# Patient Record
Sex: MALE | Race: WHITE | NOT HISPANIC OR LATINO | Employment: FULL TIME | URBAN - NONMETROPOLITAN AREA
[De-identification: names, ages, dates, MRNs, and addresses within clinical notes are randomized per-mention and may not be internally consistent; named-entity substitution may affect disease eponyms.]

---

## 2018-01-01 ENCOUNTER — APPOINTMENT (EMERGENCY)
Dept: RADIOLOGY | Facility: HOSPITAL | Age: 63
End: 2018-01-01
Payer: COMMERCIAL

## 2018-01-01 ENCOUNTER — APPOINTMENT (EMERGENCY)
Dept: CT IMAGING | Facility: HOSPITAL | Age: 63
End: 2018-01-01
Payer: COMMERCIAL

## 2018-01-01 ENCOUNTER — HOSPITAL ENCOUNTER (EMERGENCY)
Facility: HOSPITAL | Age: 63
End: 2018-01-01
Attending: EMERGENCY MEDICINE | Admitting: EMERGENCY MEDICINE
Payer: COMMERCIAL

## 2018-01-01 ENCOUNTER — HOSPITAL ENCOUNTER (INPATIENT)
Facility: HOSPITAL | Age: 63
LOS: 2 days | Discharge: HOME | DRG: 183 | End: 2018-01-03
Attending: SURGERY | Admitting: SURGERY
Payer: COMMERCIAL

## 2018-01-01 VITALS
SYSTOLIC BLOOD PRESSURE: 148 MMHG | DIASTOLIC BLOOD PRESSURE: 88 MMHG | RESPIRATION RATE: 20 BRPM | TEMPERATURE: 99.2 F | OXYGEN SATURATION: 100 % | WEIGHT: 187.61 LBS | HEART RATE: 110 BPM

## 2018-01-01 DIAGNOSIS — K92.1 MELENA: ICD-10-CM
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
## 2018-01-01 DIAGNOSIS — E11.9 NEW ONSET TYPE 2 DIABETES MELLITUS (HCC): ICD-10-CM

## 2018-01-01 DIAGNOSIS — D64.9 ANEMIA: ICD-10-CM

## 2018-01-01 DIAGNOSIS — I21.A1 NON-ST ELEVATION MYOCARDIAL INFARCTION (NSTEMI), TYPE 2: ICD-10-CM

## 2018-01-01 DIAGNOSIS — S22.49XA MULTIPLE RIB FRACTURES: ICD-10-CM

## 2018-01-01 DIAGNOSIS — I21.4 NSTEMI (NON-ST ELEVATED MYOCARDIAL INFARCTION) (HCC): ICD-10-CM

## 2018-01-01 DIAGNOSIS — K92.2 GASTROINTESTINAL HEMORRHAGE, UNSPECIFIED GASTROINTESTINAL HEMORRHAGE TYPE: Primary | ICD-10-CM

## 2018-01-01 LAB
ABO GROUP BLD BPU: NORMAL
ABO GROUP BLD BPU: NORMAL
ABO GROUP BLD: NORMAL
ACETONE SERPL-MCNC: NEGATIVE MG/DL
ALBUMIN SERPL BCP-MCNC: 3.2 G/DL (ref 3.5–5)
ALP SERPL-CCNC: 50 U/L (ref 46–116)
ALT SERPL W P-5'-P-CCNC: 33 U/L (ref 12–78)
ANION GAP SERPL CALCULATED.3IONS-SCNC: 18 MMOL/L (ref 4–13)
ANION GAP SERPL CALCULATED.3IONS-SCNC: 9 MMOL/L (ref 4–13)
APTT PPP: 24 SECONDS (ref 23–35)
AST SERPL W P-5'-P-CCNC: 27 U/L (ref 5–45)
ATRIAL RATE: 110 BPM
BACTERIA UR QL AUTO: ABNORMAL /HPF
BASOPHILS # BLD AUTO: 0.01 THOUSANDS/ΜL (ref 0–0.1)
BASOPHILS NFR BLD AUTO: 0 % (ref 0–1)
BILIRUB SERPL-MCNC: 1.3 MG/DL (ref 0.2–1)
BILIRUB UR QL STRIP: NEGATIVE
BLD GP AB SCN SERPL QL: NEGATIVE
BPU ID: NORMAL
BPU ID: NORMAL
BUN SERPL-MCNC: 35 MG/DL (ref 5–25)
BUN SERPL-MCNC: 65 MG/DL (ref 5–25)
CALCIUM SERPL-MCNC: 7.8 MG/DL (ref 8.3–10.1)
CALCIUM SERPL-MCNC: 8.6 MG/DL (ref 8.3–10.1)
CHLORIDE SERPL-SCNC: 110 MMOL/L (ref 100–108)
CHLORIDE SERPL-SCNC: 96 MMOL/L (ref 100–108)
CK MB SERPL-MCNC: 1.4 % (ref 0–2.5)
CK MB SERPL-MCNC: 2.3 NG/ML (ref 0–5)
CK SERPL-CCNC: 163 U/L (ref 39–308)
CLARITY UR: CLEAR
CO2 SERPL-SCNC: 20 MMOL/L (ref 21–32)
CO2 SERPL-SCNC: 25 MMOL/L (ref 21–32)
COLOR UR: ABNORMAL
CREAT SERPL-MCNC: 0.99 MG/DL (ref 0.6–1.3)
CREAT SERPL-MCNC: 1.61 MG/DL (ref 0.6–1.3)
CROSSMATCH: NORMAL
CROSSMATCH: NORMAL
EOSINOPHIL # BLD AUTO: 0 THOUSAND/ΜL (ref 0–0.61)
EOSINOPHIL NFR BLD AUTO: 0 % (ref 0–6)
ERYTHROCYTE [DISTWIDTH] IN BLOOD BY AUTOMATED COUNT: 12.4 % (ref 11.6–15.1)
ETHANOL SERPL-MCNC: <3 MG/DL (ref 0–3)
GFR SERPL CREATININE-BSD FRML MDRD: 45 ML/MIN/1.73SQ M
GFR SERPL CREATININE-BSD FRML MDRD: 82 ML/MIN/1.73SQ M
GLUCOSE SERPL-MCNC: 119 MG/DL (ref 65–140)
GLUCOSE SERPL-MCNC: 353 MG/DL (ref 65–140)
GLUCOSE SERPL-MCNC: 96 MG/DL (ref 65–140)
GLUCOSE UR STRIP-MCNC: NEGATIVE MG/DL
HCT VFR BLD AUTO: 28.5 % (ref 36.5–49.3)
HGB BLD-MCNC: 9.8 G/DL (ref 12–17)
HGB UR QL STRIP.AUTO: ABNORMAL
INR PPP: 1.11 (ref 0.86–1.16)
KETONES UR STRIP-MCNC: NEGATIVE MG/DL
LACTATE SERPL-SCNC: 1.5 MMOL/L (ref 0.5–2)
LACTATE SERPL-SCNC: 9.4 MMOL/L (ref 0.5–2)
LEUKOCYTE ESTERASE UR QL STRIP: NEGATIVE
LIPASE SERPL-CCNC: 69 U/L (ref 73–393)
LYMPHOCYTES # BLD AUTO: 1.11 THOUSANDS/ΜL (ref 0.6–4.47)
LYMPHOCYTES NFR BLD AUTO: 6 % (ref 14–44)
MAGNESIUM SERPL-MCNC: 2.3 MG/DL (ref 1.6–2.6)
MCH RBC QN AUTO: 32.9 PG (ref 26.8–34.3)
MCHC RBC AUTO-ENTMCNC: 34.4 G/DL (ref 31.4–37.4)
MCV RBC AUTO: 96 FL (ref 82–98)
MONOCYTES # BLD AUTO: 1.23 THOUSAND/ΜL (ref 0.17–1.22)
MONOCYTES NFR BLD AUTO: 6 % (ref 4–12)
NEUTROPHILS # BLD AUTO: 17.18 THOUSANDS/ΜL (ref 1.85–7.62)
NEUTS SEG NFR BLD AUTO: 88 % (ref 43–75)
NITRITE UR QL STRIP: NEGATIVE
NON-SQ EPI CELLS URNS QL MICRO: ABNORMAL /HPF
P AXIS: 32 DEGREES
PH UR STRIP.AUTO: 5 [PH] (ref 4.5–8)
PLATELET # BLD AUTO: 216 THOUSANDS/UL (ref 149–390)
PMV BLD AUTO: 10.7 FL (ref 8.9–12.7)
POTASSIUM SERPL-SCNC: 3.2 MMOL/L (ref 3.5–5.3)
POTASSIUM SERPL-SCNC: 3.5 MMOL/L (ref 3.5–5.3)
PR INTERVAL: 136 MS
PROT SERPL-MCNC: 6.6 G/DL (ref 6.4–8.2)
PROT UR STRIP-MCNC: NEGATIVE MG/DL
PROTHROMBIN TIME: 14.2 SECONDS (ref 12.1–14.4)
QRS AXIS: 6 DEGREES
QRSD INTERVAL: 94 MS
QT INTERVAL: 344 MS
QTC INTERVAL: 465 MS
RBC # BLD AUTO: 2.98 MILLION/UL (ref 3.88–5.62)
RBC #/AREA URNS AUTO: ABNORMAL /HPF
RH BLD: POSITIVE
SODIUM SERPL-SCNC: 134 MMOL/L (ref 136–145)
SODIUM SERPL-SCNC: 144 MMOL/L (ref 136–145)
SP GR UR STRIP.AUTO: <=1.005 (ref 1–1.03)
SPECIMEN EXPIRATION DATE: NORMAL
T WAVE AXIS: 3 DEGREES
TROPONIN I SERPL-MCNC: 0.07 NG/ML
TROPONIN I SERPL-MCNC: 0.07 NG/ML
TROPONIN I SERPL-MCNC: 0.09 NG/ML
TROPONIN I SERPL-MCNC: 0.11 NG/ML
TROPONIN I SERPL-MCNC: 0.11 NG/ML
UNIT DISPENSE STATUS: NORMAL
UNIT DISPENSE STATUS: NORMAL
UNIT PRODUCT CODE: NORMAL
UNIT PRODUCT CODE: NORMAL
UNIT RH: NORMAL
UNIT RH: NORMAL
UROBILINOGEN UR QL STRIP.AUTO: 0.2 E.U./DL
VENTRICULAR RATE: 110 BPM
WBC # BLD AUTO: 19.53 THOUSAND/UL (ref 4.31–10.16)
WBC #/AREA URNS AUTO: ABNORMAL /HPF

## 2018-01-01 PROCEDURE — 83605 ASSAY OF LACTIC ACID: CPT | Performed by: SURGERY

## 2018-01-01 PROCEDURE — 84484 ASSAY OF TROPONIN QUANT: CPT | Performed by: EMERGENCY MEDICINE

## 2018-01-01 PROCEDURE — C9113 INJ PANTOPRAZOLE SODIUM, VIA: HCPCS | Performed by: SURGERY

## 2018-01-01 PROCEDURE — 71045 X-RAY EXAM CHEST 1 VIEW: CPT

## 2018-01-01 PROCEDURE — 85730 THROMBOPLASTIN TIME PARTIAL: CPT | Performed by: EMERGENCY MEDICINE

## 2018-01-01 PROCEDURE — P9021 RED BLOOD CELLS UNIT: HCPCS

## 2018-01-01 PROCEDURE — 83690 ASSAY OF LIPASE: CPT | Performed by: EMERGENCY MEDICINE

## 2018-01-01 PROCEDURE — 93005 ELECTROCARDIOGRAM TRACING: CPT | Performed by: EMERGENCY MEDICINE

## 2018-01-01 PROCEDURE — 96365 THER/PROPH/DIAG IV INF INIT: CPT

## 2018-01-01 PROCEDURE — 70450 CT HEAD/BRAIN W/O DYE: CPT

## 2018-01-01 PROCEDURE — 90471 IMMUNIZATION ADMIN: CPT

## 2018-01-01 PROCEDURE — 80320 DRUG SCREEN QUANTALCOHOLS: CPT | Performed by: EMERGENCY MEDICINE

## 2018-01-01 PROCEDURE — 99285 EMERGENCY DEPT VISIT HI MDM: CPT

## 2018-01-01 PROCEDURE — 87505 NFCT AGENT DETECTION GI: CPT | Performed by: SURGERY

## 2018-01-01 PROCEDURE — 90715 TDAP VACCINE 7 YRS/> IM: CPT | Performed by: EMERGENCY MEDICINE

## 2018-01-01 PROCEDURE — 93005 ELECTROCARDIOGRAM TRACING: CPT

## 2018-01-01 PROCEDURE — 93005 ELECTROCARDIOGRAM TRACING: CPT | Performed by: SURGERY

## 2018-01-01 PROCEDURE — 86850 RBC ANTIBODY SCREEN: CPT | Performed by: EMERGENCY MEDICINE

## 2018-01-01 PROCEDURE — 71250 CT THORAX DX C-: CPT

## 2018-01-01 PROCEDURE — 83605 ASSAY OF LACTIC ACID: CPT | Performed by: EMERGENCY MEDICINE

## 2018-01-01 PROCEDURE — C9113 INJ PANTOPRAZOLE SODIUM, VIA: HCPCS | Performed by: EMERGENCY MEDICINE

## 2018-01-01 PROCEDURE — 82948 REAGENT STRIP/BLOOD GLUCOSE: CPT

## 2018-01-01 PROCEDURE — 85025 COMPLETE CBC W/AUTO DIFF WBC: CPT | Performed by: EMERGENCY MEDICINE

## 2018-01-01 PROCEDURE — 87040 BLOOD CULTURE FOR BACTERIA: CPT | Performed by: EMERGENCY MEDICINE

## 2018-01-01 PROCEDURE — 96361 HYDRATE IV INFUSION ADD-ON: CPT

## 2018-01-01 PROCEDURE — 73564 X-RAY EXAM KNEE 4 OR MORE: CPT

## 2018-01-01 PROCEDURE — 80048 BASIC METABOLIC PNL TOTAL CA: CPT | Performed by: SURGERY

## 2018-01-01 PROCEDURE — 86900 BLOOD TYPING SEROLOGIC ABO: CPT | Performed by: EMERGENCY MEDICINE

## 2018-01-01 PROCEDURE — 86901 BLOOD TYPING SEROLOGIC RH(D): CPT | Performed by: EMERGENCY MEDICINE

## 2018-01-01 PROCEDURE — 85610 PROTHROMBIN TIME: CPT | Performed by: EMERGENCY MEDICINE

## 2018-01-01 PROCEDURE — 82009 KETONE BODYS QUAL: CPT | Performed by: EMERGENCY MEDICINE

## 2018-01-01 PROCEDURE — 86920 COMPATIBILITY TEST SPIN: CPT

## 2018-01-01 PROCEDURE — 74177 CT ABD & PELVIS W/CONTRAST: CPT

## 2018-01-01 PROCEDURE — 87493 C DIFF AMPLIFIED PROBE: CPT | Performed by: SURGERY

## 2018-01-01 PROCEDURE — 81001 URINALYSIS AUTO W/SCOPE: CPT | Performed by: EMERGENCY MEDICINE

## 2018-01-01 PROCEDURE — 72125 CT NECK SPINE W/O DYE: CPT

## 2018-01-01 PROCEDURE — 82272 OCCULT BLD FECES 1-3 TESTS: CPT

## 2018-01-01 PROCEDURE — 36430 TRANSFUSION BLD/BLD COMPNT: CPT

## 2018-01-01 PROCEDURE — 83735 ASSAY OF MAGNESIUM: CPT | Performed by: EMERGENCY MEDICINE

## 2018-01-01 PROCEDURE — 82550 ASSAY OF CK (CPK): CPT | Performed by: EMERGENCY MEDICINE

## 2018-01-01 PROCEDURE — 84484 ASSAY OF TROPONIN QUANT: CPT | Performed by: SURGERY

## 2018-01-01 PROCEDURE — 80053 COMPREHEN METABOLIC PANEL: CPT | Performed by: EMERGENCY MEDICINE

## 2018-01-01 PROCEDURE — 36415 COLL VENOUS BLD VENIPUNCTURE: CPT | Performed by: EMERGENCY MEDICINE

## 2018-01-01 PROCEDURE — 82553 CREATINE MB FRACTION: CPT | Performed by: EMERGENCY MEDICINE

## 2018-01-01 PROCEDURE — 96374 THER/PROPH/DIAG INJ IV PUSH: CPT

## 2018-01-01 PROCEDURE — 36415 COLL VENOUS BLD VENIPUNCTURE: CPT | Performed by: SURGERY

## 2018-01-01 RX ORDER — SODIUM CHLORIDE, SODIUM LACTATE, POTASSIUM CHLORIDE, CALCIUM CHLORIDE 600; 310; 30; 20 MG/100ML; MG/100ML; MG/100ML; MG/100ML
125 INJECTION, SOLUTION INTRAVENOUS CONTINUOUS
Status: DISCONTINUED | OUTPATIENT
Start: 2018-01-01 | End: 2018-01-03

## 2018-01-01 RX ORDER — LIDOCAINE 50 MG/G
1 PATCH TOPICAL DAILY
Status: DISCONTINUED | OUTPATIENT
Start: 2018-01-01 | End: 2018-01-03 | Stop reason: HOSPADM

## 2018-01-01 RX ORDER — PANTOPRAZOLE SODIUM 40 MG/1
40 INJECTION, POWDER, FOR SOLUTION INTRAVENOUS EVERY 12 HOURS SCHEDULED
Status: DISCONTINUED | OUTPATIENT
Start: 2018-01-01 | End: 2018-01-03 | Stop reason: HOSPADM

## 2018-01-01 RX ORDER — OXYCODONE HYDROCHLORIDE 5 MG/1
5 TABLET ORAL EVERY 4 HOURS PRN
Status: DISCONTINUED | OUTPATIENT
Start: 2018-01-01 | End: 2018-01-03 | Stop reason: HOSPADM

## 2018-01-01 RX ORDER — ACETAMINOPHEN 325 MG/1
650 TABLET ORAL EVERY 6 HOURS SCHEDULED
Status: DISCONTINUED | OUTPATIENT
Start: 2018-01-01 | End: 2018-01-03 | Stop reason: HOSPADM

## 2018-01-01 RX ORDER — VALSARTAN AND HYDROCHLOROTHIAZIDE 320; 25 MG/1; MG/1
1 TABLET, FILM COATED ORAL DAILY
COMMUNITY
End: 2021-01-12 | Stop reason: SDUPTHER

## 2018-01-01 RX ORDER — ONDANSETRON 2 MG/ML
4 INJECTION INTRAMUSCULAR; INTRAVENOUS EVERY 6 HOURS PRN
Status: DISCONTINUED | OUTPATIENT
Start: 2018-01-01 | End: 2018-01-03 | Stop reason: HOSPADM

## 2018-01-01 RX ORDER — ONDANSETRON 2 MG/ML
4 INJECTION INTRAMUSCULAR; INTRAVENOUS ONCE
Status: COMPLETED | OUTPATIENT
Start: 2018-01-01 | End: 2018-01-01

## 2018-01-01 RX ORDER — OXYCODONE HYDROCHLORIDE 10 MG/1
10 TABLET ORAL EVERY 4 HOURS PRN
Status: DISCONTINUED | OUTPATIENT
Start: 2018-01-01 | End: 2018-01-03 | Stop reason: HOSPADM

## 2018-01-01 RX ADMIN — LIDOCAINE 1 PATCH: 50 PATCH TOPICAL at 14:28

## 2018-01-01 RX ADMIN — TETANUS TOXOID, REDUCED DIPHTHERIA TOXOID AND ACELLULAR PERTUSSIS VACCINE, ADSORBED 0.5 ML: 5; 2.5; 8; 8; 2.5 SUSPENSION INTRAMUSCULAR at 09:04

## 2018-01-01 RX ADMIN — SODIUM CHLORIDE, SODIUM LACTATE, POTASSIUM CHLORIDE, AND CALCIUM CHLORIDE 1000 ML: .6; .31; .03; .02 INJECTION, SOLUTION INTRAVENOUS at 13:28

## 2018-01-01 RX ADMIN — ONDANSETRON 4 MG: 2 INJECTION INTRAMUSCULAR; INTRAVENOUS at 09:03

## 2018-01-01 RX ADMIN — ACETAMINOPHEN 650 MG: 325 TABLET, FILM COATED ORAL at 20:09

## 2018-01-01 RX ADMIN — SODIUM CHLORIDE 80 MG: 900 INJECTION, SOLUTION INTRAVENOUS at 09:13

## 2018-01-01 RX ADMIN — ACETAMINOPHEN 650 MG: 325 TABLET, FILM COATED ORAL at 14:25

## 2018-01-01 RX ADMIN — DEXTROSE MONOHYDRATE 3.38 G: 50 INJECTION, SOLUTION INTRAVENOUS at 20:11

## 2018-01-01 RX ADMIN — DEXTROSE MONOHYDRATE 3.38 G: 50 INJECTION, SOLUTION INTRAVENOUS at 14:26

## 2018-01-01 RX ADMIN — SODIUM CHLORIDE 1000 ML: 0.9 INJECTION, SOLUTION INTRAVENOUS at 08:53

## 2018-01-01 RX ADMIN — SODIUM CHLORIDE 2553 ML: 0.9 INJECTION, SOLUTION INTRAVENOUS at 09:41

## 2018-01-01 RX ADMIN — SODIUM CHLORIDE, SODIUM LACTATE, POTASSIUM CHLORIDE, AND CALCIUM CHLORIDE 125 ML/HR: .6; .31; .03; .02 INJECTION, SOLUTION INTRAVENOUS at 17:30

## 2018-01-01 RX ADMIN — IOHEXOL 100 ML: 350 INJECTION, SOLUTION INTRAVENOUS at 08:46

## 2018-01-01 RX ADMIN — PANTOPRAZOLE SODIUM 40 MG: 40 INJECTION, POWDER, FOR SOLUTION INTRAVENOUS at 21:49

## 2018-01-01 NOTE — H&P
H&P Exam - Trauma   Akil Mayo 65 y o  male MRN: [id number]  Unit/Bed#: ED 22 Encounter: [id number]    Assessment/Plan   Trauma Alert: Evaluation  Model of Arrival: Transfer [facility], [place]  Trauma Team: Attending Dr Walter Shelby and Residents Dr Lorraine Huff  Consultants: None    Trauma Active Problems/Plan:     Bloody diarrhea, non-bloody vomiting, dehydrartion  - Presume infectious at this time  WBC 19 5  Lac 9 4  No obvious source on CTAP  Diverticulosis no diverticulitis  - No abdominal pain  Non-tender   - S/p 2U RBC, 1L NS, and 30cc/kg NS at OSH  - Will give another 1L bolus  - Obtain stool cultures and Cdiff  - F/u blood cultures  - Starting empiric antibiotics - zosyn    Elevated troponin 0 07  - Denies chest pain  - ST depressions on lateral leads seen on EKG  - Obtain f/u troponins  - Repeat EKG    Fall with L 6-10 rib fractures  - Rib fracture protocol  - Scheduled tylenol, robaxin  PRN pain meds  - Resp protocol, IS    Chief Complaint: Left rib pain    History of Present Illness   HPI:  Akil Mayo is a 65 y o  male who presents as trauma transfer following fall down steps  The patient states that on Saturday he developed sudden onset nausea, vomiting and diarrhea  There was no associated abdominal or chest pain  He did admit to some chills but no fever around this time  No sick contacts  No known preceding illness or antibiotics use  The vomiting was never bloody  The diarrhea had bright red blood mixed in and was very voluminous  He then was dehydrated and had a fall down steps  No LOC and didn't hit his head  He was seen at [facility], [place]  He was hypotensive to SBP of 70  He received 2U RBC, 1L and an additional 30cc/kg bolus  Lac 9 4 and leukocytosis  Hgb 9 8  CTH, chest, abdomen and pelvis obtained showing rib fractures 6-10 on left side  He was transferred here for further management  He currently denies abdominal pain, chest pain, nausea, lightheadedness  He has never had a colonoscopy  Mechanism:Fall    Review of Systems   Constitutional: Positive for chills  Negative for fever  Eyes: Negative for photophobia and visual disturbance  Respiratory: Negative for chest tightness and shortness of breath  Cardiovascular: Negative for chest pain and palpitations  Gastrointestinal: Positive for blood in stool, diarrhea, nausea and vomiting  Negative for abdominal pain  Genitourinary: Negative for dysuria and flank pain  Musculoskeletal: Negative for back pain and neck pain  Skin: Negative for rash and wound  Neurological: Negative for dizziness, syncope, weakness, light-headedness, numbness and headaches  Psychiatric/Behavioral: Negative for confusion         Historical Information     Past Medical History:   Diagnosis Date    Hypertension      Past Surgical History:   Procedure Laterality Date    NO PAST SURGERIES       Social History   History   Alcohol Use    Yes     Comment: occassionally     History   Drug Use No     History   Smoking Status    Never Smoker   Smokeless Tobacco    Never Used     Immunization History   Administered Date(s) Administered    Tdap 01/01/2018     Last Tetanus: Today  Family History: Non-contributory    Meds/Allergies   all current active meds have been reviewed, current meds:   Current Facility-Administered Medications   Medication Dose Route Frequency    acetaminophen (TYLENOL) tablet 650 mg  650 mg Oral Q6H [address]    lactated ringers bolus 1,000 mL  1,000 mL Intravenous Once    lactated ringers infusion  125 mL/hr Intravenous Continuous    lidocaine (LIDODERM) 5 % patch 1 patch  1 patch Transdermal Daily    ondansetron (ZOFRAN) injection 4 mg  4 mg Intravenous Q6H PRN    oxyCODONE (ROXICODONE) immediate release tablet 10 mg  10 mg Oral Q4H PRN    oxyCODONE (ROXICODONE) IR tablet 5 mg  5 mg Oral Q4H PRN    pantoprazole (PROTONIX) injection 40 mg  40 mg Intravenous Q12H [address]    piperacillin-tazobactam (ZOSYN) 3 375 g in dextrose 5 % 50 mL IVPB  3 375 g Intravenous Q6H    and PTA meds:   None       No Known Allergies      PHYSICAL EXAM        Objective   Vitals:   First set: Temperature: 99 9 °F (37 7 °C) (01/01/18 1229)  Pulse: (!) 114 (01/01/18 1229)  Respirations: 18 (01/01/18 1229)  Blood Pressure: 148/87 (01/01/18 1229)    Primary Survey:   (A) Airway: Intact  (B) Breathing: B/l breath sounds  (C) Circulation: Pulses:   normal  (D) Disabliity:  GCS Total:  15  (E) Expose:  Completed    Secondary Survey: (Click on Physical Exam tab above)  Physical Exam   Constitutional: He is oriented to person, place, and time  No distress  HENT:   Head: Normocephalic and atraumatic  Neck: Normal range of motion  No JVD present  Cardiovascular: Regular rhythm and intact distal pulses  Tachycardic   Pulmonary/Chest: Effort normal  No respiratory distress  He has no wheezes  Abdominal: Soft  He exhibits no distension  There is no tenderness  There is no rebound and no guarding  Musculoskeletal: Normal range of motion  He exhibits no edema or deformity  Neurological: He is alert and oriented to person, place, and time  Skin: Skin is warm  He is not diaphoretic  No pallor  Psychiatric: He has a normal mood and affect   His behavior is normal  Thought content normal        Invasive Devices     Peripheral Intravenous Line            Peripheral IV 01/01/18 Right Antecubital less than 1 day    Peripheral IV 01/01/18 Right Hand less than 1 day                Lab Results:   Results: I have personally reviewed pertinent reports   , BMP/CMP:   Lab Results   Component Value Date     (L) 01/01/2018    K 3 2 (L) 01/01/2018    CL 96 (L) 01/01/2018    CO2 20 (L) 01/01/2018    ANIONGAP 18 (H) 01/01/2018    BUN 65 (H) 01/01/2018    CREATININE 1 61 (H) 01/01/2018    GLUCOSE 353 (H) 01/01/2018    CALCIUM 8 6 01/01/2018    AST 27 01/01/2018    ALT 33 01/01/2018    ALKPHOS 50 01/01/2018    PROT 6 6 01/01/2018    ALBUMIN 3 2 (L) 01/01/2018    BILITOT 1 30 (H) 01/01/2018    EGFR 45 01/01/2018   , CBC:   Lab Results   Component Value Date    WBC 19 53 (H) 01/01/2018    HGB 9 8 (L) 01/01/2018    HCT 28 5 (L) 01/01/2018    MCV 96 01/01/2018     01/01/2018    MCH 32 9 01/01/2018    MCHC 34 4 01/01/2018    RDW 12 4 01/01/2018    MPV 10 7 01/01/2018   , Coagulation:   Lab Results   Component Value Date    INR 1 11 01/01/2018   , Lactate: No results found for: LACTATE, Troponin:   Lab Results   Component Value Date    TROPONINI 0 07 ([place]) 01/01/2018    and ISTAT: No components found for: VBG  Imaging/EKG Studies: CT Scan Head: Nml, CT Scan C-Spine: Nml, CT Chest: Rib fractures, CT Scan Abdomen/Pelvis: Sclerotic lesions to vertebrae       Code Status: No Order  Advance Directive and Living Will:      Power of :    POLST:

## 2018-01-01 NOTE — ED PROVIDER NOTES
History  Chief Complaint   Patient presents with    Fall     Patient fell coming down steps last night  States he has been sick with N/V/D since the afternoon   Vomiting     81-year-old male presents by private car with complaint of fall down 4 steps at his home yesterday afternoon     This was secondary to lightheadedness  States he was in his usual state of health yesterday but by noon he was experiencing nausea vomiting and diarrhea in the afternoon  He complains of dark stools  They are still slightly formed  He is not anticoagulated  He denies loss of consciousness  He is denying any syncope  He did hit his head  He is denying any headache visual disturbance no difficulties with speech no malocclusion no neck pain denies any anterior chest pain he has some left posterior rib discomfort he denies shortness of breath no pleuritic pain no upper or lower back pain  No abdominal pain  He continues to be slightly nauseated this morning  There has been no sick contacts or travel E no recent antibiotic use  He states his stools have been slightly dark  He has no hip pain no numbness or tingling he feels dehydrated  He is unsure of his last tetanus  ; no prior hx of DM last BM during the night            None       Past Medical History:   Diagnosis Date    Hypertension        Past Surgical History:   Procedure Laterality Date    NO PAST SURGERIES         History reviewed  No pertinent family history  I have reviewed and agree with the history as documented  Social History   Substance Use Topics    Smoking status: Never Smoker    Smokeless tobacco: Never Used    Alcohol use Yes      Comment: occassionally        Review of Systems   Constitutional: Positive for appetite change  Negative for activity change, chills, diaphoresis and fever  HENT: Negative for congestion, ear pain, rhinorrhea, sneezing, sore throat and trouble swallowing  Eyes: Negative for discharge and visual disturbance  Respiratory: Negative for cough, chest tightness, shortness of breath and wheezing  Cardiovascular: Negative for chest pain and leg swelling  Gastrointestinal: Positive for diarrhea, nausea and vomiting  Negative for abdominal pain and blood in stool  Endocrine: Negative for polyuria  Genitourinary: Negative for difficulty urinating, dysuria, frequency and urgency  Musculoskeletal: Negative for back pain (left rib pain), gait problem, myalgias, neck pain and neck stiffness  Skin: Positive for wound (abraision to forehead)  Negative for rash  Neurological: Positive for light-headedness  Negative for dizziness, syncope, weakness, numbness and headaches  Hematological: Negative for adenopathy  Psychiatric/Behavioral: Negative for confusion  All other systems reviewed and are negative  Physical Exam  ED Triage Vitals [01/01/18 0805]   Temperature Pulse Respirations Blood Pressure SpO2   (!) 97 2 °F (36 2 °C) (!) 114 18 110/69 100 %      Temp Source Heart Rate Source Patient Position - Orthostatic VS BP Location FiO2 (%)   Temporal Monitor Lying -- --      Pain Score       --           Orthostatic Vital Signs  Vitals:    01/01/18 1041 01/01/18 1045 01/01/18 1050 01/01/18 1105   BP: 144/78 147/84 147/84 148/88   Pulse: 105 (!) 108 105 (!) 110   Patient Position - Orthostatic VS:  Sitting         Physical Exam   Constitutional: He is oriented to person, place, and time  He appears well-developed  No distress  HENT:   Head: Normocephalic  Right Ear: External ear normal    Left Ear: External ear normal    Nose: Nose normal    Mouth/Throat: No oropharyngeal exudate  Right forehead abrasion; TMS pale bilaterally no facial bone tenderess; dry oropharynx   Eyes: Conjunctivae and EOM are normal  Pupils are equal, round, and reactive to light  Right eye exhibits no discharge  Left eye exhibits no discharge  3mm equal; no periorbital tenderness   Neck: Normal range of motion  Neck supple     No midline or paraspinous tenderenss   Cardiovascular: Regular rhythm, normal heart sounds and intact distal pulses  Pulmonary/Chest: Effort normal and breath sounds normal  No respiratory distress  He has no wheezes  He exhibits no tenderness  Abdominal: Soft  Bowel sounds are normal  He exhibits no distension and no mass  There is no tenderness  There is no guarding  Back no midline T or Lspine tenderness; No CVA tenderness   Genitourinary: Rectal exam shows guaiac positive stool  Genitourinary Comments: Rectal - chaparoned by Abhi Clemente; nml tone heme postitive flaca melena  Prostate NT   Musculoskeletal: Normal range of motion  He exhibits no edema or tenderness  Right shoulder: Normal         Left shoulder: Normal         Right hip: Normal         Left hip: Normal         Left knee: He exhibits normal range of motion, no swelling, no effusion, no ecchymosis, no deformity, no erythema, normal alignment, no LCL laxity, normal patellar mobility, no bony tenderness and no MCL laxity  No tenderness found  No medial joint line, no lateral joint line, no MCL, no LCL and no patellar tendon tenderness noted  Left ankle: Normal         Legs:  Abrasion to left suprascapular region and bilateral flanks as well as above posterior illiac crests; Hip NT bilaterally with ROM intact; rt knee with scan superficial abrasion   Lymphadenopathy:     He has no cervical adenopathy  Neurological: He is alert and oriented to person, place, and time  He displays normal reflexes  No cranial nerve deficit or sensory deficit  He exhibits normal muscle tone  Coordination normal    Gait steady   Skin: Skin is warm and dry  Capillary refill takes less than 2 seconds  He is not diaphoretic  Psychiatric: He has a normal mood and affect  Vitals reviewed        ED Medications  Medications   ondansetron (ZOFRAN) injection 4 mg (4 mg Intravenous Given 1/1/18 0903)   sodium chloride 0 9 % bolus 1,000 mL (0 mL Intravenous Stopped 1/1/18 0940)   tetanus-diphtheria-acellular pertussis (BOOSTRIX) IM injection 0 5 mL (0 5 mL Intramuscular Given 1/1/18 0904)   pantoprazole (PROTONIX) 80 mg in sodium chloride 0 9 % 100 mL IVPB (0 mg Intravenous Stopped 1/1/18 0940)   iohexol (OMNIPAQUE) 350 MG/ML injection (SINGLE-DOSE) 100 mL (100 mL Intravenous Given 1/1/18 0846)   sodium chloride 0 9 % bolus 2,553 mL (0 mL/kg × 85 1 kg Intravenous Stopped 1/1/18 1104)       Diagnostic Studies  Results Reviewed     Procedure Component Value Units Date/Time    Urine Microscopic [[id number]]  (Abnormal) Collected:  01/01/18 1023    Lab Status:  Final result Specimen:  Urine from Urine, Clean Catch Updated:  01/01/18 1041     RBC, UA 1-2 (A) /hpf      WBC, UA 0-1 (A) /hpf      Epithelial Cells Occasional /hpf      Bacteria, UA None Seen /hpf     Fingerstick Glucose (POCT) [[id number]]  (Normal) Collected:  01/01/18 1037    Lab Status:  Final result Updated:  01/01/18 1038     POC Glucose 96 mg/dl     UA w Reflex to Microscopic w Reflex to Culture [[id number]]  (Abnormal) Collected:  01/01/18 1023    Lab Status:  Final result Specimen:  Urine from Urine, Clean Catch Updated:  01/01/18 1030     Color, UA Light Yellow     Clarity, UA Clear     Specific Gravity, UA <=1 005     pH, UA 5 0     Leukocytes, UA Negative     Nitrite, UA Negative     Protein, UA Negative mg/dl      Glucose, UA Negative mg/dl      Ketones, UA Negative mg/dl      Urobilinogen, UA 0 2 E U /dl      Bilirubin, UA Negative     Blood, UA Moderate (A)    CKMB [[id number]]  (Normal) Collected:  01/01/18 0810    Lab Status:  Final result Specimen:  Blood from Arm, Right Updated:  01/01/18 0923     CK-MB Index 1 4 %      CK-MB FRACTION 2 3 ng/mL     Troponin I [[id number]]  (Abnormal) Collected:  01/01/18 0810    Lab Status:  Final result Specimen:  Blood from Arm, Right Updated:  01/01/18 0905     Troponin I 0 07 (HH) ng/mL     Narrative:         [employer] Chemistry analyzer 99% cutoff is > 0 04 ng/mL in network labs    o cTnI 99% cutoff is useful only when applied to patients in the clinical setting of myocardial ischemia  o cTnI 99% cutoff should be interpreted in the context of clinical history, ECG findings and possibly cardiac imaging to establish correct diagnosis  o cTnI 99% cutoff may be suggestive but clearly not indicative of a coronary event without the clinical setting of myocardial ischemia  Acetone [[id number]]  (Normal) Collected:  01/01/18 0810    Lab Status:  Final result Specimen:  Blood from Arm, Right Updated:  01/01/18 0855     Acetone, Bld Negative    Lactic acid, plasma [[id number]]  (Abnormal) Collected:  01/01/18 0810    Lab Status:  Final result Specimen:  Blood from Arm, Right Updated:  01/01/18 0851     LACTIC ACID 9 4 (HH) mmol/L     Narrative:         Result may be elevated if tourniquet was used during collection  Ethanol [[id number]]  (Normal) Collected:  01/01/18 0810    Lab Status:  Final result Specimen:  Blood from Arm, Right Updated:  01/01/18 0844     Ethanol Lvl <3 mg/dL     Comprehensive metabolic panel [[id number]]  (Abnormal) Collected:  01/01/18 0810    Lab Status:  Final result Specimen:  Blood from Arm, Right Updated:  01/01/18 0843     Sodium 134 (L) mmol/L      Potassium 3 2 (L) mmol/L      Chloride 96 (L) mmol/L      CO2 20 (L) mmol/L      Anion Gap 18 (H) mmol/L      BUN 65 (H) mg/dL      Creatinine 1 61 (H) mg/dL      Glucose 353 (H) mg/dL      Calcium 8 6 mg/dL      AST 27 U/L      ALT 33 U/L      Alkaline Phosphatase 50 U/L      Total Protein 6 6 g/dL      Albumin 3 2 (L) g/dL      Total Bilirubin 1 30 (H) mg/dL      eGFR 45 ml/min/1 73sq m     Narrative:         National Kidney Disease Education Program recommendations are as follows:  GFR calculation is accurate only with a steady state creatinine  Chronic Kidney disease less than 60 ml/min/1 73 sq  meters  Kidney failure less than 15 ml/min/1 73 sq  meters      Magnesium [[id number]]  (Normal) Collected:  01/01/18 7393    Lab Status:  Final result Specimen:  Blood from Arm, Right Updated:  01/01/18 0843     Magnesium 2 3 mg/dL     Lipase [[id number]]  (Abnormal) Collected:  01/01/18 0810    Lab Status:  Final result Specimen:  Blood from Arm, Right Updated:  01/01/18 0843     Lipase 69 (L) u/L     CK Total with Reflex CKMB [[id number]]  (Normal) Collected:  01/01/18 0810    Lab Status:  Final result Specimen:  Blood from Arm, Right Updated:  01/01/18 0843     Total  U/L     Protime-INR [[id number]]  (Normal) Collected:  01/01/18 0810    Lab Status:  Final result Specimen:  Blood from Arm, Right Updated:  01/01/18 0835     Protime 14 2 seconds      INR 1 11    APTT [[id number]]  (Normal) Collected:  01/01/18 0810    Lab Status:  Final result Specimen:  Blood from Arm, Right Updated:  01/01/18 0835     PTT 24 seconds     Narrative: Therapeutic Heparin Range = 60-90 seconds    CBC and differential [[id number]]  (Abnormal) Collected:  01/01/18 0810    Lab Status:  Final result Specimen:  Blood from Arm, Right Updated:  01/01/18 0830     WBC 19 53 (H) Thousand/uL      RBC 2 98 (L) Million/uL      Hemoglobin 9 8 (L) g/dL      Hematocrit 28 5 (L) %      MCV 96 fL      MCH 32 9 pg      MCHC 34 4 g/dL      RDW 12 4 %      MPV 10 7 fL      Platelets 634 Thousands/uL      Neutrophils Relative 88 (H) %      Lymphocytes Relative 6 (L) %      Monocytes Relative 6 %      Eosinophils Relative 0 %      Basophils Relative 0 %      Neutrophils Absolute 17 18 (H) Thousands/µL      Lymphocytes Absolute 1 11 Thousands/µL      Monocytes Absolute 1 23 (H) Thousand/µL      Eosinophils Absolute 0 00 Thousand/µL      Basophils Absolute 0 01 Thousands/µL     Blood culture #1 [[id number]] Collected:  01/01/18 8421    Lab Status: In process Specimen:  Blood from Hand, Right Updated:  01/01/18 0824    Blood culture #2 [[id number]] Collected:  01/01/18 0810    Lab Status:   In process Specimen:  Blood from Arm, Right Updated:  01/01/18 1172 CT abdomen pelvis with contrast   Final Result by Jackie Tucker MD (12/70 9309)   1  Acute displaced fractures left 7th through 10th ribs  Possible trace left hemothorax  Chest CT is recommended as the left 7 fracture is on the 1st slice of the study  2   Mild bladder wall thickening  Correlate with urinalysis  3   Mixed lytic and sclerotic lesions involving the sacrum and left T11 pedicle  Recommend bone scan for initial evaluation  4   Colonic diverticulosis without diverticulitis  I personally discussed this study with Santa Miller on 1/1/2018 9:24 AM          Workstation performed: [id number]         CT chest without contrast   Final Result by Dylon Bender MD (01/01 7180)         1  Fractures of the left sixth through 10th ribs  2  Mild airspace disease, posterior left lower lobe suggestive of mild contusion  3  No evidence of pneumothorax or hemothorax  4  Fluid-filled esophagus, potentially related to reflux                  Workstation performed: [id number]         XR knee 4+ views left injury   ED Interpretation by Ian Alan MD (89/25 7161)   Read by me; Radiologist to provide formal interpretation  No acute fracture      Final Result by INES Figueredo MD (01/01 3341)      No acute osseous abnormality  ____ ____ ____ ____ ____ ____ ____ ____ ____ ____ ____ ____ ____       As per comments in the PACS workstation, findings are concordant with preliminary interpretation provided by the emergency room physician  Workstation performed: [id number]         XR chest 1 view portable   ED Interpretation by Ian Alan MD (18/19 3169)   Read by me; Radiologist to provide formal interpretation  No acute process      Final Result by INES Figueredo MD (59/52 2836)      No active disease            ____ ____ ____ ____ ____ ____ ____ ____ ____ ____ ____ ____ ____       As per comments in the PACS workstation, findings are concordant with preliminary interpretation provided by the emergency room physician            Workstation performed: [id number]         CT cervical spine without contrast   Final Result by Corrinne Kohler, MD (01/01 1099)      No cervical spine fracture or traumatic malalignment  Workstation performed: [id number]         CT head without contrast   Final Result by Corrinne Kohler, MD (84/74 7312)      No acute intracranial abnormality  Workstation performed: [id number]                    Procedures  ECG 12 Lead Documentation  Date/Time: 1/1/2018 8:27 AM  Performed by: Karis Lake  Authorized by: Karis Lake     Indications / Diagnosis:  Lightheadedness  ECG reviewed by me, the ED Provider: yes    Patient location:  ED  Previous ECG:     Previous ECG:  Unavailable  Rate:     ECG rate:  112    ECG rate assessment: tachycardic    Rhythm:     Rhythm: sinus tachycardia    QRS:     QRS axis:  Normal  Comments:      STD 1 mm v2-v6, I  No prior available           Phone Contacts  ED Phone Contact    ED Course  ED Course as of Jan 01 2046   Mon Jan 01, 2018   0850 Will bolus with 30ml/kg NS once CT head results available    0900 Patient had episode of hypotension to SBP 70 in CT will transfulse 2 PRBC secondary to ST changes on EKG and episode of hypotension  1553 Case reviewed with Dr Javan Painter accepts patient in transfer to trauma service    9726 No focus of infection on exam; lactic acidicosis secondary to acute blood loss   Emperic antibioic held          HEART Risk Score    Flowsheet Row Most Recent Value   History  0 Filed at: 01/01/2018 0905   ECG  2 Filed at: 01/01/2018 9994   Age  1 Filed at: 01/01/2018 0905   Risk Factors  1 Filed at: 01/01/2018 0905   Troponin  1 Filed at: 01/01/2018 0905   Heart Score Risk Calculator   History  0 Filed at: 01/01/2018 0905   ECG  2 Filed at: 01/01/2018 2288   Age  1 Filed at: 01/01/2018 0905   Risk Factors  1 Filed at: 01/01/2018 9985 Troponin  1 Filed at: 01/01/2018 0905   HEART Score  5 Filed at: 01/01/2018 7483   HEART Score  5 Filed at: 01/01/2018 5028                            MDM  Number of Diagnoses or Management Options  Anemia:   Fall down stairs:   Melena:   Multiple rib fractures:   New onset type 2 diabetes mellitus ([facility] ):   Non-ST elevation myocardial infarction (NSTEMI), type 2 [facility]):   Diagnosis management comments: Mdm: upper GI bleed with flaca melena on rectal; will eval for evidence of trauma with CT head, neck , a/p; plain film of chest and left knee; will t&S and proceed with medical eval of GI bleed  The patient presented with a condition in which there was a high probability of imminent or life-threatening deterioration, and critical care services (excluding separately billable procedures) totalled  minutes          Disposition  Final diagnoses:   Fall down stairs   Multiple rib fractures - left 6-10 trace left lung contusion   Melena   Anemia - Hb 9 4   New onset type 2 diabetes mellitus (HCC)   Non-ST elevation myocardial infarction (NSTEMI), type 2 ([place] [address] )     Time reflects when diagnosis was documented in both MDM as applicable and the Disposition within this note     Time User Action Codes Description Comment    1/1/2018  9:33 AM Daniel Pellet Add Brandyn Sweetching Fall down stairs     1/1/2018  9:34 AM Daniel Pellet Add [S22 49XA] Multiple rib fractures     1/1/2018  9:34 AM Daniel Pellet Modify [S22 49XA] Multiple rib fractures left 7-10 trace hemothorax    1/1/2018  9:34 AM Daniel Pellet Add [K92 1] Melena     1/1/2018  9:35 AM Eda [place] [D64 9] Anemia     1/1/2018  9:35 AM Daniel Pellet Modify [D64 9] Anemia Hb 9 4    1/1/2018  9:35 AM Sean Kaiser Aw Add [E11 9] New onset type 2 diabetes mellitus ([facility] )     1/1/2018  9:35 AM Sean Kaiser Add [I21 4] Non-ST elevation myocardial infarction (NSTEMI), type 2 ([place] [address] )     1/1/2018 11:08 AM Daniel Pellet Modify [S22 49XA] Multiple rib fractures left 6-10 trace left lung contusion      ED Disposition     ED Disposition Condition Comment    Transfer to Another [facility] Leo should be transferred out to Dr Jaime Taylor trauma services B ED      MD Documentation    Garrett Plata Most Recent Value   Patient Condition  The patient has been stabilized such that within reasonable medical probability, no material deterioration of the patient condition or the condition of the unborn child(christianne) is likely to result from the transfer   Reason for Transfer  Level of Care needed not available at this facility   Benefits of Transfer  Specialized equipment and/or services available at the receiving facility (Include comment)________________________ Jannette Small, GI]   Risks of Transfer  Potential for delay in receiving treatment   Accepting Physician  Dr Ryan Richey Name, [address] [place], [place]    (Name & Tel number)  PACs   Sending MD  [facility]   Provider Certification  General risk, such as traffic hazards, adverse weather conditions, rough terrain or turbulence, possible failure of equipment (including vehicle or aircraft), or consequences of actions of persons outside the control of the transport personnel      RN Documentation    Flowsheet Row Most [address] [facility] Name, [address] [facility], [address], [address] Assignment  ER-2    (Name & Tel number)  PACs   Report Given to  [facility]      Follow-up Information    None       There are no discharge medications for this patient  No discharge procedures on file      ED Provider  Electronically Signed by           Mariah Woodard MD  01/01/18 9334

## 2018-01-01 NOTE — EMTALA/ACUTE CARE TRANSFER
[address] [address]  [address] [address]  Dept: [address] [place] TRANSFER CONSENT    NAME Jimbo Matthews                                         1955                              MRN [id number]    I have been informed of my rights regarding examination, treatment, and transfer   by Dr Mariah Woodard MD    Benefits: Specialized equipment and/or services available at the receiving facility (Include comment)________________________ (Trauma, GI)    Risks: Potential for delay in receiving treatment      Transfer Request   I acknowledge that my medical condition has been evaluated and explained to me by the emergency department physician or other qualified medical person and/or my attending physician who has recommended and offered to me further medical examination and treatment  I understand the Hospital's obligation with respect to the treatment and stabilization of my emergency medical condition  I nevertheless request to be transferred  I release the Hospital, the doctor, and any other persons caring for me from all responsibility or liability for any injury or ill effects that may result from my transfer and agree to accept all responsibility for the consequences of my choice to transfer, rather than receive stabilizing treatment at the Hospital  I understand that because the transfer is my request, my insurance may not provide reimbursement for the services  The Hospital will assist and direct me and my family in how to make arrangements for transfer, but the hospital is not liable for any fees charged by the transport service  In spite of this understanding, I refuse to consent to further medical examination and treatment which has been offered to me, and request transfer to  [facility] [address] : [place], [place]   I authorize the performance of emergency medical procedures and treatments upon me in both transit and upon arrival at the receiving facility  Additionally, I authorize the release of any and all medical records to the receiving facility and request they be transported with me, if possible  I authorize the performance of emergency medical procedures and treatments upon me in both transit and upon arrival at the receiving facility  Additionally, I authorize the release of any and all medical records to the receiving facility and request they be transported with me, if possible  I understand that the safest mode of transportation during a medical emergency is an ambulance and that the Hospital advocates the use of this mode of transport  Risks of traveling to the receiving facility by car, including absence of medical control, life sustaining equipment, such as oxygen, and medical personnel has been explained to me and I fully understand them  (RUSSEL CORRECT BOX BELOW)  [  ]  I consent to the stated transfer and to be transported by ambulance/helicopter  [  ]  I consent to the stated transfer, but refuse transportation by ambulance and accept full responsibility for my transportation by car  I understand the risks of non-ambulance transfers and I exonerate the Hospital and its staff from any deterioration in my condition that results from this refusal     X___________________________________________    DATE  18  TIME________  Signature of patient or legally responsible individual signing on patient behalf           RELATIONSHIP TO PATIENT_________________________          Provider Certification    NAME Uriah Romero                                         1955                              MRN [id number]    A medical screening exam was performed on the above named patient  Based on the examination:    Condition Necessitating Transfer The primary encounter diagnosis was Fall down stairs   Diagnoses of Multiple rib fractures, Melena, Anemia, New onset type 2 diabetes mellitus ([facility] [address] ), and Non-ST elevation myocardial infarction (NSTEMI), type 2 ([facility] [address] ) were also pertinent to this visit  Patient Condition: The patient has been stabilized such that within reasonable medical probability, no material deterioration of the patient condition or the condition of the unborn child(christianne) is likely to result from the transfer    Reason for Transfer: Level of Care needed not available at this facility    Transfer Requirements: [address] [place], [place]   · Space available and qualified personnel available for treatment as acknowledged by PACs  · Agreed to accept transfer and to provide appropriate medical treatment as acknowledged by       Dr Loc Wilder  · Appropriate medical records of the examination and treatment of the patient are provided at the time of transfer   [facility], [address] _______  · Transfer will be performed by qualified personnel from    and appropriate transfer equipment as required, including the use of necessary and appropriate life support measures  Provider Certification: I have examined the patient and explained the following risks and benefits of being transferred/refusing transfer to the patient/family:  General risk, such as traffic hazards, adverse weather conditions, rough terrain or turbulence, possible failure of equipment (including vehicle or aircraft), or consequences of actions of persons outside the control of the transport personnel      Based on these reasonable risks and benefits to the patient and/or the unborn child(christianne), and based upon the information available at the time of the patients examination, I certify that the medical benefits reasonably to be expected from the provision of appropriate medical treatments at another medical facility outweigh the increasing risks, if any, to the individuals medical condition, and in the case of labor to the unborn child, from effecting the transfer      X____________________________________________ DATE 01/01/18 TIME_______      ORIGINAL - SEND TO MEDICAL RECORDS   COPY - SEND WITH PATIENT DURING TRANSFER

## 2018-01-01 NOTE — ED NOTES
Rectal exam done  Stool -melanoma   hemmoccult positive     Yeni Deal [facility]  01/01/18 8455

## 2018-01-02 PROBLEM — S22.49XA MULTIPLE RIB FRACTURES: Status: ACTIVE | Noted: 2018-01-02

## 2018-01-02 PROBLEM — I21.4 NSTEMI (NON-ST ELEVATED MYOCARDIAL INFARCTION) (HCC): Status: ACTIVE | Noted: 2018-01-02

## 2018-01-02 PROBLEM — K92.2 GI BLEED: Status: ACTIVE | Noted: 2018-01-02

## 2018-01-02 LAB
ANION GAP SERPL CALCULATED.3IONS-SCNC: 6 MMOL/L (ref 4–13)
ATRIAL RATE: 112 BPM
BASOPHILS # BLD AUTO: 0.04 THOUSANDS/ΜL (ref 0–0.1)
BASOPHILS NFR BLD AUTO: 1 % (ref 0–1)
BUN SERPL-MCNC: 20 MG/DL (ref 5–25)
C DIFF TOX GENS STL QL NAA+PROBE: NORMAL
CALCIUM SERPL-MCNC: 7.8 MG/DL (ref 8.3–10.1)
CAMPYLOBACTER DNA SPEC NAA+PROBE: NORMAL
CHLORIDE SERPL-SCNC: 107 MMOL/L (ref 100–108)
CO2 SERPL-SCNC: 26 MMOL/L (ref 21–32)
CREAT SERPL-MCNC: 0.73 MG/DL (ref 0.6–1.3)
EOSINOPHIL # BLD AUTO: 0.02 THOUSAND/ΜL (ref 0–0.61)
EOSINOPHIL NFR BLD AUTO: 0 % (ref 0–6)
ERYTHROCYTE [DISTWIDTH] IN BLOOD BY AUTOMATED COUNT: 14.8 % (ref 11.6–15.1)
GFR SERPL CREATININE-BSD FRML MDRD: 100 ML/MIN/1.73SQ M
GLUCOSE SERPL-MCNC: 110 MG/DL (ref 65–140)
HCT VFR BLD AUTO: 22.6 % (ref 36.5–49.3)
HCT VFR BLD AUTO: 26.7 % (ref 36.5–49.3)
HGB BLD-MCNC: 7.8 G/DL (ref 12–17)
HGB BLD-MCNC: 9.2 G/DL (ref 12–17)
LYMPHOCYTES # BLD AUTO: 0.82 THOUSANDS/ΜL (ref 0.6–4.47)
LYMPHOCYTES NFR BLD AUTO: 9 % (ref 14–44)
MAGNESIUM SERPL-MCNC: 2.4 MG/DL (ref 1.6–2.6)
MCH RBC QN AUTO: 31.6 PG (ref 26.8–34.3)
MCHC RBC AUTO-ENTMCNC: 34.5 G/DL (ref 31.4–37.4)
MCV RBC AUTO: 92 FL (ref 82–98)
MONOCYTES # BLD AUTO: 0.82 THOUSAND/ΜL (ref 0.17–1.22)
MONOCYTES NFR BLD AUTO: 9 % (ref 4–12)
NEUTROPHILS # BLD AUTO: 7.01 THOUSANDS/ΜL (ref 1.85–7.62)
NEUTS SEG NFR BLD AUTO: 81 % (ref 43–75)
NRBC BLD AUTO-RTO: 0 /100 WBCS
P AXIS: 49 DEGREES
PLATELET # BLD AUTO: 127 THOUSANDS/UL (ref 149–390)
PMV BLD AUTO: 9.5 FL (ref 8.9–12.7)
POTASSIUM SERPL-SCNC: 3.2 MMOL/L (ref 3.5–5.3)
PR INTERVAL: 138 MS
QRS AXIS: -2 DEGREES
QRSD INTERVAL: 86 MS
QT INTERVAL: 336 MS
QTC INTERVAL: 458 MS
RBC # BLD AUTO: 2.47 MILLION/UL (ref 3.88–5.62)
SALMONELLA DNA SPEC QL NAA+PROBE: NORMAL
SHIGA TOXIN STX GENE SPEC NAA+PROBE: NORMAL
SHIGELLA DNA SPEC QL NAA+PROBE: NORMAL
SODIUM SERPL-SCNC: 139 MMOL/L (ref 136–145)
T WAVE AXIS: -10 DEGREES
VENTRICULAR RATE: 112 BPM
WBC # BLD AUTO: 8.75 THOUSAND/UL (ref 4.31–10.16)

## 2018-01-02 PROCEDURE — 83735 ASSAY OF MAGNESIUM: CPT | Performed by: SURGERY

## 2018-01-02 PROCEDURE — 85018 HEMOGLOBIN: CPT | Performed by: SURGERY

## 2018-01-02 PROCEDURE — C9113 INJ PANTOPRAZOLE SODIUM, VIA: HCPCS | Performed by: SURGERY

## 2018-01-02 PROCEDURE — 80048 BASIC METABOLIC PNL TOTAL CA: CPT | Performed by: SURGERY

## 2018-01-02 PROCEDURE — 85014 HEMATOCRIT: CPT | Performed by: SURGERY

## 2018-01-02 PROCEDURE — G8979 MOBILITY GOAL STATUS: HCPCS

## 2018-01-02 PROCEDURE — G8978 MOBILITY CURRENT STATUS: HCPCS

## 2018-01-02 PROCEDURE — 97163 PT EVAL HIGH COMPLEX 45 MIN: CPT

## 2018-01-02 PROCEDURE — 85025 COMPLETE CBC W/AUTO DIFF WBC: CPT | Performed by: SURGERY

## 2018-01-02 RX ORDER — POTASSIUM CHLORIDE 14.9 MG/ML
20 INJECTION INTRAVENOUS
Status: COMPLETED | OUTPATIENT
Start: 2018-01-02 | End: 2018-01-02

## 2018-01-02 RX ORDER — POTASSIUM CHLORIDE 20 MEQ/1
40 TABLET, EXTENDED RELEASE ORAL ONCE
Status: COMPLETED | OUTPATIENT
Start: 2018-01-02 | End: 2018-01-02

## 2018-01-02 RX ADMIN — ACETAMINOPHEN 650 MG: 325 TABLET, FILM COATED ORAL at 00:33

## 2018-01-02 RX ADMIN — POTASSIUM CHLORIDE 40 MEQ: 1500 TABLET, EXTENDED RELEASE ORAL at 07:39

## 2018-01-02 RX ADMIN — SODIUM CHLORIDE, SODIUM LACTATE, POTASSIUM CHLORIDE, AND CALCIUM CHLORIDE 125 ML/HR: .6; .31; .03; .02 INJECTION, SOLUTION INTRAVENOUS at 16:50

## 2018-01-02 RX ADMIN — PANTOPRAZOLE SODIUM 40 MG: 40 INJECTION, POWDER, FOR SOLUTION INTRAVENOUS at 20:48

## 2018-01-02 RX ADMIN — SODIUM CHLORIDE, SODIUM LACTATE, POTASSIUM CHLORIDE, AND CALCIUM CHLORIDE 125 ML/HR: .6; .31; .03; .02 INJECTION, SOLUTION INTRAVENOUS at 08:49

## 2018-01-02 RX ADMIN — LIDOCAINE 1 PATCH: 50 PATCH TOPICAL at 08:45

## 2018-01-02 RX ADMIN — ACETAMINOPHEN 650 MG: 325 TABLET, FILM COATED ORAL at 05:44

## 2018-01-02 RX ADMIN — DEXTROSE MONOHYDRATE 3.38 G: 50 INJECTION, SOLUTION INTRAVENOUS at 20:48

## 2018-01-02 RX ADMIN — ACETAMINOPHEN 650 MG: 325 TABLET, FILM COATED ORAL at 11:16

## 2018-01-02 RX ADMIN — POTASSIUM CHLORIDE 20 MEQ: 200 INJECTION, SOLUTION INTRAVENOUS at 10:47

## 2018-01-02 RX ADMIN — ACETAMINOPHEN 650 MG: 325 TABLET, FILM COATED ORAL at 23:50

## 2018-01-02 RX ADMIN — PANTOPRAZOLE SODIUM 40 MG: 40 INJECTION, POWDER, FOR SOLUTION INTRAVENOUS at 08:45

## 2018-01-02 RX ADMIN — SODIUM CHLORIDE, SODIUM LACTATE, POTASSIUM CHLORIDE, AND CALCIUM CHLORIDE 125 ML/HR: .6; .31; .03; .02 INJECTION, SOLUTION INTRAVENOUS at 00:34

## 2018-01-02 RX ADMIN — DEXTROSE MONOHYDRATE 3.38 G: 50 INJECTION, SOLUTION INTRAVENOUS at 01:41

## 2018-01-02 RX ADMIN — ACETAMINOPHEN 650 MG: 325 TABLET, FILM COATED ORAL at 17:10

## 2018-01-02 RX ADMIN — POTASSIUM CHLORIDE 20 MEQ: 200 INJECTION, SOLUTION INTRAVENOUS at 07:39

## 2018-01-02 RX ADMIN — DEXTROSE MONOHYDRATE 3.38 G: 50 INJECTION, SOLUTION INTRAVENOUS at 13:59

## 2018-01-02 RX ADMIN — DEXTROSE MONOHYDRATE 3.38 G: 50 INJECTION, SOLUTION INTRAVENOUS at 09:37

## 2018-01-02 NOTE — CONSULTS
Consultation - Cardiology   Cathryn Calderon 65 y o  male MRN: [id number]  Unit/Bed#: [facility] 803-01 Encounter: [id number]    Inpatient consult to Cardiology  Performed by: Nancy Armenta by: Luis Parikh         Physician Requesting Consult: Sean Chamberlain MD  Reason for Consult / Principal Problem:  Elevated troponin    Assessment:  Principal Problem:    Multiple rib fractures  Active Problems:    GI bleed    NSTEMI (non-ST elevated myocardial infarction) ([facility] [address] )      Plan  1  NSTEMI - type 2 secondary to sepsis and acute blood loss anemia  - EKG showed ST depressions and T-wave inversions in anterolateral leads which improved in the EKG done later yesterday  EKG changes likely secondary to demand ischemia in the setting of sepsis, hypotension and blood loss  - troponins 0 07, 0 11, 0 07, trended down  - can get an echo to evaluate LV function but likely EKG changes and elevated troponins secondary to sepsis and blood loss anemia  - patient denies any chest pain  - Will need workup by GI for GI bleed  Hemoglobin trended down from 9 8 to 7 8 even after patient received 2 unit packed RBC  Patient also did receive IV fluids yesterday  Management as per primary team  - on IV antibiotics  Lactic acid improved  Creatinine improved  2   History of Hypertension  - BP controlled now  - as per notes patient was hypotensive during CT scan yesterday likely secondary to sepsis or acute blood loss  - restart patient's home antihypertensives when stable after GI workup  History of Present Illness   HPI: Cathryn Calderon is a 65y o  year old male who has a history of hypertension  He initially went to [employer] yesterday after a mechanical fall  He was transferred to [facility] after he was found to have left 6th to 10th rib fractures on CT chest   He was also hypotensive yesterday in ED and was transfused 2 units packed RBC    Cardiology consulted for elevated troponins  Patient has been complaining of multiple episodes of vomiting and diarrhea since Thursday  He also complained of blood in his stools  On Sunday he felt very weak and also started to feel lightheaded and then fell on steps due to weakness  He denies any chest pain, palpitations, syncope, shortness of breath  Patient denies any cardiac history  Denies any chest pain or shortness of breath on exertion  He denies any smoking history, drinks alcohol occasionally, denies any IV drug abuse  Denies any family history of heart disease  He had a stress test done 2-3 years ago which was normal  His hemoglobin was 9 8 on admission and is 7 8 today after receiving 2 units packed RBC and IV fluids  His lactic acid was 9 4 on admission, WBC 19,000, creatinine 1 61  Review of Systems:  As per HPI      Historical Information   Past Medical History:   Diagnosis Date    Hypertension      Past Surgical History:   Procedure Laterality Date    NO PAST SURGERIES       History   Alcohol Use    Yes     Comment: occassionally     History   Drug Use No     History   Smoking Status    Never Smoker   Smokeless Tobacco    Never Used     Family History: History reviewed  No pertinent family history      Meds/Allergies   current meds:   Current Facility-Administered Medications   Medication Dose Route Frequency    acetaminophen (TYLENOL) tablet 650 mg  650 mg Oral Q6H [facility]    lactated ringers infusion  125 mL/hr Intravenous Continuous    lidocaine (LIDODERM) 5 % patch 1 patch  1 patch Transdermal Daily    ondansetron (ZOFRAN) injection 4 mg  4 mg Intravenous Q6H PRN    oxyCODONE (ROXICODONE) immediate release tablet 10 mg  10 mg Oral Q4H PRN    oxyCODONE (ROXICODONE) IR tablet 5 mg  5 mg Oral Q4H PRN    pantoprazole (PROTONIX) injection 40 mg  40 mg Intravenous Q12H [facility]    piperacillin-tazobactam (ZOSYN) 3 375 g in dextrose 5 % 50 mL IVPB  3 375 g Intravenous Q6H       lactated ringers 125 mL/hr Last Rate: 125 mL/hr (18 0849)       No Known Allergies    Objective   Vitals: Blood pressure 110/64, pulse 103, temperature 98 6 °F (37 °C), temperature source Oral, resp  rate 18, height 5' 10" (1 778 m), weight 86 2 kg (190 lb), SpO2 100 %  , Body mass index is 27 26 kg/m² , Orthostatic Blood Pressures    Flowsheet Row Most Recent Value   Blood Pressure  110/64 filed at 2018 0700   Patient Position - Orthostatic VS  Lying filed at 2018 0608        Systolic (61MZM), IJ , Min:103 , EOQ:944     Diastolic (99KOI), HCC:65, Min:63, Max:79      Intake/Output Summary (Last 24 hours) at 18 1356  Last data filed at 18 1200   Gross per 24 hour   Intake          2704 17 ml   Output                0 ml   Net          2704 17 ml       Weight (last 2 days)     Date/Time   Weight    18 1229  86 2 (190)              Invasive Devices     Peripheral Intravenous Line            Peripheral IV 18 Right Antecubital 1 day    Peripheral IV 18 Right Hand 1 day                  Physical Exam   Constitutional: He is oriented to person, place, and time  He appears well-developed  No distress  HENT:   Head: Normocephalic  Eyes: EOM are normal  Pupils are equal, round, and reactive to light  Neck: No JVD present  No thyromegaly present  Cardiovascular: Normal rate and regular rhythm  Exam reveals no gallop and no friction rub  No murmur heard  Pulmonary/Chest: He has no wheezes  He has no rales  Abdominal: Soft  Bowel sounds are normal  There is no tenderness  Musculoskeletal: He exhibits no edema or tenderness  Neurological: He is alert and oriented to person, place, and time  Skin: He is not diaphoretic             Laboratory Results:    Results from last 7 days  Lab Units 18  2009 18  1725 18  1348 18  0810   CK TOTAL U/L  --   --   --  163   TROPONIN I ng/mL 0 07* 0 09* 0 11*  0 11* 0 07*   CK MB INDEX %  --   --   --  1 4       CBC with diff:   Results from last 7 days  Lab Units 01/02/18  0554 01/01/18  0810   WBC Thousand/uL 8 75 19 53*   HEMOGLOBIN g/dL 7 8* 9 8*   HEMATOCRIT % 22 6* 28 5*   MCV fL 92 96   PLATELETS Thousands/uL 127* 216   MCH pg 31 6 32 9   MCHC g/dL 34 5 34 4   RDW % 14 8 12 4   MPV fL 9 5 10 7   NRBC AUTO /100 WBCs 0  --          CMP:  Results from last 7 days  Lab Units 01/02/18  0554 01/01/18 2009 01/01/18  0810   SODIUM mmol/L 139 144 134*   POTASSIUM mmol/L 3 2* 3 5 3 2*   CHLORIDE mmol/L 107 110* 96*   CO2 mmol/L 26 25 20*   ANION GAP mmol/L 6 9 18*   BUN mg/dL 20 35* 65*   CREATININE mg/dL 0 73 0 99 1 61*   GLUCOSE RANDOM mg/dL 110 119 353*   CALCIUM mg/dL 7 8* 7 8* 8 6   AST U/L  --   --  27   ALT U/L  --   --  33   ALK PHOS U/L  --   --  50   TOTAL PROTEIN g/dL  --   --  6 6   ALBUMIN g/dL  --   --  3 2*   BILIRUBIN TOTAL mg/dL  --   --  1 30*   EGFR ml/min/1 73sq m 100 82 45         BNP:  No results for input(s): BNP in the last 72 hours  Magnesium:   Results from last 7 days  Lab Units 01/02/18  0554 01/01/18  0810   MAGNESIUM mg/dL 2 4 2 3       Coags:   Results from last 7 days  Lab Units 01/01/18  0810   PTT seconds 24   INR  1 11       TSH: No components found for: TSH  No results found for: I4DQIRH, X2ZFJLW, THYROIDAB  Hemoglobin A1C       Lipid Profile:   No results found for: CHOL  No results found for: HDL  No results found for: LDLCALC  No results found for: TRIG  No components found for: CHOLHDL    Cardiac testing:   No results found for this or any previous visit  No results found for this or any previous visit  No results found for this or any previous visit  No results found for this or any previous visit  Imaging: I have personally reviewed pertinent reports  Xr Chest 1 View Portable    Result Date: 1/1/2018  Narrative: CHEST  PORTABLE INDICATION: Fall  Vomiting  Weakness  Chest pain  COMPARISON:  None VIEWS:   AP semierect; IMAGES:  1 FINDINGS: The cardiomediastinal silhouette is unremarkable    The lungs are clear  No pleural effusion  Bony thorax unremarkable  Electrode (EKG) monitoring devices overlie the chest      Impression: No active disease  ____ ____ ____ ____ ____ ____ ____ ____ ____ ____ ____ ____ ____ As per comments in the PACS workstation, findings are concordant with preliminary interpretation provided by the emergency room physician    Workstation performed: [id number]     CB Knee 4+ Views Left Injury    Result Date: 1/1/2018  Narrative: LEFT KNEE INDICATION:  Fall  Trauma  Weakness  COMPARISON: None  VIEWS:  AP, lateral and obliques IMAGES:  5 FINDINGS: There is no acute fracture or dislocation  There is no joint effusion  Small spur at the inferior pole of the patella  No lytic or blastic lesions are seen  Soft tissues are unremarkable  Impression: No acute osseous abnormality  ____ ____ ____ ____ ____ ____ ____ ____ ____ ____ ____ ____ ____ As per comments in the PACS workstation, findings are concordant with preliminary interpretation provided by the emergency room physician  Workstation performed: [id number]     Ct Head Without Contrast    Result Date: 1/1/2018  Narrative: CT BRAIN - WITHOUT CONTRAST INDICATION:  71-year-old man status post fall  COMPARISON:  None available at time of image interpretation  TECHNIQUE:  CT examination of the brain was performed  In addition to axial images, coronal reformatted images were created and submitted for interpretation  Radiation dose length product (DLP) for this visit:  1100 8 mGy-cm   This examination, like all CT scans performed in the [facility], was performed utilizing techniques to minimize radiation dose exposure, including the use of iterative  reconstruction and automated exposure control  IMAGE QUALITY:  Diagnostic  FINDINGS:  PARENCHYMA:  Decreased attenuation is noted in the supratentorial white matter demonstrating an appearance most consistent with mild microangiopathic change   No intracranial mass, mass effect or midline shift  No CT signs of acute infarction  There is no parenchymal hemorrhage  Catalina cavum septum pellucidum is noted VENTRICLES AND EXTRA-AXIAL SPACES:  Normal for patient's age  VISUALIZED ORBITS AND PARANASAL SINUSES:  Orbits appear normal   Mild scattered sinus mucosal thickening is noted  No fluid levels are seen  CALVARIUM AND EXTRACRANIAL SOFT TISSUES:   Normal      Impression: No acute intracranial abnormality  Workstation performed: [id number]     Ct Chest Without Contrast    Result Date: 1/1/2018  Narrative: CT CHEST WITHOUT IV CONTRAST INDICATION:  Status post fall  Rib fractures  Further assessment for possible intrathoracic trauma  COMPARISON: Abdominal CT, same day TECHNIQUE: CT examination of the chest was performed without intravenous contrast   Reformatted images were created in axial, sagittal, and coronal planes  Radiation dose length product (DLP) for this visit:  427 82 mGy-cm   This examination, like all CT scans performed in the [facility], was performed utilizing techniques to minimize radiation dose exposure, including the use of iterative  reconstruction and automated exposure control  FINDINGS: LUNGS:  There is mild groundglass opacity noted within the posterior aspect of the left lower lobe, given adjacent rib fractures, suspicious for mild contusion  PLEURA:  Unremarkable  HEART/GREAT VESSELS:  Unremarkable for patient's age  MEDIASTINUM AND SHRADDHA:  No adenopathy or hematoma  Fluid-filled mid to distal esophagus  CHEST WALL AND LOWER NECK:       Normal  VISUALIZED STRUCTURES IN THE UPPER ABDOMEN:  Mild hepatic steatosis  Residual contrast agent renal collecting systems  OSSEOUS STRUCTURES:  There is a slightly displaced posterolateral left sixth rib  As seen on prior abdominal CT, fractures of the seventh, eighth, ninth, and 10th ribs are also noted  There is an anomaly of the posterior right fifth rib, unrelated to acute fracture  Impression: 1  Fractures of the left sixth through 10th ribs  2  Mild airspace disease, posterior left lower lobe suggestive of mild contusion  3  No evidence of pneumothorax or hemothorax  4  Fluid-filled esophagus, potentially related to reflux    Workstation performed: [id number]     Ct Cervical Spine Without Contrast    Result Date: 1/1/2018  Narrative: CT CERVICAL SPINE - WITHOUT CONTRAST INDICATION: 71-year-old man status post fall COMPARISON: None available at time of image interpretation  TECHNIQUE:  CT examination of the cervical spine was performed without intravenous contrast   Contiguous axial images were obtained  Sagittal and coronal reconstructions were performed  Radiation dose length product (DLP) for this visit:  494 97 mGy-cm   This examination, like all CT scans performed in the [facility], was performed utilizing techniques to minimize radiation dose exposure, including the use of iterative  reconstruction and automated exposure control  IMAGE QUALITY:  Diagnostic  FINDINGS: ALIGNMENT:  Normal alignment of the cervical spine  No subluxation  VERTEBRAL BODIES:  No fracture  DEGENERATIVE CHANGES:  Moderate multilevel cervical degenerative changes are noted  No critical central canal stenosis  At least moderate neural foraminal narrowing at C4-5 and C5-6  PREVERTEBRAL AND PARASPINAL SOFT TISSUES: Normal         THORACIC INLET:  Normal      Impression: No cervical spine fracture or traumatic malalignment  Workstation performed: [id number]     Ct Abdomen Pelvis With Contrast    Result Date: 1/1/2018  Narrative: CT ABDOMEN AND PELVIS WITH IV CONTRAST INDICATION:  71-year-old male status post fall  COMPARISON: None available at time of image interpretation  TECHNIQUE:  CT examination of the abdomen and pelvis was performed    In addition to portal venous phase postcontrast scanning through the abdomen and pelvis, delayed phase postcontrast scanning was performed through the upper abdominal viscera  Reformatted images were created in axial, sagittal, and coronal planes  Radiation dose length product (DLP) for this visit:  2179 06 mGy-cm   This examination, like all CT scans performed in the [facility], was performed utilizing techniques to minimize radiation dose exposure, including the use of iterative reconstruction and automated exposure control  IV Contrast:  100 mL of iohexol (OMNIPAQUE)         Enteric Contrast:  Enteric contrast was not administered  FINDINGS: ABDOMEN LOWER CHEST: The distal esophagus is fluid-filled which may indicate reflux  Minimal atelectasis at the lung bases  Trace left pleural fluid, likely hemothorax but difficult to measure attenuation  LIVER/BILIARY TREE:  One or more subcentimeter sharply circumscribed low-density hepatic lesion(s) are noted, too small to accurately characterize, but statistically most likely to represent subcentimeter hepatic cysts  No suspicious solid hepatic lesion is identified  Hepatic contours are normal   No biliary dilatation  GALLBLADDER:  No calcified gallstones  No pericholecystic inflammatory change  SPLEEN:  Unremarkable  PANCREAS:  Unremarkable  ADRENAL GLANDS:  Unremarkable  KIDNEYS/URETERS:  Unremarkable  No hydronephrosis  STOMACH AND BOWEL:  There is colonic diverticulosis without evidence of acute diverticulitis  APPENDIX:  A normal appendix was visualized  ABDOMINOPELVIC CAVITY:  No ascites or free intraperitoneal air  No lymphadenopathy  VESSELS:  Unremarkable for patient's age  PELVIS REPRODUCTIVE ORGANS:  Unremarkable for patient's age  URINARY BLADDER:  Mild bladder wall thickening  ABDOMINAL WALL/INGUINAL REGIONS:  Unremarkable  OSSEOUS STRUCTURES: Acute displaced fractures of the left 7th through 10th ribs  Possible nondisplaced fracture of the left 11th rib   There is a mixed lytic and sclerotic lesion involving the sacrum measuring approximately 4 9 x 3 8 cm (series 9 image 67 and series 606 image 111)  Smaller similar-appearing sclerotic and sclerotic lesion involving the left T11 pedicle measures 2 6 x 1 8 cm (series 9 image 20)  Impression: 1  Acute displaced fractures left 7th through 10th ribs  Possible trace left hemothorax  Chest CT is recommended as the left 7 fracture is on the 1st slice of the study  2   Mild bladder wall thickening  Correlate with urinalysis  3   Mixed lytic and sclerotic lesions involving the sacrum and left T11 pedicle  Recommend bone scan for initial evaluation  4   Colonic diverticulosis without diverticulitis  I personally discussed this study with Kacie Morton on 1/1/2018 9:24 AM  Workstation performed: [id number]       EKG reviewed personally:  Normal sinus rhythm  ST depression and T-wave inversion in anterolateral leads ( improved in next EKG)  Telemetry reviewed personally: Not on telemetry    Counseling / Coordination of Care  Total floor / unit time spent today 45 minutes  Greater than 50% of total time was spent with the patient and / or family counseling and / or coordination of care

## 2018-01-02 NOTE — PROGRESS NOTES
Trauma Progress Note Marcus Ziegler 1955, 65 y o  male MRN: [id number]    Unit/Bed#: [facility] 803-01 Encounter: [id number]    Primary Care Provider: Zahira Cortez DO   Date and time admitted to hospital: 1/1/2018 12:20 PM    Events overnight: Some dark blood mixed in diarrhea yesterday  No bright red blood  Continues to deny abdominal pain and chest pain  GI bleed   Assessment & Plan    - Presume infectious at this time  Admission WBC 19 5  Lac 9 4  No obvious source on CTAP  Diverticulosis no diverticulitis  No abdominal pain  Non-tender  S/p 2U RBC, 1L NS, and 30cc/kg NS at OSH and more fluid here  Lactic acidosis resolved after IVF bolus  - Hgb this AM is 7 8 from 9 8  Asymptomatic    - Continues to deny abdominal pain  Some dark blood mixed in with diarrhea yesterday  - F/u stool cultures and Cdiff  - F/u blood cultures  - Continue clear diet  - Continue empiric antibiotics - zosyn  - Continue protonix  - Obtain 6PM Hgb  - Will need colonoscopy and likely upper endoscopy  - Will consult GI for further recs        NSTEMI (non-ST elevated myocardial infarction) ([facility] [address] )   Assessment & Plan    - ST depressions on lateral leads seen on initial EKG  - Initial trop  07, peaked at 0 11 and down to 0 07  - Repeat EKG showing resolution of ST depressions  - Continues to deny chest pain  - Will consult cardiology to determine if any further recs        * Multiple rib fractures   Assessment & Plan    - L 6-10 fractures s/p fall  - Rib fracture protocol  - Scheduled tylenol, robaxin  PRN pain meds  - Resp protocol, IS            Subjective/Objective   Chief Complaint: No complaints  No rib pain  No abdominal or chest pain      Objective:   Meds/Allergies   Prescriptions Prior to Admission   Medication Sig Dispense Refill Last Dose    valsartan-hydrochlorothiazide (DIOVAN-HCT) 320-25 MG per tablet Take 1 tablet by mouth daily   12/31/2017 at Unknown time       Vitals: Blood pressure 103/63, pulse 98, temperature 98 5 °F (36 9 °C), temperature source Oral, resp  rate 20, height 5' 10" (1 778 m), weight 86 2 kg (190 lb), SpO2 98 %  Body mass index is 27 26 kg/m²   SpO2: SpO2: 100 %    ABG: No results found for: PHART, GLF4RJD, PO2ART, LAL4HHP, A8IMOXWJ, BEART, SOURCE      Intake/Output Summary (Last 24 hours) at 01/02/18 0649  Last data filed at 01/02/18 0545   Gross per 24 hour   Intake          1649 17 ml   Output                0 ml   Net          1649 17 ml       Invasive Devices     Peripheral Intravenous Line            Peripheral IV 01/01/18 Right Antecubital less than 1 day    Peripheral IV 01/01/18 Right Hand less than 1 day                Nutrition/GI Proph/Bowel Reg: Clears    Physical Exam:   GENERAL APPEARANCE: NAD  HEENT: Nml  CV: RRR  LUNGS: No distress, room air  ABD: Soft, non-tender  EXT: KERR  NEURO: Intact    Lab Results:   Results: I have personally reviewed pertinent reports   , BMP/CMP:   Lab Results   Component Value Date     01/02/2018    K 3 2 (L) 01/02/2018     01/02/2018    CO2 26 01/02/2018    ANIONGAP 6 01/02/2018    BUN 20 01/02/2018    CREATININE 0 73 01/02/2018    GLUCOSE 110 01/02/2018    CALCIUM 7 8 (L) 01/02/2018    EGFR 100 01/02/2018    and CBC:   Lab Results   Component Value Date    WBC 8 75 01/02/2018    HGB 7 8 (L) 01/02/2018    HCT 22 6 (L) 01/02/2018    MCV 92 01/02/2018     (L) 01/02/2018    MCH 31 6 01/02/2018    MCHC 34 5 01/02/2018    RDW 14 8 01/02/2018    MPV 9 5 01/02/2018    NRBC 0 01/02/2018     VTE Prophylaxis: None

## 2018-01-02 NOTE — PHYSICAL THERAPY NOTE
Physical Therapy Evaluation    Patient's Name:Alexander Olmstead    Today's Date:01/02/18    Patient Active Problem List   Diagnosis    GI bleed    NSTEMI (non-ST elevated myocardial infarction) ([facility] [address] )    Multiple rib fractures       Past Medical History:   Diagnosis Date    Hypertension        Past Surgical History:   Procedure Laterality Date    NO PAST SURGERIES            01/02/18 1225   Pain Assessment   Pain Assessment 0-10   Pain Score No Pain   Hospital Pain Intervention(s) Ambulation/increased activity   Response to Interventions unchanged   Home Living   Type of Home Apartment   Home Layout One level  (10 [address])   Prior Function   Level of [place] Independent with ADLs and functional mobility   Lives With Alone   Receives Help From (landlord)   Cognition   Arousal/Participation Alert   Orientation Level Oriented X4   Following Commands Follows all commands and directions without difficulty   RUE Assessment   RUE Assessment (funct elev reach and grasp)   LUE Assessment   LUE Assessment (funct elev reach and grasp)   RLE Assessment   RLE Assessment X   Strength RLE   RLE Overall Strength 4/5   LLE Assessment   LLE Assessment X   Strength LLE   LLE Overall Strength 4/5   Coordination   Movements are Fluid and Coordinated 0   Coordination and Movement Description decreased trunk rotation   Bed Mobility   Additional Comments bed mob not tested - pt in chair on Pt arrival   Transfers   Sit to Stand 5  Supervision   Stand to Sit 5  Supervision   Stand pivot 5  Supervision   Ambulation/Elevation   Gait pattern Excessively slow; Inconsistent dalia   Gait Assistance 5  Supervision   Additional items Verbal cues; Tactile cues   Assistive Device None   Distance 200 ft   Stair Management Assistance 5  Supervision   Additional items Verbal cues   Stair Management Technique Alternating pattern; One rail L   Number of Stairs 9   Balance   Dynamic Sitting Fair +  (forward reach)   Static Standing Fair +   Dynamic Standing Fair   Endurance Deficit   Endurance Deficit Yes   Endurance Deficit Description fatigue   Activity Tolerance   Activity Tolerance Patient limited by fatigue   Nurse Made Aware yes   Assessment   Prognosis Good   Problem List Decreased endurance;Decreased range of motion   Assessment Pt is a 65 y o  male admitted to [facility] on 1/1/2018 w/ Multiple rib fractures; Pt exhibits significant impairments with decreased ROM, decreased endurance, impaired coordination, gait deviations, decreased activity tolerance and decreased safety awareness; these impact independence with mobility, ADLs, and IADLs; requires supervision w transf and amb 200 ft - gait w decreased trunk rotation 2* postural guarding of rib cage, inconsistent dalia, pain protected; objective measures on the Barthel Index also reveal limitations;  therapy prognosis is impacted by relevant co morbidities as noted in evaluation; clinical presentation is currently unstable/unpredictable - pt is on continuous pulse oximetry, contact precautions, presents w phys impairments as noted- a regression from baseline; PTA, pt was Independent with mobility lives alone in apt, reports landlord avail to assist prn skilled PT is indicated to to optimize functional independence and discharge planning; pending functional progress, PT recommendation at discharge is home w soc support   Goals   Patient Goals go home   STG Expiration Date 01/12/18   Short Term Goal #1 1  Independent with Bed Mobility Rolling Right and Left     2  Independent with Bed Mobility Supine-Sit     3  Independent with Transfer Bed-Chair     4  Increase Dynamic Sitting Balance at least 1 Grade for improved stability with functional reach activities     5  Increase Dynamic Standing Balance at least 1 Grade for improved ease with Activities of Daily Living     6  Increase Lower Extremity Strength at least 1 Grade for improved ease mobility tasks     7   Independent with  Ambulation 300 feet  to facilitate home and community mobility 8  Independent with Ascending/Descending 14 steps to facilitate home and community accessibility  Plan   Treatment/Interventions Functional transfer training;LE strengthening/ROM; Elevations; Therapeutic exercise; Endurance training;Cognitive reorientation;Patient/family training;Equipment eval/education; Bed mobility;Gait training; Compensatory technique education;Continued evaluation;Spoke to nursing   PT Frequency (6x/wk)   Recommendation   Recommendation (home w soc support)   PT - OK to Discharge Yes   Barthel Index   Feeding 10   Bathing 0   Grooming Score 5   Dressing Score 5   Bladder Score 10   Bowels Score 10   Toilet Use Score 5   Transfers (Bed/Chair) Score 10   Mobility (Level Surface) Score 10   Stairs Score 5   Barthel Index Score 70

## 2018-01-02 NOTE — CASE MANAGEMENT
Notification of Inpatient Admission/Inpatient Authorization Request  This is a Notification of Inpatient Admission/Request for Inpatient Authorization to our facility [facility]  Please be advised that this patient is currently in our facility under Inpatient Status  Below you will find the Attending Physician and Facilitys information including NPI# and contact information for the Utilization  assigned to the [facility] where the patient is receiving services  Please feel free to contact the Utilization Review Department with any questions  Patient Information:  PATIENT NAME: Jade Hendrix  MRN: [id number]  YOB: 1955    PRESENTATION DATE: 1/1/2018 12:20 PM  IP ADMISSION DATE: 1/1/18 1401  DISCHARGE DATE: No discharge date for patient encounter  DISPOSITION: [facility]    Attending Physician:  RADHA Chou  Specialty- General Surgery,   [facility] ID- [id number]  [address]  Phone 1: [phone]  Fax: [phone]    Facility:  [address] [place] [address], [address] [place] [address] [phone]  NPI: [id number]  TAX ID# [id number]  MEDICARE ID: [id number]    [facility] in the [facility] by [facility] for 2017  Network Utilization Review Department  Phone: [phone]; Fax [phone]  ATTENTION: The Network Utilization Review Department is now centralized for our 7 Facilities  Make a note that we have a new phone and fax numbers for our Department  Please call with any questions or concerns to [phone] and carefully follow the prompts so that you are directed to the right person  All voicemails are confidential  Fax any determinations, approvals, denials, and requests for initial or continue stay review clinical to [phone]   Due to HIGH CALL volume, it would be easier if you could please send faxed requests to expedite your requests and in part, help us provide discharge notifications faster

## 2018-01-02 NOTE — CASE MANAGEMENT
Thank you,  [address]  [facility] in the [facility] by [facility] for 2017  Network Utilization Review Department  Phone: [phone]; Fax [phone]  ATTENTION: The Network Utilization Review Department is now centralized for our 7 Facilities  Make a note that we have a new phone and fax numbers for our Department  Please call with any questions or concerns to [phone] and carefully follow the prompts so that you are directed to the right person  All voicemails are confidential  Fax any determinations, approvals, denials, and requests for initial or continue stay review clinical to [phone]  Due to HIGH CALL volume, it would be easier if you could please send faxed requests to expedite your requests and in part, help us provide discharge notifications faster  Initial Clinical Review    Admission: Date/Time/Statement: 1/1/18 @ 1401     Orders Placed This Encounter   Procedures    Inpatient Admission     Standing Status:   Standing     Number of Occurrences:   1     Order Specific Question:   Admitting Physician     Answer:   Dea Ortiz     Order Specific Question:   Level of Care     Answer:   Level 2 Stepdown / HOT [14]     Order Specific Question:   Bed Type     Answer:   Trauma [7]     Order Specific Question:   Estimated length of stay     Answer:   More than 2 Midnights     Order Specific Question:   Certification     Answer:   I certify that inpatient services are medically necessary for this patient for a duration of greater than two midnights  See H&P and MD Progress Notes for additional information about the patient's course of treatment       ED: Date/Time/Mode of Arrival:   ED Arrival Information     Expected Arrival Acuity Means of Arrival Escorted By Service Admission Type    1/1/2018 11:15 1/1/2018 12:20 [facility]) Trauma Emergency    Arrival Complaint    Rishi Harden        Chief Complaint:   Chief Complaint   Patient presents with    Fall     syncope and fall down stairs last night, reports vomitting and diarrhea since saturday     History of Illness: Lisa Capone is a 65 y o  male who presents as trauma transfer following fall down steps  The patient states that on Saturday he developed sudden onset nausea, vomiting and diarrhea  There was no associated abdominal or chest pain  He did admit to some chills but no fever around this time  No sick contacts  No known preceding illness or antibiotics use  The vomiting was never bloody  The diarrhea had bright red blood mixed in and was very voluminous  He then was dehydrated and had a fall down steps  No LOC and didn't hit his head  He was [facility] at [facility]  He was hypotensive to SBP of 70  He received 2U RBC, 1L and an additional 30cc/kg bolus  Lac 9 4 and leukocytosis  Hgb 9 8  CTH, chest, abdomen and pelvis obtained showing rib fractures 6-10 on left side  He was transferred here for further management  He currently denies abdominal pain, chest pain, nausea, lightheadedness  He has never had a colonoscopy     Mechanism:Fall    ED Vital Signs:   ED Triage Vitals [01/01/18 1229]   Temperature Pulse Respirations Blood Pressure SpO2   99 9 °F (37 7 °C) (!) 114 18 148/87 100 %      Temp Source Heart Rate Source Patient Position - Orthostatic VS BP Location FiO2 (%)   Oral Monitor Lying Left arm --      Pain Score       No Pain        Wt Readings from Last 1 Encounters:   01/01/18 86 2 kg (190 lb)     Vital Signs (abnormal):   01/01/18 1900  --   109  18  124/68  98 %  --  Sitting   01/01/18 1800  --   109  18  122/67  100 %  None (Room air)  Lying   01/01/18 17:00:28  --   108  17  112/63  97 %  --  Lying   01/01/18 1700  --   108  18  112/63  100 %  --  Sitting   01/01/18 1615  --   106  18  117/65  100 %  --  Lying   01/01/18 1515  --   111  16  132/76  99 %  --  Sitting   01/01/18 1501  --   112  18  132/73  100 %  None (Room air)  Lying 01/01/18 15:00:27  --   112  17  --  100 %  --  --   01/01/18 1415  --   113  18  150/79  100 %  --  Sitting   01/01/18 1357  --   116  18  126/78  100 %  None (Room air)  Lying   01/01/18 1345  --   116  18  128/75  100 %  --  Sitting   01/01/18 1315  --   116  --  129/75  100 %  --  Sitting   01/01/18 1300  --   116  18  129/71  100 %  --  Sitting   01/01/18 1245  --   120  19  130/74  98 %  --  Sitting   01/01/18 1229  99 9 °F (37 7 °C)   114  18  148/87  100 %  None (Room air)  Lying     Abnormal Labs:   12/31 1/1 1/2   Sodium 134    144    139      Potassium 3 2 3 5 3 2   Chloride 96 110 107   CO2 20 25 26   Anion Gap 18 9 6   BUN 65 35 20   Creatinine 1 61 0 99 0 73   Glucose 353 119 110   Calcium 8 6 7 8 7 8   Albumin 3 2     Total Bilirubin 1 30     Lipase 69       Trop 0 07, 0 11, 0 09, 0 07   12/31 1/2   WBC 19 53    8 75      RBC 2 98 2 47   Hemoglobin 9 8 7 8   Hematocrit 28 5 22 6   Platelets 818 265     Color, UA   01/01/18 1023  Light Yellow    Clarity, UA Clear    Specific Gravity, UA <=1 005    pH, UA 5 0    Leukocytes, UA  Negative    Nitrite, UA  Negative    Protein, UA  Negative    Glucose, UA  Negative    Ketones, UA  Negative    Urobilinogen, UA 0 2    Bilirubin, UA  Negative    Blood, UA  Moderate       Blood cultures pending    Diagnostic Test Results:     1/1 EKG - Sinus tachycardia  ST & T wave abnormality, consider anterolateral ischemia  Abnormal ECG    1/1 EKG - Sinus tachycardia  Incomplete right bundle branch block  Nonspecific ST and T wave abnormality  Borderline ECG  When compared with ECG of 01-JAN-2018 08:15, (unconfirmed)  T wave inversion no longer evident in Anterior leads    1/1 Enteric Stool cultures - pending    1/1 CT abd, pelvis - 1  Acute displaced fractures left 7th through 10th ribs  Possible trace left hemothorax  Chest CT is recommended as the left 7 fracture is on the 1st slice of the study  2   Mild bladder wall thickening  Correlate with urinalysis    3  Mixed lytic and sclerotic lesions involving the sacrum and left T11 pedicle  Recommend bone scan for initial evaluation  Colonic diverticulosis without diverticulitis  1/1 CT chest - 1  Fractures of the left sixth through 10th ribs  2  Mild airspace disease, posterior left lower lobe suggestive of mild contusion  3  No evidence of pneumothorax or hemothorax  4  Fluid-filled esophagus, potentially related to reflux    ED Treatment:   Medication Administration from 01/01/2018 1115 to 01/01/2018 2109    Date/Time Order Dose Route Action   01/01/2018 1328 lactated ringers bolus 1,000 mL 1,000 mL Intravenous New Bag   01/01/2018 2011 piperacillin-tazobactam (ZOSYN) 3 375 g in dextrose 5 % 50 mL IVPB 3 375 g Intravenous New Bag   01/01/2018 1426 piperacillin-tazobactam (ZOSYN) 3 375 g in dextrose 5 % 50 mL IVPB 3 375 g Intravenous New Bag   01/01/2018 1730 lactated ringers infusion 125 mL/hr Intravenous New Bag   01/01/2018 2009 acetaminophen (TYLENOL) tablet 650 mg 650 mg Oral Given   01/01/2018 1425 acetaminophen (TYLENOL) tablet 650 mg 650 mg Oral Given   01/01/2018 1428 lidocaine (LIDODERM) 5 % patch 1 patch 1 patch Transdermal Medication Applied        Past Medical/Surgical History: Active Ambulatory Problems     Diagnosis Date Noted    No Active Ambulatory Problems     Resolved Ambulatory Problems     Diagnosis Date Noted    No Resolved Ambulatory Problems     Past Medical History:   Diagnosis Date    Hypertension      Admitting Diagnosis: Unspecified multiple injuries, initial encounter [T07  XXXA]    Age/Sex: 65 y o  male    Assessment/Plan:   Assessment/Plan   Trauma Alert: Evaluation  Model of Arrival: Transfer [place] [facility]  Trauma Team: Attending Dr Ortiz Alfonso and Residents Dr Puma Bravo  Consultants: None     Trauma Active Problems/Plan:      Bloody diarrhea, non-bloody vomiting, dehydrartion  - Presume infectious at this time  WBC 19 5  Lac 9 4  No obvious source on CTAP   Diverticulosis no diverticulitis  - No abdominal pain  Non-tender   - S/p 2U RBC, 1L NS, and 30cc/kg NS at OSH  - Will give another 1L bolus  - Obtain stool cultures and Cdiff  - F/u blood cultures  - Starting empiric antibiotics - zosyn     Elevated troponin 0 07  - Denies chest pain  - ST depressions on lateral leads seen on EKG  - Obtain f/u troponins  - Repeat EKG     Fall with L 6-10 rib fractures  - Rib fracture protocol  - Scheduled tylenol, robaxin  PRN pain meds  - Resp protocol, IS    Admission Orders:  Scheduled Meds:   acetaminophen 650 mg Oral Q6H [facility]   lidocaine 1 patch Transdermal Daily   pantoprazole 40 mg Intravenous Q12H [facility]   piperacillin-tazobactam 3 375 g Intravenous Q6H   potassium chloride 20 mEq Intravenous Q2H     Continuous Infusions:   lactated ringers 125 mL/hr Last Rate: 125 mL/hr (01/02/18 0849)     PRN Meds: ondansetron    oxyCODONE    oxyCODONE     Cons Cardiology  Cons GI   Cons CM   Airway clearance protocol   PT eval/tx  Diet cl liq  Stool for c diff  Oxygen via Nasal Cannula  Rib fracture protocol   ___________________________  1/2 Trauma Progress Note  Events overnight: Some dark blood mixed in diarrhea yesterday  No bright red blood  Continues to deny abdominal pain and chest pain  GI bleed   Assessment & Plan     - Presume infectious at this time  Admission WBC 19 5  Lac 9 4  No obvious source on CTAP  Diverticulosis no diverticulitis  No abdominal pain  Non-tender  S/p 2U RBC, 1L NS, and 30cc/kg NS at OSH and more fluid here  Lactic acidosis resolved after IVF bolus  - Hgb this AM is 7 8 from 9 8  Asymptomatic    - Continues to deny abdominal pain  Some dark blood mixed in with diarrhea yesterday     - F/u stool cultures and Cdiff  - F/u blood cultures  - Continue clear diet  - Continue empiric antibiotics - zosyn  - Continue protonix  - Obtain 6PM Hgb  - Will need colonoscopy and likely upper endoscopy  - Will consult GI for further recs       NSTEMI (non-ST elevated myocardial infarction) [facility])   Assessment & Plan     - ST depressions on lateral leads seen on initial EKG  - Initial trop  07, peaked at 0 11 and down to 0 07  - Repeat EKG showing resolution of ST depressions  - Continues to deny chest pain  - Will consult cardiology to determine if any further recs       * Multiple rib fractures   Assessment & Plan     - L 6-10 fractures s/p fall  - Rib fracture protocol  - Scheduled tylenol, robaxin   PRN pain meds  - Resp protocol, IS

## 2018-01-02 NOTE — PLAN OF CARE
Problem: PHYSICAL THERAPY ADULT  Goal: Performs mobility at highest level of function for planned discharge setting  See evaluation for individualized goals  Treatment/Interventions: Functional transfer training, LE strengthening/ROM, Elevations, Therapeutic exercise, Endurance training, Cognitive reorientation, Patient/family training, Equipment eval/education, Bed mobility, Gait training, Compensatory technique education, Continued evaluation, Spoke to nursing          See flowsheet documentation for full assessment, interventions and recommendations  Prognosis: Good  Problem List: Decreased endurance, Decreased range of motion  Assessment: Pt is a 65 y o  male admitted to [facility] on 1/1/2018 w/ Multiple rib fractures; Pt exhibits significant impairments with decreased ROM, decreased endurance, impaired coordination, gait deviations, decreased activity tolerance and decreased safety awareness; these impact independence with mobility, ADLs, and IADLs; requires supervision w transf and amb 200 ft - gait w decreased trunk rotation 2* postural guarding of rib cage, inconsistent dalia, pain protected; objective measures on the Barthel Index also reveal limitations;  therapy prognosis is impacted by relevant co morbidities as noted in evaluation; clinical presentation is currently unstable/unpredictable - pt is on continuous pulse oximetry, contact precautions, presents w phys impairments as noted- a regression from baseline; PTA, pt was Independent with mobility lives alone in apt, reports landlord avail to assist prn skilled PT is indicated to to optimize functional independence and discharge planning; pending functional progress, PT recommendation at discharge is home w soc support        Recommendation:  (home w soc support)     PT - OK to Discharge: Yes    See flowsheet documentation for full assessment

## 2018-01-02 NOTE — SOCIAL WORK
Cm met with pt and pt aware cm role at discharge   Pt lives in an apartment alone   There is one step to get in and once in everything on one level  Prior to admission pt was independent all ADL"s   Pt denies any DME's   Pt has no history of VNA or snf   Pt also has no history of mental health or [address] [place] [address] rehab in the past   Pt gets medication from [facility]   Pt friend Ángela Perez or Ian Shahram will transport home when medically clear  CM reviewed d/c planning process including the following: identifying help at home, patient preference for d/c planning needs, Discharge Lounge, [facility] Meds to Bed program, availability of treatment team to discuss questions or concerns patient and/or family may have regarding understanding medications and recognizing signs and symptoms once discharged  CM also encouraged patient to follow up with all recommended appointments after discharge  Patient advised of importance for patient and family to participate in managing patients medical well being  Discharge checklist discussed with patient and family

## 2018-01-02 NOTE — ASSESSMENT & PLAN NOTE
- L 6-10 fractures s/p fall  - Rib fracture protocol  - Scheduled tylenol, robaxin   PRN pain meds  - Resp protocol, IS

## 2018-01-02 NOTE — CONSULTS
Consultation - [facility]  Dany Davis 65 y o  male MRN: [id number]  Unit/Bed#: [facility] 803-01 Encounter: [id number]        ASSESSMENT/PLAN:   Anemia  Heme +  GI bleeding  N/V/D    Pt was admitted after a fall likely from dehydration from his N/V/D  It is possible that his sxs were from a gastroenteritis  These sxs have all resolved  Could have also been from upper GI bleed but would have expected hematemesis  Possible Haydee Rivas tear from retching that then caused diarrhea  There is a repeat H&H pending for tonight  It would be difficult for him to undergo an endoscopic procedures secondary to rib fractures  If his H&H is stable I would prefer to hold off on EGD/colon & do them together as outpt as he needs screening colonoscopy anyway  If his H&H drops or he again sees melena will arrange for inpt  -Follow H&H  -Avoid NSAID's  -Continue Protonix BID      Consults    Reason for Consult / Principal Problem: Anemia    HPI: Dany Davis is a 65y o  year old male with a PMH of HTN who started with N/V 6 days ago  He states it came on suddenly  No blood  This improved after 2 days  He then developed diarrhea  States 5-6 episodes  He describes it as dark  He states he became very weak & passed out & fell down the stairs  He was initially seen at [address] [place] [address] transferred here  He was found to have multiple rib fxs & elevated troponin with EKG changes  He is also anemic  He came in at 9 8 & is currently 7 8, after 2 units  Per ER rectal exam showed melena & was heme +  Per pt stool now returning to brown  No further N/V or diarrhea  No abd pain  No previous EGD or colonoscopy  He states he has been taking Aleve BID x 1 month for shoulder pain  No sick contacts  Review of Systems: as per HPI  Review of Systems   Musculoskeletal:        Left chest wall/ rib pain   All other systems reviewed and are negative        Historical Information   Past Medical History:   Diagnosis Date    Hypertension      Past Surgical History:   Procedure Laterality Date    NO PAST SURGERIES       Social History   History   Alcohol Use    Yes     Comment: occassionally     History   Drug Use No     History   Smoking Status    Never Smoker   Smokeless Tobacco    Never Used     History reviewed  No pertinent family history  Meds/Allergies     Prescriptions Prior to Admission   Medication    valsartan-hydrochlorothiazide (DIOVAN-HCT) 320-25 MG per tablet     Current Facility-Administered Medications   Medication Dose Route Frequency    acetaminophen (TYLENOL) tablet 650 mg  650 mg Oral Q6H MALLIKA    lactated ringers infusion  125 mL/hr Intravenous Continuous    lidocaine (LIDODERM) 5 % patch 1 patch  1 patch Transdermal Daily    ondansetron (ZOFRAN) injection 4 mg  4 mg Intravenous Q6H PRN    oxyCODONE (ROXICODONE) immediate release tablet 10 mg  10 mg Oral Q4H PRN    oxyCODONE (ROXICODONE) IR tablet 5 mg  5 mg Oral Q4H PRN    pantoprazole (PROTONIX) injection 40 mg  40 mg Intravenous Q12H [facility] & halfway    piperacillin-tazobactam (ZOSYN) 3 375 g in dextrose 5 % 50 mL IVPB  3 375 g Intravenous Q6H       No Known Allergies    Objective     Blood pressure 138/71, pulse 89, temperature 97 5 °F (36 4 °C), temperature source Oral, resp  rate 18, height 5' 10" (1 778 m), weight 86 2 kg (190 lb), SpO2 100 %  Intake/Output Summary (Last 24 hours) at 01/02/18 1534  Last data filed at 01/02/18 1200   Gross per 24 hour   Intake          2654 17 ml   Output                0 ml   Net          2654 17 ml       PHYSICAL EXAM     Physical Exam   Constitutional: He is oriented to person, place, and time  He appears well-developed and well-nourished  HENT:   Head: Normocephalic  Small abrasions on forehead   Eyes: Conjunctivae are normal    Neck: Normal range of motion  Cardiovascular: Normal rate and regular rhythm  Pulmonary/Chest: Effort normal and breath sounds normal    Abdominal: Soft   Bowel sounds are normal  He exhibits no distension  There is no tenderness  Musculoskeletal: Normal range of motion  Neurological: He is alert and oriented to person, place, and time  Skin: Skin is warm and dry  Psychiatric: He has a normal mood and affect  His behavior is normal        Lab Results:   CBC: Lab Results   Component Value Date    WBC 8 75 01/02/2018    HGB 7 8 (L) 01/02/2018    HCT 22 6 (L) 01/02/2018    MCV 92 01/02/2018     (L) 01/02/2018    MCH 31 6 01/02/2018    MCHC 34 5 01/02/2018    RDW 14 8 01/02/2018    MPV 9 5 01/02/2018    NRBC 0 01/02/2018   ,   CMP: Lab Results   Component Value Date     01/02/2018    K 3 2 (L) 01/02/2018     01/02/2018    CO2 26 01/02/2018    ANIONGAP 6 01/02/2018    BUN 20 01/02/2018    CREATININE 0 73 01/02/2018    GLUCOSE 110 01/02/2018    CALCIUM 7 8 (L) 01/02/2018    EGFR 100 01/02/2018   ,   Lipase: No results found for: LIPASE,  PT/INR: No results found for: PT, INR,   Troponin:   Lab Results   Component Value Date    TROPONINI 0 07 (H) 01/01/2018   ,   Imaging Studies: I have personally reviewed pertinent reports  CT chest:  1  Fractures of the left sixth through 10th ribs  2  Mild airspace disease, posterior left lower lobe suggestive of mild contusion  3  No evidence of pneumothorax or hemothorax  4  Fluid-filled esophagus, potentially related to reflux    CT abd/pelvis:  1  Acute displaced fractures left 7th through 10th ribs  Possible trace left hemothorax  Chest CT is recommended as the left 7 fracture is on the 1st slice of the study  2   Mild bladder wall thickening  Correlate with urinalysis  3   Mixed lytic and sclerotic lesions involving the sacrum and left T11 pedicle  Recommend bone scan for initial evaluation  4   Colonic diverticulosis without diverticulitis

## 2018-01-02 NOTE — PLAN OF CARE
INFECTION - ADULT     Absence or prevention of progression during hospitalization Progressing        PAIN - ADULT     Verbalizes/displays adequate comfort level or baseline comfort level Progressing        Potential for Falls     Patient will remain free of falls Progressing        SAFETY ADULT     Maintain or return to baseline ADL function Progressing     Patient will remain free of falls Progressing

## 2018-01-02 NOTE — PLAN OF CARE
Problem: DISCHARGE PLANNING - CARE MANAGEMENT  Goal: Discharge to post-acute care or home with appropriate resources  INTERVENTIONS:  - Conduct assessment to determine patient/family and health care team treatment goals, and need for post-acute services based on payer coverage, community resources, and patient preferences, and barriers to discharge  - Address psychosocial, clinical, and financial barriers to discharge as identified in assessment in conjunction with the patient/family and health care team  - Arrange appropriate level of post-acute services according to patient's   needs and preference and payer coverage in collaboration with the physician and health care team  - Communicate with and update the patient/family, physician, and health care team regarding progress on the discharge plan  - Arrange appropriate transportation to post-acute venues  When medically clear will d/c home with family   Outcome: Progressing

## 2018-01-02 NOTE — ASSESSMENT & PLAN NOTE
- Presume infectious at this time  Admission WBC 19 5  Lac 9 4  No obvious source on CTAP  Diverticulosis no diverticulitis  No abdominal pain  Non-tender  S/p 2U RBC, 1L NS, and 30cc/kg NS at OSH and more fluid here  Lactic acidosis resolved after IVF bolus  - Hgb this AM is 7 8 from 9 8  Asymptomatic    - Continues to deny abdominal pain  Some dark blood mixed in with diarrhea yesterday     - F/u stool cultures and Cdiff  - F/u blood cultures  - Continue clear diet  - Continue empiric antibiotics - zosyn  - Continue protonix  - Obtain 6PM Hgb  - Will need colonoscopy and likely upper endoscopy  - Will consult GI for further recs

## 2018-01-02 NOTE — ASSESSMENT & PLAN NOTE
- ST depressions on lateral leads seen on initial EKG  - Initial trop  07, peaked at 0 11 and down to 0 07  - Repeat EKG showing resolution of ST depressions  - Continues to deny chest pain  - Will consult cardiology to determine if any further recs

## 2018-01-03 ENCOUNTER — APPOINTMENT (INPATIENT)
Dept: NON INVASIVE DIAGNOSTICS | Facility: HOSPITAL | Age: 63
DRG: 183 | End: 2018-01-03
Payer: COMMERCIAL

## 2018-01-03 VITALS
TEMPERATURE: 97.4 F | SYSTOLIC BLOOD PRESSURE: 155 MMHG | WEIGHT: 190 LBS | HEIGHT: 70 IN | RESPIRATION RATE: 18 BRPM | DIASTOLIC BLOOD PRESSURE: 80 MMHG | HEART RATE: 95 BPM | OXYGEN SATURATION: 100 % | BODY MASS INDEX: 27.2 KG/M2

## 2018-01-03 LAB
ANION GAP SERPL CALCULATED.3IONS-SCNC: 6 MMOL/L (ref 4–13)
BUN SERPL-MCNC: 10 MG/DL (ref 5–25)
CALCIUM SERPL-MCNC: 8 MG/DL (ref 8.3–10.1)
CHLORIDE SERPL-SCNC: 105 MMOL/L (ref 100–108)
CO2 SERPL-SCNC: 26 MMOL/L (ref 21–32)
CREAT SERPL-MCNC: 0.66 MG/DL (ref 0.6–1.3)
ERYTHROCYTE [DISTWIDTH] IN BLOOD BY AUTOMATED COUNT: 14.1 % (ref 11.6–15.1)
GFR SERPL CREATININE-BSD FRML MDRD: 104 ML/MIN/1.73SQ M
GLUCOSE SERPL-MCNC: 110 MG/DL (ref 65–140)
HCT VFR BLD AUTO: 23.2 % (ref 36.5–49.3)
HGB BLD-MCNC: 8 G/DL (ref 12–17)
MCH RBC QN AUTO: 32 PG (ref 26.8–34.3)
MCHC RBC AUTO-ENTMCNC: 34.5 G/DL (ref 31.4–37.4)
MCV RBC AUTO: 93 FL (ref 82–98)
PLATELET # BLD AUTO: 153 THOUSANDS/UL (ref 149–390)
PMV BLD AUTO: 9.8 FL (ref 8.9–12.7)
POTASSIUM SERPL-SCNC: 3.3 MMOL/L (ref 3.5–5.3)
RBC # BLD AUTO: 2.5 MILLION/UL (ref 3.88–5.62)
SODIUM SERPL-SCNC: 137 MMOL/L (ref 136–145)
WBC # BLD AUTO: 7.64 THOUSAND/UL (ref 4.31–10.16)

## 2018-01-03 PROCEDURE — 80048 BASIC METABOLIC PNL TOTAL CA: CPT | Performed by: STUDENT IN AN ORGANIZED HEALTH CARE EDUCATION/TRAINING PROGRAM

## 2018-01-03 PROCEDURE — 97165 OT EVAL LOW COMPLEX 30 MIN: CPT

## 2018-01-03 PROCEDURE — G8987 SELF CARE CURRENT STATUS: HCPCS

## 2018-01-03 PROCEDURE — 85027 COMPLETE CBC AUTOMATED: CPT | Performed by: STUDENT IN AN ORGANIZED HEALTH CARE EDUCATION/TRAINING PROGRAM

## 2018-01-03 PROCEDURE — G8989 SELF CARE D/C STATUS: HCPCS

## 2018-01-03 PROCEDURE — 93306 TTE W/DOPPLER COMPLETE: CPT

## 2018-01-03 PROCEDURE — 94762 N-INVAS EAR/PLS OXIMTRY CONT: CPT

## 2018-01-03 PROCEDURE — G8988 SELF CARE GOAL STATUS: HCPCS

## 2018-01-03 PROCEDURE — C9113 INJ PANTOPRAZOLE SODIUM, VIA: HCPCS | Performed by: SURGERY

## 2018-01-03 RX ORDER — VALSARTAN 160 MG/1
160 TABLET ORAL DAILY
COMMUNITY
End: 2021-01-12

## 2018-01-03 RX ORDER — POTASSIUM CHLORIDE 20 MEQ/1
40 TABLET, EXTENDED RELEASE ORAL ONCE
Status: COMPLETED | OUTPATIENT
Start: 2018-01-03 | End: 2018-01-03

## 2018-01-03 RX ADMIN — PANTOPRAZOLE SODIUM 40 MG: 40 INJECTION, POWDER, FOR SOLUTION INTRAVENOUS at 09:21

## 2018-01-03 RX ADMIN — ACETAMINOPHEN 650 MG: 325 TABLET, FILM COATED ORAL at 11:25

## 2018-01-03 RX ADMIN — DEXTROSE MONOHYDRATE 3.38 G: 50 INJECTION, SOLUTION INTRAVENOUS at 08:24

## 2018-01-03 RX ADMIN — SODIUM CHLORIDE, SODIUM LACTATE, POTASSIUM CHLORIDE, AND CALCIUM CHLORIDE 125 ML/HR: .6; .31; .03; .02 INJECTION, SOLUTION INTRAVENOUS at 01:18

## 2018-01-03 RX ADMIN — LIDOCAINE 1 PATCH: 50 PATCH TOPICAL at 09:21

## 2018-01-03 RX ADMIN — DEXTROSE MONOHYDRATE 3.38 G: 50 INJECTION, SOLUTION INTRAVENOUS at 02:47

## 2018-01-03 RX ADMIN — ACETAMINOPHEN 650 MG: 325 TABLET, FILM COATED ORAL at 05:36

## 2018-01-03 RX ADMIN — POTASSIUM CHLORIDE 40 MEQ: 1500 TABLET, EXTENDED RELEASE ORAL at 07:54

## 2018-01-03 NOTE — PROGRESS NOTES
Progress Note - Nico Schneider 59 y o  male MRN: [id number]    Unit/Bed#: [facility] 803-01 Encounter: [id number]         Assessment/ Plan:  Anemia  Heme +  GI bleeding  N/V/D     Pt was admitted after a fall likely from dehydration from his N/V/D  It is possible that his sxs were from a gastroenteritis  These sxs have all resolved  Could have also been from upper GI bleed, PUD from NSAID's  Possible Haydee Rivas tear from retching that then caused diarrhea  His H&H was 7 8->9 2->8 0 H&H appears stable  It would be difficult for him to undergo an endoscopic procedures secondary to rib fractures  Therefore will arrange to see him in the office & have outpt EGD & colonoscopy once he heals  I did tell him to call immediately if he were to have any further melena, weakness, dizziness, etc   -Avoid NSAID's  -Continue Protonix BID  -Outpt f/u    OK to d/c      Subjective:   Pt states he feels well  No abd pain  States did have BM which appears lighter, not dark  Objective:     Vitals: Blood pressure 155/80, pulse 95, temperature (!) 97 4 °F (36 3 °C), temperature source Oral, resp  rate 18, height 5' 10" (1 778 m), weight 86 2 kg (190 lb), SpO2 100 %  ,Body mass index is 27 26 kg/m²  Physical Exam: General appearance: alert, cooperative and no distress  Lungs: clear to auscultation bilaterally  Heart: regular rate and rhythm  Abdomen: soft, non-tender; bowel sounds normal; no masses,  no organomegaly   Rectal: no stool, small smear on pad, dark brown  Skin: Multiple abrasions to scalp & ecchymosis     Invasive Devices     Peripheral Intravenous Line            Peripheral IV 01/01/18 Right Antecubital 2 days                Lab, Imaging and other studies: I have personally reviewed pertinent reports       CBC:   Lab Results   Component Value Date    WBC 7 64 01/03/2018    HGB 8 0 (L) 01/03/2018    HCT 23 2 (L) 01/03/2018    MCV 93 01/03/2018     01/03/2018    MCH 32 0 01/03/2018    MCHC 34 5 01/03/2018 RDW 14 1 01/03/2018    MPV 9 8 01/03/2018   ,   CMP:   Lab Results   Component Value Date     01/03/2018    K 3 3 (L) 01/03/2018     01/03/2018    CO2 26 01/03/2018    ANIONGAP 6 01/03/2018    BUN 10 01/03/2018    CREATININE 0 66 01/03/2018    GLUCOSE 110 01/03/2018    CALCIUM 8 0 (L) 01/03/2018    EGFR 104 01/03/2018   ,

## 2018-01-03 NOTE — OCCUPATIONAL THERAPY NOTE
Occupational Therapy Evaluation      Debe Alpers    1/3/2018    Patient Active Problem List   Diagnosis    GI bleed    NSTEMI (non-ST elevated myocardial infarction) ([facility] [address] )    Multiple rib fractures       Past Medical History:   Diagnosis Date    Hypertension        Past Surgical History:   Procedure Laterality Date    NO PAST SURGERIES          01/03/18 1025   Note Type   Note type Eval only   Restrictions/Precautions   Other Precautions Impulsive; Chair Alarm   Pain Assessment   Pain Assessment No/denies pain   Pain Score No Pain   Home Living   Type of Home Apartment  (+ steps to enter )   Home Layout One level   Prior Function   Level of [place] Independent with ADLs and functional mobility   Lives With Alone   Receives Help From Family   ADL Assistance Independent   IADLs Independent   Falls in the last 6 months 0   Vocational Full time employment   Lifestyle   Autonomy fully I and active PTA, wors full-time as   Reports friends and family in local are if needed       Psychosocial   Psychosocial (WDL) WDL   Patient Behaviors/Mood Cooperative   Subjective   Subjective "I feel okay, sometimes my ribs hurt but it's getting better"   ADL   Eating Assistance 7  Independent   Grooming Assistance 7  Independent   UB Bathing Assistance 7  Independent    [address] 7  Independent    [facility] 7  [address]  7  Independent   Transfers   Sit to Stand 7  Independent   Additional items Impulsive   Stand to Sit 7  Independent   Functional Mobility   Functional Mobility 5  Supervision   Additional items (No device )   Balance   Static Sitting Normal   Dynamic Sitting Good   Static Standing Good   Dynamic Standing Fair +   Activity Tolerance   Activity Tolerance Patient tolerated treatment well  (activity Tolerance: Good )   RUE Assessment   RUE Assessment WFL   LUE Assessment   LUE Assessment WFL Sensation   Light Touch No apparent deficits   Vision-Basic Assessment   Current Vision No visual deficits   Cognition   Overall Cognitive Status WFL   Arousal/Participation Alert   Attention Within functional limits   Orientation Level Oriented X4   Memory Within functional limits   Following Commands Follows all commands and directions without difficulty   Comments mild impulsivity noted with behavior   Assessment   Limitation (no functional ADL deficits )   Prognosis Good   Assessment Patient is a 64 y/o male s/p fall down 4 steps, sustaned multiple rib fractures and reports hitting head  Patient is alert and fully oriented to person/place/time and situation, no deficits noted in cogntive skills however OT does note mildly impulsive behaviors at times  Patient currently able to complete all functional tasks at this time, do not feel furhter OT intervention is required  Recommend discharge home when medically stable    DC OT    Recommendation   OT Discharge Recommendation Home with family support   OT - OK to Discharge Yes   Barthel Index   Feeding 10   Bathing 5   Grooming Score 5   Dressing Score 10   Bladder Score 10   Bowels Score 10   Toilet Use Score 10   Transfers (Bed/Chair) Score 15   Mobility (Level Surface) Score 10   Stairs Score 5  (Per PT eval )   Barthel Index Score 90   Ricky Shah, OT

## 2018-01-03 NOTE — ASSESSMENT & PLAN NOTE
- No obvious source on CTAP  Diverticulosis no diverticulitis  No abdominal pain  Non-tender  S/p 2U RBC, 1L NS, and 30cc/kg NS at OSH and more fluid here  Lactic acidosis resolved after IVF bolus  - Hgb stable at 8 0, asymptomatic    - Stool cultures and C   Diff negative  - Blood cultures no growth to date  - Advance diet to non-ulcerogenic diet  - Discontinue empiric antibiotics  - Continue protonix  - Appreciate GI recommendations, planning for outpatient EGD and colonoscopy unless Hgb unstable

## 2018-01-03 NOTE — PROGRESS NOTES
Progress Note Bailey Rivera 1955, 59 y o  male MRN: [id number]    Unit/Bed#: [facility] 803-01 Encounter: [id number]    Primary Care Provider: Beryle Perry, DO   Date and time admitted to hospital: 1/1/2018 12:20 PM        NSTEMI (non-ST elevated myocardial infarction) [facility])   Assessment & Plan    - ST depressions on lateral leads seen on initial EKG  - Initial trop  07, peaked at 0 11 and down to 0 07  - Repeat EKG showing resolution of ST depressions  - Continues to deny chest pain  - Appreciate cardiology recs  - follow-up echo        GI bleed   Assessment & Plan    - No obvious source on CTAP  Diverticulosis no diverticulitis  No abdominal pain  Non-tender  S/p 2U RBC, 1L NS, and 30cc/kg NS at OSH and more fluid here  Lactic acidosis resolved after IVF bolus  - Hgb stable at 8 0, asymptomatic    - Stool cultures and C  Diff negative  - Blood cultures no growth to date  - Advance diet to non-ulcerogenic diet  - Discontinue empiric antibiotics  - Continue protonix  - Appreciate GI recommendations, planning for outpatient EGD and colonoscopy unless Hgb unstable        * Multiple rib fractures   Assessment & Plan    - L 6-10 fractures s/p fall  - Rib fracture protocol  - Scheduled tylenol, robaxin  PRN pain meds  - Resp protocol, IS            Subjective/Objective   Chief Complaint: I feel good    Subjective: Had a dark bowel movement yesterday  Tolerating clears  Denies abdominal or chest pain  Objective:     Meds/Allergies   Prescriptions Prior to Admission   Medication Sig Dispense Refill Last Dose    valsartan-hydrochlorothiazide (DIOVAN-HCT) 320-25 MG per tablet Take 1 tablet by mouth daily   12/31/2017 at Unknown time    valsartan (DIOVAN) 160 mg tablet Take 160 mg by mouth daily          Vitals: Blood pressure 152/83, pulse 76, temperature 98 2 °F (36 8 °C), temperature source Oral, resp  rate 18, height 5' 10" (1 778 m), weight 86 2 kg (190 lb), SpO2 100 %  Body mass index is 27 26 kg/m²  SpO2 Device: O2 Device: None (Room air)    ABG: No results found for: PHART, CYC7UKI, PO2ART, KSX4EXQ, S1BOVPFF, BEART, SOURCE      Intake/Output Summary (Last 24 hours) at 01/03/18 0945  Last data filed at 01/03/18 9797   Gross per 24 hour   Intake          3970 01 ml   Output              600 ml   Net          3370 01 ml       Invasive Devices     Peripheral Intravenous Line            Peripheral IV 01/01/18 Right Antecubital 2 days    Peripheral IV 01/01/18 Right Hand 2 days                Nutrition/GI Proph/Bowel Reg: Clears, Protonix    Physical Exam:   GENERAL APPEARANCE: No acute distress, awake, alert and oriented  HEENT: NCAT  CV: Regular rate/rhythm  LUNGS: no respiratory distress  ABD: soft, non-tender, non-distended, Left flank ecchymosis and tenderness  EXT: No edema  NEURO: No focal deficits  SKIN: Intact    Lab Results:   BMP/CMP:   Lab Results   Component Value Date     01/03/2018    K 3 3 (L) 01/03/2018     01/03/2018    CO2 26 01/03/2018    ANIONGAP 6 01/03/2018    BUN 10 01/03/2018    CREATININE 0 66 01/03/2018    GLUCOSE 110 01/03/2018    CALCIUM 8 0 (L) 01/03/2018    EGFR 104 01/03/2018    and CBC:   Lab Results   Component Value Date    WBC 7 64 01/03/2018    HGB 8 0 (L) 01/03/2018    HCT 23 2 (L) 01/03/2018    MCV 93 01/03/2018     01/03/2018    MCH 32 0 01/03/2018    MCHC 34 5 01/03/2018    RDW 14 1 01/03/2018    MPV 9 8 01/03/2018     Imaging/EKG Studies: Results: I have personally reviewed pertinent reports      Other Studies: n/a  VTE Prophylaxis: Held 2/2 GI bleed

## 2018-01-03 NOTE — ASSESSMENT & PLAN NOTE
- ST depressions on lateral leads seen on initial EKG  - Initial trop  07, peaked at 0 11 and down to 0 07  - Repeat EKG showing resolution of ST depressions  - Continues to deny chest pain  - Appreciate cardiology recs  - follow-up echo

## 2018-01-03 NOTE — PLAN OF CARE
Problem: OCCUPATIONAL THERAPY ADULT  Goal: Performs self-care activities at highest level of function for planned discharge setting  See evaluation for individualized goals  See flowsheet documentation for full assessment, interventions and recommendations  Outcome: Adequate for Discharge  Limitation:  (no functional ADL deficits )  Prognosis: Good  Assessment: Patient is a 64 y/o male s/p fall down 4 steps, sustaned multiple rib fractures and reports hitting head  Patient is alert and fully oriented to person/place/time and situation, no deficits noted in cogntive skills however OT does note mildly impulsive behaviors at times  Patient currently able to complete all functional tasks at this time, do not feel furhter OT intervention is required  Recommend discharge home when medically stable  DC OT      OT Discharge Recommendation: Home with family support  OT - OK to Discharge:  Yes

## 2018-01-03 NOTE — SIGNIFICANT EVENT
Patient discovered to be missing from floor  Unable to locate patient  Was planned for discharge today

## 2018-01-06 LAB
BACTERIA BLD CULT: NORMAL
BACTERIA BLD CULT: NORMAL

## 2018-01-16 NOTE — DISCHARGE SUMMARY
Discharge Summary - Trauma  Last Haney 59 y o  male MRN: [id number]  [facility] Encounter: [id number]      Date of admission: 1/1/2018  Date of discharge: 1/3/2018 (eloped)    Admitting diagnosis: Unspecified multiple injuries, initial encounter [T07  XXXA]  Discharge diagnosis: NSTEMI, GI bleeding, Multiple rib fx    HPI  Last Haney is a 59 y o  male who presented as a trauma transfer  He had suffered nausea, vomiting, and bloody diarrhea, and subsequently fell down some steps  Denied loss of consciousness  Hospital Course  Seen and evaluated by trauma team  Found to have left 6 - 10 rib fractures, NSTEMI, and Hgb of 7 8  Was started on empiric antibiotics and diet was advanced to clear liquids  Cardiology consulted with no indication for intervention  Gastroenterology consulted and deemed the patient stable for outpatient colonoscopy/endoscopy  Worked with physical and occupational therapy and deemed appropriate for home with family/social support  On hospital day 2, was found to be missing from floor with discharge planning in progress  Hospital staff/supervisor as well as police were notified  Condition at discharge: stable    Procedures/Services:  GI consultation  Cardiology consultation    Current Vitals:   Blood Pressure: 155/80 (01/03/18 1248)  Pulse: 95 (01/03/18 1248)  Temperature: (!) 97 4 °F (36 3 °C) (01/03/18 1248)  Temp Source: Oral (01/03/18 1248)  Respirations: 18 (01/03/18 0700)  Height: 5' 10" (177 8 cm) (01/01/18 1229)  Weight - Scale: 86 2 kg (190 lb) (01/01/18 1229)  SpO2: 100 % (01/03/18 0800)    No intake or output data in the 24 hours ending 01/16/18 1000    Lab Results: CBC: No results found for: WBC, HGB, HCT, MCV, PLT, ADJUSTEDWBC, MCH, MCHC, RDW, MPV, NRBC, CMP: No results found for: NA, K, CL, CO2, ANIONGAP, BUN, CREATININE, GLUCOSE, CALCIUM, AST, ALT, ALKPHOS, PROT, ALBUMIN, BILITOT, EGFR  Imaging: I have personally reviewed pertinent reports  EKG, Pathology, and Other Studies: I have personally reviewed pertinent reports  Disposition: Eloped/left against medical advice  Planned Readmission: No    Discharge Medications:  See after visit summary for reconciled discharge medications provided to patient and family  Discharge instructions/Information to patient and family:   See after visit summary for information provided to patient and family  Provisions for Follow-Up Care:  See after visit summary for information related to follow-up care and any pertinent home health orders  Discharge Statement   I spent 30 minutes discharging the patient  This time was spent on the day of discharge  I had direct contact with the patient on the day of discharge  Additional documentation is required if more than 30 minutes were spent on discharge

## 2018-01-17 ENCOUNTER — GENERIC CONVERSION - ENCOUNTER (OUTPATIENT)
Dept: OTHER | Facility: OTHER | Age: 63
End: 2018-01-17

## 2018-01-23 NOTE — MISCELLANEOUS
Message  GI Reminder Recall Kathy Long:   Date: 01/17/2018   Dear Nanette Jeter:     Review of our records shows you are due for the following: hospital follow up  Please call the following office to schedule your appointment:   [facility], [address] [place] [address], [address] [place] [address] [phone]  We look forward to hearing from you! Sincerely,     [facility]      Signatures   Electronically signed by :  Abigail Barrett, ; Jan 17 2018 12:56PM EST                       (Author)

## 2020-01-02 LAB — HBA1C MFR BLD HPLC: 5.5 %

## 2020-01-09 ENCOUNTER — TRANSCRIBE ORDERS (OUTPATIENT)
Dept: ADMINISTRATIVE | Facility: HOSPITAL | Age: 65
End: 2020-01-09

## 2020-01-09 DIAGNOSIS — R94.5 NONSPECIFIC ABNORMAL RESULTS OF LIVER FUNCTION STUDY: Primary | ICD-10-CM

## 2020-01-10 ENCOUNTER — HOSPITAL ENCOUNTER (OUTPATIENT)
Dept: ULTRASOUND IMAGING | Facility: HOSPITAL | Age: 65
Discharge: HOME | End: 2020-01-10
Payer: COMMERCIAL

## 2020-01-10 ENCOUNTER — APPOINTMENT (OUTPATIENT)
Dept: LAB | Facility: HOSPITAL | Age: 65
End: 2020-01-10
Payer: COMMERCIAL

## 2020-01-10 DIAGNOSIS — R94.5 NONSPECIFIC ABNORMAL RESULTS OF LIVER FUNCTION STUDY: ICD-10-CM

## 2020-01-10 LAB
ALBUMIN SERPL BCP-MCNC: 3.6 G/DL (ref 3.5–5)
ALP SERPL-CCNC: 97 U/L (ref 46–116)
ALT SERPL W P-5'-P-CCNC: 49 U/L (ref 12–78)
AST SERPL W P-5'-P-CCNC: 37 U/L (ref 5–45)
BILIRUB DIRECT SERPL-MCNC: 0.16 MG/DL (ref 0–0.2)
BILIRUB SERPL-MCNC: 0.5 MG/DL (ref 0.2–1)
HAV IGM SER QL: NORMAL
HBV CORE IGM SER QL: NORMAL
HBV SURFACE AG SER QL: NORMAL
HCV AB SER QL: NORMAL
PROT SERPL-MCNC: 7.8 G/DL (ref 6.4–8.2)

## 2020-01-10 PROCEDURE — 80074 ACUTE HEPATITIS PANEL: CPT

## 2020-01-10 PROCEDURE — 36415 COLL VENOUS BLD VENIPUNCTURE: CPT

## 2020-01-10 PROCEDURE — 76705 ECHO EXAM OF ABDOMEN: CPT

## 2020-01-10 PROCEDURE — 80076 HEPATIC FUNCTION PANEL: CPT

## 2020-05-27 RX ORDER — MULTIVITAMIN
1 TABLET ORAL DAILY
COMMUNITY

## 2020-05-27 RX ORDER — ICOSAPENT ETHYL 1000 MG/1
1 CAPSULE ORAL DAILY
COMMUNITY
End: 2021-01-12

## 2021-01-12 ENCOUNTER — OFFICE VISIT (OUTPATIENT)
Dept: FAMILY MEDICINE CLINIC | Facility: CLINIC | Age: 66
End: 2021-01-12
Payer: MEDICARE

## 2021-01-12 ENCOUNTER — TELEPHONE (OUTPATIENT)
Dept: ADMINISTRATIVE | Facility: OTHER | Age: 66
End: 2021-01-12

## 2021-01-12 VITALS
TEMPERATURE: 98.6 F | RESPIRATION RATE: 20 BRPM | SYSTOLIC BLOOD PRESSURE: 154 MMHG | DIASTOLIC BLOOD PRESSURE: 84 MMHG | WEIGHT: 200 LBS | HEART RATE: 84 BPM | HEIGHT: 69 IN | BODY MASS INDEX: 29.62 KG/M2

## 2021-01-12 DIAGNOSIS — I21.4 NSTEMI (NON-ST ELEVATED MYOCARDIAL INFARCTION) (HCC): Primary | ICD-10-CM

## 2021-01-12 DIAGNOSIS — E55.9 VITAMIN D DEFICIENCY: ICD-10-CM

## 2021-01-12 DIAGNOSIS — I11.9 HYPERTENSIVE HEART DISEASE WITHOUT HEART FAILURE: ICD-10-CM

## 2021-01-12 DIAGNOSIS — R73.03 PRE-DIABETES: ICD-10-CM

## 2021-01-12 PROCEDURE — 99213 OFFICE O/P EST LOW 20 MIN: CPT | Performed by: FAMILY MEDICINE

## 2021-01-12 RX ORDER — VALSARTAN AND HYDROCHLOROTHIAZIDE 320; 25 MG/1; MG/1
1 TABLET, FILM COATED ORAL DAILY
Qty: 90 TABLET | Refills: 1 | Status: SHIPPED | OUTPATIENT
Start: 2021-01-12 | End: 2021-07-06

## 2021-01-12 NOTE — PROGRESS NOTES
Assessment/Plan:  Hypertension less than ideally controlled  Patient states his blood pressure has been 523-260 systolic at home and 70 80 diastolic at home pre states he did not take his blood pressure medication until 2 hours ago  Will continue to monitor blood pressure and continue current regimen  History of pre diabetes hemoglobin A1c now is at 5 5  Problem List Items Addressed This Visit     None           There are no diagnoses linked to this encounter  No problem-specific Assessment & Plan notes found for this encounter  PHQ-9 Depression Screening    PHQ-9:   Frequency of the following problems over the past two weeks:      Little interest or pleasure in doing things: 0 - not at all  Feeling down, depressed, or hopeless: 0 - not at all  PHQ-2 Score: 0          Body mass index is 29 53 kg/m²  BMI Counseling: Body mass index is 29 53 kg/m²  The BMI     Subjective:      Patient ID: Meseret Alvarez is a 73 y o  male  Patient presents for routine      The following portions of the patient's history were reviewed and updated as appropriate:   He has a past medical history of Hyperlipidemia and Hypertension  ,  does not have any pertinent problems on file  ,   has a past surgical history that includes No past surgeries  ,  family history includes Diabetes in his father; Hypertension in his father; Stroke in his mother  ,   reports that he has never smoked  He has never used smokeless tobacco  He reports current alcohol use  He reports that he does not use drugs  ,  has No Known Allergies     Current Outpatient Medications   Medication Sig Dispense Refill    Garlic (GARLIQUE PO) Take by mouth      Multiple Vitamin (MULTIVITAMIN) tablet Take 1 tablet by mouth daily      Omega-3 Fatty Acids (FISH OIL PO) Take by mouth      Probiotic Product (ALIGN PO) Take by mouth      psyllium (METAMUCIL) 58 6 % packet Take 1 packet by mouth daily      valsartan-hydrochlorothiazide (DIOVAN-HCT) 320-25 MG per tablet Take 1 tablet by mouth daily       No current facility-administered medications for this visit  Review of Systems   Constitutional: Negative  Negative for chills and fever  HENT: Negative  Negative for ear pain and sore throat  Eyes: Negative  Negative for pain and visual disturbance  Respiratory: Negative  Negative for cough and shortness of breath  Cardiovascular: Negative  Negative for chest pain and palpitations  Gastrointestinal: Negative  Negative for abdominal pain and vomiting  Endocrine: Negative  Genitourinary: Negative  Negative for dysuria and hematuria  Musculoskeletal: Negative  Negative for arthralgias and back pain  Skin: Negative  Negative for color change and rash  Allergic/Immunologic: Negative  Neurological: Negative  Negative for seizures and syncope  Hematological: Negative  Psychiatric/Behavioral: Negative  All other systems reviewed and are negative  Objective:    /84   Pulse 84   Temp 98 6 °F (37 °C)   Resp 20   Ht 5' 9" (1 753 m)   Wt 90 7 kg (200 lb)   BMI 29 53 kg/m²   Body mass index is 29 53 kg/m²  Physical Exam  Constitutional:       Appearance: He is well-developed  HENT:      Head: Normocephalic  Eyes:      Pupils: Pupils are equal, round, and reactive to light  Neck:      Musculoskeletal: Normal range of motion  Cardiovascular:      Rate and Rhythm: Normal rate and regular rhythm  Heart sounds: Normal heart sounds  Pulmonary:      Effort: Pulmonary effort is normal       Breath sounds: Normal breath sounds  Abdominal:      General: Bowel sounds are normal       Palpations: Abdomen is soft  Tenderness: There is no abdominal tenderness  Skin:     General: Skin is warm  Neurological:      Mental Status: He is alert and oriented to person, place, and time

## 2021-01-12 NOTE — TELEPHONE ENCOUNTER
Upon review of the request/inquiry, we are reaching out to you to ask for assistance  We have reviewed all Chart Review tabs, Care Everywhere (CE), completed a chart search and were unable to locate requested item(s)  If you feel this was an oversight, please respond with with location and date of service of the document(s)  To respond with requested information, open this Encounter, navigate to the Routing section, add your response to the routing comments, add my name to the Recipient field, and select Send and Close Workspace      Thank you  Davon Marks

## 2021-01-12 NOTE — TELEPHONE ENCOUNTER
----- Message from Dominguez Barillas sent at 1/12/2021  9:55 AM EST -----  Regarding: Colon/rectal  01/12/21 9:55 AM    Hello, our patient Ronak Iverson has had CRC: Colonoscopy completed/performed  Please assist in updating the patient chart by pulling the document from Encounters Tab within Chart Review  The date of service is 01/2018-- inpatient hospitalization  Patient insisting inpatient colon/rectal exam  Report states will schedule as outpatient       Thank you,  Dominguez Barillas  [facility] [address] [facility]

## 2021-01-14 NOTE — TELEPHONE ENCOUNTER
Upon review of the In Basket request we were unable to find the reqested item within patints chart  We have outreachd for assist and have not received a response  This [employer] will now be completed  Any additional questions or concerns should be emailed to the Practice Liaisons via [email or web address] email, please do not reply via In Basket      Thank you  Davon Marks

## 2021-03-10 DIAGNOSIS — Z23 ENCOUNTER FOR IMMUNIZATION: ICD-10-CM

## 2021-04-21 ENCOUNTER — VBI (OUTPATIENT)
Dept: ADMINISTRATIVE | Facility: OTHER | Age: 66
End: 2021-04-21

## 2021-07-06 DIAGNOSIS — I11.9 HYPERTENSIVE HEART DISEASE WITHOUT HEART FAILURE: ICD-10-CM

## 2021-07-06 RX ORDER — VALSARTAN AND HYDROCHLOROTHIAZIDE 320; 25 MG/1; MG/1
TABLET, FILM COATED ORAL
Qty: 90 TABLET | Refills: 1 | Status: SHIPPED | OUTPATIENT
Start: 2021-07-06 | End: 2021-12-28

## 2021-07-13 ENCOUNTER — OFFICE VISIT (OUTPATIENT)
Dept: FAMILY MEDICINE CLINIC | Facility: CLINIC | Age: 66
End: 2021-07-13
Payer: MEDICARE

## 2021-07-13 VITALS
OXYGEN SATURATION: 98 % | BODY MASS INDEX: 29.71 KG/M2 | HEART RATE: 93 BPM | TEMPERATURE: 97.8 F | DIASTOLIC BLOOD PRESSURE: 84 MMHG | SYSTOLIC BLOOD PRESSURE: 146 MMHG | HEIGHT: 69 IN | WEIGHT: 200.6 LBS

## 2021-07-13 DIAGNOSIS — I21.4 NSTEMI (NON-ST ELEVATED MYOCARDIAL INFARCTION) (HCC): ICD-10-CM

## 2021-07-13 DIAGNOSIS — I11.9 HYPERTENSIVE HEART DISEASE WITHOUT HEART FAILURE: Primary | ICD-10-CM

## 2021-07-13 DIAGNOSIS — R73.03 PRE-DIABETES: ICD-10-CM

## 2021-07-13 DIAGNOSIS — Z11.59 NEED FOR HEPATITIS B SCREENING TEST: ICD-10-CM

## 2021-07-13 DIAGNOSIS — Z11.4 SCREENING FOR HIV (HUMAN IMMUNODEFICIENCY VIRUS): ICD-10-CM

## 2021-07-13 DIAGNOSIS — Z11.59 NEED FOR HEPATITIS C SCREENING TEST: ICD-10-CM

## 2021-07-13 DIAGNOSIS — Z00.00 MEDICARE ANNUAL WELLNESS VISIT, INITIAL: ICD-10-CM

## 2021-07-13 DIAGNOSIS — E55.9 VITAMIN D DEFICIENCY: ICD-10-CM

## 2021-07-13 DIAGNOSIS — Z13.6 SCREENING FOR CARDIOVASCULAR CONDITION: ICD-10-CM

## 2021-07-13 PROCEDURE — G0402 INITIAL PREVENTIVE EXAM: HCPCS | Performed by: FAMILY MEDICINE

## 2021-07-13 PROCEDURE — 99213 OFFICE O/P EST LOW 20 MIN: CPT | Performed by: FAMILY MEDICINE

## 2021-07-13 PROCEDURE — 1123F ACP DISCUSS/DSCN MKR DOCD: CPT | Performed by: FAMILY MEDICINE

## 2021-07-13 NOTE — PROGRESS NOTES
Assessment and Plan:     Problem List Items Addressed This Visit     None           Preventive health issues were discussed with patient, and age appropriate screening tests were ordered as noted in patient's After Visit Summary  Personalized health advice and appropriate referrals for health education or preventive services given if needed, as noted in patient's After Visit Summary  History of Present Illness:     Patient presents for Medicare Annual Wellness visit    Patient Care Team:  Megan Khan DO as PCP - General (Family Medicine)     Problem List:     Patient Active Problem List   Diagnosis    GI bleed    NSTEMI (non-ST elevated myocardial infarction) ([facility] [address] )    Multiple rib fractures      Past Medical and Surgical History:     Past Medical History:   Diagnosis Date    Hyperlipidemia     Hypertension      Past Surgical History:   Procedure Laterality Date    NO PAST SURGERIES        Family History:     Family History   Problem Relation Age of Onset    Stroke Mother     Hypertension Father     Diabetes Father       Social History:     Social History     Socioeconomic History    Marital status: Single     Spouse name: None    Number of children: None    Years of education: None    Highest education level: None   Occupational History    None   Tobacco Use    Smoking status: Never Smoker    Smokeless tobacco: Never Used   Vaping Use    Vaping Use: Never used   Substance and Sexual Activity    Alcohol use: Yes     Comment: social    Drug use: No    Sexual activity: None   Other Topics Concern    None   Social History Narrative    None     Social Determinants of Health     Financial Resource Strain:     Difficulty of Paying Living Expenses:    Food Insecurity:     Worried About Running Out of Food in the Last Year:     Ran Out of Food in the Last Year:    Transportation Needs:     Lack of Transportation (Medical):      Lack of Transportation (Non-Medical):    Physical Activity:  Days of Exercise per Week:     Minutes of Exercise per Session:    Stress:     Feeling of Stress :    Social Connections:     Frequency of Communication with Friends and Family:     Frequency of Social Gatherings with Friends and Family:     Attends Manda[employer] Services:     Active Member of Clubs or Organizations:     Attends Club or Organization Meetings:     Marital Status:    Intimate Partner Violence:     Fear of Current or Ex-Partner:     Emotionally Abused:     Physically Abused:     Sexually Abused:       Medications and Allergies:     Current Outpatient Medications   Medication Sig Dispense Refill    Garlic (GARLIQUE PO) Take by mouth      Multiple Vitamin (MULTIVITAMIN) tablet Take 1 tablet by mouth daily      Omega-3 Fatty Acids (FISH OIL PO) Take by mouth      Probiotic Product (ALIGN PO) Take by mouth      psyllium (METAMUCIL) 58 6 % packet Take 1 packet by mouth daily      valsartan-hydrochlorothiazide (DIOVAN-HCT) 320-25 MG per tablet TAKE 1 TABLET BY MOUTH EVERY DAY 90 tablet 1     No current facility-administered medications for this visit  No Known Allergies   Immunizations:     Immunization History   Administered Date(s) Administered    INFLUENZA 11/05/2017, 10/19/2018    Influenza Injectable, MDCK, Preservative Free, Quadrivalent, 0 5 mL 10/18/2019    SARS-CoV-2 / COVID-19 mRNA IM (Lyle Batter) 03/24/2021    Tdap 01/01/2018      Health Maintenance:         Topic Date Due    HIV Screening  Never done    Colorectal Cancer Screening  Never done    Hepatitis C Screening  Completed         Topic Date Due    Pneumococcal Vaccine: 65+ Years (1 of 2 - PPSV23) Never done    COVID-19 Vaccine (2 - Moderna 2-dose series) 04/21/2021    Influenza Vaccine (1) 09/01/2021      Medicare Health Risk Assessment:     There were no vitals taken for this visit  Jacoby Leon is here for his Initial Wellness visit  Health Risk Assessment:   Patient rates overall health as good   Patient feels that their physical health rating is same  Patient is satisfied with their life  Eyesight was rated as same  Hearing was rated as same  Patient feels that their emotional and mental health rating is same  Patients states they are never, rarely angry  Patient states they are never, rarely unusually tired/fatigued  Pain experienced in the last 7 days has been none  Patient states that he has experienced no weight loss or gain in last 6 months  Fall Risk Screening: In the past year, patient has experienced: no history of falling in past year      Home Safety:  Patient does not have trouble with stairs inside or outside of their home  Patient has working smoke alarms and has working carbon monoxide detector  Home safety hazards include: none  Nutrition:   Current diet is Regular  Medications:   Patient is not currently taking any over-the-counter supplements  Patient is able to manage medications  Activities of Daily Living (ADLs)/Instrumental Activities of Daily Living (IADLs):   Walk and transfer into and out of bed and chair?: Yes  Dress and groom yourself?: Yes    Bathe or shower yourself?: Yes    Feed yourself?  Yes  Do your laundry/housekeeping?: Yes  Manage your money, pay your bills and track your expenses?: Yes  Make your own meals?: Yes    Do your own shopping?: Yes    Previous Hospitalizations:   Any hospitalizations or ED visits within the last 12 months?: No      Advance Care Planning:   Living will: No    Durable POA for healthcare: No    Advanced directive: No      PREVENTIVE SCREENINGS        Abdominal Aortic Aneurysm (AAA) Screening:    Risk factors include: age between 73-69 yo        Lung Cancer Screening:     General: Screening Not Indicated      Hepatitis C Screening:    General: Screening Current    Screening, Brief Intervention, and Referral to Treatment (SBIRT)    Screening  Typical number of drinks in a day: 1  Typical number of drinks in a week: 6  Interpretation: Low risk drinking behavior      Single Item Drug Screening:  How often have you used an illegal drug (including marijuana) or a prescription medication for non-medical reasons in the past year? never    Single Item Drug Screen Score: 0  Interpretation: Negative screen for possible drug use disorder      Gilford Purpura, DO

## 2021-07-13 NOTE — PATIENT INSTRUCTIONS
Medicare Preventive Visit Patient Instructions  Thank you for completing your Welcome to Medicare Visit or Medicare Annual Wellness Visit today  Your next wellness visit will be due in one year (7/14/2022)  The screening/preventive services that you may require over the next 5-10 years are detailed below  Some tests may not apply to you based off risk factors and/or age  Screening tests ordered at today's visit but not completed yet may show as past due  Also, please note that scanned in results may not display below  Preventive Screenings:  Service Recommendations Previous Testing/Comments   Colorectal Cancer Screening  · Colonoscopy    · Fecal Occult Blood Test (FOBT)/Fecal Immunochemical Test (FIT)  · Fecal DNA/Cologuard Test  · Flexible Sigmoidoscopy Age: 54-65 years old   Colonoscopy: every 10 years (May be performed more frequently if at higher risk)  OR  FOBT/FIT: every 1 year  OR  Cologuard: every 3 years  OR  Sigmoidoscopy: every 5 years  Screening may be recommended earlier than age 48 if at higher risk for colorectal cancer  Also, an individualized decision between you and your healthcare provider will decide whether screening between the ages of 74-80 would be appropriate   Colonoscopy: Not on file  FOBT/FIT: Not on file  Cologuard: Not on file  Sigmoidoscopy: Not on file          Prostate Cancer Screening Individualized decision between patient and health care provider in men between ages of 53-78   Medicare will cover every 12 months beginning on the day after your 50th birthday PSA: No results in last 5 years           Hepatitis C Screening Once for adults born between 1945 and 1965  More frequently in patients at high risk for Hepatitis C Hep C Antibody: 01/10/2020    Screening Current   Diabetes Screening 1-2 times per year if you're at risk for diabetes or have pre-diabetes Fasting glucose: No results in last 5 years   A1C: 5 5 %        Cholesterol Screening Once every 5 years if you don't have a lipid disorder  May order more often based on risk factors  Lipid panel: Not on file           Other Preventive Screenings Covered by Medicare:  1  Abdominal Aortic Aneurysm (AAA) Screening: covered once if your at risk  You're considered to be at risk if you have a family history of AAA or a male between the age of 73-68 who smoking at least 100 cigarettes in your lifetime  2  Lung Cancer Screening: covers low dose CT scan once per year if you meet all of the following conditions: (1) Age 50-69; (2) No signs or symptoms of lung cancer; (3) Current smoker or have quit smoking within the last 15 years; (4) You have a tobacco smoking history of at least 30 pack years (packs per day x number of years you smoked); (5) You get a written order from a healthcare provider  3  Glaucoma Screening: covered annually if you're considered high risk: (1) You have diabetes OR (2) Family history of glaucoma OR (3)  aged 48 and older OR (3)  American aged 72 and older  3  Osteoporosis Screening: covered every 2 years if you meet one of the following conditions: (1) Have a vertebral abnormality; (2) On glucocorticoid therapy for more than 3 months; (3) Have primary hyperparathyroidism; (4) On osteoporosis medications and need to assess response to drug therapy  5  HIV Screening: covered annually if you're between the age of 12-76  Also covered annually if you are younger than 13 and older than 72 with risk factors for HIV infection  For pregnant patients, it is covered up to 3 times per pregnancy      Immunizations:  Immunization Recommendations   Influenza Vaccine Annual influenza vaccination during flu season is recommended for all persons aged >= 6 months who do not have contraindications   Pneumococcal Vaccine (Prevnar and Pneumovax)  * Prevnar = PCV13  * Pneumovax = PPSV23 Adults 25-60 years old: 1-3 doses may be recommended based on certain risk factors  Adults 72 years old: Prevnar (PCV13) vaccine recommended followed by Pneumovax (PPSV23) vaccine  If already received PPSV23 since turning 65, then PCV13 recommended at least one year after PPSV23 dose  Hepatitis B Vaccine 3 dose series if at intermediate or high risk (ex: diabetes, end stage renal disease, liver disease)   Tetanus (Td) Vaccine - COST NOT COVERED BY MEDICARE PART B Following completion of primary series, a booster dose should be given every 10 years to maintain immunity against tetanus  Td may also be given as tetanus wound prophylaxis  Tdap Vaccine - COST NOT COVERED BY MEDICARE PART B Recommended at least once for all adults  For pregnant patients, recommended with each pregnancy  Shingles Vaccine (Shingrix) - COST NOT COVERED BY MEDICARE PART B  2 shot series recommended in those aged 48 and above     Health Maintenance Due:      Topic Date Due    HIV Screening  Never done    Colorectal Cancer Screening  Never done    Hepatitis C Screening  Completed     Immunizations Due:      Topic Date Due    Pneumococcal Vaccine: 65+ Years (1 of 2 - PPSV23) Never done    COVID-19 Vaccine (2 - Moderna 2-dose series) 04/21/2021    Influenza Vaccine (1) 09/01/2021     Advance Directives   What are advance directives? Advance directives are legal documents that state your wishes and plans for medical care  These plans are made ahead of time in case you lose your ability to make decisions for yourself  Advance directives can apply to any medical decision, such as the treatments you want, and if you want to donate organs  What are the types of advance directives? There are many types of advance directives, and each state has rules about how to use them  You may choose a combination of any of the following:  · Living will: This is a written record of the treatment you want  You can also choose which treatments you do not want, which to limit, and which to stop at a certain time  This includes surgery, medicine, IV fluid, and tube feedings  · Durable power of  for healthcare [place] [facility]): This is a written record that states who you want to make healthcare choices for you when you are unable to make them for yourself  This person, called a proxy, is usually a family member or a friend  You may choose more than 1 proxy  · Do not resuscitate (DNR) order:  A DNR order is used in case your heart stops beating or you stop breathing  It is a request not to have certain forms of treatment, such as CPR  A DNR order may be included in other types of advance directives  · Medical directive: This covers the care that you want if you are in a coma, near death, or unable to make decisions for yourself  You can list the treatments you want for each condition  Treatment may include pain medicine, surgery, blood transfusions, dialysis, IV or tube feedings, and a ventilator (breathing machine)  · Values history: This document has questions about your views, beliefs, and how you feel and think about life  This information can help others choose the care that you would choose  Why are advance directives important? An advance directive helps you control your care  Although spoken wishes may be used, it is better to have your wishes written down  Spoken wishes can be misunderstood, or not followed  Treatments may be given even if you do not want them  An advance directive may make it easier for your family to make difficult choices about your care  Weight Management   Why it is important to manage your weight:  Being overweight increases your risk of health conditions such as heart disease, high blood pressure, type 2 diabetes, and certain types of cancer  It can also increase your risk for osteoarthritis, sleep apnea, and other respiratory problems  Aim for a slow, steady weight loss  Even a small amount of weight loss can lower your risk of health problems    How to lose weight safely:  A safe and healthy way to lose weight is to eat fewer calories and get regular exercise  You can lose up about 1 pound a week by decreasing the number of calories you eat by 500 calories each day  Healthy meal plan for weight management:  A healthy meal plan includes a variety of foods, contains fewer calories, and helps you stay healthy  A healthy meal plan includes the following:  · Eat whole-grain foods more often  A healthy meal plan should contain fiber  Fiber is the part of grains, fruits, and vegetables that is not broken down by your body  Whole-grain foods are healthy and provide extra fiber in your diet  Some examples of whole-grain foods are whole-wheat breads and pastas, oatmeal, brown rice, and bulgur  · Eat a variety of vegetables every day  Include dark, leafy greens such as spinach, kale, lenora greens, and mustard greens  Eat yellow and orange vegetables such as carrots, sweet potatoes, and winter squash  · Eat a variety of fruits every day  Choose fresh or canned fruit (canned in its own juice or light syrup) instead of juice  Fruit juice has very little or no fiber  · Eat low-fat dairy foods  Drink fat-free (skim) milk or 1% milk  Eat fat-free yogurt and low-fat cottage cheese  Try low-fat cheeses such as mozzarella and other reduced-fat cheeses  · Choose meat and other protein foods that are low in fat  Choose beans or other legumes such as split peas or lentils  Choose fish, skinless poultry (chicken or turkey), or lean cuts of red meat (beef or pork)  Before you cook meat or poultry, cut off any visible fat  · Use less fat and oil  Try baking foods instead of frying them  Add less fat, such as margarine, sour cream, regular salad dressing and mayonnaise to foods  Eat fewer high-fat foods  Some examples of high-fat foods include french fries, doughnuts, ice cream, and cakes  · Eat fewer sweets  Limit foods and drinks that are high in sugar  This includes candy, cookies, regular soda, and sweetened drinks    Exercise:  Exercise at least 30 minutes per day on most days of the week  Some examples of exercise include walking, biking, dancing, and swimming  You can also fit in more physical activity by taking the stairs instead of the elevator or parking farther away from stores  Ask your healthcare provider about the best exercise plan for you  © Copyright [employer] 2018 Information is for End User's use only and may not be sold, redistributed or otherwise used for commercial purposes  All illustrations and images included in CareNotes® are the copyrighted property of [employer]  or [facility] Preventive Visit Patient Instructions  Thank you for completing your Welcome to Medicare Visit or Medicare Annual Wellness Visit today  Your next wellness visit will be due in one year (7/14/2022)  The screening/preventive services that you may require over the next 5-10 years are detailed below  Some tests may not apply to you based off risk factors and/or age  Screening tests ordered at today's visit but not completed yet may show as past due  Also, please note that scanned in results may not display below  Preventive Screenings:  Service Recommendations Previous Testing/Comments   Colorectal Cancer Screening  · Colonoscopy    · Fecal Occult Blood Test (FOBT)/Fecal Immunochemical Test (FIT)  · Fecal DNA/Cologuard Test  · Flexible Sigmoidoscopy Age: 54-65 years old   Colonoscopy: every 10 years (May be performed more frequently if at higher risk)  OR  FOBT/FIT: every 1 year  OR  Cologuard: every 3 years  OR  Sigmoidoscopy: every 5 years  Screening may be recommended earlier than age 48 if at higher risk for colorectal cancer  Also, an individualized decision between you and your healthcare provider will decide whether screening between the ages of 74-80 would be appropriate   Colonoscopy: Not on file  FOBT/FIT: Not on file  Cologuard: Not on file  Sigmoidoscopy: Not on file          Prostate Cancer Screening Individualized decision between patient and health care provider in men between ages of 53-78   Medicare will cover every 12 months beginning on the day after your 50th birthday PSA: No results in last 5 years           Hepatitis C Screening Once for adults born between 1945 and 1965  More frequently in patients at high risk for Hepatitis C Hep C Antibody: 01/10/2020    Screening Current   Diabetes Screening 1-2 times per year if you're at risk for diabetes or have pre-diabetes Fasting glucose: No results in last 5 years   A1C: 5 5 %        Cholesterol Screening Once every 5 years if you don't have a lipid disorder  May order more often based on risk factors  Lipid panel: Not on file           Other Preventive Screenings Covered by Medicare:  6  Abdominal Aortic Aneurysm (AAA) Screening: covered once if your at risk  You're considered to be at risk if you have a family history of AAA or a male between the age of 73-68 who smoking at least 100 cigarettes in your lifetime  7  Lung Cancer Screening: covers low dose CT scan once per year if you meet all of the following conditions: (1) Age 50-69; (2) No signs or symptoms of lung cancer; (3) Current smoker or have quit smoking within the last 15 years; (4) You have a tobacco smoking history of at least 30 pack years (packs per day x number of years you smoked); (5) You get a written order from a healthcare provider  8  Glaucoma Screening: covered annually if you're considered high risk: (1) You have diabetes OR (2) Family history of glaucoma OR (3)  aged 48 and older OR (3)  American aged 72 and older  5  Osteoporosis Screening: covered every 2 years if you meet one of the following conditions: (1) Have a vertebral abnormality; (2) On glucocorticoid therapy for more than 3 months; (3) Have primary hyperparathyroidism; (4) On osteoporosis medications and need to assess response to drug therapy  10  HIV Screening: covered annually if you're between the age of 12-76   Also covered annually if you are younger than 13 and older than 72 with risk factors for HIV infection  For pregnant patients, it is covered up to 3 times per pregnancy  Immunizations:  Immunization Recommendations   Influenza Vaccine Annual influenza vaccination during flu season is recommended for all persons aged >= 6 months who do not have contraindications   Pneumococcal Vaccine (Prevnar and Pneumovax)  * Prevnar = PCV13  * Pneumovax = PPSV23 Adults 25-60 years old: 1-3 doses may be recommended based on certain risk factors  Adults 72 years old: Prevnar (PCV13) vaccine recommended followed by Pneumovax (PPSV23) vaccine  If already received PPSV23 since turning 65, then PCV13 recommended at least one year after PPSV23 dose  Hepatitis B Vaccine 3 dose series if at intermediate or high risk (ex: diabetes, end stage renal disease, liver disease)   Tetanus (Td) Vaccine - COST NOT COVERED BY MEDICARE PART B Following completion of primary series, a booster dose should be given every 10 years to maintain immunity against tetanus  Td may also be given as tetanus wound prophylaxis  Tdap Vaccine - COST NOT COVERED BY MEDICARE PART B Recommended at least once for all adults  For pregnant patients, recommended with each pregnancy  Shingles Vaccine (Shingrix) - COST NOT COVERED BY MEDICARE PART B  2 shot series recommended in those aged 48 and above     Health Maintenance Due:      Topic Date Due    HIV Screening  Never done    Colorectal Cancer Screening  Never done    Hepatitis C Screening  Completed     Immunizations Due:      Topic Date Due    Pneumococcal Vaccine: 65+ Years (1 of 2 - PPSV23) Never done    COVID-19 Vaccine (2 - Moderna 2-dose series) 04/21/2021    Influenza Vaccine (1) 09/01/2021     Advance Directives   What are advance directives? Advance directives are legal documents that state your wishes and plans for medical care   These plans are made ahead of time in case you lose your ability to make decisions for yourself  Advance directives can apply to any medical decision, such as the treatments you want, and if you want to donate organs  What are the types of advance directives? There are many types of advance directives, and each state has rules about how to use them  You may choose a combination of any of the following:  · Living will: This is a written record of the treatment you want  You can also choose which treatments you do not want, which to limit, and which to stop at a certain time  This includes surgery, medicine, IV fluid, and tube feedings  · Durable power of  for healthcare [place] [facility]): This is a written record that states who you want to make healthcare choices for you when you are unable to make them for yourself  This person, called a proxy, is usually a family member or a friend  You may choose more than 1 proxy  · Do not resuscitate (DNR) order:  A DNR order is used in case your heart stops beating or you stop breathing  It is a request not to have certain forms of treatment, such as CPR  A DNR order may be included in other types of advance directives  · Medical directive: This covers the care that you want if you are in a coma, near death, or unable to make decisions for yourself  You can list the treatments you want for each condition  Treatment may include pain medicine, surgery, blood transfusions, dialysis, IV or tube feedings, and a ventilator (breathing machine)  · Values history: This document has questions about your views, beliefs, and how you feel and think about life  This information can help others choose the care that you would choose  Why are advance directives important? An advance directive helps you control your care  Although spoken wishes may be used, it is better to have your wishes written down  Spoken wishes can be misunderstood, or not followed  Treatments may be given even if you do not want them   An advance directive may make it easier for your family to make difficult choices about your care  Weight Management   Why it is important to manage your weight:  Being overweight increases your risk of health conditions such as heart disease, high blood pressure, type 2 diabetes, and certain types of cancer  It can also increase your risk for osteoarthritis, sleep apnea, and other respiratory problems  Aim for a slow, steady weight loss  Even a small amount of weight loss can lower your risk of health problems  How to lose weight safely:  A safe and healthy way to lose weight is to eat fewer calories and get regular exercise  You can lose up about 1 pound a week by decreasing the number of calories you eat by 500 calories each day  Healthy meal plan for weight management:  A healthy meal plan includes a variety of foods, contains fewer calories, and helps you stay healthy  A healthy meal plan includes the following:  · Eat whole-grain foods more often  A healthy meal plan should contain fiber  Fiber is the part of grains, fruits, and vegetables that is not broken down by your body  Whole-grain foods are healthy and provide extra fiber in your diet  Some examples of whole-grain foods are whole-wheat breads and pastas, oatmeal, brown rice, and bulgur  · Eat a variety of vegetables every day  Include dark, leafy greens such as spinach, kale, lenora greens, and mustard greens  Eat yellow and orange vegetables such as carrots, sweet potatoes, and winter squash  · Eat a variety of fruits every day  Choose fresh or canned fruit (canned in its own juice or light syrup) instead of juice  Fruit juice has very little or no fiber  · Eat low-fat dairy foods  Drink fat-free (skim) milk or 1% milk  Eat fat-free yogurt and low-fat cottage cheese  Try low-fat cheeses such as mozzarella and other reduced-fat cheeses  · Choose meat and other protein foods that are low in fat  Choose beans or other legumes such as split peas or lentils   Choose fish, skinless poultry (chicken or turkey), or lean cuts of red meat (beef or pork)  Before you cook meat or poultry, cut off any visible fat  · Use less fat and oil  Try baking foods instead of frying them  Add less fat, such as margarine, sour cream, regular salad dressing and mayonnaise to foods  Eat fewer high-fat foods  Some examples of high-fat foods include french fries, doughnuts, ice cream, and cakes  · Eat fewer sweets  Limit foods and drinks that are high in sugar  This includes candy, cookies, regular soda, and sweetened drinks  Exercise:  Exercise at least 30 minutes per day on most days of the week  Some examples of exercise include walking, biking, dancing, and swimming  You can also fit in more physical activity by taking the stairs instead of the elevator or parking farther away from stores  Ask your healthcare provider about the best exercise plan for you  © Copyright [employer] 2018 Information is for End User's use only and may not be sold, redistributed or otherwise used for commercial purposes   All illustrations and images included in CareNotes® are the copyrighted property of A D A M [employer] Inc  or [address] [place] [address]

## 2021-07-13 NOTE — PROGRESS NOTES
Assessment and Plan:Hypertensive cardiovascular disease with blood pressure slightly elevated on today's visit  Will continue to observe the blood pressure  History of non ST elevated myocardial infarction and January of 2018 complains of no chest pain or shortness of breath    Pre diabetes will be checking hemoglobin A1c    History of vitamin-D deficiency will be checking a vitamin-D     Problem List Items Addressed This Visit     None        BMI Counseling: Body mass index is 29 62 kg/m²  The BMI is above normal  Nutrition recommendations include decreasing portion sizes, encouraging healthy choices of fruits and vegetables, decreasing fast food intake, consuming healthier snacks, limiting drinks that contain sugar, moderation in carbohydrate intake, increasing intake of lean protein, reducing intake of saturated and trans fat and reducing intake of cholesterol  Exercise recommendations include moderate physical activity 150 minutes/week and exercising 3-5 times per week  Preventive health issues were discussed with patient, and age appropriate screening tests were ordered as noted in patient's After Visit Summary  Personalized health advice and appropriate referrals for health education or preventive services given if needed, as noted in patient's After Visit Summary  History of Present Illness:     Patient [facility] for [facility] Medicare visit  Patient Care Team:  Megan Khan DO as PCP - General (Family Medicine)     Review of Systems:     Review of Systems   Constitutional: Negative for chills and fever  HENT: Negative for ear pain and sore throat  Eyes: Negative for pain and visual disturbance  Respiratory: Negative for cough and shortness of breath  Cardiovascular: Negative for chest pain and palpitations  Gastrointestinal: Negative for abdominal pain and vomiting  Genitourinary: Negative for dysuria and hematuria     Musculoskeletal: Negative for arthralgias and back pain    Skin: Negative for color change and rash  Neurological: Negative for seizures and syncope  All other systems reviewed and are negative  Problem List:     Patient Active Problem List   Diagnosis    GI bleed    NSTEMI (non-ST elevated myocardial infarction) ([facility] [address] )    Multiple rib fractures      Past Medical and Surgical History:     Past Medical History:   Diagnosis Date    Hyperlipidemia     Hypertension      Past Surgical History:   Procedure Laterality Date    NO PAST SURGERIES        Family History:     Family History   Problem Relation Age of Onset    Stroke Mother     Hypertension Father     Diabetes Father       Social History:     Social History     Socioeconomic History    Marital status: Single     Spouse name: None    Number of children: None    Years of education: None    Highest education level: None   Occupational History    None   Tobacco Use    Smoking status: Never Smoker    Smokeless tobacco: Never Used   Vaping Use    Vaping Use: Never used   Substance and Sexual Activity    Alcohol use: Yes     Comment: social    Drug use: No    Sexual activity: None   Other Topics Concern    None   Social History Narrative    None     Social Determinants of Health     Financial Resource Strain:     Difficulty of Paying Living Expenses:    Food Insecurity:     Worried About Running Out of Food in the Last Year:     Ran Out of Food in the Last Year:    Transportation Needs:     Lack of Transportation (Medical):      Lack of Transportation (Non-Medical):    Physical Activity:     Days of Exercise per Week:     Minutes of Exercise per Session:    Stress:     Feeling of Stress :    Social Connections:     Frequency of Communication with Friends and Family:     Frequency of Social Gatherings with Friends and Family:     Attends Denominational Services:     Active Member of Clubs or Organizations:     Attends Club or Organization Meetings:     Marital Status:    Intimate Partner Violence:     Fear of Current or Ex-Partner:     Emotionally Abused:     Physically Abused:     Sexually Abused:       Medications and Allergies:     Current Outpatient Medications   Medication Sig Dispense Refill    Garlic (GARLIQUE PO) Take by mouth      Multiple Vitamin (MULTIVITAMIN) tablet Take 1 tablet by mouth daily      Omega-3 Fatty Acids (FISH OIL PO) Take by mouth      Probiotic Product (ALIGN PO) Take by mouth      psyllium (METAMUCIL) 58 6 % packet Take 1 packet by mouth daily      valsartan-hydrochlorothiazide (DIOVAN-HCT) 320-25 MG per tablet TAKE 1 TABLET BY MOUTH EVERY DAY 90 tablet 1     No current facility-administered medications for this visit  No Known Allergies   Immunizations:     Immunization History   Administered Date(s) Administered    INFLUENZA 11/05/2017, 10/19/2018    Influenza Injectable, MDCK, Preservative Free, Quadrivalent, 0 5 mL 10/18/2019    SARS-CoV-2 / COVID-19 mRNA IM (Moderna) 03/24/2021    Tdap 01/01/2018      Health Maintenance:         Topic Date Due    HIV Screening  Never done    Colorectal Cancer Screening  Never done    Hepatitis C Screening  Completed         Topic Date Due    Pneumococcal Vaccine: 65+ Years (1 of 2 - PPSV23) Never done    COVID-19 Vaccine (2 - Moderna 2-dose series) 04/21/2021    Influenza Vaccine (1) 09/01/2021      Medicare Screening Tests and Risk Assessments:     Annual Wellness Visit  No exam data present     Physical Exam:     /84 (BP Location: Left arm, Patient Position: Sitting, Cuff Size: Adult)   Pulse 93   Temp 97 8 °F (36 6 °C) (Tympanic)   Ht 5' 9" (1 753 m)   Wt 91 kg (200 lb 9 6 oz)   SpO2 98%   BMI 29 62 kg/m²     Physical Exam  Vitals and nursing note reviewed  Constitutional:       Appearance: He is well-developed  HENT:      Head: Normocephalic and atraumatic  Eyes:      Conjunctiva/sclera: Conjunctivae normal    Cardiovascular:      Rate and Rhythm: Normal rate and regular rhythm  Heart sounds: No murmur heard  Pulmonary:      Effort: Pulmonary effort is normal  No respiratory distress  Breath sounds: Normal breath sounds  Abdominal:      Palpations: Abdomen is soft  Tenderness: There is no abdominal tenderness  Musculoskeletal:      Cervical back: Neck supple  Skin:     General: Skin is warm and dry  Neurological:      Mental Status: He is alert            Carvel Fieldton, DO

## 2021-07-14 ENCOUNTER — TELEPHONE (OUTPATIENT)
Dept: ADMINISTRATIVE | Facility: OTHER | Age: 66
End: 2021-07-14

## 2021-07-14 NOTE — LETTER
Vaccination Request Form: [id number] aka SARS-CoV-2 (Ruthellen Frantz or Sadiq Thakur or [employer])      Date Requested: 21  Patient: Anastasiya Rivera  Patient : 1955   Referring Provider: Ana Maria Person DO       The above patient has informed us that they have had their   most recent COVID-19 aka SARS-CoV-2 (Leonlen Frantz or Sadiq Thakur or [employer]) administered at your facility  Please   complete this form and attach all corresponding documentation  Date of Vaccine(s) Given  ______________________________    Lot Number(s) _______________________________________    Manufacture(s) ______________________________________    Dose Amount (s) _____________________________________    Expiration Date(s) ____________________________________    Comments __________________________________________________________  ____________________________________________________________________  ____________________________________________________________________  ____________________________________________________________________    Administering Facility  ________________________________________________    Vaccine Administered By (print name) ___________________________________      Form Completed By (print name) _______________________________________      Signature ___________________________________________________________      These reports are needed for  compliance    Please fax this completed form and a copy of the Vaccine Documents to our office located at [place] [address] as soon as possible to [phone] hammad Allan Patches: Phone [phone]    We thank you for your assistance in treating our mutual patient

## 2021-07-14 NOTE — TELEPHONE ENCOUNTER
----- Message from Erin Martin, [address] [place] sent at 7/13/2021  9:57 AM EDT -----  Regarding: covid  [place]  07/13/21 9:58 AM    Hello, our patient Jenifer Brush has had Immunization(s) completed/performed  COVID VACCINE Please assist in updating the patient chart by making an External outreach to [address] [place] [address] facility located in [place]  The date of service is 3/21 & 4/21      Thank you,  Erin Martin MA  [facility]

## 2021-07-30 NOTE — TELEPHONE ENCOUNTER
Upon review of the In Basket request and the patient's chart, initial outreach has been made via fax, please see Contacts section for details       Thank you  Angel Prieto MA

## 2021-08-02 NOTE — TELEPHONE ENCOUNTER
Upon review of the In Basket request we were able to locate, review, and update the patient chart as requested for Immunization(s) [id number] Covid Vaccination  Any additional questions or concerns should be emailed to the Practice Liaisons via [email or web address] email, please do not reply via In Basket      Thank you  Romy Rice MA

## 2021-11-16 ENCOUNTER — APPOINTMENT (OUTPATIENT)
Dept: LAB | Facility: HOSPITAL | Age: 66
End: 2021-11-16
Attending: FAMILY MEDICINE
Payer: MEDICARE

## 2021-11-16 DIAGNOSIS — Z11.59 NEED FOR HEPATITIS B SCREENING TEST: ICD-10-CM

## 2021-11-16 DIAGNOSIS — Z13.6 SCREENING FOR CARDIOVASCULAR CONDITION: ICD-10-CM

## 2021-11-16 DIAGNOSIS — Z11.4 SCREENING FOR HIV (HUMAN IMMUNODEFICIENCY VIRUS): ICD-10-CM

## 2021-11-16 DIAGNOSIS — Z11.59 NEED FOR HEPATITIS C SCREENING TEST: ICD-10-CM

## 2021-11-16 DIAGNOSIS — Z00.00 MEDICARE ANNUAL WELLNESS VISIT, INITIAL: ICD-10-CM

## 2021-11-16 LAB
CHOLEST SERPL-MCNC: 163 MG/DL (ref 50–200)
HBV CORE AB SER QL: NORMAL
HBV SURFACE AB SER-ACNC: <3.1 MIU/ML
HBV SURFACE AG SER QL: NORMAL
HCV AB SER QL: NORMAL
HDLC SERPL-MCNC: 82 MG/DL
LDLC SERPL CALC-MCNC: 65 MG/DL (ref 0–100)
NONHDLC SERPL-MCNC: 81 MG/DL
TRIGL SERPL-MCNC: 78 MG/DL

## 2021-11-16 PROCEDURE — 87340 HEPATITIS B SURFACE AG IA: CPT

## 2021-11-16 PROCEDURE — 86803 HEPATITIS C AB TEST: CPT

## 2021-11-16 PROCEDURE — 36415 COLL VENOUS BLD VENIPUNCTURE: CPT

## 2021-11-16 PROCEDURE — 86706 HEP B SURFACE ANTIBODY: CPT

## 2021-11-16 PROCEDURE — 87389 HIV-1 AG W/HIV-1&-2 AB AG IA: CPT

## 2021-11-16 PROCEDURE — 80061 LIPID PANEL: CPT

## 2021-11-16 PROCEDURE — 86704 HEP B CORE ANTIBODY TOTAL: CPT

## 2021-11-17 LAB — HIV 1+2 AB+HIV1 P24 AG SERPL QL IA: NORMAL

## 2021-12-28 DIAGNOSIS — I11.9 HYPERTENSIVE HEART DISEASE WITHOUT HEART FAILURE: ICD-10-CM

## 2021-12-28 RX ORDER — VALSARTAN AND HYDROCHLOROTHIAZIDE 320; 25 MG/1; MG/1
TABLET, FILM COATED ORAL
Qty: 90 TABLET | Refills: 1 | Status: SHIPPED | OUTPATIENT
Start: 2021-12-28 | End: 2022-06-22

## 2022-01-18 ENCOUNTER — OFFICE VISIT (OUTPATIENT)
Dept: FAMILY MEDICINE CLINIC | Facility: CLINIC | Age: 67
End: 2022-01-18
Payer: MEDICARE

## 2022-01-18 VITALS
RESPIRATION RATE: 16 BRPM | BODY MASS INDEX: 28.71 KG/M2 | HEART RATE: 99 BPM | WEIGHT: 194.4 LBS | SYSTOLIC BLOOD PRESSURE: 150 MMHG | DIASTOLIC BLOOD PRESSURE: 70 MMHG | OXYGEN SATURATION: 100 %

## 2022-01-18 DIAGNOSIS — E55.9 VITAMIN D DEFICIENCY: ICD-10-CM

## 2022-01-18 DIAGNOSIS — I11.9 HYPERTENSIVE HEART DISEASE WITHOUT HEART FAILURE: Primary | ICD-10-CM

## 2022-01-18 PROCEDURE — 99213 OFFICE O/P EST LOW 20 MIN: CPT | Performed by: FAMILY MEDICINE

## 2022-01-18 NOTE — PROGRESS NOTES
Assessment/Plan:  Hypertensive cardiovascular disease with blood pressure slightly elevated today at 150/70 the patient currently is on Diovan hydrochlorothiazide 320-25  The patient states that he will continue to check his blood pressure daily and notify us if it remains elevated  Otherwise will see him back in 6 months    Cholesterol panel was reviewed with the patient and found to be an excellent panel    Problem List Items Addressed This Visit     None           There are no diagnoses linked to this encounter  No problem-specific Assessment & Plan notes found for this encounter  PHQ-2/9 Depression Screening    Little interest or pleasure in doing things: 0 - not at all  Feeling down, depressed, or hopeless: 0 - not at all  PHQ-2 Score: 0  PHQ-2 Interpretation: Negative depression screen          Body mass index is 28 71 kg/m²  BMI Counseling: Body mass index is 28 71 kg/m²  The BMI   Subjective:      Patient ID: Uriah Romero is a 78 y o  male  Patient presents for six-month checkup on hypertension      The following portions of the patient's history were reviewed and updated as appropriate:   He has a past medical history of Hyperlipidemia and Hypertension  ,  does not have any pertinent problems on file  ,   has a past surgical history that includes No past surgeries  ,  family history includes Diabetes in his father; Hypertension in his father; Stroke in his mother  ,   reports that he has never smoked  He has never used smokeless tobacco  He reports current alcohol use  He reports that he does not use drugs  ,  has No Known Allergies     Current Outpatient Medications   Medication Sig Dispense Refill    Garlic (GARLIQUE PO) Take by mouth      Multiple Vitamin (MULTIVITAMIN) tablet Take 1 tablet by mouth daily      Omega-3 Fatty Acids (FISH OIL PO) Take by mouth      Probiotic Product (ALIGN PO) Take by mouth      psyllium (METAMUCIL) 58 6 % packet Take 1 packet by mouth daily      valsartan-hydrochlorothiazide (DIOVAN-HCT) 320-25 MG per tablet TAKE 1 TABLET BY MOUTH EVERY DAY 90 tablet 1     No current facility-administered medications for this visit  Review of Systems   Constitutional: Negative for chills and fever  HENT: Negative for ear pain and sore throat  Eyes: Negative for pain and visual disturbance  Respiratory: Negative for cough and shortness of breath  Cardiovascular: Negative for chest pain and palpitations  Gastrointestinal: Negative for abdominal pain and vomiting  Genitourinary: Negative for dysuria and hematuria  Musculoskeletal: Negative for arthralgias and back pain  Skin: Negative for color change and rash  Neurological: Negative for seizures and syncope  All other systems reviewed and are negative  Objective:    /70 (BP Location: Left arm, Patient Position: Sitting, Cuff Size: Standard)   Pulse 99   Resp 16   Wt 88 2 kg (194 lb 6 4 oz)   SpO2 100%   BMI 28 71 kg/m²   Body mass index is 28 71 kg/m²  Physical Exam  Constitutional:       Appearance: He is well-developed  HENT:      Head: Normocephalic  Eyes:      Pupils: Pupils are equal, round, and reactive to light  Cardiovascular:      Rate and Rhythm: Normal rate and regular rhythm  Heart sounds: Normal heart sounds  Pulmonary:      Effort: Pulmonary effort is normal       Breath sounds: Normal breath sounds  Abdominal:      General: Bowel sounds are normal       Palpations: Abdomen is soft  Tenderness: There is no abdominal tenderness  Musculoskeletal:      Cervical back: Normal range of motion  Skin:     General: Skin is warm  Neurological:      Mental Status: He is alert and oriented to person, place, and time

## 2022-01-18 NOTE — PROGRESS NOTES
Assessment/Plan:    Problem List Items Addressed This Visit     None           There are no diagnoses linked to this encounter  No problem-specific Assessment & Plan notes found for this encounter  PHQ-2/9 Depression Screening    Little interest or pleasure in doing things: 0 - not at all  Feeling down, depressed, or hopeless: 0 - not at all  PHQ-2 Score: 0  PHQ-2 Interpretation: Negative depression screen          Body mass index is 28 71 kg/m²  BMI Counseling: Body mass index is 28 71 kg/m²  The BMI   Subjective:      Patient ID: Debe Alpers is a 78 y o  male  HPI    The following portions of the patient's history were reviewed and updated as appropriate:   He has a past medical history of Hyperlipidemia and Hypertension  ,  does not have any pertinent problems on file  ,   has a past surgical history that includes No past surgeries  ,  family history includes Diabetes in his father; Hypertension in his father; Stroke in his mother  ,   reports that he has never smoked  He has never used smokeless tobacco  He reports current alcohol use  He reports that he does not use drugs  ,  has No Known Allergies     Current Outpatient Medications   Medication Sig Dispense Refill    Garlic (GARLIQUE PO) Take by mouth      Multiple Vitamin (MULTIVITAMIN) tablet Take 1 tablet by mouth daily      Omega-3 Fatty Acids (FISH OIL PO) Take by mouth      Probiotic Product (ALIGN PO) Take by mouth      psyllium (METAMUCIL) 58 6 % packet Take 1 packet by mouth daily      valsartan-hydrochlorothiazide (DIOVAN-HCT) 320-25 MG per tablet TAKE 1 TABLET BY MOUTH EVERY DAY 90 tablet 1     No current facility-administered medications for this visit  Review of Systems   Constitutional: Negative for chills and fever  HENT: Negative for ear pain and sore throat  Eyes: Negative for pain and visual disturbance  Respiratory: Negative for cough and shortness of breath      Cardiovascular: Negative for chest pain and palpitations  Gastrointestinal: Negative for abdominal pain and vomiting  Genitourinary: Negative for dysuria and hematuria  Musculoskeletal: Negative for arthralgias and back pain  Skin: Negative for color change and rash  Neurological: Negative for seizures and syncope  All other systems reviewed and are negative  Objective:    /70 (BP Location: Left arm, Patient Position: Sitting, Cuff Size: Standard)   Pulse 99   Resp 16   Wt 88 2 kg (194 lb 6 4 oz)   SpO2 100%   BMI 28 71 kg/m²   Body mass index is 28 71 kg/m²  Physical Exam  Constitutional:       Appearance: He is well-developed  HENT:      Head: Normocephalic  Eyes:      Pupils: Pupils are equal, round, and reactive to light  Cardiovascular:      Rate and Rhythm: Normal rate and regular rhythm  Heart sounds: Normal heart sounds  Pulmonary:      Effort: Pulmonary effort is normal       Breath sounds: Normal breath sounds  Abdominal:      General: Bowel sounds are normal       Palpations: Abdomen is soft  Tenderness: There is no abdominal tenderness  Musculoskeletal:      Cervical back: Normal range of motion  Skin:     General: Skin is warm  Neurological:      Mental Status: He is alert and oriented to person, place, and time

## 2022-06-22 DIAGNOSIS — I11.9 HYPERTENSIVE HEART DISEASE WITHOUT HEART FAILURE: ICD-10-CM

## 2022-06-22 RX ORDER — VALSARTAN AND HYDROCHLOROTHIAZIDE 320; 25 MG/1; MG/1
TABLET, FILM COATED ORAL
Qty: 90 TABLET | Refills: 1 | Status: SHIPPED | OUTPATIENT
Start: 2022-06-22 | End: 2022-07-19 | Stop reason: SDUPTHER

## 2022-06-28 ENCOUNTER — APPOINTMENT (OUTPATIENT)
Dept: LAB | Facility: HOSPITAL | Age: 67
End: 2022-06-28
Attending: FAMILY MEDICINE
Payer: MEDICARE

## 2022-06-28 DIAGNOSIS — I11.9 HYPERTENSIVE HEART DISEASE WITHOUT HEART FAILURE: ICD-10-CM

## 2022-06-28 DIAGNOSIS — E55.9 VITAMIN D DEFICIENCY: ICD-10-CM

## 2022-06-28 LAB
25(OH)D3 SERPL-MCNC: 52.7 NG/ML (ref 30–100)
ALBUMIN SERPL BCP-MCNC: 3.5 G/DL (ref 3.5–5)
ALP SERPL-CCNC: 112 U/L (ref 46–116)
ALT SERPL W P-5'-P-CCNC: 22 U/L (ref 12–78)
ANION GAP SERPL CALCULATED.3IONS-SCNC: 8 MMOL/L (ref 4–13)
AST SERPL W P-5'-P-CCNC: 17 U/L (ref 5–45)
BASOPHILS # BLD AUTO: 0.04 THOUSANDS/ΜL (ref 0–0.1)
BASOPHILS NFR BLD AUTO: 1 % (ref 0–1)
BILIRUB SERPL-MCNC: 1.6 MG/DL (ref 0.2–1)
BUN SERPL-MCNC: 9 MG/DL (ref 5–25)
CALCIUM SERPL-MCNC: 9.8 MG/DL (ref 8.3–10.1)
CHLORIDE SERPL-SCNC: 94 MMOL/L (ref 100–108)
CHOLEST SERPL-MCNC: 142 MG/DL
CO2 SERPL-SCNC: 28 MMOL/L (ref 21–32)
CREAT SERPL-MCNC: 0.81 MG/DL (ref 0.6–1.3)
EOSINOPHIL # BLD AUTO: 0.02 THOUSAND/ΜL (ref 0–0.61)
EOSINOPHIL NFR BLD AUTO: 1 % (ref 0–6)
ERYTHROCYTE [DISTWIDTH] IN BLOOD BY AUTOMATED COUNT: 14.4 % (ref 11.6–15.1)
GFR SERPL CREATININE-BSD FRML MDRD: 92 ML/MIN/1.73SQ M
GLUCOSE P FAST SERPL-MCNC: 132 MG/DL (ref 65–99)
HCT VFR BLD AUTO: 42.6 % (ref 36.5–49.3)
HDLC SERPL-MCNC: 62 MG/DL
HGB BLD-MCNC: 14.2 G/DL (ref 12–17)
IMM GRANULOCYTES # BLD AUTO: 0.01 THOUSAND/UL (ref 0–0.2)
IMM GRANULOCYTES NFR BLD AUTO: 0 % (ref 0–2)
LDLC SERPL CALC-MCNC: 55 MG/DL (ref 0–100)
LYMPHOCYTES # BLD AUTO: 0.75 THOUSANDS/ΜL (ref 0.6–4.47)
LYMPHOCYTES NFR BLD AUTO: 18 % (ref 14–44)
MCH RBC QN AUTO: 30 PG (ref 26.8–34.3)
MCHC RBC AUTO-ENTMCNC: 33.3 G/DL (ref 31.4–37.4)
MCV RBC AUTO: 90 FL (ref 82–98)
MONOCYTES # BLD AUTO: 0.87 THOUSAND/ΜL (ref 0.17–1.22)
MONOCYTES NFR BLD AUTO: 21 % (ref 4–12)
NEUTROPHILS # BLD AUTO: 2.47 THOUSANDS/ΜL (ref 1.85–7.62)
NEUTS SEG NFR BLD AUTO: 59 % (ref 43–75)
NRBC BLD AUTO-RTO: 0 /100 WBCS
PLATELET # BLD AUTO: 419 THOUSANDS/UL (ref 149–390)
PMV BLD AUTO: 8.8 FL (ref 8.9–12.7)
POTASSIUM SERPL-SCNC: 4.2 MMOL/L (ref 3.5–5.3)
PROT SERPL-MCNC: 8.1 G/DL (ref 6.4–8.2)
RBC # BLD AUTO: 4.73 MILLION/UL (ref 3.88–5.62)
SODIUM SERPL-SCNC: 130 MMOL/L (ref 136–145)
TRIGL SERPL-MCNC: 124 MG/DL
WBC # BLD AUTO: 4.16 THOUSAND/UL (ref 4.31–10.16)

## 2022-06-28 PROCEDURE — 80061 LIPID PANEL: CPT

## 2022-06-28 PROCEDURE — 36415 COLL VENOUS BLD VENIPUNCTURE: CPT

## 2022-06-28 PROCEDURE — 82306 VITAMIN D 25 HYDROXY: CPT

## 2022-06-28 PROCEDURE — 85025 COMPLETE CBC W/AUTO DIFF WBC: CPT

## 2022-06-28 PROCEDURE — 80053 COMPREHEN METABOLIC PANEL: CPT

## 2022-07-19 ENCOUNTER — OFFICE VISIT (OUTPATIENT)
Dept: FAMILY MEDICINE CLINIC | Facility: CLINIC | Age: 67
End: 2022-07-19
Payer: MEDICARE

## 2022-07-19 VITALS
SYSTOLIC BLOOD PRESSURE: 130 MMHG | WEIGHT: 192 LBS | TEMPERATURE: 97.7 F | DIASTOLIC BLOOD PRESSURE: 64 MMHG | HEART RATE: 80 BPM | BODY MASS INDEX: 28.44 KG/M2 | RESPIRATION RATE: 20 BRPM | HEIGHT: 69 IN

## 2022-07-19 DIAGNOSIS — R73.03 PRE-DIABETES: ICD-10-CM

## 2022-07-19 DIAGNOSIS — Z00.00 MEDICARE ANNUAL WELLNESS VISIT, SUBSEQUENT: Primary | ICD-10-CM

## 2022-07-19 DIAGNOSIS — I11.9 HYPERTENSIVE HEART DISEASE WITHOUT HEART FAILURE: ICD-10-CM

## 2022-07-19 DIAGNOSIS — E55.9 VITAMIN D DEFICIENCY: ICD-10-CM

## 2022-07-19 DIAGNOSIS — I21.4 NSTEMI (NON-ST ELEVATED MYOCARDIAL INFARCTION) (HCC): ICD-10-CM

## 2022-07-19 PROCEDURE — G0438 PPPS, INITIAL VISIT: HCPCS | Performed by: FAMILY MEDICINE

## 2022-07-19 RX ORDER — VALSARTAN AND HYDROCHLOROTHIAZIDE 320; 25 MG/1; MG/1
1 TABLET, FILM COATED ORAL DAILY
Qty: 90 TABLET | Refills: 1 | Status: SHIPPED | OUTPATIENT
Start: 2022-07-19

## 2022-07-19 NOTE — PROGRESS NOTES
Assessment and Plan:  Hypertensive cardiovascular disease with blood pressure controlled at 130/64  The patient is currently on Diovan hydrochlorothiazide 320-25  Remote history of myocardial infarction hospital record January 2, 2018 reviewed     Problem List Items Addressed This Visit        Cardiovascular and Mediastinum    NSTEMI (non-ST elevated myocardial infarction) [facility])      Other Visit Diagnoses     Medicare annual wellness visit, subsequent    -  Primary    Relevant Orders    Vitamin D 25 hydroxy    Hypertensive heart disease without heart failure        Relevant Medications    valsartan-hydrochlorothiazide (DIOVAN-HCT) 320-25 MG per tablet    Other Relevant Orders    CBC and differential    Comprehensive metabolic panel    Lipid Panel with Direct LDL reflex    Pre-diabetes        Relevant Orders    Hemoglobin A1c (w/out EAG)    Vitamin D deficiency        Relevant Orders    Vitamin D 25 hydroxy        BMI Counseling: Body mass index is 28 35 kg/m²  The BMI is above normal  Nutrition recommendations include decreasing portion sizes, encouraging healthy choices of fruits and vegetables, decreasing fast food intake, consuming healthier snacks, limiting drinks that contain sugar, moderation in carbohydrate intake, increasing intake of lean protein, reducing intake of saturated and trans fat and reducing intake of cholesterol  Exercise recommendations include moderate physical activity 150 minutes/week  Rationale for BMI follow-up plan is due to patient being overweight or obese  Depression Screening and Follow-up Plan: Patient was screened for depression during today's encounter  They screened negative with a PHQ-2 score of 0  Preventive health issues were discussed with patient, and age appropriate screening tests were ordered as noted in patient's After Visit Summary    Personalized health advice and appropriate referrals for health education or preventive services given if needed, as noted in patient's After Visit Summary  History of Present Illness:     Patient presents for a Medicare Wellness Visit    Patient presents for his annual Medicare wellness checkup     Patient Care Team:  Faith Tan DO as PCP - General (Family Medicine)     Review of Systems:     Review of Systems   Constitutional: Negative  HENT: Negative  Eyes: Negative  Respiratory: Negative  Cardiovascular: Negative  Gastrointestinal: Negative  Endocrine: Negative  Genitourinary: Negative  Musculoskeletal: Negative  Skin: Negative  Allergic/Immunologic: Negative  Neurological: Negative  Hematological: Negative  Psychiatric/Behavioral: Negative           Problem List:     Patient Active Problem List   Diagnosis    GI bleed    NSTEMI (non-ST elevated myocardial infarction) ([facility] [address] [facility]    Multiple rib fractures      Past Medical and Surgical History:     Past Medical History:   Diagnosis Date    Hyperlipidemia     Hypertension      Past Surgical History:   Procedure Laterality Date    NO PAST SURGERIES        Family History:     Family History   Problem Relation Age of Onset    Stroke Mother     Hypertension Father     Diabetes Father       Social History:     Social History     Socioeconomic History    Marital status: Single     Spouse name: None    Number of children: None    Years of education: None    Highest education level: None   Occupational History    None   Tobacco Use    Smoking status: Never Smoker    Smokeless tobacco: Never Used   Vaping Use    Vaping Use: Never used   Substance and Sexual Activity    Alcohol use: Yes     Comment: social    Drug use: No    Sexual activity: None   Other Topics Concern    None   Social History Narrative    None     Social Determinants of Health     Financial Resource Strain: Not on file   Food Insecurity: Not on file   Transportation Needs: Not on file   Physical Activity: Not on file   Stress: Not on file   Social Connections: Not on file   Intimate Partner Violence: Not on file   Housing Stability: Not on file      Medications and Allergies:     Current Outpatient Medications   Medication Sig Dispense Refill    Garlic (GARLIQUE PO) Take by mouth      Multiple Vitamin (MULTIVITAMIN) tablet Take 1 tablet by mouth daily      Omega-3 Fatty Acids (FISH OIL PO) Take by mouth      Probiotic Product (ALIGN PO) Take by mouth      valsartan-hydrochlorothiazide (DIOVAN-HCT) 320-25 MG per tablet Take 1 tablet by mouth daily 90 tablet 1    psyllium (METAMUCIL) 58 6 % packet Take 1 packet by mouth daily (Patient not taking: Reported on 7/19/2022)       No current facility-administered medications for this visit  No Known Allergies   Immunizations:     Immunization History   Administered Date(s) Administered    COVID-19 MODERNA VACC 0 5 ML IM 03/24/2021, 04/21/2021, 10/22/2021, 04/06/2022    INFLUENZA 11/05/2017, 10/19/2018, 09/14/2021    Influenza Injectable, MDCK, Preservative Free, Quadrivalent, 0 5 mL 10/18/2019    Pneumococcal Conjugate 13-Valent 10/14/2021    Tdap 01/01/2018    Zoster Vaccine Recombinant 09/27/2021      Health Maintenance:         Topic Date Due    Colorectal Cancer Screening  Never done    Hepatitis C Screening  Completed         Topic Date Due    Influenza Vaccine (1) 09/01/2022      Medicare Screening Tests and Risk Assessments:         Health Risk Assessment:   Patient rates overall health as excellent  Patient feels that their physical health rating is slightly better  Patient is very satisfied with their life  Eyesight was rated as same  Hearing was rated as same  Patient feels that their emotional and mental health rating is same  Patients states they are never, rarely angry  Patient states they are never, rarely unusually tired/fatigued  Pain experienced in the last 7 days has been none  Patient states that he has experienced no weight loss or gain in last 6 months       Depression Screening:   PHQ-2 Score: 0      Fall Risk Screening: In the past year, patient has experienced: no history of falling in past year      Home Safety:  Patient does not have trouble with stairs inside or outside of their home  Patient has working smoke alarms and has working carbon monoxide detector  Home safety hazards include: none  Nutrition:   Current diet is Limited junk food  Medications:   Patient is not currently taking any over-the-counter supplements  Patient is able to manage medications  Activities of Daily Living (ADLs)/Instrumental Activities of Daily Living (IADLs):   Walk and transfer into and out of bed and chair?: Yes  Dress and groom yourself?: Yes    Bathe or shower yourself?: Yes    Feed yourself? Yes  Do your laundry/housekeeping?: Yes  Manage your money, pay your bills and track your expenses?: Yes  Make your own meals?: Yes    Do your own shopping?: Yes    Previous Hospitalizations:   Any hospitalizations or ED visits within the last 12 months?: No      Advance Care Planning:   Living will: No    Durable POA for healthcare: No    Advanced directive: No      PREVENTIVE SCREENINGS      Cardiovascular Screening:    General: Screening Current      Diabetes Screening:     General: Screening Current      Abdominal Aortic Aneurysm (AAA) Screening:    Risk factors include: age between 73-69 yo        Lung Cancer Screening:     General: Screening Not Indicated      Hepatitis C Screening:    General: Screening Current    Screening, Brief Intervention, and Referral to Treatment (SBIRT)    Screening  Typical number of drinks in a day: 3  Typical number of drinks in a week: 12  Interpretation: Low risk drinking behavior  AUDIT-C Screenin) How often did you have a drink containing alcohol in the past year? 2 to 4 times a month  2) How many drinks did you have on a typical day when you were drinking in the past year?  3 to 4  3) How often did you have 6 or more drinks on one occasion in the past year? less than monthly    AUDIT-C Score: 3  Interpretation: Score 0-3 (male): Negative screen for alcohol misuse    Single Item Drug Screening:  How often have you used an illegal drug (including marijuana) or a prescription medication for non-medical reasons in the past year? never    Single Item Drug Screen Score: 0  Interpretation: Negative screen for possible drug use disorder    No exam data present     Physical Exam:     /64   Pulse 80   Temp 97 7 °F (36 5 °C)   Resp 20   Ht 5' 9" (1 753 m)   Wt 87 1 kg (192 lb)   BMI 28 35 kg/m²     Physical Exam  Vitals and nursing note reviewed  Constitutional:       Appearance: He is well-developed  HENT:      Head: Normocephalic and atraumatic  Eyes:      Conjunctiva/sclera: Conjunctivae normal    Cardiovascular:      Rate and Rhythm: Normal rate and regular rhythm  Heart sounds: No murmur heard  Pulmonary:      Effort: Pulmonary effort is normal  No respiratory distress  Breath sounds: Normal breath sounds  Abdominal:      Palpations: Abdomen is soft  Tenderness: There is no abdominal tenderness  Musculoskeletal:      Cervical back: Neck supple  Skin:     General: Skin is warm and dry  Neurological:      Mental Status: He is alert            Km Nails, DO

## 2022-07-19 NOTE — PATIENT INSTRUCTIONS

## 2022-07-19 NOTE — PROGRESS NOTES
Assessment and Plan:     Problem List Items Addressed This Visit    None     Visit Diagnoses     Hypertensive heart disease without heart failure               Preventive health issues were discussed with patient, and age appropriate screening tests were ordered as noted in patient's After Visit Summary  Personalized health advice and appropriate referrals for health education or preventive services given if needed, as noted in patient's After Visit Summary       History of Present Illness:     Patient presents for a Medicare Wellness Visit    HPI   Patient Care Team:  Zahira Buenrostro DO as PCP - General (Family Medicine)     Review of Systems:     Review of Systems     Problem List:     Patient Active Problem List   Diagnosis    GI bleed    [facility]STEMI [facility]non-ST elevated myocardial infarction) ([facility] [address] )    Multiple rib fractures      Past Medical and Surgical History:     Past Medical History:   Diagnosis Date    Hyperlipidemia     Hypertension      Past Surgical History:   Procedure Laterality Date    NO PAST SURGERIES        Family History:     Family History   Problem Relation Age of Onset    Stroke Mother     Hypertension Father     Diabetes Father       Social History:     Social History     Socioeconomic History    Marital status: Single     Spouse name: None    Number of children: None    Years of education: None    Highest education level: None   Occupational History    None   Tobacco Use    Smoking status: Never Smoker    Smokeless tobacco: Never Used   Vaping Use    Vaping Use: Never used   Substance and Sexual Activity    Alcohol use: Yes     Comment: social    Drug use: No    Sexual activity: None   Other Topics Concern    None   Social History Narrative    None     Social Determinants of Health     Financial Resource Strain: Not on file   Food Insecurity: Not on file   Transportation Needs: Not on file   Physical Activity: Not on file   Stress: Not on file   Social Connections: Not on file   Intimate Partner Violence: Not on file   Housing Stability: Not on file      Medications and Allergies:     Current Outpatient Medications   Medication Sig Dispense Refill    Garlic (GARLIQUE PO) Take by mouth      Multiple Vitamin (MULTIVITAMIN) tablet Take 1 tablet by mouth daily      Omega-3 Fatty Acids (FISH OIL PO) Take by mouth      Probiotic Product (ALIGN PO) Take by mouth      valsartan-hydrochlorothiazide (DIOVAN-HCT) 320-25 MG per tablet TAKE 1 TABLET BY MOUTH EVERY DAY 90 tablet 1    psyllium (METAMUCIL) 58 6 % packet Take 1 packet by mouth daily (Patient not taking: Reported on 7/19/2022)       No current facility-administered medications for this visit  No Known Allergies   Immunizations:     Immunization History   Administered Date(s) Administered    COVID-19 MODERNA VACC 0 5 ML IM 03/24/2021, 04/21/2021, 10/22/2021, 04/06/2022    INFLUENZA 11/05/2017, 10/19/2018, 09/14/2021    Influenza Injectable, MDCK, Preservative Free, Quadrivalent, 0 5 mL 10/18/2019    Pneumococcal Conjugate 13-Valent 10/14/2021    Tdap 01/01/2018    Zoster Vaccine Recombinant 09/27/2021      Health Maintenance:         Topic Date Due    Colorectal Cancer Screening  Never done    Hepatitis C Screening  Completed         Topic Date Due    Influenza Vaccine (1) 09/01/2022      Medicare Screening Tests and Risk Assessments:         Health Risk Assessment:   Patient rates overall health as excellent  Patient feels that their physical health rating is slightly better  Patient is very satisfied with their life  Eyesight was rated as same  Hearing was rated as same  Patient feels that their emotional and mental health rating is same  Patients states they are never, rarely angry  Patient states they are never, rarely unusually tired/fatigued  Pain experienced in the last 7 days has been none  Patient states that he has experienced no weight loss or gain in last 6 months       Depression Screening:   PHQ-2 Score: 0      Fall Risk Screening: In the past year, patient has experienced: no history of falling in past year      Home Safety:  Patient does not have trouble with stairs inside or outside of their home  Patient has working smoke alarms and has working carbon monoxide detector  Home safety hazards include: none  Nutrition:   Current diet is Limited junk food  Medications:   Patient is not currently taking any over-the-counter supplements  Patient is able to manage medications  Activities of Daily Living (ADLs)/Instrumental Activities of Daily Living (IADLs):   Walk and transfer into and out of bed and chair?: Yes  Dress and groom yourself?: Yes    Bathe or shower yourself?: Yes    Feed yourself? Yes  Do your laundry/housekeeping?: Yes  Manage your money, pay your bills and track your expenses?: Yes  Make your own meals?: Yes    Do your own shopping?: Yes    Previous Hospitalizations:   Any hospitalizations or ED visits within the last 12 months?: No      Advance Care Planning:   Living will: No    Durable POA for healthcare: No    Advanced directive: No      PREVENTIVE SCREENINGS      Cardiovascular Screening:    General: Screening Current      Diabetes Screening:     General: Screening Current      Abdominal Aortic Aneurysm (AAA) Screening:    Risk factors include: age between 73-69 yo        Lung Cancer Screening:     General: Screening Not Indicated      Hepatitis C Screening:    General: Screening Current    Screening, Brief Intervention, and Referral to Treatment (SBIRT)    Screening  Typical number of drinks in a day: 3  Typical number of drinks in a week: 12  Interpretation: Low risk drinking behavior  AUDIT-C Screenin) How often did you have a drink containing alcohol in the past year? 2 to 4 times a month  2) How many drinks did you have on a typical day when you were drinking in the past year?  3 to 4  3) How often did you have 6 or more drinks on one occasion in the past year? less than monthly    AUDIT-C Score: 3  Interpretation: Score 0-3 (male): Negative screen for alcohol misuse    Single Item Drug Screening:  How often have you used an illegal drug (including marijuana) or a prescription medication for non-medical reasons in the past year? never    Single Item Drug Screen Score: 0  Interpretation: Negative screen for possible drug use disorder    No exam data present     Physical Exam:     /64   Pulse 80   Temp 97 7 °F (36 5 °C)   Resp 20   Ht 5' 9" (1 753 m)   Wt 87 1 kg (192 lb)   BMI 28 35 kg/m²     Physical Exam     Blade Walton,

## 2022-11-23 ENCOUNTER — HOSPITAL ENCOUNTER (INPATIENT)
Facility: HOSPITAL | Age: 67
LOS: 8 days | Discharge: NURSING, REHAB OR LONG-TERM CARE | End: 2022-12-01
Attending: EMERGENCY MEDICINE | Admitting: INTERNAL MEDICINE

## 2022-11-23 ENCOUNTER — APPOINTMENT (EMERGENCY)
Dept: CT IMAGING | Facility: HOSPITAL | Age: 67
End: 2022-11-23

## 2022-11-23 ENCOUNTER — APPOINTMENT (INPATIENT)
Dept: RADIOLOGY | Facility: HOSPITAL | Age: 67
End: 2022-11-23

## 2022-11-23 ENCOUNTER — APPOINTMENT (EMERGENCY)
Dept: RADIOLOGY | Facility: HOSPITAL | Age: 67
End: 2022-11-23

## 2022-11-23 DIAGNOSIS — R41.82 ALTERED MENTAL STATUS: Primary | ICD-10-CM

## 2022-11-23 DIAGNOSIS — R65.21 SEPTIC SHOCK (HCC): ICD-10-CM

## 2022-11-23 DIAGNOSIS — E87.1 ACUTE HYPONATREMIA: ICD-10-CM

## 2022-11-23 DIAGNOSIS — A41.9 SEPTIC SHOCK (HCC): ICD-10-CM

## 2022-11-23 DIAGNOSIS — R13.19 OTHER DYSPHAGIA: ICD-10-CM

## 2022-11-23 DIAGNOSIS — J02.9 ACUTE PHARYNGITIS: ICD-10-CM

## 2022-11-23 DIAGNOSIS — E61.1 IRON DEFICIENCY: ICD-10-CM

## 2022-11-23 DIAGNOSIS — K22.89 ESOPHAGEAL DILATATION: ICD-10-CM

## 2022-11-23 DIAGNOSIS — R93.3 ABNORMAL CT SCAN, ESOPHAGUS: ICD-10-CM

## 2022-11-23 DIAGNOSIS — R12 HEARTBURN: ICD-10-CM

## 2022-11-23 DIAGNOSIS — T68.XXXA HYPOTHERMIA, INITIAL ENCOUNTER: ICD-10-CM

## 2022-11-23 DIAGNOSIS — R41.82 ALTERED MENTAL STATUS, UNSPECIFIED ALTERED MENTAL STATUS TYPE: ICD-10-CM

## 2022-11-23 DIAGNOSIS — J18.9 PNEUMONIA DUE TO INFECTIOUS ORGANISM, UNSPECIFIED LATERALITY, UNSPECIFIED PART OF LUNG: ICD-10-CM

## 2022-11-23 DIAGNOSIS — Z87.19 HISTORY OF GI BLEED: ICD-10-CM

## 2022-11-23 PROBLEM — R09.02 HYPOXIA: Status: ACTIVE | Noted: 2022-11-23

## 2022-11-23 PROBLEM — J39.2 PHARYNGEAL SWELLING: Status: ACTIVE | Noted: 2022-11-23

## 2022-11-23 PROBLEM — E87.20 METABOLIC ACIDOSIS WITH INCREASED ANION GAP AND ACCUMULATION OF ORGANIC ACIDS: Status: ACTIVE | Noted: 2022-11-23

## 2022-11-23 PROBLEM — G93.41 METABOLIC ENCEPHALOPATHY: Status: ACTIVE | Noted: 2022-11-23

## 2022-11-23 LAB
2HR DELTA HS TROPONIN: 1 NG/L
4HR DELTA HS TROPONIN: 4 NG/L
AMPHETAMINES SERPL QL SCN: NEGATIVE
ANION GAP SERPL CALCULATED.3IONS-SCNC: 15 MMOL/L (ref 4–13)
ANION GAP SERPL CALCULATED.3IONS-SCNC: 15 MMOL/L (ref 4–13)
ANION GAP SERPL CALCULATED.3IONS-SCNC: 17 MMOL/L (ref 4–13)
ANION GAP SERPL CALCULATED.3IONS-SCNC: 17 MMOL/L (ref 4–13)
ANION GAP SERPL CALCULATED.3IONS-SCNC: 19 MMOL/L (ref 4–13)
APAP SERPL-MCNC: 2.5 UG/ML (ref 10–20)
APTT PPP: 36 SECONDS (ref 23–37)
ARTERIAL PATENCY WRIST A: YES
ATRIAL RATE: 62 BPM
BACTERIA UR QL AUTO: ABNORMAL /HPF
BARBITURATES UR QL: NEGATIVE
BASE EX.OXY STD BLDV CALC-SCNC: 62.2 % (ref 60–80)
BASE EXCESS BLDA CALC-SCNC: -7.8 MMOL/L
BASE EXCESS BLDV CALC-SCNC: -8 MMOL/L
BENZODIAZ UR QL: NEGATIVE
BILIRUB UR QL STRIP: NEGATIVE
BILIRUB UR QL STRIP: NEGATIVE
BUN SERPL-MCNC: 20 MG/DL (ref 5–25)
BUN SERPL-MCNC: 25 MG/DL (ref 5–25)
BUN SERPL-MCNC: 26 MG/DL (ref 5–25)
BUN SERPL-MCNC: 26 MG/DL (ref 5–25)
BUN SERPL-MCNC: 28 MG/DL (ref 5–25)
CA-I BLD-SCNC: 1.19 MMOL/L (ref 1.12–1.32)
CALCIUM SERPL-MCNC: 5.9 MG/DL (ref 8.3–10.1)
CALCIUM SERPL-MCNC: 8.2 MG/DL (ref 8.3–10.1)
CALCIUM SERPL-MCNC: 8.3 MG/DL (ref 8.3–10.1)
CALCIUM SERPL-MCNC: 8.6 MG/DL (ref 8.3–10.1)
CALCIUM SERPL-MCNC: 9.2 MG/DL (ref 8.3–10.1)
CARDIAC TROPONIN I PNL SERPL HS: 17 NG/L
CARDIAC TROPONIN I PNL SERPL HS: 18 NG/L
CARDIAC TROPONIN I PNL SERPL HS: 21 NG/L
CHLORIDE SERPL-SCNC: 102 MMOL/L (ref 96–108)
CHLORIDE SERPL-SCNC: 86 MMOL/L (ref 96–108)
CHLORIDE SERPL-SCNC: 90 MMOL/L (ref 96–108)
CHLORIDE SERPL-SCNC: 91 MMOL/L (ref 96–108)
CHLORIDE SERPL-SCNC: 92 MMOL/L (ref 96–108)
CK MB SERPL-MCNC: 25 NG/ML (ref 0–5)
CK MB SERPL-MCNC: 9.1 % (ref 0–2.5)
CK SERPL-CCNC: 274 U/L (ref 39–308)
CLARITY UR: CLEAR
CLARITY UR: CLEAR
CO2 SERPL-SCNC: 14 MMOL/L (ref 21–32)
CO2 SERPL-SCNC: 17 MMOL/L (ref 21–32)
CO2 SERPL-SCNC: 17 MMOL/L (ref 21–32)
CO2 SERPL-SCNC: 18 MMOL/L (ref 21–32)
CO2 SERPL-SCNC: 21 MMOL/L (ref 21–32)
COCAINE UR QL: NEGATIVE
COLOR UR: YELLOW
COLOR UR: YELLOW
CREAT SERPL-MCNC: 0.4 MG/DL (ref 0.6–1.3)
CREAT SERPL-MCNC: 0.68 MG/DL (ref 0.6–1.3)
CREAT SERPL-MCNC: 0.78 MG/DL (ref 0.6–1.3)
CREAT SERPL-MCNC: 0.82 MG/DL (ref 0.6–1.3)
CREAT SERPL-MCNC: 0.85 MG/DL (ref 0.6–1.3)
ERYTHROCYTE [DISTWIDTH] IN BLOOD BY AUTOMATED COUNT: 12.2 % (ref 11.6–15.1)
ETHANOL SERPL-MCNC: <3 MG/DL (ref 0–3)
ETHANOL SERPL-MCNC: <3 MG/DL (ref 0–3)
FLUAV RNA RESP QL NAA+PROBE: NEGATIVE
FLUBV RNA RESP QL NAA+PROBE: NEGATIVE
GAS + CO PNL BLDA: 1.3 % (ref 0–1.5)
GFR SERPL CREATININE-BSD FRML MDRD: 123 ML/MIN/1.73SQ M
GFR SERPL CREATININE-BSD FRML MDRD: 90 ML/MIN/1.73SQ M
GFR SERPL CREATININE-BSD FRML MDRD: 92 ML/MIN/1.73SQ M
GFR SERPL CREATININE-BSD FRML MDRD: 94 ML/MIN/1.73SQ M
GFR SERPL CREATININE-BSD FRML MDRD: 99 ML/MIN/1.73SQ M
GLUCOSE SERPL-MCNC: 116 MG/DL (ref 65–140)
GLUCOSE SERPL-MCNC: 116 MG/DL (ref 65–140)
GLUCOSE SERPL-MCNC: 48 MG/DL (ref 65–140)
GLUCOSE SERPL-MCNC: 69 MG/DL (ref 65–140)
GLUCOSE SERPL-MCNC: 71 MG/DL (ref 65–140)
GLUCOSE SERPL-MCNC: 79 MG/DL (ref 65–140)
GLUCOSE SERPL-MCNC: 96 MG/DL (ref 65–140)
GLUCOSE UR STRIP-MCNC: NEGATIVE MG/DL
GLUCOSE UR STRIP-MCNC: NEGATIVE MG/DL
HCO3 BLDA-SCNC: 17.1 MMOL/L (ref 22–28)
HCO3 BLDV-SCNC: 18.5 MMOL/L (ref 24–30)
HCT VFR BLD AUTO: 32.4 % (ref 36.5–49.3)
HCT VFR BLD AUTO: 37.4 % (ref 36.5–49.3)
HFNC FLOW LPM: 40
HGB BLD-MCNC: 11.7 G/DL (ref 12–17)
HGB BLD-MCNC: 13.4 G/DL (ref 12–17)
HGB UR QL STRIP.AUTO: ABNORMAL
HGB UR QL STRIP.AUTO: NEGATIVE
HYALINE CASTS #/AREA URNS LPF: ABNORMAL /LPF
INR PPP: 1.13 (ref 0.84–1.19)
KETONES UR STRIP-MCNC: ABNORMAL MG/DL
KETONES UR STRIP-MCNC: ABNORMAL MG/DL
LACTATE SERPL-SCNC: 0.8 MMOL/L (ref 0.5–2)
LACTATE SERPL-SCNC: 0.9 MMOL/L (ref 0.5–2)
LEUKOCYTE ESTERASE UR QL STRIP: NEGATIVE
LEUKOCYTE ESTERASE UR QL STRIP: NEGATIVE
MAGNESIUM SERPL-MCNC: 1.8 MG/DL (ref 1.6–2.6)
MCH RBC QN AUTO: 31 PG (ref 26.8–34.3)
MCHC RBC AUTO-ENTMCNC: 35.8 G/DL (ref 31.4–37.4)
MCV RBC AUTO: 87 FL (ref 82–98)
METHADONE UR QL: NEGATIVE
NITRITE UR QL STRIP: NEGATIVE
NITRITE UR QL STRIP: NEGATIVE
NON VENT HFNC FIO2: 100
NON VENT TYPE HFNC: ABNORMAL
NON-SQ EPI CELLS URNS QL MICRO: ABNORMAL /HPF
O2 CT BLDA-SCNC: 16.6 ML/DL (ref 16–23)
O2 CT BLDV-SCNC: 12.4 ML/DL
OPIATES UR QL SCN: NEGATIVE
OXYCODONE+OXYMORPHONE UR QL SCN: NEGATIVE
OXYHGB MFR BLDA: 89.9 % (ref 94–97)
P AXIS: 89 DEGREES
PCO2 BLDA: 33.2 MM HG (ref 36–44)
PCO2 BLDV: 41.8 MM HG (ref 42–50)
PCP UR QL: NEGATIVE
PH BLDA: 7.33 [PH] (ref 7.35–7.45)
PH BLDV: 7.26 [PH] (ref 7.3–7.4)
PH UR STRIP.AUTO: 5.5 [PH]
PH UR STRIP.AUTO: 6 [PH]
PHOSPHATE SERPL-MCNC: 3.6 MG/DL (ref 2.3–4.1)
PLATELET # BLD AUTO: 322 THOUSANDS/UL (ref 149–390)
PMV BLD AUTO: 8.9 FL (ref 8.9–12.7)
PO2 BLDA: 65.7 MM HG (ref 75–129)
PO2 BLDV: 38.2 MM HG (ref 35–45)
POTASSIUM SERPL-SCNC: 2.7 MMOL/L (ref 3.5–5.3)
POTASSIUM SERPL-SCNC: 3.1 MMOL/L (ref 3.5–5.3)
POTASSIUM SERPL-SCNC: 3.4 MMOL/L (ref 3.5–5.3)
POTASSIUM SERPL-SCNC: 3.6 MMOL/L (ref 3.5–5.3)
POTASSIUM SERPL-SCNC: 3.7 MMOL/L (ref 3.5–5.3)
PR INTERVAL: 174 MS
PROCALCITONIN SERPL-MCNC: 0.26 NG/ML
PROT UR STRIP-MCNC: NEGATIVE MG/DL
PROT UR STRIP-MCNC: NEGATIVE MG/DL
PROTHROMBIN TIME: 14.8 SECONDS (ref 11.6–14.5)
QRS AXIS: 2 DEGREES
QRSD INTERVAL: 100 MS
QT INTERVAL: 504 MS
QTC INTERVAL: 511 MS
RBC # BLD AUTO: 4.32 MILLION/UL (ref 3.88–5.62)
RBC #/AREA URNS AUTO: ABNORMAL /HPF
RSV RNA RESP QL NAA+PROBE: NEGATIVE
SALICYLATES SERPL-MCNC: 3 MG/DL (ref 3–20)
SARS-COV-2 RNA RESP QL NAA+PROBE: NEGATIVE
SODIUM SERPL-SCNC: 123 MMOL/L (ref 135–147)
SODIUM SERPL-SCNC: 125 MMOL/L (ref 135–147)
SODIUM SERPL-SCNC: 126 MMOL/L (ref 135–147)
SODIUM SERPL-SCNC: 126 MMOL/L (ref 135–147)
SODIUM SERPL-SCNC: 131 MMOL/L (ref 135–147)
SP GR UR STRIP.AUTO: 1.01 (ref 1–1.03)
SP GR UR STRIP.AUTO: >=1.03 (ref 1–1.03)
SPECIMEN SOURCE: ABNORMAL
T WAVE AXIS: 53 DEGREES
THC UR QL: NEGATIVE
TSH SERPL DL<=0.05 MIU/L-ACNC: 2.32 UIU/ML (ref 0.45–4.5)
UROBILINOGEN UR QL STRIP.AUTO: 0.2 E.U./DL
UROBILINOGEN UR QL STRIP.AUTO: 0.2 E.U./DL
VENTRICULAR RATE: 62 BPM
WBC # BLD AUTO: 7.31 THOUSAND/UL (ref 4.31–10.16)
WBC #/AREA URNS AUTO: ABNORMAL /HPF

## 2022-11-23 PROCEDURE — 05H533Z INSERTION OF INFUSION DEVICE INTO RIGHT SUBCLAVIAN VEIN, PERCUTANEOUS APPROACH: ICD-10-PCS | Performed by: EMERGENCY MEDICINE

## 2022-11-23 RX ORDER — DEXAMETHASONE SODIUM PHOSPHATE 4 MG/ML
10 INJECTION, SOLUTION INTRA-ARTICULAR; INTRALESIONAL; INTRAMUSCULAR; INTRAVENOUS; SOFT TISSUE ONCE
Status: COMPLETED | OUTPATIENT
Start: 2022-11-23 | End: 2022-11-23

## 2022-11-23 RX ORDER — ENOXAPARIN SODIUM 100 MG/ML
40 INJECTION SUBCUTANEOUS DAILY
Status: DISCONTINUED | OUTPATIENT
Start: 2022-11-24 | End: 2022-12-01 | Stop reason: HOSPADM

## 2022-11-23 RX ORDER — CEFEPIME HYDROCHLORIDE 2 G/50ML
2000 INJECTION, SOLUTION INTRAVENOUS ONCE
Status: COMPLETED | OUTPATIENT
Start: 2022-11-23 | End: 2022-11-23

## 2022-11-23 RX ORDER — DEXTROSE AND SODIUM CHLORIDE 5; .9 G/100ML; G/100ML
75 INJECTION, SOLUTION INTRAVENOUS CONTINUOUS
Status: DISCONTINUED | OUTPATIENT
Start: 2022-11-23 | End: 2022-11-26

## 2022-11-23 RX ORDER — CEFEPIME HYDROCHLORIDE 2 G/50ML
2000 INJECTION, SOLUTION INTRAVENOUS EVERY 12 HOURS
Status: DISCONTINUED | OUTPATIENT
Start: 2022-11-24 | End: 2022-11-28

## 2022-11-23 RX ADMIN — DEXAMETHASONE SODIUM PHOSPHATE 10 MG: 4 INJECTION, SOLUTION INTRAMUSCULAR; INTRAVENOUS at 16:39

## 2022-11-23 RX ADMIN — SODIUM CHLORIDE 1000 ML: 0.9 INJECTION, SOLUTION INTRAVENOUS at 22:13

## 2022-11-23 RX ADMIN — SODIUM CHLORIDE 1000 ML: 0.9 INJECTION, SOLUTION INTRAVENOUS at 20:54

## 2022-11-23 RX ADMIN — VANCOMYCIN HYDROCHLORIDE 1250 MG: 5 INJECTION, POWDER, LYOPHILIZED, FOR SOLUTION INTRAVENOUS at 18:28

## 2022-11-23 RX ADMIN — IOHEXOL 100 ML: 350 INJECTION, SOLUTION INTRAVENOUS at 13:36

## 2022-11-23 RX ADMIN — SODIUM CHLORIDE 1000 ML: 0.9 INJECTION, SOLUTION INTRAVENOUS at 23:31

## 2022-11-23 RX ADMIN — DEXTROSE AND SODIUM CHLORIDE 200 ML/HR: 5; .9 INJECTION, SOLUTION INTRAVENOUS at 13:44

## 2022-11-23 RX ADMIN — CEFEPIME HYDROCHLORIDE 2000 MG: 2 INJECTION, SOLUTION INTRAVENOUS at 16:42

## 2022-11-23 RX ADMIN — NOREPINEPHRINE BITARTRATE 4 MCG/MIN: 1 SOLUTION INTRAVENOUS at 18:47

## 2022-11-23 RX ADMIN — SODIUM CHLORIDE 1000 ML: 0.9 INJECTION, SOLUTION INTRAVENOUS at 18:28

## 2022-11-23 NOTE — ED NOTES
Bed ready  Delay in transporting patient to unit due to provider placing a central line        Shannan Freire RN  11/23/22 [id number]

## 2022-11-23 NOTE — ED NOTES
Inpatient bed assigned  Awaiting current inpatient to be moved out of room and the room to be cleaned        Oralcarlos Freire RN  11/23/22 [id number]

## 2022-11-23 NOTE — ED PROVIDER NOTES
History  Chief Complaint   Patient presents with   • Altered Mental Status     Patient presents for altered mental status, found on the floor greater than 24 hours     Patient presents via EMS for evaluation of altered mental status  His neighbor had not seen him for 2 days, corroborated by the presence of male piled up in his mailbox, per EMS  His dog was found dead in the home  There were human and canine feces throughout the apartment  The patient was found on the floor, soiled in urine and stool, confused but able to state his name  He was cold to the touch but had no external signs of trauma other than healing abrasions to his knees  He has no documented significant medical history and was last in [address] records for a well visit to his PCP, Dr Sean Stone, in July  His pre-hospital blood sugar was 97 mg/dL  An IV was established and saline started to be infused pre-hospital   Upon arrival to the ED, he is cool to the touch, but able to follow commands and answer most questions  He does appear to be having difficulty articulating but is not dysarthric  He does appear to be alert and oriented to me  No recent travel or sick contacts  Denies f/c, HA, CP, SOB, abdominal pain, n/v/d  12 system ROS o/w negative  History provided by:  Patient, medical records and EMS personnel  Altered Mental Status  Presenting symptoms: behavior changes and partial responsiveness    Severity:  Severe  Most recent episode: Possibly 2 days ago  Episode history:  Unable to specify  Timing:  Unable to specify  Progression:  Unable to specify  Chronicity:  New  Context: not dementia, not head injury, not homeless and not nursing home resident  Alcohol use: Unknown  Drug use: Unknown  Associated symptoms: no vomiting        Prior to Admission Medications   Prescriptions Last Dose Informant Patient Reported? Taking?    Garlic (GARLIQUE PO)   Yes No   Sig: Take by mouth   Multiple Vitamin (MULTIVITAMIN) tablet Yes No   Sig: Take 1 tablet by mouth daily   Omega-3 Fatty Acids (FISH OIL PO)   Yes No   Sig: Take by mouth   Probiotic Product (ALIGN PO)   Yes No   Sig: Take by mouth   psyllium (METAMUCIL) 58 6 % packet   Yes No   Sig: Take 1 packet by mouth daily   Patient not taking: Reported on 7/19/2022   valsartan-hydrochlorothiazide (DIOVAN-HCT) 320-25 MG per tablet   No No   Sig: Take 1 tablet by mouth daily      Facility-Administered Medications: None       Past Medical History:   Diagnosis Date   • Hyperlipidemia    • Hypertension        Past Surgical History:   Procedure Laterality Date   • NO PAST SURGERIES         Family History   Problem Relation Age of Onset   • Stroke Mother    • Hypertension Father    • Diabetes Father      I have reviewed and agree with the history as documented  E-Cigarette/Vaping   • E-Cigarette Use Never User      E-Cigarette/Vaping Substances   • Nicotine No    • THC No    • CBD No    • Flavoring No    • Other No    • Unknown No      Social History     Tobacco Use   • Smoking status: Never   • Smokeless tobacco: Never   Vaping Use   • Vaping Use: Never used   Substance Use Topics   • Alcohol use: Yes     Comment: social   • Drug use: No       Review of Systems   Unable to perform ROS: Mental status change   Gastrointestinal: Negative for vomiting  Physical Exam  Physical Exam  Vitals reviewed  Constitutional:       General: He is not in acute distress  Appearance: He is well-developed and well-nourished  He is ill-appearing  He is not toxic-appearing or diaphoretic  HENT:      Head: Normocephalic and atraumatic  Right Ear: External ear normal       Left Ear: External ear normal       Nose: Nose normal  No rhinorrhea  Mouth/Throat:      Mouth: Oropharynx is clear and moist  Mucous membranes are dry  Pharynx: No oropharyngeal exudate  Comments: Dry mucosa with thick secretions  Eyes:      General: No scleral icterus       Extraocular Movements: Extraocular movements intact and EOM normal       Conjunctiva/sclera: Conjunctivae normal       Pupils: Pupils are equal, round, and reactive to light  Pupils are equal    Cardiovascular:      Rate and Rhythm: Normal rate and regular rhythm  Pulses: Normal pulses  Heart sounds: Normal heart sounds  No murmur heard  Pulmonary:      Effort: No respiratory distress  Breath sounds: Rales present  Comments: Snoring respirations even when awake  HOB elevated 30 degrees  Abdominal:      General: Bowel sounds are normal  There is no distension  Palpations: Abdomen is soft  Tenderness: There is no abdominal tenderness  There is no right CVA tenderness or left CVA tenderness  Musculoskeletal:         General: Signs of injury (Minor abrasions that are healing to both anterior knees) present  No tenderness or edema  Normal range of motion  Cervical back: Normal range of motion and neck supple  No rigidity or tenderness  Right lower leg: No edema  Left lower leg: No edema  Lymphadenopathy:      Cervical: No cervical adenopathy  Skin:     General: Skin is warm and dry  Coloration: Skin is not pale  Findings: No erythema or rash  Neurological:      General: No focal deficit present  Mental Status: He is alert and oriented to person, place, and time  Cranial Nerves: No cranial nerve deficit  Motor: No abnormal muscle tone  Deep Tendon Reflexes: Reflexes are normal and symmetric     Psychiatric:         Mood and Affect: Mood and affect normal          Behavior: Behavior normal          Vital Signs  ED Triage Vitals   Temperature Pulse Respirations Blood Pressure SpO2   11/23/22 1315 11/23/22 1315 11/23/22 1315 11/23/22 1315 11/23/22 1315   (!) 84 9 °F (29 4 °C) 62 (!) 26 123/90 98 %      Temp Source Heart Rate Source Patient Position - Orthostatic VS BP Location FiO2 (%)   11/23/22 1315 11/23/22 1315 11/23/22 1315 11/23/22 1315 11/23/22 1631   Bladder Monitor Lying Right arm 100      Pain Score       --                  Vitals:    11/23/22 1700 11/23/22 1715 11/23/22 1730 11/23/22 1745   BP:  98/55 (!) 81/33 (!) 84/51   Pulse: 76 66 71 71   Patient Position - Orthostatic VS: Lying Lying Lying Lying         Visual Acuity      ED Medications  Medications   dextrose 5 % and sodium chloride 0 9 % infusion (200 mL/hr Intravenous New Bag 11/23/22 1344)   vancomycin (VANCOCIN) 1250 mg in sodium chloride 0 9% 250 mL IVPB (1,250 mg Intravenous New Bag 11/23/22 1828)   sodium chloride 0 9 % bolus 1,000 mL (1,000 mL Intravenous New Bag 11/23/22 1828)   iohexol (OMNIPAQUE) 350 MG/ML injection (SINGLE-DOSE) 100 mL (100 mL Intravenous Given 11/23/22 1336)   cefepime (MAXIPIME) IVPB (premix in dextrose) 2,000 mg 50 mL (0 mg Intravenous Stopped 11/23/22 1828)   dexamethasone (DECADRON) injection 10 mg (10 mg Intravenous Given 11/23/22 1639)       Diagnostic Studies  Results Reviewed     Procedure Component Value Units Date/Time    Blood culture #1 [[id number]] Collected: 11/23/22 1339    Lab Status: Preliminary result Specimen: Blood from Arm, Left Updated: 11/23/22 1801     Blood Culture Received in Microbiology Lab  Culture in Progress  Blood culture #2 [[id number]] Collected: 11/23/22 1328    Lab Status: Preliminary result Specimen: Blood from Arm, Right Updated: 11/23/22 1801     Blood Culture Received in Microbiology Lab  Culture in Progress  TSH [[id number]]  (Normal) Collected: 11/23/22 1610    Lab Status: Final result Specimen: Blood from Arm, Left Updated: 11/23/22 1757     TSH 3RD GENERATON 2 318 uIU/mL     Narrative:      Patients undergoing fluorescein dye angiography may retain small amounts of fluorescein in the body for 48-72 hours post procedure  Samples containing fluorescein can produce falsely depressed TSH values  If the patient had this procedure,a specimen should be resubmitted post fluorescein clearance        HS Troponin I 2hr [[id number]]  (Normal) Collected: 11/23/22 1711    Lab Status: Final result Specimen: Blood from Arm, Left Updated: 11/23/22 1753     hs TnI 2hr 18 ng/L      Delta 2hr hsTnI 1 ng/L     Fingerstick Glucose (POCT) [[id number]]  (Normal) Collected: 11/23/22 1657    Lab Status: Final result Updated: 11/23/22 1711     POC Glucose 79 mg/dl     Basic metabolic panel [[id number]]  (Abnormal) Collected: 11/23/22 1610    Lab Status: Final result Specimen: Blood from Arm, Left Updated: 11/23/22 1647     Sodium 131 mmol/L      Potassium 2 7 mmol/L      Chloride 102 mmol/L      CO2 14 mmol/L      ANION GAP 15 mmol/L      BUN 20 mg/dL      Creatinine 0 40 mg/dL      Glucose 48 mg/dL      Calcium 5 9 mg/dL      eGFR 123 ml/min/1 73sq m     Narrative:      [place] guidelines for Chronic Kidney Disease (CKD):   •  Stage 1 with normal or high GFR (GFR > 90 mL/min/1 73 square meters)  •  Stage 2 Mild CKD (GFR = 60-89 mL/min/1 73 square meters)  •  Stage 3A Moderate CKD (GFR = 45-59 mL/min/1 73 square meters)  •  Stage 3B Moderate CKD (GFR = 30-44 mL/min/1 73 square meters)  •  Stage 4 Severe CKD (GFR = 15-29 mL/min/1 73 square meters)  •  Stage 5 End Stage CKD (GFR <15 mL/min/1 73 square meters)  Note: GFR calculation is accurate only with a steady state creatinine    Blood gas, arterial [[id number]]  (Abnormal) Collected: 11/23/22 1629    Lab Status: Final result Specimen: Blood, Arterial from Radial, Right Updated: 11/23/22 1646     pH, Arterial 7 329     pCO2, Arterial 33 2 mm Hg      pO2, Arterial 65 7 mm Hg      HCO3, Arterial 17 1 mmol/L      Base Excess, Arterial -7 8 mmol/L      O2 Content, Arterial 16 6 mL/dL      O2 HGB,Arterial  89 9 %      SOURCE Radial, Right     DANA TEST Yes     Non Vent Type- HFNC HFNC Flow     NV HFNC FIO2 100     HFNC FLOW LPM 40    HS Troponin I 4hr [[id number]]     Lab Status: No result Specimen: Blood     Ethanol [[id number]]  (Normal) Collected: 11/23/22 1431    Lab Status: Final result Specimen: Blood from Arm, Left Updated: 11/23/22 1512     Ethanol Lvl <3 mg/dL     Carboxyhemoglobin [[id number]]  (Normal) Collected: 11/23/22 1458    Lab Status: Final result Specimen: Blood from Arm, Left Updated: 11/23/22 1505     Carbon Monoxide, Blood 1 3 %     Narrative: Therapeutic levels (1 mg/mL and 2 mg/mL) of hydroxocobalamin may interfere with the fCOHb and fMetHb where it may cause lower than expected values  Normal Carboxyhemoglobin range for nonsmokers is <1 5%   Normal Carboxyhemoglobin range for smokers is 1 5% to 5 1%     CKMB [[id number]]  (Abnormal) Collected: 11/23/22 1320    Lab Status: Final result Specimen: Blood from Arm, Right Updated: 11/23/22 1428     CK-MB Index 9 1 %      CK-MB 25 0 ng/mL     FLU/RSV/COVID - if FLU/RSV clinically relevant [[id number]]  (Normal) Collected: 11/23/22 1339    Lab Status: Final result Specimen: Nares from Nose Updated: 11/23/22 1421     SARS-CoV-2 Negative     INFLUENZA A PCR Negative     INFLUENZA B PCR Negative     RSV PCR Negative    Narrative:      FOR PEDIATRIC PATIENTS - copy/paste COVID Guidelines URL to browser: [email or web address]  [email or web address]    SARS-CoV-2 assay is a Nucleic Acid Amplification assay intended for the  qualitative detection of nucleic acid from SARS-CoV-2 in nasopharyngeal  swabs  Results are for the presumptive identification of SARS-CoV-2 RNA  Positive results are indicative of infection with SARS-CoV-2, the virus  causing COVID-19, but do not rule out bacterial infection or co-infection  with other viruses  Laboratories within the [place] and its  territories are required to report all positive results to the appropriate  public health authorities  Negative results do not preclude SARS-CoV-2  infection and should not be used as the sole basis for treatment or other  patient management decisions   Negative results must be combined with  clinical observations, patient history, and epidemiological information  This test has not been FDA cleared or approved  This test has been authorized by FDA under an Emergency Use Authorization  (EUA)  This test is only authorized for the duration of time the  declaration that circumstances exist justifying the authorization of the  emergency use of an in vitro diagnostic tests for detection of SARS-CoV-2  virus and/or diagnosis of COVID-19 infection under section 564(b)(1) of  the Act, 21 U  S C  753YJB-7(Y)(9), unless the authorization is terminated  or revoked sooner  The test has been validated but independent review by FDA  and CLIA is pending  Test performed using [employer]: This RT-PCR assay targets N2,  a region unique to SARS-CoV-2  A conserved region in the E-gene was chosen  for pan-Sarbecovirus detection which includes SARS-CoV-2  According to CMS-2020-01-R, this platform meets the definition of high-throughput technology  CK Total with Reflex CKMB [[id number]]  (Normal) Collected: 11/23/22 1320    Lab Status: Final result Specimen: Blood from Arm, Right Updated: 11/23/22 1409     Total  U/L     Rapid drug screen, urine [[id number]]  (Normal) Collected: 11/23/22 1345    Lab Status: Final result Specimen: Urine, Clean Catch Updated: 11/23/22 1403     Amph/Meth UR Negative     Barbiturate Ur Negative     Benzodiazepine Urine Negative     Cocaine Urine Negative     Methadone Urine Negative     Opiate Urine Negative     PCP Ur Negative     THC Urine Negative     Oxycodone Urine Negative    Narrative:      FOR MEDICAL PURPOSES ONLY  IF CONFIRMATION NEEDED PLEASE CONTACT THE LAB WITHIN 5 DAYS      Drug Screen Cutoff Levels:  AMPHETAMINE/METHAMPHETAMINES  1000 ng/mL  BARBITURATES     200 ng/mL  BENZODIAZEPINES     200 ng/mL  COCAINE      300 ng/mL  METHADONE      300 ng/mL  OPIATES      300 ng/mL  PHENCYCLIDINE     25 ng/mL  THC       50 ng/mL  OXYCODONE      100 ng/mL    Procalcitonin [[id number]]  (Abnormal) Collected: 11/23/22 1328    Lab Status: Final result Specimen: Blood from Arm, Right Updated: 11/23/22 1402     Procalcitonin 0 26 ng/ml     UA w Reflex to Microscopic w Reflex to Culture [[id number]]  (Abnormal) Collected: 11/23/22 1346    Lab Status: Final result Specimen: Urine, Clean Catch Updated: 11/23/22 1355     Color, UA Yellow     Clarity, UA Clear     Specific Gravity, UA >=1 030     pH, UA 5 5     Leukocytes, UA Negative     Nitrite, UA Negative     Protein, UA Negative mg/dl      Glucose, UA Negative mg/dl      Ketones, UA 40 (2+) mg/dl      Urobilinogen, UA 0 2 E U /dl      Bilirubin, UA Negative     Occult Blood, UA Negative    Lactic acid [[id number]]  (Normal) Collected: 11/23/22 1328    Lab Status: Final result Specimen: Blood from Arm, Right Updated: 11/23/22 1354     LACTIC ACID 0 9 mmol/L     Narrative:      Result may be elevated if tourniquet was used during collection      HS Troponin 0hr (reflex protocol) [[id number]]  (Normal) Collected: 11/23/22 1320    Lab Status: Final result Specimen: Blood from Arm, Right Updated: 11/23/22 1351     hs TnI 0hr 17 ng/L     APTT [[id number]]  (Normal) Collected: 11/23/22 1320    Lab Status: Final result Specimen: Blood from Arm, Right Updated: 11/23/22 1339     PTT 36 seconds     Protime-INR [[id number]]  (Abnormal) Collected: 11/23/22 1320    Lab Status: Final result Specimen: Blood from Arm, Right Updated: 11/23/22 1339     Protime 14 8 seconds      INR 8 38    Basic metabolic panel [[id number]]  (Abnormal) Collected: 11/23/22 1320    Lab Status: Final result Specimen: Blood from Arm, Right Updated: 11/23/22 1339     Sodium 126 mmol/L      Potassium 3 7 mmol/L      Chloride 86 mmol/L      CO2 21 mmol/L      ANION GAP 19 mmol/L      BUN 26 mg/dL      Creatinine 0 68 mg/dL      Glucose 69 mg/dL      Calcium 9 2 mg/dL      eGFR 99 ml/min/1 73sq m     Narrative:      [place] guidelines for Chronic Kidney Disease (CKD):   •  Stage 1 with normal or high GFR (GFR > 90 mL/min/1 73 square meters)  •  Stage 2 Mild CKD (GFR = 60-89 mL/min/1 73 square meters)  •  Stage 3A Moderate CKD (GFR = 45-59 mL/min/1 73 square meters)  •  Stage 3B Moderate CKD (GFR = 30-44 mL/min/1 73 square meters)  •  Stage 4 Severe CKD (GFR = 15-29 mL/min/1 73 square meters)  •  Stage 5 End Stage CKD (GFR <15 mL/min/1 73 square meters)  Note: GFR calculation is accurate only with a steady state creatinine    Blood gas, Venous [[id number]]  (Abnormal) Collected: 11/23/22 1328    Lab Status: Final result Specimen: Blood from Arm, Right Updated: 11/23/22 1338     pH, Blue 7 265     pCO2, Blue 41 8 mm Hg      pO2, Blue 38 2 mm Hg      HCO3, Blue 18 5 mmol/L      Base Excess, Blue -8 0 mmol/L      O2 Content, Blue 12 4 ml/dL      O2 HGB, VENOUS 62 2 %     CBC and Platelet [[id number]]  (Normal) Collected: 11/23/22 1320    Lab Status: Final result Specimen: Blood from Arm, Right Updated: 11/23/22 1326     WBC 7 31 Thousand/uL      RBC 4 32 Million/uL      Hemoglobin 13 4 g/dL      Hematocrit 37 4 %      MCV 87 fL      MCH 31 0 pg      MCHC 35 8 g/dL      RDW 12 2 %      Platelets 129 Thousands/uL      MPV 8 9 fL     Fingerstick Glucose (POCT) [[id number]]  (Normal) Collected: 11/23/22 1313    Lab Status: Final result Updated: 11/23/22 1314     POC Glucose 71 mg/dl                  X-ray chest 1 view portable   Final Result by Sher Mistry MD (11/23 0574)      No acute cardiopulmonary disease  In the setting of clinically suspected/proven COVID-19, this plain film appearance does not contain findings that raise concern for viral pneumonia such as COVID-19, but does not rule out this diagnosis  Workstation performed: [id number]         CTA head and neck w wo contrast   Final Result by Jozef Cash MD (11/23 2116)      1  No acute intracranial CT abnormality  2   Patent major vasculature of [place] without high-grade stenosis        3   No hemodynamically significant stenosis of cervical carotid arteries  4   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  5   Significant retropharyngeal effusion extending from C1 through inferior C5 effacing nasopharyngeal and pharyngeal airways  No apparent rim enhancement to suggest abscess  Uvula appears edematous  Recommend ENT consultation  6   Acute sinusitis  7   Fluid-filled esophagus extending to the level of the neck base  Correlate for GERD  I personally discussed this study with CAMERON MEJIA on 11/23/2022 at 2:26 PM                Workstation performed: [id number]         XR chest 1 view portable    (Results Pending)              Procedures  ECG 12 Lead Documentation Only    Date/Time: 11/23/2022 1:43 PM  Performed by: Jarrod Whitney DO  Authorized by: Jarrod Whitney DO     Indications / Diagnosis:  Altered mental status  ECG reviewed by me, the ED Provider: yes    Patient location:  ED  Interpretation:     Interpretation: normal    Quality:     Tracing quality:  Limited by artifact  Rate:     ECG rate:  62    ECG rate assessment: normal    Rhythm:     Rhythm: sinus rhythm    Ectopy:     Ectopy: none    Conduction:     Conduction: normal    ST segments:     ST segments:  Normal  T waves:     T waves: normal    CriticalCare Time  Performed by: Jarrod Whitney DO  Authorized by:  Jarrod Whitney DO     Critical care provider statement:     Critical care time (minutes):  30    Critical care time was exclusive of:  Separately billable procedures and treating other patients and teaching time    Critical care was necessary to treat or prevent imminent or life-threatening deterioration of the following conditions:  CNS failure or compromise and metabolic crisis    Critical care was time spent personally by me on the following activities:  Obtaining history from patient or surrogate, development of treatment plan with patient or surrogate, discussions with consultants, evaluation of patient's response to treatment, examination of patient, ordering and performing treatments and interventions, ordering and review of laboratory studies, ordering and review of radiographic studies, re-evaluation of patient's condition and review of old charts    I assumed direction of critical care for this patient from another provider in my specialty: no    Central Line    Date/Time: 11/23/2022 6:28 PM  Performed by: Eliseo Gracia DO  Authorized by: Eliseo Gracia DO     Patient location:  ED  Consent:     Consent obtained:  Emergent situation  Universal protocol:     Patient identity confirmed:  Verbally with patient and arm band  Pre-procedure details:     Hand hygiene: Hand hygiene performed prior to insertion      Sterile barrier technique: All elements of maximal sterile technique followed      Skin preparation:  2% chlorhexidine    Skin preparation agent: Skin preparation agent completely dried prior to procedure    Indications:     Central line indications: medications requiring central line      Site selection rationale:  Longevity  Anesthesia (see MAR for exact dosages): Anesthesia method:  Local infiltration    Local anesthetic:  Lidocaine 1% w/o epi  Procedure details:     Location:  Right subclavian    Vessel type: vein      Laterality:  Right    Approach: percutaneous technique used      Patient position:  Flat    Catheter type:  Triple lumen 20cm    Catheter size:  7 Fr    Landmarks identified: yes      Ultrasound guidance: no      Manometry confirmation: no      Number of attempts:  1    Successful placement: yes      Vessel of catheter tip end:  R atrium  Post-procedure details:     Post-procedure:  Dressing applied and line sutured    Assessment:  Blood return through all ports, free fluid flow, no pneumothorax on x-ray and placement verified by x-ray    Patient tolerance of procedure:   Tolerated well, no immediate complications             ED Course  ED Course as of 11/23/22 1830 Wed Nov 23, 2022 1349 Anion Gap(!): 19  Previously normal    1349 Sodium(!): 126  Previously in the 130s  1411 Core temp up to 86  Patient still altered but rousable and oriented  Airway appears protected  1436 Mild desaturation while resting - patient noted to be mouth-breathing, likely associated with his CT findings  NRB applied with improved results  Core temp 86 4  Patient denies daily alcohol consumption  1540 Case d/w Dr Radha Mckeon  Will see patient in ED and determine disposition  Initial Sepsis Screening     Row Name 11/23/22 1448                Is the patient's history suggestive of a new or worsening infection? No  -AC        Suspected source of infection --        Are two or more of the following signs & symptoms of infection both present and new to the patient? Yes (Proceed)  though I do not think they are due to sepsis  -AC        Indicate SIRS criteria Leukocytosis (WBC > 42804 IJL); Hypothermia < 36C (96 8F)  -AC        If the answer is yes to both questions, suspicion of sepsis is present --        If severe sepsis is present AND tissue hypoperfusion perists in the hour after fluid resuscitation or lactate > 4, the patient meets criteria for SEPTIC SHOCK --        Are any of the following organ dysfunction criteria present within 6 hours of suspected infection and SIRS criteria that are NOT considered to be chronic conditions?  --        Organ dysfunction --        Date of presentation of severe sepsis --        Time of presentation of severe sepsis --        Tissue hypoperfusion persists in the hour after crystalloid fluid administration, evidenced, by either: --        Was hypotension present within one hour of the conclusion of crystalloid fluid administration? --        Date of presentation of septic shock --        Time of presentation of septic shock --              User Key  (r) = Recorded By, (t) = Taken By, (c) = Cosigned By    [id number] Name Provider Type    [facility] Paola Caba DO Physician                              MDM  Number of Diagnoses or Management Options  Diagnosis management comments: DDx:  Altered mental status - metabolic derangement (such as abnormal electrolytes, ARF, rhabdomyolysis, alcohol withdrawal), subacute stroke, drug use/abuse, no clinical evidence of hypoglycemia, trauma  A/P: Will check CT/CTA head and neck, septic workup, CK/troponin, UA/UDS, treat symptoms, reevaluate for further work up and admit  Amount and/or Complexity of Data Reviewed  Clinical lab tests: reviewed and ordered  Tests in the radiology section of CPT®: ordered and reviewed  Tests in the medicine section of CPT®: reviewed  Obtain history from someone other than the patient: yes (EMS)  Review and summarize past medical records: yes        Disposition  Final diagnoses: Altered mental status   Hypothermia, initial encounter   Acute hyponatremia   Acute pharyngitis     Time reflects when diagnosis was documented in both MDM as applicable and the Disposition within this note     Time User Action Codes Description Comment    11/23/2022  1:52 PM [address],[place]  [address], [address] [R41 82] Altered mental status     11/23/2022  1:52 PM [address],[place]  [address], [address]  XXXA] Hypothermia, initial encounter     11/23/2022  1:52 PM [address],[place]  [address], [address] [E87 1] Acute hyponatremia     11/23/2022  1:52 PM [address],[place]  [address], [address] [E87 29] High anion gap metabolic acidosis     74/28/7175  1:53 PM Karlie Masters Remove [D15 48] High anion gap metabolic acidosis     67/42/7611  4:09 PM Karlie Masters Add [J02 9] Acute pharyngitis       ED Disposition     ED Disposition   Admit    Condition   Stable    Date/Time   Wed Nov 23, 2022  4:09 PM    Comment   Case was discussed with Dr Maik Gates and the patient's admission status was agreed to be Admission Status: inpatient status to the service of Dr Maik Gates              Follow-up Information    None         Patient's Medications   Discharge Prescriptions    No medications on file       No discharge procedures on file      PDMP Review     None          ED Provider  Electronically Signed by           Natalie Trotter DO  11/23/22 3500

## 2022-11-23 NOTE — ED NOTES
Delay in transport due to ICU nurse being busy with another patient        Ania Sneed, RN  11/23/22 1250

## 2022-11-23 NOTE — ED NOTES
Upon entering patients room, bear hugger blanket was half off, no blanket or gown on patient  Patient removed his cardiac wires  Room cleaned, clutter removed  Cardiac monitor, gown, blankets, bear hugger all reapplied  Litter in low, locked position with side rail up x2  Patient awaiting inpatient bed        Yoly Freire RN  11/23/22 8728

## 2022-11-23 NOTE — LETTER
December 1, 2022     Kin Solis, DO  [address] [facility]  [address]  [address] [address]    Patient: Remy Martinez   YOB: 1955   Date of Visit: 11/23/2022       Dear Dr Timur Liu Recipients: Thank you for referring Remy Martinez to me for evaluation  Below are my notes for this consultation  If you have questions, please do not hesitate to call me  I look forward to following your patient along with you  Sincerely,        No name on file        CC: No Recipients  Fausto Benjamin MD  12/1/2022  8:29 AM  Incomplete  Progress Note - Infectious Disease   Remy Martinez 78 y o  male MRN: [id number]  Unit/Bed#: 404-01 Encounter: [id number]      Impression/Plan:  1  Septic shock   Patient earlier in admission required pressors and was felt to meet sepsis criteria  [facility] source is problem 2  Would question if patient may have had episode of vomiting/aspiration in the setting of 6 which then also contributed to 4  Clinical parameters improved   Suspect blood cultures represent contaminant   Repeat blood cultures NGTD   Repeat CT of the neck with significant improvement   Oral intake slowly progressing due to risk of aspiration   MRI of the brain negative  Continue Unasyn 3 g every 6 hours for now  Plan for total of 10 days of therapy given 4, through 12/2  Can transition to oral Augmentin once p o  intake is reliable  Continue to trend fever curve/vitals  Repeat CBC/CMP tomorrow  Additional supportive care/discharge as per primary      2  Multifocal pneumonia   Imaging over admission notable for multifocal disease   Esophagus noted to be distended and fluid-filled on imaging   Suspect a component of aspiration pneumonia in the setting of 6  Procalcitonin has since normalized  Continue antibiotic as above, plan to complete 10 day course through 12/02/2022  Continue to trend fever curve/vitals  Repeat labs tomorrow if admitted  Monitor respiratory status     3   Acute hypoxic respiratory failure   Noted early on presentation  Mejia Marisol due to problem 2 as well as 6  Currently stable on room air   Supportive care as per primary      4  Abnormal CT neck   Patient noted to have retropharyngeal effusion on initial imaging on presentation   Again would question if this may be inflammatory due to an episode of vomiting, consider ingestion   Consider also developing upper respiratory infection   Clinical parameters improved   Repeat CT imaging with near resolution of effusion noted  No focal complaints on exam   Continue antibiotics as above  Continue to trend fever curve/WBC  Monitor exam and for any recurrent symptoms     5  Positive blood culture   Blood cultures on admission with delayed growth of 1 set with lactobacillus and the other with micrococcus   I suspect that these organisms were isolated from the patient's skin and are likely contaminant given the manner in which the patient was found   Repeat cultures are NGTD   2D echo without gross vegetation   No other devices appreciated on  imaging  No antibiotics needed for this issue  Continue to trend fever curve/WBC     6  Multiple electrolyte abnormalities and alcohol abuse/withdrawal   Suspect that this is the primary reason for patient's presentation and likely contributed to the above complications  Toxicology evaluation appreciated  Fluid hydration as per primary  Antibiotics as above     7  Metabolic encephalopathy   This is primarily related to 6  CT head imaging unremarkable   Formal MRI of the head unremarkable   Mental status continues to improve  Monitor mental status  Ongoing follow-up by speech therapy  Antibiotics as above     8  Abnormal CT abdomen   Patient's CT imaging of the abdomen notable for a sclerotic bony lesion at the sacrum   Uncertain etiology    Patient will need further imaging/biopsy as outpatient  Monitor for progressing pain  Additional care/workup as per primary     Above plan discussed in detail with the patient and with primary service  ID Consult service will continue to follow  Antibiotics:  Amp-Sulbactam: 4  Total antibiotic days: 9    Subjective:  Patient has no fever, chills, sweats; no nausea, vomiting, diarrhea; no cough, shortness of breath; no pain  No new symptoms  Objective:  Vitals:  Temp:  [97 9 °F (36 6 °C)-98 4 °F (36 9 °C)] 97 9 °F (36 6 °C)  HR:  [66-69] 66  Resp:  [16-18] 16  BP: (140-179)/(74-90) 140/74  SpO2:  [98 %-100 %] 100 %  Temp (24hrs), Av 2 °F (36 8 °C), Min:97 9 °F (36 6 °C), Max:98 4 °F (36 9 °C)  Current: Temperature: 97 9 °F (36 6 °C)    Physical Exam:   General Appearance:  Alert, interactive, nontoxic, no acute distress  Throat: Oropharynx moist without lesions  Lungs:   Clear to auscultation bilaterally; no wheezes, rhonchi or rales; respirations unlabored   Heart:  RRR; no murmur, rub or gallop   Abdomen:   Soft, non-tender, non-distended, positive bowel sounds  Extremities: No clubbing, cyanosis or edema   Skin: No new rashes or lesions  No draining wounds noted  Labs:    All pertinent labs and imaging studies were personally reviewed  Results from last 7 days   Lab Units 22  0522  1238 22  0531   WBC Thousand/uL 6 81 7 70 6 64   HEMOGLOBIN g/dL 10 4* 9 5* 8 8*   PLATELETS Thousands/uL 164 150 126*     Results from last 7 days   Lab Units 22  0517 22  0531 22  0453   SODIUM mmol/L 141 142 140   POTASSIUM mmol/L 3 4* 3 2* 3 6   CHLORIDE mmol/L 110* 111* 111*   CO2 mmol/L 24 22 23   BUN mg/dL 16 16 20   CREATININE mg/dL 0 67 0 74 0 89   EGFR ml/min/1 73sq m 100 96 89   CALCIUM mg/dL 8 3 8 3 8 8   AST U/L  --  13  --    ALT U/L  --  16  --    ALK PHOS U/L  --  112  --      Results from last 7 days   Lab Units 22  0453   PROCALCITONIN ng/ml 0 07         Results from last 7 days   Lab Units 22  0431   FERRITIN ng/mL 425*           Micro:  Results from last 7 days   Lab Units 22  1226   BLOOD CULTURE  No Growth at 72 hrs  No Growth at 72 hrs  Imaging:          Moe Dick MD  [facility]                     [address], [place]  11/30/2022  4:52 PM  Signed  [address] [facility] [address]  Progress Note Meenakshi Hinton 1955, 78 y o  male MRN: [id number]  Unit/Bed#: 404-01 Encounter: [id number]  Primary Care Provider: Margarita Mclean DO   Date and time admitted to hospital: 11/23/2022  1:09 PM    Other dysphagia  Assessment & Plan  Continue speech therapy with diet modifications    * Metabolic encephalopathy  Assessment & Plan  Background:  Patient presented with altered mental status, lethargic but responds to verbal stimuli, follows commands  Acute metabolic encephalopathy present on admission, likely due to underlying infection, plus alcohol withdrawal      Mental status continues to improve, discussion with patient's friend/emergency contact and the patient at bedside, patient is agreeable to short-term rehab placement    -Bairum swallow study ; report and recommendations per speech therapist is following:  Recommending dysphagia diet for aspiration precautions given the results of the swallow study  -MRI brain; No acute intracranial abnormality  -PT OT recommending acute rehab services when stable  Plan: Today patient is alert and oriented x3  Remarkably improved in terms of alertness and orientation since admission  Will continue to recheck and follow-up  -PT OT eval and treat  -cardiopulmonary monitoring  -assessment and plan for sepsis  Pharyngeal swelling  Assessment & Plan  Continue IV antibiotics per Infectious Disease, can transition to p o  Augmentin at discharge    Positive blood culture  Assessment & Plan  Assessment and plan per Infectious Disease team     Positive blood culture  Blood cultures on admission with delayed growth of 1 set with lactobacillus and the other with micrococcus    I suspect that these organisms were isolated from the patient's skin and are likely contaminant given the manner in which the patient was found  Repeat cultures are pending  2D echo without gross vegetation  No other devices appreciated on  imaging  No antibiotics for this issue  Follow-up repeat blood cultures  Unasyn as above  Continue to trend fever curve/WBC    History of GI bleed  Assessment & Plan  Patient with history of GI bleed and previous hospitalization  No evidence of active bleeding    Esophageal dilatation  Assessment & Plan  In the setting of coma and possible alcohol intoxication when was at home  -CT scan showing esophageal dilation  Tolerating diet    Other specified anemias  Assessment & Plan  -H and H trend stable  Plan:  -GI team on board; gastroenterologist notified no further steps needed at this time  Sacral lesion  Assessment & Plan  Incidental finding noted on CT scan and x-rays as following:    3  Sacral bony lesion has enlarged compared to the previous exam and is indeterminate  Plan:  Patient should follow-up with [address] [facility] team and/or PCP    Alcohol withdrawal [facility])  Assessment & Plan  Background:  Social history as given by his close friends; daily alcohol drinker for the past 1 year at least   Smokes cigarettes I pack per day for same duration  Patient was Likely past alcohol withdrawal on admission, unknown down time at home  -IV thiamine supplement per toxicology, please see their plan of care  -IV folic acid supplement  -follow-up with rehab when stable, I recommended outpatient or inpatient alcohol rehab after short-term nursing care stay      Pneumonia  Assessment & Plan  CT Ca P:  1  Bilateral pneumonia and pleural effusions      Blood cultures consistent with contaminant son admission    -completed 5 days of cefepime       -infectious disease team on board; agreed on the following plan:  Continue Unasyn 3 g every 6 hours  Plan for total of 10 days of therapy given 4, through   Can transition to oral Augmentin once p o  intake is reliable      Septic shock (HCC)  Assessment & Plan  Likely secondary to complicated pneumonia, present on admission, resolved              Code Status: Level 1 - Full Code    Subjective:   No pain    Objective:     Vitals:   Temp (24hrs), Av 7 °F (36 5 °C), Min:97 1 °F (36 2 °C), Max:98 4 °F (36 9 °C)    Temp:  [97 1 °F (36 2 °C)-98 4 °F (36 9 °C)] 98 4 °F (36 9 °C)  HR:  [59-69] 69  Resp:  [16-19] 18  BP: (142-154)/(78-90) 154/90  SpO2:  [98 %-100 %] 98 %  Body mass index is 28 84 kg/m²  Input and Output Summary (last 24 hours): Intake/Output Summary (Last 24 hours) at 2022 1651  Last data filed at 2022 1300  Gross per 24 hour   Intake 720 ml   Output 100 ml   Net 620 ml       Physical Exam:   Physical Exam  Vitals and nursing note reviewed  Constitutional:       General: He is not in acute distress  Appearance: He is not ill-appearing, toxic-appearing or diaphoretic  Comments: Chronically ill-appearing male, no acute distress   HENT:      Head: Normocephalic and atraumatic  Right Ear: External ear normal       Left Ear: External ear normal    Cardiovascular:      Pulses: Normal pulses  Pulmonary:      Effort: Pulmonary effort is normal       Breath sounds: Normal breath sounds  Abdominal:      General: Abdomen is flat  There is no distension  Palpations: Abdomen is soft  Tenderness: There is no abdominal tenderness  Musculoskeletal:      Cervical back: Normal range of motion  Right lower leg: Edema present  Left lower leg: Edema present  Neurological:      General: No focal deficit present  Mental Status: He is alert  Psychiatric:         Mood and Affect: Mood normal          Behavior: Behavior normal          Thought Content:  Thought content normal          Judgment: Judgment normal           Additional Data:     Labs:  Results from last 7 days   Lab Units 11/30/22  0517 11/25/22  0431 11/24/22  0504   WBC Thousand/uL 6 81   < > 13 01*   HEMOGLOBIN g/dL 10 4*   < > 10 3*   HEMATOCRIT % 32 3*   < > 28 8*   PLATELETS Thousands/uL 164   < > 353   BANDS PCT %  --   --  6   NEUTROS PCT % 67   < >  --    LYMPHS PCT % 18   < >  --    LYMPHO PCT %  --   --  0*   MONOS PCT % 9   < >  --    MONO PCT %  --   --  2*   EOS PCT % 5   < > 0    < > = values in this interval not displayed  Results from last 7 days   Lab Units 11/30/22  0517 11/29/22  0531   SODIUM mmol/L 141 142   POTASSIUM mmol/L 3 4* 3 2*   CHLORIDE mmol/L 110* 111*   CO2 mmol/L 24 22   BUN mg/dL 16 16   CREATININE mg/dL 0 67 0 74   ANION GAP mmol/L 7 9   CALCIUM mg/dL 8 3 8 3   ALBUMIN g/dL  --  2 2*   TOTAL BILIRUBIN mg/dL  --  0 42   ALK PHOS U/L  --  112   ALT U/L  --  16   AST U/L  --  13   GLUCOSE RANDOM mg/dL 96 98     Results from last 7 days   Lab Units 11/27/22  0453   INR  1 14     Results from last 7 days   Lab Units 11/24/22  0800 11/23/22  1657   POC GLUCOSE mg/dl 169* 79     Results from last 7 days   Lab Units 11/24/22  0504   HEMOGLOBIN A1C % 4 9     Results from last 7 days   Lab Units 11/27/22  0453 11/24/22  0504 11/23/22  2024   LACTIC ACID mmol/L  --   --  0 8   PROCALCITONIN ng/ml 0 07 0 70*  --        Lines/Drains:  Invasive Devices     Peripheral Intravenous Line  Duration           Peripheral IV 11/29/22 Right Antecubital 1 day                      Imaging: No pertinent imaging reviewed  Recent Cultures (last 7 days):   Results from last 7 days   Lab Units 11/27/22  1226   BLOOD CULTURE  No Growth at 48 hrs  No Growth at 48 hrs         Last 24 Hours Medication List:   Current Facility-Administered Medications   Medication Dose Route Frequency Provider Last Rate   • ampicillin-sulbactam  3 g Intravenous Q6H Lindsay Teixeira MD 3 g (11/30/22 1646)   • enoxaparin  40 mg Subcutaneous Daily Joshua Torres DO     • ferrous gluconate  324 mg Oral BID AC Hernandez Coronel MD     • Denver Beal ON 12/1/2022] pantoprazole  40 mg Oral Early Morning [place] Brash Canelo cason, DO     • thiamine  100 mg Intravenous Wolkrystina Hernández Canelo cason,  mg (11/30/22 8120)        Today, Patient Was Seen By: Naman Nava DO    **Please Note: This note may have been constructed using a voice recognition system  Kar Florentino MD  11/30/2022 11:18 AM  Signed    Progress Note - Infectious Disease   Nuria Hightower 78 y o  male MRN: [id number]  Unit/Bed#: 404-01 Encounter: [id number]        REQUIRED DOCUMENTATION:   1  This service was provided via Telemedicine  2  Provider located at [facility]  3  TeleMed provider: Lourdes Varela MD   4  Identify all parties in room with patient during tele consult: [facility]  5  After connecting through [employer], patient was identified by name and date of birth and assistant checked wristband  Patient was then informed that this was a Telemedicine visit and that the exam was being conducted confidentially over secure lines  My office door was closed  No one else was in the room  Patient acknowledged consent and understanding of privacy and security of the Telemedicine visit, and gave us permission to have the assistant stay in the room in order to assist with the history and to conduct the exam   I informed the patient that I have reviewed their record in Epic and presented the opportunity for them to ask any questions regarding the visit today  The patient agreed to participate  Assessment/Recommendations   1  Septic shock   Patient earlier in admission required pressors and was felt to meet sepsis criteria  Char Epstein source is problem 2  Would question if patient may have had episode of vomiting/aspiration in the setting of 6 which then also contributed to 4  Clinical parameters improved   Suspect blood cultures represent contaminant   Repeat blood cultures NGTD  Repeat CT of the neck with significant improvement  Oral intake slowly progressing due to risk of aspiration    MRI of the brain negative  Continue Unasyn 3 g every 6 hours for now  Plan for total of 10 days of therapy given 4, through 12/2  Can transition to oral Augmentin once p o  intake is reliable  Continue to trend fever curve/vitals  Repeat CBC/CMP tomorrow  Follow-up repeat blood cultures while admitted  Additional supportive care/discharge as per primary      2  Multifocal pneumonia   Imaging over admission notable for multifocal disease   Esophagus noted to be distended and fluid-filled on imaging   Suspect a component of aspiration pneumonia in the setting of 6  Procalcitonin has since normalized  Continue antibiotic as above  Continue to trend fever curve/vitals  Repeat labs tomorrow if admitted  Monitor respiratory status  Follow-up pending blood cultures while admitted     3  Acute hypoxic respiratory failure   Noted early on presentation   Likely due to problem 2 as well as 6  Currently stable on room air   Supportive care as per primary      4  Abnormal CT neck   Patient noted to have retropharyngeal effusion on initial imaging on presentation   Again would question if this may be inflammatory due to an episode of vomiting, consider ingestion  Consider also developing upper respiratory infection   Clinical parameters improved  Repeat CT imaging with near resolution of effusion noted  No focal complaints on exam   Continue antibiotics as above  Continue to trend fever curve/WBC  Monitor exam and for any recurrent symptoms     5  Positive blood culture   Blood cultures on admission with delayed growth of 1 set with lactobacillus and the other with micrococcus   I suspect that these organisms were isolated from the patient's skin and are likely contaminant given the manner in which the patient was found   Repeat cultures are NGTD   2D echo without gross vegetation   No other devices appreciated on  imaging    No antibiotics for this issue  Follow-up repeat blood cultures while admitted  Unasyn as above  Continue to trend fever curve/WBC     6  Multiple electrolyte abnormalities and alcohol abuse/withdrawal   Suspect that this is the primary reason for patient's presentation and likely contributed to the above complications  Toxicology evaluation appreciated  Fluid hydration as per primary  Antibiotics as above     7  Metabolic encephalopathy   This is primarily related to 6  CT head imaging unremarkable   Formal MRI of the head unremarkable  Mental status continues to improve  Monitor mental status  Ongoing follow-up by speech therapy  Antibiotics as above     8  Abnormal CT abdomen   Patient's CT imaging of the abdomen notable for a sclerotic bony lesion at the sacrum   Uncertain etiology  Patient will need further imaging/biopsy as outpatient  Monitor for progressing pain  Additional care/workup as per primary    Above plan discussed in detail with the patient and with primary service  ID Consult service will continue to follow  History       Subjective:  Patient is much more interactive today  Again he has very poor recall of the events leading up to his admission  Denies having any nausea, vomiting, chest pain or shortness of breath  Denies having any neck discomfort  Denies having any throat discomfort  Denies having any joint complaints  Overall tolerating antibiotic without itching or rash      Antibiotics:  Unasyn 3  Total antibody 8      Physical Exam     Temp:  [97 1 °F (36 2 °C)-97 5 °F (36 4 °C)] 97 5 °F (36 4 °C)  HR:  [59-69] 59  Resp:  [16-19] 19  BP: (142-154)/(78-90) 142/78  SpO2:  [98 %-100 %] 98 %  Temp (24hrs), Av 4 °F (36 3 °C), Min:97 1 °F (36 2 °C), Max:97 5 °F (36 4 °C)  Current: Temperature: 97 5 °F (36 4 °C)    Intake/Output Summary (Last 24 hours) at 2022 0816  Last data filed at 2022 1900  Gross per 24 hour   Intake 840 ml   Output 800 ml   Net 40 ml       Physical exam findings reported by bedside and primary medical team staff      General Appearance:  Appearing well, nontoxic, and in no distress, appears stated age   Throat: Oropharynx moist without lesions; lips, mucosa, and tongue normal   Lungs:   Clear to auscultation bilaterally, no audible wheezes, rhonchi and rales, respirations unlabored on room air   Heart:  Regular rate and rhythm, S1, S2 normal, no murmur, rub or gallop reported   Abdomen:   Soft, non-tender, non-distended, positive bowel sounds, no masses, no organomegaly   Extremities: Extremities normal, atraumatic, no cyanosis, clubbing or edema   Skin: Skin color, texture, turgor normal, no rashes or lesions  No draining wounds noted  Neurologic: Alert and oriented times 3, extremity strength 5/5 and symmetric; again has very poor recall of recent events but is much more interactive and participate with exam compared to prior  Invasive Devices:   Peripheral IV 11/29/22 Right Antecubital (Active)   Site Assessment WDL 11/29/22 1200   Dressing Type Transparent 11/29/22 1200   Line Status Flushed; Infusing 11/29/22 1200   Dressing Status Clean;Dry; Intact 11/29/22 1200       Labs, Imaging, & Other Studies     Lab Results:    I have personally reviewed pertinent labs  Results from last 7 days   Lab Units 11/30/22  0517 11/29/22  1238 11/29/22  0531   WBC Thousand/uL 6 81 7 70 6 64   HEMOGLOBIN g/dL 10 4* 9 5* 8 8*   PLATELETS Thousands/uL 164 150 126*     Results from last 7 days   Lab Units 11/30/22  0517 11/29/22  0531 11/24/22  1245 11/24/22  0504   POTASSIUM mmol/L 3 4* 3 2*   < > 4 0   CHLORIDE mmol/L 110* 111*   < > 97   CO2 mmol/L 24 22   < > 16*   BUN mg/dL 16 16   < > 23   CREATININE mg/dL 0 67 0 74   < > 0 96   EGFR ml/min/1 73sq m 100 96   < > 82   CALCIUM mg/dL 8 3 8 3   < > 7 8*   AST U/L  --  13  --  34   ALT U/L  --  16  --  21   ALK PHOS U/L  --  112  --  124*    < > = values in this interval not displayed       Results from last 7 days   Lab Units 11/27/22  1226 11/23/22  1339 11/23/22  1328   BLOOD CULTURE  No Growth at 48 hrs  No Growth at 48 hrs  Micrococcus luteus* Lactobacillus species*   GRAM STAIN RESULT   --  Gram positive cocci in clusters* Gram variable rods*       Imaging Studies:   I have personally reviewed pertinent imaging study reports and images in PACS  EKG, Pathology, and Other Studies:   I have personally reviewed pertinent reports  Counseling/Coordination of care: Total 35 minutes communication with the patient via telehealth  Labs, medical tests and imaging studies were independently reviewed by me as noted above  NASIM Stovall  11/29/2022  2:51 PM  Attested  Progress Note- Mei Matta 78 y o  male MRN: [id number]    Unit/Bed#: 404-01 Encounter: [id number]      Assessment and Plan:    Mei Matta is a 78year old male with a PMH of alcohol use and hypertension that presented to the emergency department with altered mental status  According to EMR, patient was found in his house on the floor, confused, soiled in urine and stool  Patient was found to have sepsis and hypoxia  1  Abnormal CT scan, esophagus     CT scan on admission revealed possible pneumonia as well as distended esophagus containing fluid  Patient underwent speech evaluation with video swallow that revealed remarkable signs of aspiration  He was recommended fear pureed/level 1 diet and honey thick liquids with medications crushed with puree  Non urgent EGD recommended  2  Anemia    Patient's hemoglobin on admission was normal at 13 4 with a downtrend to 8 8 this morning however this has increased to 9 5 this afternoon  Patient denies any overt bleeding  Stool for occult with 1 positive and 1 negative  Patient does have a history of GI bleeding in 2018 and he was recommended for outpatient EGD and colonoscopy however this was not completed    Non urgent EGD and colonoscopy recommended     ______________________________________________________________________    Subjective: Patient awake and alert  He is oriented x3  He currently denies any GI symptoms at this time  He reports that he tolerated breakfast with no choking or trouble swallowing      Medication Administration - last 24 hours from 11/28/2022 1441 to 11/29/2022 1441       Date/Time Order Dose Route Action Action by     11/28/2022 2116 EST cefepime (MAXIPIME) IVPB (premix in dextrose) 2,000 mg 50 mL 0 mg Intravenous Stopped [facility]     11/29/2022 0855 EST enoxaparin (LOVENOX) subcutaneous injection 40 mg 40 mg Subcutaneous Given Vicky Bishop RN     11/28/2022 2116 EST dextrose 5 % and sodium chloride 0 9 % infusion 0 mL/hr Intravenous Stopped [facility]     11/29/2022 0855 EST pantoprazole (PROTONIX) injection 40 mg 40 mg Intravenous Given Vicky Bishop RN     11/28/2022 2117 EST pantoprazole (PROTONIX) injection 40 mg 40 mg Intravenous Given Ulus Waleska     11/29/2022 0602 EST thiamine (VITAMIN B1) 100 mg in sodium chloride 0 9 % 50 mL IVPB 100 mg Intravenous New Bag [facility]     11/28/2022 2202 EST thiamine (VITAMIN B1) 100 mg in sodium chloride 0 9 % 50 mL IVPB 0 mg Intravenous Stopped [facility]     11/28/2022 2117 EST thiamine (VITAMIN B1) 100 mg in sodium chloride 0 9 % 50 mL IVPB 100 mg Intravenous New Bag [facility]     11/28/2022 1443 EST thiamine (VITAMIN B1) 100 mg in sodium chloride 0 9 % 50 mL IVPB 100 mg Intravenous [address] Vicky Bishop RN     11/29/2022 0525 EST ampicillin-sulbactam (UNASYN) 3 g in sodium chloride 0 9 % 100 mL IVPB 3 g Intravenous New Bag [facility]     11/28/2022 2202 EST ampicillin-sulbactam (UNASYN) 3 g in sodium chloride 0 9 % 100 mL IVPB 3 g Intravenous New Bag [facility]     11/28/2022 1724 EST ampicillin-sulbactam (UNASYN) 3 g in sodium chloride 0 9 % 100 mL IVPB 3 g Intravenous [address] Vicky Bishop RN     11/28/2022 1442 EST ampicillin-sulbactam (UNASYN) 3 g in sodium chloride 0 9 % 100 mL IVPB 3 g Intravenous Not Given Pennie Guevara RN     11/29/2022 9723 EST ferrous gluconate (FERGON) tablet 324 mg 324 mg Oral Given Pennie Guevara RN     11/28/2022 1714 EST ferrous gluconate (FERGON) tablet 324 mg 324 mg Oral Given Pennie Guevara RN     11/29/2022 1220 EST potassium chloride 20 mEq IVPB (premix) 20 mEq Intravenous [address] Pennie Guevara RN     11/29/2022 1507 EST potassium chloride 20 mEq IVPB (premix) 20 mEq Intravenous [address] Pennie Guevara RN     11/29/2022 1220 EST ampicillin-sulbactam (UNASYN) 3 g in sodium chloride 0 9 % 100 mL IVPB 3 g Intravenous New Bag Pennie Guevara RN          Objective:     Vitals: Blood pressure 137/74, pulse 56, temperature 97 7 °F (36 5 °C), resp  rate 17, height 5' 9" (1 753 m), weight 88 6 kg (195 lb 5 2 oz), SpO2 99 %  ,Body mass index is 28 84 kg/m²  No intake or output data in the 24 hours ending 11/29/22 1441    Physical Exam:   PHYSICAL EXAMINATION:    General Appearance:   Alert, cooperative, no distress   HEENT:  Normocephalic, atraumatic, anicteric  Neck supple, symmetrical, trachea midline  Lungs:   Equal chest rise and unlabored breathing, normal effort, no coughing  Cardiovascular:   No visualized JVD  Abdomen:   No abdominal distension  Skin:   No jaundice, rashes, or lesions  Musculoskeletal:   Normal range of motion visualized  Psych:  Normal affect and normal insight  Neuro:  Alert and appropriate  Invasive Devices     Central Venous Catheter Line  Duration           CVC Central Lines 11/23/22 Triple Right Subclavian 5 days          Peripheral Intravenous Line  Duration           Peripheral IV 11/29/22 Right Antecubital <1 day                Lab Results:  No results displayed because visit has over 200 results  Imaging Studies: I have personally reviewed pertinent imaging studies  Attestation signed by Susana Ng MD at 11/30/2022 10:22 AM:     I was the supervising/collaborating physician on 11/29/2022    I acknowledge the AP's documentation and services provided  I was available by phone, if needed, for consultation  Dat Lamb MD 11/30/22    Vania Green MD  11/29/2022 11:58 AM  Attested  [address] [address]  Progress Note Na Welch 1955, 78 y o  male MRN: [id number]  Unit/Bed#: 404-01 Encounter: [id number]  Primary Care Provider: Bel Estrada DO   Date and time admitted to hospital: 11/23/2022  1:09 PM    Pneumonia  Assessment & Plan  CT Ca P:  1  Bilateral pneumonia and pleural effusions  Blood cultures:     1/4:  Micrococcus luteus, Gram-positive cocci in clusters  1/4: Lactobacillus species Abnormal  Gram Stain ResultGram variable rods Abnormal    2/4: NGTD     -completed 5 days of cefepime   -day 1 Unasyn    -new set of blood cultures obtained on 11/27  No growth to date     -infectious disease team on board; agreed on the following plan:  Continue Unasyn 3 g every 6 hours  Plan for total of 10 days of therapy given 4, through 12/2  Can transition to oral Augmentin once p o  intake is reliable  Continue to trend fever curve/vitals  Repeat CBC/CMP tomorrow  Follow-up repeat blood cultures while admitted  Additional supportive care/discharge as per primary  Septic shock [facility])  Assessment & Plan  Likely secondary to complicated pneumonia      Refer to assessment and plan for pneumonia  * Metabolic encephalopathy  Assessment & Plan  Background:  Patient presented with altered mental status, lethargic but responds to verbal stimuli, follows commands  Acute metabolic encephalopathy present on admission, likely due to underlying infection, plus alcohol withdrawal      Mental status moderately improved, however he is still confused with minimal change from yesterday     -Bairum swallow study ; report and recommendations per speech therapist is following:  Recommending dysphagia diet for aspiration precautions given the results of the swallow study  -MRI brain;    No acute intracranial abnormality  Minor white matter change suggestive of chronic microangiopathy  -PT OT recommending acute rehab services when stable  Plan: Today patient is alert and oriented x3  Remarkably improved in terms of alertness and orientation since admission  Will continue to recheck and follow-up  -PT OT eval and treat  -cardiopulmonary monitoring  -assessment and plan for sepsis  Alcohol withdrawal ([facility] [address] )  Assessment & Plan  Background:  Social history as given by his close friends; daily alcohol drinker for the past 1 year at least   Smokes cigarettes I pack per day for same duration  Given the patient's estimated timing of the fall at home his altered mental state could be most likely caused by a combination between septic shock and alcohol withdrawal     -medical toxicology consultant on board; agreed on the plan as following:    -Ativan p r n  for agitation and seizures  -IV thiamine supplement  -IV folic acid supplement  -follow-up with rehab when stable  Positive blood culture  Assessment & Plan  Assessment and plan per Infectious Disease team     Positive blood culture  Blood cultures on admission with delayed growth of 1 set with lactobacillus and the other with micrococcus  I suspect that these organisms were isolated from the patient's skin and are likely contaminant given the manner in which the patient was found  Repeat cultures are pending  2D echo without gross vegetation  No other devices appreciated on  imaging  No antibiotics for this issue  Follow-up repeat blood cultures  Unasyn as above  Continue to trend fever curve/WBC    History of GI bleed  Assessment & Plan  Patient with history of GI bleed and previous hospitalization     -since admission hemoglobin levels have been trending down from 11 6 on presentation to 8 7    -2 consecutive readings from past 24 hours with no change  Most likely no active bleeding at this time    Will continue to re-evaluate  -FOBT 1/3:  positive  Pending complete set  -hemoglobin trend stable  GI team consulted and did not want to further proceed with any interventions at this time for the next 24 hours  Plan:   -GI team consulted; input highly appreciated  -follow-up with morning labs  -continue PPI therapy for stress ulcer prophylaxis  -consider holding anticoagulants if continues to trend down  Esophageal dilatation  Assessment & Plan  In the setting of coma and possible alcohol intoxication when was at home  -CT scan showing esophageal dilation  Plan as following:  -GI team consulted; input highly appreciated  -will consider nasogastric tube insertion if no improvement within next 24 hours  Other dysphagia  Assessment & Plan  Patient seen by speech therapy twice and failed oral intake trial at bedside  Barium swallow study obtained on 11/28  With remarkable signs of aspiration on exam      -MRI brain; no acute changes  Speech therapist with recommendations as following;    Recommended Diet:  puree/level 1 diet and honey thick liquids   Recommended Form of Medications: crushed with puree   Aspiration precautions and compensatory swallowing strategies: upright posture, only feed when fully alert, small bites/sips, no straws, liquids by teaspoon only, chin tuck and alternating bites and sips  Consider referral to:  ENT regarding CP bar, GI regarding suspected GERD  SLP Dysphagia therapy recommended: yes  Recommend pharyngeal strengthening exercises      Other specified anemias  Assessment & Plan  Continue monitoring hemoglobin and hematocrit     -given his history of GI bleed and previous hospitalization for that reason will plan for the following:    -folic acid and vitamin B12 levels within normal range  -fecal occult blood test; pending 1/3   -H and H trend stable        Plan:  -GI team on board; gastroenterologist notified no further steps needed at this time   -follow-up with fecal occult blood testing complete set of 3  -transfuse packed RBC units for hemoglobin less than 7, and per protocol   -morning labs  Sacral lesion  Assessment & Plan  Incidental finding noted on CT scan and x-rays as following:    3  Sacral bony lesion has enlarged compared to the previous exam and is indeterminate  Plan:  -follow-up with primary care provider accordingly  -May follow up with an MRI when stable  Pharyngeal swelling  Assessment & Plan    -vitals stable  -SpO2 within normal limits on room air   -not in acute respiratory distress  -CMV and EBV panel negative  11/28  -follow-up CT neck/soft tissue: There is marked improvement in the previously seen retropharyngeal effusion compared to the study performed 5 days ago  Plan:  -will consider contacting ENT specialist for further recommendations  Acute respiratory failure (HCC)-resolved as of 11/27/2022  Assessment & Plan  Present on admission, resolved    Hyponatremia-resolved as of 11/25/2022  Assessment & Plan  Background:  Likely hyponatremia due to hypovolemia on top of thiazide diuretic use     -received total 4 L IV normal saline boluses within the past 24 hour  -also is maintained on maintenance IV fluids of half dextrose with normal saline at 200 mils per hour     -serum sodium levels normalizing; trending up to 130 from 123 on presentation  Plan:  Given the patient's borderline low blood pressures and his need for pressor will continue IV fluids at maintenance  -currently Levophed is at 1 ml/hr   -wean and titrate Levophed drip per protocol   -re-evaluate Q 4 neuro checks, cardiopulmonary check, telemetry   -monitor I/OS with Moreno's catheter in place   -daily weight  -morning labs          Metabolic acidosis with increased anion gap and accumulation of organic acids-resolved as of 11/24/2022  Assessment & Plan  Metabolic acidosis with anion gap    -resolved per updated ABG on 11/24        Hypothermia-resolved as of 2022  Assessment & Plan  Hypothermia due to exposure in cold house, unknown downtime    Closely monitor metabolic status with rewarming, monitor calcium, patient did have transient hypoglycemia  Continue D5 normal saline infusion and external rewarming as needed     -resolved  Hypoxia-resolved as of 2022  Assessment & Plan  Resolved, likely secondary to aspiration, with suspected aspiration pneumonia, continue current broad-spectrum antibiotics, cultures        VTE Pharmacologic Prophylaxis:   Pharmacologic: Enoxaparin (Lovenox)  Mechanical VTE Prophylaxis in Place: Yes    Patient Centered Rounds: I have performed bedside rounds with nursing staff today  Discussions with Specialists or Other Care Team Provider: infectious disease, GI, case management    Education and Discussions with Family / Patient: patient     Time Spent for Care: 30 minutes  More than 50% of total time spent on counseling and coordination of care as described above  Current Length of Stay: 6 day(s)    Current Patient Status: Inpatient   Certification Statement: The patient will continue to require additional inpatient hospital stay due to ongoing medical management for metabolic encephalopathy        Code Status: Level 1 - Full Code      Subjective:   Patient was seen at bedside  AOX3, continues to be improving in terms of alertness and awareness  Tolerating oral diet for the past 24 hrs with restrictions per speech PT  He doesn't have any complaints   No chest pain or tightness, oxygen saturation stable on RA  stooling and voiding accordingly  Objective:     Vitals:   Temp (24hrs), Av 9 °F (36 6 °C), Min:97 7 °F (36 5 °C), Max:98 2 °F (36 8 °C)    Temp:  [97 7 °F (36 5 °C)-98 2 °F (36 8 °C)] 97 7 °F (36 5 °C)  HR:  [56-65] 56  Resp:  [17-18] 17  BP: (136-155)/(74-85) 137/74  SpO2:  [98 %-100 %] 99 %  Body mass index is 28 84 kg/m²  Input and Output Summary (last 24 hours):        Intake/Output Summary (Last 24 hours) at 11/29/2022 1153  Last data filed at 11/28/2022 1438  Gross per 24 hour   Intake 0 ml   Output 0 ml   Net 0 ml       Physical Exam:   Vitals and nursing note reviewed  HENT:      Head: Normocephalic and atraumatic       Right Ear: External ear normal       Left Ear: External ear normal    Cardiovascular:      Rate and Rhythm: Normal rate   No peripheral edema   No heart murmurs or added sounds  Pulmonary:      Effort: Pulmonary effort is normal, normal bilateral air entry   Coarse crackles audible throughout  Musculoskeletal:      Cervical back: Normal range of motion   Deep tendon reflexes equal symmetrical 2+, power:  5/5 throughout  Neurological:      Mental Status:AOX3     Motor: No weakness  DTR 2+ equal ( patellar and biceps)       Additional Data:     Labs:    Results from last 7 days   Lab Units 11/29/22  0531 11/25/22  0431 11/24/22  0504   WBC Thousand/uL 6 64   < > 13 01*   HEMOGLOBIN g/dL 8 8*   < > 10 3*   HEMATOCRIT % 27 3*   < > 28 8*   PLATELETS Thousands/uL 126*   < > 353   BANDS PCT %  --   --  6   NEUTROS PCT % 64   < >  --    LYMPHS PCT % 16   < >  --    LYMPHO PCT %  --   --  0*   MONOS PCT % 12   < >  --    MONO PCT %  --   --  2*   EOS PCT % 5   < > 0    < > = values in this interval not displayed       Results from last 7 days   Lab Units 11/29/22  0531   SODIUM mmol/L 142   POTASSIUM mmol/L 3 2*   CHLORIDE mmol/L 111*   CO2 mmol/L 22   BUN mg/dL 16   CREATININE mg/dL 0 74   ANION GAP mmol/L 9   CALCIUM mg/dL 8 3   ALBUMIN g/dL 2 2*   TOTAL BILIRUBIN mg/dL 0 42   ALK PHOS U/L 112   ALT U/L 16   AST U/L 13   GLUCOSE RANDOM mg/dL 98     Results from last 7 days   Lab Units 11/27/22  0453   INR  1 14     Results from last 7 days   Lab Units 11/24/22  0800 11/23/22  1657 11/23/22  1313   POC GLUCOSE mg/dl 169* 79 71     Results from last 7 days   Lab Units 11/24/22  0504   HEMOGLOBIN A1C % 4 9     Results from last 7 days   Lab Units 11/27/22  0453 11/24/22  0504 11/23/22 2024 11/23/22  1328   LACTIC ACID mmol/L  --   --  0 8 0 9   PROCALCITONIN ng/ml 0 07 0 70*  --  0 26*           * I Have Reviewed All Lab Data Listed Above  * Additional Pertinent Lab Tests Reviewed: [address] Admission Reviewed    Imaging:    Imaging Reports Reviewed Today Include:   CT soft tissue neck w contrast   Final Result      There is marked improvement in the previously seen retropharyngeal effusion compared to the study performed 5 days ago  There is coating of the mucosa of the oral cavity, oropharynx, hypopharynx and larynx from a recently performed fluoroscopy barium swallow earlier today  Small amount of barium is seen within the tracheal bifurcation and proximal right and left mainstem    bronchi posteriorly indicating some aspiration occurred during that barium swallow  Small posterior layering pleural effusions are seen within the lung apices, new since prior CT angiogram             Workstation performed: [id number]         FL barium swallow video w speech   Final Result      MRI brain wo contrast   Final Result      No acute intracranial abnormality  Minor white matter change suggestive of chronic microangiopathy  Workstation performed: [id number]         XR hips bilateral 3-4 vw w pelvis if performed   Final Result      No acute osseous abnormality  Again seen is a large sclerotic bone lesion in the upper sacrum  This has enlarged comparing the 11/24/2022 CT and a 1/1/2018 CT  This is suspicious for malignancy, and bone scan is recommended for further evaluation  Workstation performed: [id number]         CT chest abdomen pelvis w contrast   Final Result         1  Bilateral pneumonia and pleural effusions  2   Distended esophagus containing fluid  Nonurgent evaluation with swallowing study is recommended  3   Sacral bony lesion has enlarged compared to the previous exam and is indeterminate    Recommend correlation with bone scan or MRI pelvis  Sclerotic lesion at the left pedicle at T11 is unchanged  The study was marked in EPIC for significant notification  Workstation performed: [id number]         CT head wo contrast   Final Result      No acute intracranial abnormality  Chronic microangiopathic changes  Air-fluid level right sphenoid sinus  Acute sphenoid sinusitis should be considered in the right clinical setting  Workstation performed: [id number]         XR chest 1 view portable   Final Result      Right-sided central venous catheter placement without pneumothorax  Workstation performed: [id number]         X-ray chest 1 view portable   Final Result      No acute cardiopulmonary disease  In the setting of clinically suspected/proven COVID-19, this plain film appearance does not contain findings that raise concern for viral pneumonia such as COVID-19, but does not rule out this diagnosis  Workstation performed: [id number]         CTA head and neck w wo contrast   Final Result      1  No acute intracranial CT abnormality  2   Patent major vasculature of [place] without high-grade stenosis  3   No hemodynamically significant stenosis of cervical carotid arteries  4   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  5   Significant retropharyngeal effusion extending from C1 through inferior C5 effacing nasopharyngeal and pharyngeal airways  No apparent rim enhancement to suggest abscess  Uvula appears edematous  Recommend ENT consultation  6   Acute sinusitis  7   Fluid-filled esophagus extending to the level of the neck base  Correlate for GERD                           I personally discussed this study with CAMERON MEJIA on 11/23/2022 at 2:26 PM                Workstation performed: [id number]                 Recent Cultures (last 7 days):     Results from last 7 days   Lab Units 11/27/22  1226 11/23/22  5198 11/23/22  1328   BLOOD CULTURE  No Growth at 24 hrs  No Growth at 24 hrs  Micrococcus luteus* Lactobacillus species*   GRAM STAIN RESULT   --  Gram positive cocci in clusters* Gram variable rods*       Last 24 Hours Medication List:   Current Facility-Administered Medications   Medication Dose Route Frequency Provider Last Rate   • ampicillin-sulbactam  3 g Intravenous Q6H Marleen Alcantar MD     • enoxaparin  40 mg Subcutaneous Daily [facility],      • ferrous gluconate  324 mg Oral BID AC Tino Painter MD     • pantoprazole  40 mg Intravenous Q12H [facility] [address] [facility], DO     • potassium chloride  20 mEq Intravenous Q2H Tino Painter MD 20 mEq (11/29/22 0855)   • thiamine  100 mg Intravenous Q8H [facility],  100 mg (11/29/22 0602)        Today, Patient Was Seen By: Tino Painter MD    ** Please Note: Dictation voice to text software may have been used in the creation of this document  **        Attestation signed by Kalani Fay DO at 11/29/2022  4:32 PM:    Standard Teaching Supervising Statement    I have reviewed the note performed and documented by the Resident  I personally performed the required components/examined the patient  I agree with the Resident's findings and plan of care with the following additions/exceptions:     Mental status continues to improve, follow up with PT OT recommendations, anticipate discharge in 24 to 48 hours    DO Marleen Shipman MD  11/29/2022 11:19 AM  Signed    Progress Note - Infectious Disease   Remy Martinez 78 y o  male MRN: [id number]  Unit/Bed#: 404-01 Encounter: [id number]        REQUIRED DOCUMENTATION:   1  This service was provided via Telemedicine  2  Provider located at [facility]  3  TeleMed provider: Marleen Alcantar MD   4  Identify all parties in room with patient during tele consult: RN  5   After connecting through [email or web address], patient was identified by name and date of birth and assistant checked wristband  Patient was then informed that this was a Telemedicine visit and that the exam was being conducted confidentially over secure lines  My office door was closed  No one else was in the room  Patient acknowledged consent and understanding of privacy and security of the Telemedicine visit, and gave us permission to have the assistant stay in the room in order to assist with the history and to conduct the exam   I informed the patient that I have reviewed their record in Epic and presented the opportunity for them to ask any questions regarding the visit today  The patient agreed to participate  Assessment/Recommendations   1  Septic shock  Patient earlier in admission required pressors and was felt to meet sepsis criteria  Likely source is problem 2  Would question if patient may have had episode of vomiting/aspiration in the setting of 6 which then also contributed to 4  Clinical parameters improved  Suspect blood cultures represent contaminant  Repeat blood cultures NGTD  Repeat CT of the neck with significant improvement  Oral intake remains limited due to risk of aspiration  MRI of the brain negative  Continue Unasyn 3 g every 6 hours  Plan for total of 10 days of therapy given 4, through 12/2  Can transition to oral Augmentin once p o  intake is reliable  Continue to trend fever curve/vitals  Repeat CBC/CMP tomorrow  Follow-up repeat blood cultures while admitted  Additional supportive care/discharge as per primary      2  Multifocal pneumonia  Imaging over admission notable for multifocal disease  Esophagus noted to be distended and fluid-filled on imaging  Suspect a component of aspiration pneumonia in the setting of 6  Procalcitonin has since normalized  Continue antibiotic as above  Continue to trend fever curve/vitals  Repeat labs tomorrow  Monitor respiratory status  Follow-up pending blood cultures while admitted     3  Acute hypoxic respiratory failure    Noted early on presentation  Likely due to problem 2 as well as 6  Currently stable on room air  Supportive care as per primary      4  Abnormal CT neck  Patient noted to have retropharyngeal effusion on initial imaging on presentation  Again would question if this may be inflammatory due to an episode of vomiting, consider ingestion  Consider also developing upper respiratory infection  Clinical parameters improved  Repeat CT imaging with near resolution of effusion noted  Continue antibiotics as above  Continue to trend fever curve/WBC  Monitor exam and for any recurrent symptoms     5  Positive blood culture  Blood cultures on admission with delayed growth of 1 set with lactobacillus and the other with micrococcus  I suspect that these organisms were isolated from the patient's skin and are likely contaminant given the manner in which the patient was found  Repeat cultures are NGTD  2D echo without gross vegetation  No other devices appreciated on  imaging  No antibiotics for this issue  Follow-up repeat blood cultures while admitted  Unasyn as above  Continue to trend fever curve/WBC     6  Multiple electrolyte abnormalities and alcohol abuse/withdrawal   Suspect that this is the primary reason for patient's presentation and likely contributed to the above complications  Toxicology evaluation appreciated  Fluid hydration as per primary  Antibiotics as above     7  Metabolic encephalopathy  This is primarily related to 6  CT head imaging unremarkable  Formal MRI of the head unremarkable  Mental status continues to improve  Monitor mental status  Ongoing follow-up by speech therapy  Antibiotics as above     8  Abnormal CT abdomen  Patient's CT imaging of the abdomen notable for a sclerotic bony lesion at the sacrum  Uncertain etiology    Patient will need further imaging/biopsy as outpatient  Monitor for progressing pain  Additional care as per primary     Above plan discussed in detail with the patient and with primary service  ID Consult service will continue to follow  History       Subjective:  Patient is much more awake and responsive today  He denies having any nausea, vomiting, chest pain or shortness of breath  He is able to perform full range of motion of his neck  Unfortunately noted to have some bits of aspiration happening on speech evaluations on subsequent imaging  Repeat imaging of the neck reviewed which does note improvement of the previously seen retropharyngeal phlegmon  Almost nearly resolved  The patient again has limited recall of events leading up to admission  He denies any previous history of DTs or shakes with stopping alcohol use  Reports actively still working  Antibiotics:  Unasyn 2  Total antibody 7      Physical Exam     Temp:  [97 7 °F (36 5 °C)-98 2 °F (36 8 °C)] 97 7 °F (36 5 °C)  HR:  [56-65] 56  Resp:  [17-18] 17  BP: (136-155)/(74-85) 137/74  SpO2:  [98 %-100 %] 99 %  Temp (24hrs), Av 9 °F (36 6 °C), Min:97 7 °F (36 5 °C), Max:98 2 °F (36 8 °C)  Current: Temperature: 97 7 °F (36 5 °C)    Intake/Output Summary (Last 24 hours) at 2022 0936  Last data filed at 2022 1438  Gross per 24 hour   Intake 0 ml   Output 0 ml   Net 0 ml       Physical exam findings reported by bedside and primary medical team staff      General Appearance:  Appearing well, nontoxic, and in no distress, appears stated age; slow to respond to some questions but is answering more appropriately today     Throat: Oropharynx moist without lesions; lips, mucosa, and tongue normal   Lungs:   Decreased breath sounds throughout, no wheezes, rales or rhonchi   Heart:  Regular rate and rhythm, S1, S2 normal, no murmur, rub or gallop report   Abdomen:   Soft, non-tender, non-distended, positive bowel sounds, no masses, no organomegaly    No CVA tenderness   Extremities: Extremities normal, atraumatic, no cyanosis, clubbing or edema; full range of motion appreciated of the neck   Skin: Skin color, texture, turgor normal, no rashes or lesions  No draining wounds noted  Neurologic: Alert and oriented times 3, sitting up in chair unassisted and is able to participate with exam maneuvers  Is oriented but has very limited recall  Invasive Devices:   CVC Central Lines 11/23/22 Triple Right Subclavian (Active)   Reasons to continue CVC line  Other (comment) 11/27/22 1200   Goal for Removal No longer needed- Will place order to discontinue 11/27/22 1200   Line Necessity Reviewed Yes, reviewed with provider 11/27/22 1200   Site Assessment WDL 11/28/22 1000   Proximal Lumen Status Flushed 11/28/22 1000   Medial Lumen Status Flushed 11/28/22 1000   Distal Lumen Status Flushed 11/28/22 1000   Dressing Type Chlorhexidine dressing 11/27/22 2156   Dressing Status Clean;Dry; Intact 11/28/22 1000   Dressing Change Due 11/30/22 11/27/22 2156       Labs, Imaging, & Other Studies     Lab Results:    I have personally reviewed pertinent labs  Results from last 7 days   Lab Units 11/29/22  0531 11/27/22  1228 11/27/22  0453 11/26/22  0929   WBC Thousand/uL 6 64  --  8 14 7 64   HEMOGLOBIN g/dL 8 8* 9 4* 8 7* 9 1*   PLATELETS Thousands/uL 126*  --  146* 147*     Results from last 7 days   Lab Units 11/29/22  0531 11/24/22  1245 11/24/22  0504   POTASSIUM mmol/L 3 2*   < > 4 0   CHLORIDE mmol/L 111*   < > 97   CO2 mmol/L 22   < > 16*   BUN mg/dL 16   < > 23   CREATININE mg/dL 0 74   < > 0 96   EGFR ml/min/1 73sq m 96   < > 82   CALCIUM mg/dL 8 3   < > 7 8*   AST U/L 13  --  34   ALT U/L 16  --  21   ALK PHOS U/L 112  --  124*    < > = values in this interval not displayed  Results from last 7 days   Lab Units 11/27/22  1226 11/23/22  1339 11/23/22  1328   BLOOD CULTURE  No Growth at 24 hrs  No Growth at 24 hrs   Micrococcus luteus* Lactobacillus species*   GRAM STAIN RESULT   --  Gram positive cocci in clusters* Gram variable rods*       Imaging Studies:   I have personally reviewed pertinent imaging study reports and images in PACS  EKG, Pathology, and Other Studies:   I have personally reviewed pertinent reports  Counseling/Coordination of care: Total 35 minutes communication with the patient via telehealth  Labs, medical tests and imaging studies were independently reviewed by me as noted above  Sarah Garcia MD  11/28/2022  4:25 PM  Attested  [address]  Progress Note Estelita Lozano 1955, 78 y o  male MRN: [id number]  Unit/Bed#: 404-01 Encounter: [id number]  Primary Care Provider: Cruz Castellanos DO   Date and time admitted to hospital: 11/23/2022  1:09 PM    Pneumonia  Assessment & Plan  CT Ca P:  1  Bilateral pneumonia and pleural effusions  Blood cultures:     1/4:  Micrococcus luteus, Gram-positive cocci in clusters  1/4: Lactobacillus species Abnormal  Gram Stain ResultGram variable rods Abnormal    2/4: NGTD     -completed 5 days of cefepime   -day 1 Unasyn    -new set of blood cultures obtained on 11/27  Pending  Plan:  Given the atypical growth of blood cultures infectious disease team were consulted and agreed on the following plan;  -CT neck/soft tissue obtained; with improving retropharyngeal effusions   -antibiotics switched from cefepime to Unasyn   -consider reviewing the CT scan with ENT  -vital signs per unit  -I/OS, daily weight  -morning labs  -follow-up with culture    Septic shock [facility])  Assessment & Plan  Likely secondary to complicated pneumonia      Refer to assessment and plan for pneumonia      * Metabolic encephalopathy  Assessment & Plan  Background:  Patient presented with altered mental status, lethargic but responds to verbal stimuli, follows commands  Acute metabolic encephalopathy present on admission, likely due to underlying infection, plus alcohol withdrawal      Mental status moderately improved, however he is still confused with minimal change from yesterday     -Bairum swallow study ; report and recommendations per speech therapist is following:  Recommending dysphagia diet for aspiration precautions given the results of the swallow study  -MRI brain; No acute intracranial abnormality  Minor white matter change suggestive of chronic microangiopathy  -PT OT recommending acute rehab services when stable  Plan:  -PT OT eval and treat  -cardiopulmonary monitoring  -assessment and plan for sepsis  Alcohol withdrawal ([facility] [address] )  Assessment & Plan  Background:  Social history as given by his close friends; daily alcohol drinker for the past 1 year at least   Smokes cigarettes I pack per day for same duration  Given the patient's estimated timing of the fall at home his altered mental state could be most likely caused by a combination between septic shock and alcohol withdrawal     -medical toxicology consultant on board; agreed on the plan as following:    -Ativan p r n  for agitation and seizures  -IV thiamine supplement  -IV folic acid supplement  -follow-up with rehab when stable  Positive blood culture  Assessment & Plan  Assessment and plan per Infectious Disease team     Positive blood culture  Blood cultures on admission with delayed growth of 1 set with lactobacillus and the other with micrococcus  I suspect that these organisms were isolated from the patient's skin and are likely contaminant given the manner in which the patient was found  Repeat cultures are pending  2D echo without gross vegetation  No other devices appreciated on  imaging  No antibiotics for this issue  Follow-up repeat blood cultures  Unasyn as above  Continue to trend fever curve/WBC    History of GI bleed  Assessment & Plan  Patient with history of GI bleed and previous hospitalization     -since admission hemoglobin levels have been trending down from 11 6 on presentation to 8 7    -2 consecutive readings from past 24 hours with no change  Most likely no active bleeding at this time    Will continue to re-evaluate  -FOBT 1/3:  positive  Pending complete set  -hemoglobin trend stable  GI team consulted and did not want to further proceed with any interventions at this time for the next 24 hours  Plan:   -GI team consulted; input highly appreciated  -follow-up with morning labs  -continue PPI therapy for stress ulcer prophylaxis  -consider holding anticoagulants if continues to trend down  Esophageal dilatation  Assessment & Plan  In the setting of coma and possible alcohol intoxication when was at home  -CT scan showing esophageal dilation  Plan as following:  -GI team consulted; input highly appreciated  -will consider nasogastric tube insertion if no improvement within next 24 hours  Other dysphagia  Assessment & Plan  Patient seen by speech therapy twice and failed oral intake trial at bedside  Barium swallow study obtained on 11/28  With remarkable signs of aspiration on exam      -MRI brain; no acute changes  Speech therapist with recommendations as following;    Recommended Diet:  puree/level 1 diet and honey thick liquids   Recommended Form of Medications: crushed with puree   Aspiration precautions and compensatory swallowing strategies: upright posture, only feed when fully alert, small bites/sips, no straws, liquids by teaspoon only, chin tuck and alternating bites and sips  Consider referral to:  ENT regarding CP bar, GI regarding suspected GERD  SLP Dysphagia therapy recommended: yes  Recommend pharyngeal strengthening exercises      Other specified anemias  Assessment & Plan  Continue monitoring hemoglobin and hematocrit     -given his history of GI bleed and previous hospitalization for that reason will plan for the following:    -folic acid and vitamin B12 levels within normal range  -fecal occult blood test; pending 1/3   -H and H trend stable        Plan:  -GI team on board; gastroenterologist notified no further steps needed at this time   -follow-up with fecal occult blood testing complete set of 3  -transfuse packed RBC units for hemoglobin less than 7, and per protocol   -morning labs  Sacral lesion  Assessment & Plan  Incidental finding noted on CT scan and x-rays as following:    3  Sacral bony lesion has enlarged compared to the previous exam and is indeterminate  Plan:  -follow-up with primary care provider accordingly  -May follow up with an MRI when stable  Pharyngeal swelling  Assessment & Plan    -vitals stable  -SpO2 within normal limits on room air   -not in acute respiratory distress  -CMV and EBV panel negative  11/28  -follow-up CT neck/soft tissue: There is marked improvement in the previously seen retropharyngeal effusion compared to the study performed 5 days ago  Plan:  -infectious disease team consulted; input highly appreciated  -will consider contacting ENT specialist for further recommendations  Acute respiratory failure (HCC)-resolved as of 11/27/2022  Assessment & Plan  Present on admission, resolved    Hyponatremia-resolved as of 11/25/2022  Assessment & Plan  Background:  Likely hyponatremia due to hypovolemia on top of thiazide diuretic use     -received total 4 L IV normal saline boluses within the past 24 hour  -also is maintained on maintenance IV fluids of half dextrose with normal saline at 200 mils per hour     -serum sodium levels normalizing; trending up to 130 from 123 on presentation  Plan:  Given the patient's borderline low blood pressures and his need for pressor will continue IV fluids at maintenance  -currently Levophed is at 1 ml/hr   -wean and titrate Levophed drip per protocol   -re-evaluate Q 4 neuro checks, cardiopulmonary check, telemetry   -monitor I/OS with Moreno's catheter in place   -daily weight  -morning labs          Metabolic acidosis with increased anion gap and accumulation of organic acids-resolved as of 11/24/2022  Assessment & Plan  Metabolic acidosis with anion gap    -resolved per updated ABG on         Hypothermia-resolved as of 2022  Assessment & Plan  Hypothermia due to exposure in cold house, unknown downtime    Closely monitor metabolic status with rewarming, monitor calcium, patient did have transient hypoglycemia  Continue D5 normal saline infusion and external rewarming as needed     -resolved  Hypoxia-resolved as of 2022  Assessment & Plan  Resolved, likely secondary to aspiration, with suspected aspiration pneumonia, continue current broad-spectrum antibiotics, cultures      VTE Pharmacologic Prophylaxis:   Pharmacologic: Enoxaparin (Lovenox)  Mechanical VTE Prophylaxis in Place: Yes    Patient Centered Rounds: I have performed bedside rounds with nursing staff today  Discussions with Specialists or Other Care Team Provider:  Infectious Disease, Gastroenterology    Education and Discussions with Family / Patient:  Patient    Time Spent for Care: 30 minutes  More than 50% of total time spent on counseling and coordination of care as described above  Current Length of Stay: 5 day(s)    Current Patient Status: Inpatient   Certification Statement: The patient will continue to require additional inpatient hospital stay due to Ongoing medical management for acute metabolic encephalopathy and sepsis      Code Status: Level 1 - Full Code      Subjective:   Patient was seen today at bedside  He does not have any active complaints  Noted to be in confused state however remarkably improved compared to prior days since admission  He is alert and oriented to person, place and time only  During my assessment the patient was able to answer questions appropriately however with short statements constituting of 1-2 words maximum  -patient was started on dysphagia level 2 diet today for therapist recommendations  Will continue to evaluate   -stooling and voiding accordingly        Objective:     Vitals:   Temp (24hrs), Av 6 °F (36 4 °C), Min:97 3 °F (36 3 °C), Max:98 °F (36 7 °C)    Temp:  [97 3 °F (36 3 °C)-98 °F (36 7 °C)] 97 5 °F (36 4 °C)  HR:  [60-78] 60  Resp:  [17-21] 21  BP: (137-155)/(73-85) 155/85  SpO2:  [95 %-100 %] 100 %  Body mass index is 29 56 kg/m²  Input and Output Summary (last 24 hours): Intake/Output Summary (Last 24 hours) at 11/28/2022 1622  Last data filed at 11/28/2022 1438  Gross per 24 hour   Intake 0 ml   Output 0 ml   Net 0 ml       Physical Exam:   Vitals and nursing note reviewed  HENT:      Head: Normocephalic and atraumatic       Right Ear: External ear normal       Left Ear: External ear normal    Cardiovascular:      Rate and Rhythm: Normal rate  No peripheral edema  No heart murmurs or added sounds  Pulmonary:      Effort: Pulmonary effort is normal, normal bilateral air entry  Coarse crackles audible throughout  Musculoskeletal:      Cervical back: Normal range of motion  Deep tendon reflexes equal symmetrical 2+, power:  5/5 throughout  Neurological:      Mental Status: He is confused and oriented to person and time only       Motor: No weakness       Comments: Patient is bracing himself in his hospital bed, reports an abnormal sense of movement, seems like he feels as though he is going to fall      Additional Data:     Labs:    Results from last 7 days   Lab Units 11/27/22  1228 11/27/22  0453 11/25/22  0431 11/24/22  0504   WBC Thousand/uL  --  8 14   < > 13 01*   HEMOGLOBIN g/dL 9 4* 8 7*   < > 10 3*   HEMATOCRIT % 27 9* 26 4*   < > 28 8*   PLATELETS Thousands/uL  --  146*   < > 353   BANDS PCT %  --   --   --  6   NEUTROS PCT %  --  69   < >  --    LYMPHS PCT %  --  15   < >  --    LYMPHO PCT %  --   --   --  0*   MONOS PCT %  --  13*   < >  --    MONO PCT %  --   --   --  2*   EOS PCT %  --  1   < > 0    < > = values in this interval not displayed       Results from last 7 days   Lab Units 11/27/22  0453 11/24/22  1245 11/24/22  0504   SODIUM mmol/L 140   < > 130*   POTASSIUM mmol/L 3 6 < > 4 0   CHLORIDE mmol/L 111*   < > 97   CO2 mmol/L 23   < > 16*   BUN mg/dL 20   < > 23   CREATININE mg/dL 0 89   < > 0 96   ANION GAP mmol/L 6   < > 17*   CALCIUM mg/dL 8 8   < > 7 8*   ALBUMIN g/dL  --   --  2 2*   TOTAL BILIRUBIN mg/dL  --   --  0 45   ALK PHOS U/L  --   --  124*   ALT U/L  --   --  21   AST U/L  --   --  34   GLUCOSE RANDOM mg/dL 101   < > 153*    < > = values in this interval not displayed  Results from last 7 days   Lab Units 11/27/22  0453   INR  1 14     Results from last 7 days   Lab Units 11/24/22  0800 11/23/22  1657 11/23/22  1313   POC GLUCOSE mg/dl 169* 79 71     Results from last 7 days   Lab Units 11/24/22  0504   HEMOGLOBIN A1C % 4 9     Results from last 7 days   Lab Units 11/27/22  0453 11/24/22  0504 11/23/22  2024 11/23/22  1328   LACTIC ACID mmol/L  --   --  0 8 0 9   PROCALCITONIN ng/ml 0 07 0 70*  --  0 26*           * I Have Reviewed All Lab Data Listed Above  * Additional Pertinent Lab Tests Reviewed: [address] Admission Reviewed    Imaging:    Imaging Reports Reviewed Today Include:   CT soft tissue neck w contrast   Final Result      There is marked improvement in the previously seen retropharyngeal effusion compared to the study performed 5 days ago  There is coating of the mucosa of the oral cavity, oropharynx, hypopharynx and larynx from a recently performed fluoroscopy barium swallow earlier today  Small amount of barium is seen within the tracheal bifurcation and proximal right and left mainstem    bronchi posteriorly indicating some aspiration occurred during that barium swallow  Small posterior layering pleural effusions are seen within the lung apices, new since prior CT angiogram             Workstation performed: [id number]         FL barium swallow video w speech   Final Result      MRI brain wo contrast   Final Result      No acute intracranial abnormality  Minor white matter change suggestive of chronic microangiopathy  Workstation performed: [id number]         XR hips bilateral 3-4 vw w pelvis if performed   Final Result      No acute osseous abnormality  Again seen is a large sclerotic bone lesion in the upper sacrum  This has enlarged comparing the 11/24/2022 CT and a 1/1/2018 CT  This is suspicious for malignancy, and bone scan is recommended for further evaluation  Workstation performed: [id number]         CT chest abdomen pelvis w contrast   Final Result         1  Bilateral pneumonia and pleural effusions  2   Distended esophagus containing fluid  Nonurgent evaluation with swallowing study is recommended  3   Sacral bony lesion has enlarged compared to the previous exam and is indeterminate  Recommend correlation with bone scan or MRI pelvis  Sclerotic lesion at the left pedicle at T11 is unchanged  The study was marked in EPIC for significant notification  Workstation performed: [id number]         CT head wo contrast   Final Result      No acute intracranial abnormality  Chronic microangiopathic changes  Air-fluid level right sphenoid sinus  Acute sphenoid sinusitis should be considered in the right clinical setting  Workstation performed: [id number]         XR chest 1 view portable   Final Result      Right-sided central venous catheter placement without pneumothorax  Workstation performed: [id number]         X-ray chest 1 view portable   Final Result      No acute cardiopulmonary disease  In the setting of clinically suspected/proven COVID-19, this plain film appearance does not contain findings that raise concern for viral pneumonia such as COVID-19, but does not rule out this diagnosis  Workstation performed: [id number]         CTA head and neck w wo contrast   Final Result      1  No acute intracranial CT abnormality  2   Patent major vasculature of [place] without high-grade stenosis        3   No hemodynamically significant stenosis of cervical carotid arteries  4   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  5   Significant retropharyngeal effusion extending from C1 through inferior C5 effacing nasopharyngeal and pharyngeal airways  No apparent rim enhancement to suggest abscess  Uvula appears edematous  Recommend ENT consultation  6   Acute sinusitis  7   Fluid-filled esophagus extending to the level of the neck base  Correlate for GERD  I personally discussed this study with CAMERON MEJIA on 11/23/2022 at 2:26 PM                Workstation performed: [id number]                 Recent Cultures (last 7 days):     Results from last 7 days   Lab Units 11/27/22  1226 11/23/22  1339 11/23/22  1328   BLOOD CULTURE  Received in Microbiology Lab  Culture in Progress  Received in Microbiology Lab  Culture in Progress  Micrococcus luteus* Lactobacillus species*   GRAM STAIN RESULT   --  Gram positive cocci in clusters* Gram variable rods*       Last 24 Hours Medication List:   Current Facility-Administered Medications   Medication Dose Route Frequency Provider Last Rate   • ampicillin-sulbactam  3 g Intravenous Q6H Jennifer Sparrow MD     • enoxaparin  40 mg Subcutaneous Daily Caroline Torres DO     • ferrous gluconate  324 mg Oral BID AC Anh Rocha MD     • pantoprazole  40 mg Intravenous Q12H Raf 6027 Canelo dunes, DO     • thiamine  100 mg Intravenous Ellouise Roses Canelo dunes,  mg (11/28/22 1443)        Today, Patient Was Seen By: Anh Rocha MD    ** Please Note: Dictation voice to text software may have been used in the creation of this document  **        Attestation signed by Shelley Borges DO at 11/29/2022  4:23 PM:    Standard Teaching Supervising Statement    I have reviewed the note performed and documented by the Resident  I personally performed the required components/examined the patient   I agree with the Resident's findings and plan of care with the following additions/exceptions:     Patient's mental status is gradually improving, follow up final blood cultures, continue IV Unasyn, MRI of the brain negative for acute pathology, CT of the neck pending    Elvi Ignacio MD  11/28/2022 11:16 AM  Addendum  Progress Note- Tammy Benedict 78 y o  male MRN: [id number]    Unit/Bed#: 404-01 Encounter: [id number]      Assessment and Plan: This is a patient with alcohol abuse and altered mental status who is doing better  On cefepime day six  GI will follow with you     ______________________________________________________________________    Subjective:     Sarah Cueva is alert orient x3 today  He states that he feels better      Medication Administration - last 24 hours from 11/27/2022 1108 to 11/28/2022 1108       Date/Time Order Dose Route Action Action by     11/28/2022 0427 EST cefepime (MAXIPIME) IVPB (premix in dextrose) 2,000 mg 50 mL 2,000 mg Intravenous [address] Sharon Luciano RN     11/27/2022 1808 EST cefepime (MAXIPIME) IVPB (premix in dextrose) 2,000 mg 50 mL 0 mg Intravenous Stopped Callie Wilkerson RN     11/27/2022 1734 EST cefepime (MAXIPIME) IVPB (premix in dextrose) 2,000 mg 50 mL 2,000 mg Intravenous [address] Callie Wilkerson RN     11/28/2022 7371 EST enoxaparin (LOVENOX) subcutaneous injection 40 mg 40 mg Subcutaneous Given Rodney Hawkins RN     11/27/2022 1536 EST dextrose 5 % and sodium chloride 0 9 % infusion 0 mL/hr Intravenous Paused Callie Wilkerson RN     11/27/2022 1408 EST dextrose 5 % and sodium chloride 0 9 % infusion 50 mL/hr Intravenous [address] Callie Wilkerson RN     11/28/2022 7193 EST pantoprazole (PROTONIX) injection 40 mg 40 mg Intravenous Given Rodney Hawkins, AP     11/27/2022 2158 EST pantoprazole (PROTONIX) injection 40 mg 40 mg Intravenous Given Sharon Luciano RN     11/28/2022 0586 EST thiamine (VITAMIN B1) 100 mg in sodium chloride 0 9 % 50 mL IVPB 100 mg Intravenous [address] [place], [facility]     11/27/2022 2156 EST thiamine (VITAMIN B1) 100 mg in sodium chloride 0 9 % 50 mL IVPB 100 mg Intravenous [address] Gen Healy RN     11/27/2022 1527 EST thiamine (VITAMIN B1) 100 mg in sodium chloride 0 9 % 50 mL IVPB 0 mg Intravenous Stopped More Carmichael RN     11/27/2022 1408 EST thiamine (VITAMIN B1) 100 mg in sodium chloride 0 9 % 50 mL IVPB 100 mg Intravenous [address] More Carmichael RN     11/27/2022 1519 EST alteplase (CATHFLO) injection 2 mg 2 mg Intracatheter Given More Carmichael RN          Objective:     Vitals: Blood pressure 137/78, pulse 63, temperature 97 5 °F (36 4 °C), temperature source Oral, resp  rate 21, height 5' 9" (1 753 m), weight 90 8 kg (200 lb 2 8 oz), SpO2 97 %  ,Body mass index is 29 56 kg/m²  Intake/Output Summary (Last 24 hours) at 11/28/2022 1108  Last data filed at 11/28/2022 0843  Gross per 24 hour   Intake 1308 33 ml   Output --   Net 1308 33 ml       Physical Exam:   General Appearance: Awake and alert, in no acute distress  Abdomen: Soft, non-tender, non-distended; bowel sounds normal; no masses or no organomegaly    Invasive Devices     Central Venous Catheter Line  Duration           CVC Central Lines 11/23/22 Triple Right Subclavian 4 days                Lab Results:  No results displayed because visit has over 200 results  Imaging Studies: I have personally reviewed pertinent imaging studies  Ana Puckett MD  11/27/2022  1:59 PM  Attested  [address]  Progress Note Monty Mckeon 1955, 78 y o  male MRN: [id number]  Unit/Bed#: 404-01 Encounter: [id number]  Primary Care Provider: Philipp Alves, DO   Date and time admitted to hospital: 11/23/2022  1:09 PM    Pneumonia  Assessment & Plan  CT Ca P:  1  Bilateral pneumonia and pleural effusions  Blood cultures:     1/4:  Micrococcus luteus, Gram-positive cocci in clusters    1/4: Lactobacillus species Abnormal Gram Stain ResultGram variable rods Abnormal    2/4: NGTD     Plan:  Given the atypical growth of blood cultures a plan for the following;  -continue cefepime day 5  -follow-up with cultures  -infectious disease team consulted; input highly appreciated  -vital signs per unit  -I/OS, daily weight  -morning labs    Septic shock ([facility] [address] )  Assessment & Plan  Likely secondary to pneumonia, continue current broad-spectrum antibiotics and follow-up blood cultures      Refer to assessment and plan for pneumonia  * Metabolic encephalopathy  Assessment & Plan  Background:  Patient presented with altered mental status, lethargic but responds to verbal stimuli, follows commands  Acute metabolic encephalopathy present on admission, likely due to underlying infection, plus alcohol withdrawal      Mental status moderately improved, however he is still confused with minimal change from yesterday     -continues to be in altered mental state, failed 3 trials with speech and swallow eval   Continues to be NPO  Will consider NG tube if did not pass swallow eval within next 24 hours  Check MRI patient seemed to have some vertigo, trial meclizine, give 1 dose of Ativan  [facility]  Assessment & Plan  Background:  Social history as given by his close friends; daily alcohol drinker for the past 1 year at least   Smokes cigarettes I pack per day for same duration  Given the patient's estimated timing of the fall at home his altered mental state could be most likely caused by a combination between septic shock and alcohol withdrawal     -medical toxicology consultant on board; agreed on the plan as following:    -Ativan p r n  for agitation and seizures  -IV thiamine supplement  -IV folic acid supplement  -follow-up with rehab when stable        History of GI bleed  Assessment & Plan  Patient with history of GI bleed and previous hospitalization     -since admission hemoglobin levels have been trending down from 11 6 on presentation to  7    -2 consecutive readings from past 24 hours with no change  Most likely no active bleeding at this time  Will continue to re-evaluate  -FOBT 1/3:  Negative  Pending completion    Plan:  -trend H and H  -follow-up with morning labs  -continue PPI therapy for stress ulcer prophylaxis  -consider holding anticoagulants if continues to trend down  -GI team consulted; input highly appreciated    Esophageal dilatation  Assessment & Plan  In the setting of coma and possible alcohol intoxication when was at home  -CT scan showing esophageal dilation     -unable to tolerate oral diet since admission on 11/23  Daily speech and swallow eval however patient continues to fail  Plan as following:  -GI team consulted; input highly appreciated  -will consider nasogastric tube insertion if no improvement within next 24 hours  Other dysphagia  Assessment & Plan  Patient seen by speech therapy twice, still not safe for p o  Intake, video swallow Monday, check MRI to rule out CVA    Other specified anemias  Assessment & Plan  Continue monitoring hemoglobin and hematocrit     -given his history of GI bleed and previous hospitalization for that reason will plan for the following:    -check folic acid and vitamin B12 levels   -trend H and H and transfuse if needed per protocol  Sacral lesion  Assessment & Plan  Incidental finding noted on CT scan and x-rays as following:    3  Sacral bony lesion has enlarged compared to the previous exam and is indeterminate  Plan:  -follow-up with primary care provider accordingly  -May follow up with an MRI when stable  Pharyngeal swelling  Assessment & Plan    -vitals stable  -SpO2 within normal limits on room air   -not in acute respiratory distress  -CMV and EBV panel negative      Plan:  Follow-up video swallow Monday    Acute respiratory failure (HCC)-resolved as of 11/27/2022  Assessment & Plan  Present on admission, resolved    Hyponatremia-resolved as of 11/25/2022  Assessment & Plan  Background:  Likely hyponatremia due to hypovolemia on top of thiazide diuretic use     -received total 4 L IV normal saline boluses within the past 24 hour  -also is maintained on maintenance IV fluids of half dextrose with normal saline at 200 mils per hour     -serum sodium levels normalizing; trending up to 130 from 123 on presentation  Plan:  Given the patient's borderline low blood pressures and his need for pressor will continue IV fluids at maintenance  -currently Levophed is at 1 ml/hr   -wean and titrate Levophed drip per protocol   -re-evaluate Q 4 neuro checks, cardiopulmonary check, telemetry   -monitor I/OS with Moreno's catheter in place   -daily weight  -morning labs  Metabolic acidosis with increased anion gap and accumulation of organic acids-resolved as of 11/24/2022  Assessment & Plan  Metabolic acidosis with anion gap    -resolved per updated ABG on 11/24        Hypothermia-resolved as of 11/25/2022  Assessment & Plan  Hypothermia due to exposure in cold house, unknown downtime    Closely monitor metabolic status with rewarming, monitor calcium, patient did have transient hypoglycemia  Continue D5 normal saline infusion and external rewarming as needed     -resolved  Hypoxia-resolved as of 11/27/2022  Assessment & Plan  Resolved, likely secondary to aspiration, with suspected aspiration pneumonia, continue current broad-spectrum antibiotics, cultures      VTE Pharmacologic Prophylaxis:   Pharmacologic: Enoxaparin (Lovenox)  Mechanical VTE Prophylaxis in Place: Yes    Patient Centered Rounds: I have performed bedside rounds with nursing staff today  Discussions with Specialists or Other Care Team Provider:  Gastroenterology, case management    Education and Discussions with Family / Patient:  Patient    Time Spent for Care: 30 minutes    More than 50% of total time spent on counseling and coordination of care as described above  Current Length of Stay: 4 day(s)    Current Patient Status: Inpatient   Certification Statement: The patient will continue to require additional inpatient hospital stay due to Ongoing medical management off acute metabolic encephalopathy        Code Status: Level 1 - Full Code      Subjective:   Patient seen at bedside  He does not have any active complaints  Noted to be still confused and oriented to person and time only  Communicating, comprehending and verbalizing accordingly  Following commands  Unchanged from yesterday      -continues to be NPO due to failing speech and swallow eval for the 3rd day   -urine catheter discontinued  PVR/urinary retention protocol in place  Objective:     Vitals:   Temp (24hrs), Av 9 °F (36 6 °C), Min:97 8 °F (36 6 °C), Max:98 °F (36 7 °C)    Temp:  [97 8 °F (36 6 °C)-98 °F (36 7 °C)] 97 8 °F (36 6 °C)  HR:  [62-70] 62  Resp:  [16] 16  BP: (111-146)/(76-89) 146/81  SpO2:  [96 %-98 %] 96 %  Body mass index is 29 01 kg/m²  Input and Output Summary (last 24 hours): Intake/Output Summary (Last 24 hours) at 2022 1359  Last data filed at 2022 1203  Gross per 24 hour   Intake 0 ml   Output 125 ml   Net -125 ml       Physical Exam:   Vitals and nursing note reviewed  HENT:      Head: Normocephalic and atraumatic  Right Ear: External ear normal       Left Ear: External ear normal    Cardiovascular:      Rate and Rhythm: Normal rate  No peripheral edema  No heart murmurs or added sounds  Pulmonary:      Effort: Pulmonary effort is normal, normal bilateral air entry  Coarse crackles audible throughout  Musculoskeletal:      Cervical back: Normal range of motion  Deep tendon reflexes equal symmetrical 2+, power:  5/5 throughout  Neurological:      Mental Status: He is confused and oriented to person and time only  Motor: No weakness        Comments: Patient is bracing himself in his hospital bed, reports an abnormal sense of movement, seems like he feels as though he is going to fall      Additional Data:     Labs:    Results from last 7 days   Lab Units 11/27/22  1228 11/27/22  0453 11/25/22  0431 11/24/22  0504   WBC Thousand/uL  --  8 14   < > 13 01*   HEMOGLOBIN g/dL 9 4* 8 7*   < > 10 3*   HEMATOCRIT % 27 9* 26 4*   < > 28 8*   PLATELETS Thousands/uL  --  146*   < > 353   BANDS PCT %  --   --   --  6   NEUTROS PCT %  --  69   < >  --    LYMPHS PCT %  --  15   < >  --    LYMPHO PCT %  --   --   --  0*   MONOS PCT %  --  13*   < >  --    MONO PCT %  --   --   --  2*   EOS PCT %  --  1   < > 0    < > = values in this interval not displayed  Results from last 7 days   Lab Units 11/27/22  0453 11/24/22  1245 11/24/22  0504   SODIUM mmol/L 140   < > 130*   POTASSIUM mmol/L 3 6   < > 4 0   CHLORIDE mmol/L 111*   < > 97   CO2 mmol/L 23   < > 16*   BUN mg/dL 20   < > 23   CREATININE mg/dL 0 89   < > 0 96   ANION GAP mmol/L 6   < > 17*   CALCIUM mg/dL 8 8   < > 7 8*   ALBUMIN g/dL  --   --  2 2*   TOTAL BILIRUBIN mg/dL  --   --  0 45   ALK PHOS U/L  --   --  124*   ALT U/L  --   --  21   AST U/L  --   --  34   GLUCOSE RANDOM mg/dL 101   < > 153*    < > = values in this interval not displayed  Results from last 7 days   Lab Units 11/27/22  0453   INR  1 14     Results from last 7 days   Lab Units 11/24/22  0800 11/23/22  1657 11/23/22  1313   POC GLUCOSE mg/dl 169* 79 71     Results from last 7 days   Lab Units 11/24/22  0504   HEMOGLOBIN A1C % 4 9     Results from last 7 days   Lab Units 11/27/22  0453 11/24/22  0504 11/23/22  2024 11/23/22  1328   LACTIC ACID mmol/L  --   --  0 8 0 9   PROCALCITONIN ng/ml 0 07 0 70*  --  0 26*           * I Have Reviewed All Lab Data Listed Above  * Additional Pertinent Lab Tests Reviewed: [address] [facility] Admission Reviewed    Imaging:    Imaging Reports Reviewed Today Include:   XR hips bilateral 3-4 vw w pelvis if performed   Final Result      No acute osseous abnormality  Again seen is a large sclerotic bone lesion in the upper sacrum  This has enlarged comparing the 11/24/2022 CT and a 1/1/2018 CT  This is suspicious for malignancy, and bone scan is recommended for further evaluation  Workstation performed: [id number]         CT chest abdomen pelvis w contrast   Final Result         1  Bilateral pneumonia and pleural effusions  2   Distended esophagus containing fluid  Nonurgent evaluation with swallowing study is recommended  3   Sacral bony lesion has enlarged compared to the previous exam and is indeterminate  Recommend correlation with bone scan or MRI pelvis  Sclerotic lesion at the left pedicle at T11 is unchanged  The study was marked in EPIC for significant notification  Workstation performed: [id number]         CT head wo contrast   Final Result      No acute intracranial abnormality  Chronic microangiopathic changes  Air-fluid level right sphenoid sinus  Acute sphenoid sinusitis should be considered in the right clinical setting  Workstation performed: [id number]         XR chest 1 view portable   Final Result      Right-sided central venous catheter placement without pneumothorax  Workstation performed: [id number]         X-ray chest 1 view portable   Final Result      No acute cardiopulmonary disease  In the setting of clinically suspected/proven COVID-19, this plain film appearance does not contain findings that raise concern for viral pneumonia such as COVID-19, but does not rule out this diagnosis  Workstation performed: [id number]         CTA head and neck w wo contrast   Final Result      1  No acute intracranial CT abnormality  2   Patent major vasculature of [place] without high-grade stenosis  3   No hemodynamically significant stenosis of cervical carotid arteries  4   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        5  Significant retropharyngeal effusion extending from C1 through inferior C5 effacing nasopharyngeal and pharyngeal airways  No apparent rim enhancement to suggest abscess  Uvula appears edematous  Recommend ENT consultation  6   Acute sinusitis  7   Fluid-filled esophagus extending to the level of the neck base  Correlate for GERD  I personally discussed this study with CAMERON MEJIA on 11/23/2022 at 2:26 PM                Workstation performed: [id number]         FL barium swallow video w speech    (Results Pending)   MRI inpatient order    (Results Pending)           Recent Cultures (last 7 days):     Results from last 7 days   Lab Units 11/23/22  1339 11/23/22  1328   BLOOD CULTURE  Micrococcus luteus* Lactobacillus species*   GRAM STAIN RESULT  Gram positive cocci in clusters* Gram variable rods*       Last 24 Hours Medication List:   Current Facility-Administered Medications   Medication Dose Route Frequency Provider Last Rate   • cefepime  2,000 mg Intravenous Q12H Riesa Burn Brian, DO 2,000 mg (11/27/22 0535)   • dextrose 5 % and sodium chloride 0 9 %  50 mL/hr Intravenous Continuous Riesa Burn Brian, DO 50 mL/hr (11/26/22 1158)   • enoxaparin  40 mg Subcutaneous Daily Riesa Burn Canelo dunes, DO     • pantoprazole  40 mg Intravenous Q12H Raf [address] [facility], DO     • thiamine  100 mg Intravenous Q8H Tracy Rocha DO          Today, Patient Was Seen By: Vania Green MD    ** Please Note: Dictation voice to text software may have been used in the creation of this document  **        Attestation signed by Tracy Rocha DO at 11/27/2022  4:14 PM:    Standard Teaching Supervising Statement    I have reviewed the note performed and documented by the Resident  I personally performed the required components/examined the patient   I agree with the Resident's findings and plan of care with the following additions/exceptions:     Plan of care discussed with nursing staff, follow-up with PT OT, patient will need MRI of the brain, consult to Infectious Disease to review positive blood cultures given presentation, repeat blood cultures drawn today  Follow-up with video swallow tomorrow, patient may need further GI intervention pending testing  DO Miquel Choudhary DO  11/26/2022 12:05 PM  Signed  [address]  Progress Note Phu Sweeney 1955, 78 y o  male MRN: [id number]  Unit/Bed#: 404-01 Encounter: [id number]  Primary Care Provider: Romie Patel DO   Date and time admitted to hospital: 11/23/2022  1:09 PM    Other dysphagia  Assessment & Plan  Patient seen by speech therapy twice, still not safe for p o  Intake, video swallow Monday, check MRI to rule out CVA    * Metabolic encephalopathy  Assessment & Plan  Background:  Patient presented with altered mental status, lethargic but responds to verbal stimuli, follows commands  Acute metabolic encephalopathy present on admission, likely due to underlying infection, plus alcohol withdrawal      Mental status has improved, patient is still confused    Check MRI patient seemed to have some vertigo, trial meclizine, give 1 dose of Ativan  Hypoxia  Assessment & Plan  Resolved, likely secondary to aspiration, with suspected aspiration pneumonia, continue current broad-spectrum antibiotics, cultures    Pharyngeal swelling  Assessment & Plan  Follow-up video swallow Monday    Other specified anemias  Assessment & Plan  Continue monitoring hemoglobin    Sacral lesion  Assessment & Plan  Incidental finding noted on CT scan and x-rays as following:    3  Sacral bony lesion has enlarged compared to the previous exam and is indeterminate  Plan:  -follow-up with primary care provider accordingly  -May and spit for an MRI when stable      Alcohol withdrawal ([facility] [address] )  Assessment & Plan  Background:  Social history as given by his close friends; daily alcohol drinker for the past 1 year at least   Smokes cigarettes I pack per day for same duration  Given the patient's estimated timing of the fall at home his altered mental state could be most likely caused by a combination between septic shock and alcohol withdrawal     -medical toxicology consultant on board; agreed on the plan as following:    -Ativan p r n  for agitation and seizures  -IV thiamine supplement  -IV folic acid supplement  -follow-up with rehab when stable  Pneumonia  Assessment & Plan  CT Ca P:  1  Bilateral pneumonia and pleural effusions  2   Distended esophagus containing fluid  Nonurgent evaluation with swallowing study is recommended  3   Sacral bony lesion has enlarged compared to the previous exam and is indeterminate  Recommend correlation with bone scan or MRI pelvis  Sclerotic lesion at the left pedicle at T11 is unchanged  Acute respiratory failure (HCC)  Assessment & Plan  Present on admission, resolved    Septic shock ([address] )  Assessment & Plan  Likely secondary to pneumonia, continue current broad-spectrum antibiotics and follow-up blood cultures      Code Status: Level 1 - Full Code    Subjective:   Denies pain, still seems confused, is not a reliable historian    Objective:     Vitals:   Temp (24hrs), Av 3 °F (36 3 °C), Min:97 2 °F (36 2 °C), Max:97 4 °F (36 3 °C)    Temp:  [97 2 °F (36 2 °C)-97 4 °F (36 3 °C)] 97 4 °F (36 3 °C)  HR:  [76-79] 77  Resp:  [16-18] 16  BP: (141-153)/(72-89) 141/72  SpO2:  [95 %-100 %] 95 %  Body mass index is 29 37 kg/m²  Input and Output Summary (last 24 hours): Intake/Output Summary (Last 24 hours) at 2022 1204  Last data filed at 2022 1128  Gross per 24 hour   Intake 1388 29 ml   Output 600 ml   Net 788 29 ml       Physical Exam:   Physical Exam  Vitals and nursing note reviewed  HENT:      Head: Normocephalic and atraumatic        Right Ear: External ear normal       Left Ear: External ear normal    Cardiovascular: Rate and Rhythm: Normal rate  Pulmonary:      Effort: Pulmonary effort is normal    Musculoskeletal:      Cervical back: Normal range of motion  Neurological:      Mental Status: He is alert  He is disoriented  Motor: No weakness  Comments: Patient is bracing himself in his hospital bed, reports an abnormal sense of movement, seems like he feels as though he is going to fall    Generalized weakness          Additional Data:     Labs:  Results from last 7 days   Lab Units 11/26/22  0929 11/25/22  0431 11/24/22  0504   WBC Thousand/uL 7 64   < > 13 01*   HEMOGLOBIN g/dL 9 1*   < > 10 3*   HEMATOCRIT % 26 9*   < > 28 8*   PLATELETS Thousands/uL 147*   < > 353   BANDS PCT %  --   --  6   NEUTROS PCT % 75  --   --    LYMPHS PCT % 11*  --   --    LYMPHO PCT %  --   --  0*   MONOS PCT % 13*  --   --    MONO PCT %  --   --  2*   EOS PCT % 0  --  0    < > = values in this interval not displayed  Results from last 7 days   Lab Units 11/26/22  0444 11/24/22  1245 11/24/22  0504   SODIUM mmol/L 138   < > 130*   POTASSIUM mmol/L 3 5   < > 4 0   CHLORIDE mmol/L 109*   < > 97   CO2 mmol/L 20*   < > 16*   BUN mg/dL 15   < > 23   CREATININE mg/dL 0 80   < > 0 96   ANION GAP mmol/L 9   < > 17*   CALCIUM mg/dL 8 7   < > 7 8*   ALBUMIN g/dL  --   --  2 2*   TOTAL BILIRUBIN mg/dL  --   --  0 45   ALK PHOS U/L  --   --  124*   ALT U/L  --   --  21   AST U/L  --   --  34   GLUCOSE RANDOM mg/dL 128   < > 153*    < > = values in this interval not displayed       Results from last 7 days   Lab Units 11/24/22  0504   INR  1 21*     Results from last 7 days   Lab Units 11/24/22  0800 11/23/22  1657 11/23/22  1313   POC GLUCOSE mg/dl 169* 79 71     Results from last 7 days   Lab Units 11/24/22  0504   HEMOGLOBIN A1C % 4 9     Results from last 7 days   Lab Units 11/24/22  0504 11/23/22  2024 11/23/22  1328   LACTIC ACID mmol/L  --  0 8 0 9   PROCALCITONIN ng/ml 0 70*  --  0 26*       Lines/Drains:  Invasive Devices     Central Venous Catheter Line  Duration           CVC Central Lines 11/23/22 Triple Right Subclavian 2 days          Peripheral Intravenous Line  Duration           Peripheral IV 11/23/22 Dorsal (posterior); Left Hand 2 days    Peripheral IV 11/23/22 Proximal;Right;Ventral (anterior) Forearm 2 days          Drain  Duration           Urethral Catheter 16 Fr  2 days              Urinary Catheter:  Goal for removal: Remove after 48 hrs of I/O monitoring         Central Line:  Goal for removal: Maintain times 24 hours             Imaging: No pertinent imaging reviewed  Recent Cultures (last 7 days):   Results from last 7 days   Lab Units 11/23/22  1339 11/23/22  1328   GRAM STAIN RESULT  Gram positive cocci in clusters* Gram variable rods*       Last 24 Hours Medication List:   Current Facility-Administered Medications   Medication Dose Route Frequency Provider Last Rate   • cefepime  2,000 mg Intravenous Q12H Eliverto Drown Brian, DO 2,000 mg (11/26/22 0432)   • dextrose 5 % and sodium chloride 0 9 %  50 mL/hr Intravenous Continuous Eliverto Drown Brian, DO 50 mL/hr (11/26/22 1158)   • enoxaparin  40 mg Subcutaneous Daily Eliverto Drown Brian, DO     • ferrous gluconate  324 mg Oral BID AC Ilda Newell MD     • LORazepam  0 5 mg Intravenous Once Eliverto Drown Brian, DO     • meclizine  12 5 mg Oral [facility] Eliverto Drown [place], [place]     • pantoprazole  40 mg Intravenous Q24H Raf [address] [facility]     • potassium phosphate  12 mmol Intravenous Once Eliverto Drown Brian, DO     • thiamine  500 mg Intravenous Q8H [address] [place], [place] Stopped (11/26/22 5580)        Today, Patient Was Seen By: [facility],     **Please Note: This note may have been constructed using a voice recognition system  Antoni Vital MD  11/25/2022 12:05 PM  Attested  [address]  Progress Note [facility] 1955, 78 y o  male MRN: [id number]  Unit/Bed#:  Encounter: [id number]  Primary Care Provider: Shanda Spencer DO   Date and time admitted to hospital: 11/23/2022  1:09 PM    Pneumonia  Assessment & Plan  CT Ca P:  1  Bilateral pneumonia and pleural effusions  2   Distended esophagus containing fluid  Nonurgent evaluation with swallowing study is recommended  3   Sacral bony lesion has enlarged compared to the previous exam and is indeterminate  Recommend correlation with bone scan or MRI pelvis  Sclerotic lesion at the left pedicle at T11 is unchanged     -weaned off high-flow oxygen on 11/24 successful  Currently oxygen saturation within normal limits on 4 liters/minute of oxygen via nasal cannula  -Bcx: prelim growth gram positive cocci in clusters 1/4, gram variable rods prelim  1/4  2/4 NGTD   -UCx: negative to date     Plan:  In the picture of multifocal pneumonia on top of sepsis with associated delirium tremens   -continue IV antibiotics  -respiratory protocol  -oxygen therapy per protocol   -wean and titrate for target SpO2 93%>  -pulmonary toilet  -refer to assessment and plan for septic shock        Septic shock ([facility] [address] )  Assessment & Plan  Background:  Patient presented with altered mental status, lethargic but responds to verbal stimuli, follows commands  Acute metabolic encephalopathy present on admission, likely due to underlying infection    Update:(11/24)   Patient noted at bedside to be withdrawing with painful stimuli, not following commands, nonverbal, asymmetrical pupils  Right pupil nonreactive to light  GCS:  5  Continues to be on Levophed drip, high-flow oxygen  Case was discussed with toxicology due to concerns regarding delirium tremens  Per the family patient drinks alcohol on a daily basis  Given him being in a coma state for the past 72 hours at least high chance that his altered mental state is due to sepsis on top of delirium tremens  Pan CT scan 11/24:  1  Bilateral pneumonia and pleural effusions  2   Distended esophagus containing fluid    Nonurgent evaluation with swallowing study is recommended  3   Sacral bony lesion has enlarged compared to the previous exam and is indeterminate  4  Sclerotic lesion at the left pedicle at T11 is unchanged  5  No acute intracranial abnormality  Chronic microangiopathic changes  Air-fluid level right sphenoid sinus  Acute sphenoid sinusitis should be considered in the right clinical setting  11/25:   Patient is arousable, awake, oriented to person only, follows commands, GCS 13    -weaned off norepinephrine drip since 18 hours  Maintaining blood pressure within normal limits without pressure support  -currently on 4 liters/minute of oxygen via nasal cannula  Down titrated from high-flow oxygen   -speech and swallow eval done; recommended at continue NPO status for the next 24 hours and to re-evaluate accordingly  Plan as following:  -per CT scan findings suspected multifocal pneumonia on top of alcohol withdrawal with delirium tremens   -day 3 cefepime and vancomycin     -day 2 Decadron 4 mg IV Q 8  -Moreno's catheter in place; day 3   -follow-up on blood and urine cultures for final read  Plan on obtaining new set of cultures  -document I/OS, daily weight   -cardiopulmonary monitoring; telemetry  -will consider Neurology consult and possible MRI head and neck if patient continues to be in altered mental state   -septic shock resolved  * Metabolic encephalopathy  Assessment & Plan  Background:  Patient presented with altered mental status, lethargic but responds to verbal stimuli, follows commands  Acute metabolic encephalopathy present on admission, likely due to underlying infection      11/25:  Remarkably improved today  GCS score 13  Refer to assessment and plan for septic shock  Alcohol withdrawal ([facility] [address] )  Assessment & Plan  Background:  Social history as given by his close friends; daily alcohol drinker for the past 1 year at least   Smokes cigarettes I pack per day for same duration  Given the patient's estimated timing of the fall at home his altered mental state could be most likely caused by a combination between septic shock and alcohol withdrawal     -medical toxicology consultant on board; agreed on the plan as following:    -Ativan p r n  for agitation and seizures  -IV thiamine supplement  -IV folic acid supplement  -follow-up with rehab when stable  Other specified anemias  Assessment & Plan  Hemoglobin with a drop within the past 4 days from 13 to 9 1 today     -intravascular volume expansion versus acute blood loss anemia  Plan:  Given the patient's history of GI bleed and hospitalizatio GI team were consulted; pending recommendations  Type and screen  Repeat H and H  Consider blood transfusion for HGB 7 or less  Fecal occult blood test 1/3 sent  Processing  Sacral lesion  Assessment & Plan  Incidental finding noted on CT scan and x-rays as following:    3  Sacral bony lesion has enlarged compared to the previous exam and is indeterminate  Plan:  -follow-up with primary care provider accordingly  -May and spit for an MRI when stable  Acute respiratory failure (HCC)  Assessment & Plan    CT Ca P:  1  Bilateral pneumonia and pleural effusions  2   Distended esophagus containing fluid  Nonurgent evaluation with swallowing study is recommended  3   Sacral bony lesion has enlarged compared to the previous exam and is indeterminate  Recommend correlation with bone scan or MRI pelvis  Sclerotic lesion at the left pedicle at T11 is unchanged     -weaned off high-flow oxygen on 11/24 successful  Currently oxygen saturation within normal limits on 4 liters/minute of oxygen via nasal cannula  Plan:  In the picture of multifocal pneumonia on top of sepsis with associated delirium tremens   -continue IV antibiotics  -respiratory protocol  -oxygen therapy per protocol   -wean and titrate for target SpO2 93%>  -pulmonary toilet    -refer to assessment and plan for septic shock        Hypoxia  Assessment & Plan    CT Ca P:  1  Bilateral pneumonia and pleural effusions  2   Distended esophagus containing fluid  Nonurgent evaluation with swallowing study is recommended  3   Sacral bony lesion has enlarged compared to the previous exam and is indeterminate  Recommend correlation with bone scan or MRI pelvis  Sclerotic lesion at the left pedicle at T11 is unchanged     -weaned off high-flow oxygen on 11/24 successful  Currently oxygen saturation within normal limits on 4 liters/minute of oxygen via nasal cannula  Plan:  In the picture of multifocal pneumonia on top of sepsis with associated delirium tremens   -continue IV antibiotics  -respiratory protocol  -oxygen therapy per protocol   -wean and titrate for target SpO2 93%>  -pulmonary toilet  -refer to assessment and plan for septic shock      Pharyngeal swelling  Assessment & Plan  Possible component of angioedema, possible infectious component, continue antibiotics, patient given steroids  -EBV panel ordered; pending  -CMV panel ordered; pending    -refer to assessment and plan for septic shock  Hyponatremia-resolved as of 11/25/2022  Assessment & Plan  Background:  Likely hyponatremia due to hypovolemia on top of thiazide diuretic use     -received total 4 L IV normal saline boluses within the past 24 hour  -also is maintained on maintenance IV fluids of half dextrose with normal saline at 200 mils per hour     -serum sodium levels normalizing; trending up to 130 from 123 on presentation  Plan:  Given the patient's borderline low blood pressures and his need for pressor will continue IV fluids at maintenance  -currently Levophed is at 1 ml/hr   -wean and titrate Levophed drip per protocol   -re-evaluate Q 4 neuro checks, cardiopulmonary check, telemetry   -monitor I/OS with Moreno's catheter in place   -daily weight  -morning labs          Metabolic acidosis with increased anion gap and accumulation of organic acids-resolved as of 2022  Assessment & Plan  Metabolic acidosis with anion gap    -resolved per updated ABG on         Hypothermia-resolved as of 2022  Assessment & Plan  Hypothermia due to exposure in cold house, unknown downtime    Closely monitor metabolic status with rewarming, monitor calcium, patient did have transient hypoglycemia  Continue D5 normal saline infusion and external rewarming as needed     -resolved  VTE Pharmacologic Prophylaxis:   Pharmacologic: Enoxaparin (Lovenox)  Mechanical VTE Prophylaxis in Place: Yes    Patient Centered Rounds: I have performed bedside rounds with nursing staff today  Discussions with Specialists or Other Care Team Provider:  Gastroenterology, toxicology, case management, PT OT    Education and Discussions with Family / Patient:  Patient    Time Spent for Care: 30 minutes  More than 50% of total time spent on counseling and coordination of care as described above  Current Length of Stay: 2 day(s)    Current Patient Status: Inpatient   Certification Statement: The patient will continue to require additional inpatient hospital stay due to Ongoing management of sepsis, metabolic encephalopathy      Code Status: Level 1 - Full Code      Subjective:   Patient was seen today at bedside  Noted to be not in acute distress  Not ill appearing  Oriented to person only  Patient is remarkably improved compared to prior day he is able to follow commands and verbalize and communicate with limitations      -speech and swallow eval; failed  Continue NPO re-evaluate in 24 hours   -no documented bowel movements during hospitalization however on ANAHI stool was noted in the rectal area  -Moreno's catheter in place; actively draining light yellow urine with no clots or blood or pus noted        Objective:     Vitals:   Temp (24hrs), Av 9 °F (36 6 °C), Min:97 2 °F (36 2 °C), Max:98 5 °F (36 9 °C)    Temp:  [97 2 °F (36 2 °C)-98 5 °F (36 9 °C)] 97 3 °F (36 3 °C)  HR:  [74-86] 81  Resp:  [12-70] 70  BP: (115-166)/(58-81) 153/81  SpO2:  [95 %-100 %] 100 %  Body mass index is 28 35 kg/m²  Input and Output Summary (last 24 hours): Intake/Output Summary (Last 24 hours) at 11/25/2022 1205  Last data filed at 11/25/2022 1100  Gross per 24 hour   Intake 4201 15 ml   Output 1500 ml   Net 2701 15 ml       Physical Exam:     Physical Exam  Vitals and nursing note reviewed  Exam conducted with a chaperone present  Constitutional:       General: He is not in acute distress  Appearance: He is obese  He is ill-appearing  He is not toxic-appearing or diaphoretic  HENT:      Head: Normocephalic and atraumatic  Eyes:      Conjunctiva/sclera: Conjunctivae normal       Pupils: Pupils are equal, round, and reactive to light  Cardiovascular:      Rate and Rhythm: Normal rate and regular rhythm  Pulses:           Radial pulses are 2+ on the right side and 2+ on the left side  Dorsalis pedis pulses are 2+ on the right side and 2+ on the left side  Heart sounds: Normal heart sounds, S1 normal and S2 normal  No murmur heard  Pulmonary:      Effort: Pulmonary effort is normal       Breath sounds: Examination of the right-lower field reveals decreased breath sounds  Examination of the left-lower field reveals decreased breath sounds  Decreased breath sounds and rales present  No wheezing  Abdominal:      General: Abdomen is flat  Bowel sounds are normal       Palpations: Abdomen is soft  Tenderness: There is no abdominal tenderness  There is no guarding or rebound  Genitourinary:     Testes: Normal    Musculoskeletal:         General: No swelling  Normal range of motion  Cervical back: Normal range of motion and neck supple  No rigidity or tenderness  Right lower leg: No edema  Left lower leg: No edema  Skin:     General: Skin is warm  Capillary Refill: Capillary refill takes less than 2 seconds     Neurological: Mental Status: He is alert  GCS: GCS eye subscore is 4  GCS verbal subscore is 4  GCS motor subscore is 5  Motor: Weakness present  Deep Tendon Reflexes:      Reflex Scores:       Bicep reflexes are 2+ on the right side and 2+ on the left side  Patellar reflexes are 2+ on the right side and 2+ on the left side  Comments: Weakness noted on all extremities an active and passive range of motion and against resistance however remarkably improved to yesterday when he was in a comatose state  Additional Data:     Labs:    Results from last 7 days   Lab Units 11/25/22  0431 11/24/22  0504   WBC Thousand/uL 11 22* 13 01*   HEMOGLOBIN g/dL 9 1* 10 3*   HEMATOCRIT % 25 8* 28 8*   PLATELETS Thousands/uL 173 353   BANDS PCT %  --  6   LYMPHO PCT %  --  0*   MONO PCT %  --  2*   EOS PCT %  --  0     Results from last 7 days   Lab Units 11/25/22  0431 11/24/22  1245 11/24/22  0504   SODIUM mmol/L 132*   < > 130*   POTASSIUM mmol/L 3 2*   < > 4 0   CHLORIDE mmol/L 104   < > 97   CO2 mmol/L 20*   < > 16*   BUN mg/dL 15   < > 23   CREATININE mg/dL 0 83   < > 0 96   ANION GAP mmol/L 8   < > 17*   CALCIUM mg/dL 8 2*   < > 7 8*   ALBUMIN g/dL  --   --  2 2*   TOTAL BILIRUBIN mg/dL  --   --  0 45   ALK PHOS U/L  --   --  124*   ALT U/L  --   --  21   AST U/L  --   --  34   GLUCOSE RANDOM mg/dL 196*   < > 153*    < > = values in this interval not displayed  Results from last 7 days   Lab Units 11/24/22  0504   INR  1 21*     Results from last 7 days   Lab Units 11/24/22  0800 11/23/22  1657 11/23/22  1313   POC GLUCOSE mg/dl 169* 79 71     Results from last 7 days   Lab Units 11/24/22  0504   HEMOGLOBIN A1C % 4 9     Results from last 7 days   Lab Units 11/24/22  0504 11/23/22  2024 11/23/22  1328   LACTIC ACID mmol/L  --  0 8 0 9   PROCALCITONIN ng/ml 0 70*  --  0 26*           * I Have Reviewed All Lab Data Listed Above  * Additional Pertinent Lab Tests Reviewed:  All Labs For Current Hospital Admission Reviewed    Imaging:    Imaging Reports Reviewed Today Include:   XR hips bilateral 3-4 vw w pelvis if performed   Final Result      No acute osseous abnormality  Again seen is a large sclerotic bone lesion in the upper sacrum  This has enlarged comparing the 11/24/2022 CT and a 1/1/2018 CT  This is suspicious for malignancy, and bone scan is recommended for further evaluation  Workstation performed: [id number]         CT chest abdomen pelvis w contrast   Final Result         1  Bilateral pneumonia and pleural effusions  2   Distended esophagus containing fluid  Nonurgent evaluation with swallowing study is recommended  3   Sacral bony lesion has enlarged compared to the previous exam and is indeterminate  Recommend correlation with bone scan or MRI pelvis  Sclerotic lesion at the left pedicle at T11 is unchanged  The study was marked in EPIC for significant notification  Workstation performed: [id number]         CT head wo contrast   Final Result      No acute intracranial abnormality  Chronic microangiopathic changes  Air-fluid level right sphenoid sinus  Acute sphenoid sinusitis should be considered in the right clinical setting  Workstation performed: [id number]         XR chest 1 view portable   Final Result      Right-sided central venous catheter placement without pneumothorax  Workstation performed: [id number]         X-ray chest 1 view portable   Final Result      No acute cardiopulmonary disease  In the setting of clinically suspected/proven COVID-19, this plain film appearance does not contain findings that raise concern for viral pneumonia such as COVID-19, but does not rule out this diagnosis  Workstation performed: [id number]         CTA head and neck w wo contrast   Final Result      1  No acute intracranial CT abnormality  2   Patent major vasculature of [place] without high-grade stenosis  3   No hemodynamically significant stenosis of cervical carotid arteries  4   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  5   Significant retropharyngeal effusion extending from C1 through inferior C5 effacing nasopharyngeal and pharyngeal airways  No apparent rim enhancement to suggest abscess  Uvula appears edematous  Recommend ENT consultation  6   Acute sinusitis  7   Fluid-filled esophagus extending to the level of the neck base  Correlate for GERD  I personally discussed this study with CAMERON MEJIA on 11/23/2022 at 2:26 PM                Workstation performed: [id number]                 Recent Cultures (last 7 days):     Results from last 7 days   Lab Units 11/23/22  1339 11/23/22  1328   GRAM STAIN RESULT  Gram positive cocci in clusters* Gram variable rods*       Last 24 Hours Medication List:   Current Facility-Administered Medications   Medication Dose Route Frequency Provider Last Rate   • cefepime  2,000 mg Intravenous Q12H Chun April Brian, DO 2,000 mg (11/25/22 0433)   • dexamethasone  4 mg Intravenous Q8H Ciarra Negron MD     • dextrose 5 % and sodium chloride 0 9 %  175 mL/hr Intravenous Continuous Darren Tolbert PA-C 175 mL/hr (11/25/22 0328)   • enoxaparin  40 mg Subcutaneous Daily Chun April Brian, DO     • LORazepam  0 5 mg Intravenous Q6H PRN Chun April Brian, DO     • norepinephrine  1-30 mcg/min Intravenous Titrated Chun April Brian, DO Stopped (11/24/22 1500)   • pantoprazole  40 mg Intravenous Q24H [address] Nedra Ng MD     • potassium chloride  20 mEq Intravenous Q2H Nedra Ng MD 20 mEq (11/25/22 1042)   • thiamine  500 mg Intravenous Q8H Chun April Canelo dunes,  mg (11/25/22 7610)        Today, Patient Was Seen By: Nedra Ng MD    ** Please Note: Dictation voice to text software may have been used in the creation of this document   **        Attestation signed by Gisele Britt Basil Bartlett DO at 2022  3:46 PM:    Standard Teaching Supervising Statement    I have reviewed the note performed and documented by the Resident  I personally performed the required components/examined the patient  I agree with the Resident's findings and plan of care with the following additions/exceptions:     Rapid clinical improvement overnight, transfer to med surg floor, continue IV Protonix, continue antibiotics, continue thiamine  DO Yamileth Freeman MD  2022 10:15 AM  Attested  [address]  Progress Note Yolie Fletcher 1955, 78 y o  male MRN: [id number]  Unit/Bed#: 491-04 Encounter: [id number]  Primary Care Provider: Donald Rojas DO   Date and time admitted to hospital: 2022  1:09 PM    * Septic shock [facility])  Assessment & Plan  Background:  Patient presented with altered mental status, lethargic but responds to verbal stimuli, follows commands  Acute metabolic encephalopathy present on admission, likely due to underlying infection    Update:()   Patient noted at bedside to be withdrawing with painful stimuli, not following commands, nonverbal, asymmetrical pupils  Right pupil nonreactive to light  GCS:  5  ABG:  PH 7 469, PO2:  87, pCO2:  22, HC03:  16  RB  Pan CT scan with contrast ordered; pending  ( stat)    Plan as following:  Given his deteriorating mental state and nonspecific blood work to date at this time considering septic shock given his hypotension with pressor support, lethargic, tachypnea, fever and CTA findings regarding retropharyngeal soft tissue swelling  Cannot rule out anoxic brain injury given the unknown timing and duration by which the patient was in a coma state when was found by EMS  -obtain pan CT scan with contrast today   -continue high-flow per respiratory therapy protocol   -day 2 cefepime and doxycycline    Continue antibiotics ( suspected infection of unknown source)   -started on Decadron 4 mg IV Q 8  -currently maintained on Levophed drip at 1 mL/hour  Wean and titrate per protocol   -Moreno's catheter in place; day 2   -follow-up on blood and urine cultures  -document I/OS, daily weight   -cardiopulmonary monitoring; telemetry  -will consider Neurology consult and possible MRI head and neck if patient continues to be in altered mental state  Acute respiratory failure (HCC)  Assessment & Plan  Mostly due to to be in septic shock however unclear etiology     -continue high-flow per respiratory protocol   -wean and titrate per protocol   -target SpO2> 93%    Hyponatremia  Assessment & Plan  Background:  Likely hyponatremia due to hypovolemia on top of thiazide diuretic use     -received total 4 L IV normal saline boluses within the past 24 hour  -also is maintained on maintenance IV fluids of half dextrose with normal saline at 200 mils per hour     -serum sodium levels normalizing; trending up to 130 from 123 on presentation  Plan:  Given the patient's borderline low blood pressures and his need for pressor will continue IV fluids at maintenance  -currently Levophed is at 1 ml/hr   -wean and titrate Levophed drip per protocol   -re-evaluate Q 4 neuro checks, cardiopulmonary check, telemetry   -monitor I/OS with Moreno's catheter in place   -daily weight  -morning labs  Hypothermia  Assessment & Plan  Hypothermia due to exposure in cold house, unknown downtime    Closely monitor metabolic status with rewarming, monitor calcium, patient did have transient hypoglycemia  Continue D5 normal saline infusion and external rewarming as needed     -resolved  Currently borderline hyperthermic  Hypoxia  Assessment & Plan  Acute respiratory failure with hypoxia, present on admission, initial chest x-ray negative for acute pathology, possible worsening airway obstruction, verses combination of airway obstruction from increased secretions      Refer to assessment and plan for septic shock and metabolic encephalopathy  Pharyngeal swelling  Assessment & Plan  Possible component of angioedema, possible infectious component, continue antibiotics, patient given steroids  -EBV panel ordered; pending  -CMV panel ordered; pending    -refer to assessment and plan for septic shock  Metabolic encephalopathy  Assessment & Plan  Background:  Patient presented with altered mental status, lethargic but responds to verbal stimuli, follows commands  Acute metabolic encephalopathy present on admission, likely due to underlying infection    Update:()   Patient noted at bedside to be withdrawing with painful stimuli, not following commands, nonverbal, asymmetrical pupils  Right pupil nonreactive to light  GCS:  5  ABG:  PH 7 469, PO2:  87, pCO2:  22, HC03:  16  RB  Pan CT scan with contrast ordered; pending  ( stat)    Plan as following:  Given his deteriorating mental state and nonspecific blood work to date at this time considering septic shock given his hypotension with pressor support, lethargic, tachypnea, fever and CTA findings regarding retropharyngeal soft tissue swelling  Cannot rule out anoxic brain injury given the unknown timing and duration by which the patient was in a coma state when was found by EMS  -obtain pan CT scan with contrast today   -continue high-flow per respiratory therapy protocol   -day 2 cefepime and doxycycline  Continue antibiotics ( suspected infection of unknown source)   -started on Decadron 4 mg IV Q 8  -Moreno's catheter in place; day 2   -document I/OS, daily weight   -cardiopulmonary monitoring; telemetry  -will consider Neurology consult and possible MRI head and neck if patient continues to be in altered mental state        Metabolic acidosis with increased anion gap and accumulation of organic acids-resolved as of 2022  Assessment & Plan  Metabolic acidosis with anion gap    -resolved per updated ABG on           VTE Pharmacologic Prophylaxis:   Pharmacologic: Enoxaparin (Lovenox)  Mechanical VTE Prophylaxis in Place: Yes    Patient Centered Rounds: I have performed bedside rounds with nursing staff today  Discussions with Specialists or Other Care Team Provider:  Close friend  Education and Discussions with Family / Patient:  Family over the phone    Time Spent for Care: 30 minutes  More than 50% of total time spent on counseling and coordination of care as described above  Current Length of Stay: 1 day(s)    Current Patient Status: Inpatient   Certification Statement: The patient will continue to require additional inpatient hospital stay due to Septic shock with altered mental status        Code Status: Level 1 - Full Code      Subjective:   Patient was seen today at bedside  Continues to be in altered mental state  None arousable  Nonverbal   GCS score 5     -continues to be on high-flow oxygen  -per discussion with nursing staff the patient was initially with a GCS of 10     -Moreno's catheter in place; functional     Objective:     Vitals:   Temp (24hrs), Av °F (34 4 °C), Min:84 9 °F (29 4 °C), Max:100 2 °F (37 9 °C)    Temp:  [84 9 °F (29 4 °C)-100 2 °F (37 9 °C)] 99 5 °F (37 5 °C)  HR:  [] 92  Resp:  [18-42] 34  BP: ()/(33-90) 109/48  SpO2:  [83 %-100 %] 98 %  Body mass index is 28 49 kg/m²  Input and Output Summary (last 24 hours): Intake/Output Summary (Last 24 hours) at 2022 1015  Last data filed at 2022 1000  Gross per 24 hour   Intake 63 5 ml   Output 1960 ml   Net -1896 5 ml       Physical Exam:     Physical Exam  Vitals and nursing note reviewed  Constitutional:       Appearance: He is ill-appearing  HENT:      Nose: No congestion  Mouth/Throat:      Mouth: Mucous membranes are moist    Eyes:      Conjunctiva/sclera: Conjunctivae normal       Pupils: Pupils are equal, round, and reactive to light     Cardiovascular:      Rate and Rhythm: Normal rate and regular rhythm  Pulses: Normal pulses  Radial pulses are 2+ on the right side and 2+ on the left side  Dorsalis pedis pulses are 2+ on the right side and 2+ on the left side  Heart sounds: Normal heart sounds, S1 normal and S2 normal  No murmur heard  Pulmonary:      Effort: Tachypnea present  No accessory muscle usage, prolonged expiration, respiratory distress or retractions  Breath sounds: Normal air entry  Rales present  Abdominal:      General: Abdomen is flat  There is no distension  Palpations: Abdomen is soft  Musculoskeletal:      Cervical back: Normal range of motion and neck supple  No rigidity  Right lower leg: No edema  Left lower leg: No edema  Lymphadenopathy:      Cervical: No cervical adenopathy  Skin:     General: Skin is warm and dry  Capillary Refill: Capillary refill takes less than 2 seconds  Neurological:      Mental Status: He is disoriented and unresponsive  GCS: GCS eye subscore is 1  GCS verbal subscore is 1  GCS motor subscore is 1  Deep Tendon Reflexes: Reflexes abnormal       Reflex Scores:       Patellar reflexes are 0 on the right side and 2+ on the left side            Additional Data:     Labs:    Results from last 7 days   Lab Units 11/24/22  0504   WBC Thousand/uL 13 01*   HEMOGLOBIN g/dL 10 3*   HEMATOCRIT % 28 8*   PLATELETS Thousands/uL 353   BANDS PCT % 6   LYMPHO PCT % 0*   MONO PCT % 2*   EOS PCT % 0     Results from last 7 days   Lab Units 11/24/22  0504   SODIUM mmol/L 130*   POTASSIUM mmol/L 4 0   CHLORIDE mmol/L 97   CO2 mmol/L 16*   BUN mg/dL 23   CREATININE mg/dL 0 96   ANION GAP mmol/L 17*   CALCIUM mg/dL 7 8*   ALBUMIN g/dL 2 2*   TOTAL BILIRUBIN mg/dL 0 45   ALK PHOS U/L 124*   ALT U/L 21   AST U/L 34   GLUCOSE RANDOM mg/dL 153*     Results from last 7 days   Lab Units 11/24/22  0504   INR  1 21*     Results from last 7 days   Lab Units 11/24/22  0800 11/23/22  1657 11/23/22  1313   POC GLUCOSE mg/dl 169* 79 71         Results from last 7 days   Lab Units 11/24/22  0504 11/23/22 2024 11/23/22  1328   LACTIC ACID mmol/L  --  0 8 0 9   PROCALCITONIN ng/ml 0 70*  --  0 26*           * I Have Reviewed All Lab Data Listed Above  * Additional Pertinent Lab Tests Reviewed: [facility] Admission Reviewed    Imaging:    Imaging Reports Reviewed Today Include:   XR chest 1 view portable   Final Result      Right-sided central venous catheter placement without pneumothorax  Workstation performed: [id number]         X-ray chest 1 view portable   Final Result      No acute cardiopulmonary disease  In the setting of clinically suspected/proven COVID-19, this plain film appearance does not contain findings that raise concern for viral pneumonia such as COVID-19, but does not rule out this diagnosis  Workstation performed: [id number]         CTA head and neck w wo contrast   Final Result      1  No acute intracranial CT abnormality  2   Patent major vasculature of [place] without high-grade stenosis  3   No hemodynamically significant stenosis of cervical carotid arteries  4   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  5   Significant retropharyngeal effusion extending from C1 through inferior C5 effacing nasopharyngeal and pharyngeal airways  No apparent rim enhancement to suggest abscess  Uvula appears edematous  Recommend ENT consultation  6   Acute sinusitis  7   Fluid-filled esophagus extending to the level of the neck base  Correlate for GERD                           I personally discussed this study with CAMERON MEJIA on 11/23/2022 at 2:26 PM                Workstation performed: [id number]         CT head chest abdomen pelvis w contrast    (Results Pending)           Recent Cultures (last 7 days):     Results from last 7 days   Lab Units 11/23/22  1339 11/23/22  1328   BLOOD CULTURE  Received in Microbiology Lab  Culture in Progress  Received in Microbiology Lab  Culture in Progress  Last 24 Hours Medication List:   Current Facility-Administered Medications   Medication Dose Route Frequency Provider Last Rate   • cefepime  2,000 mg Intravenous Q12H Devante Cordon Brian, DO 2,000 mg (11/24/22 0503)   • dexamethasone  4 mg Intravenous Q8H [address] Ivory Cabezas MD     • dextrose 5 % and sodium chloride 0 9 %  200 mL/hr Intravenous Continuous Devante Cordon Brian,  mL/hr (11/24/22 0105)   • enoxaparin  40 mg Subcutaneous Daily Devante Cordon Canelo dunes, DO     • norepinephrine  1-30 mcg/min Intravenous Titrated Devante Cordon Canelo dunes, DO 4 mcg/min (11/24/22 0845)        Today, Patient Was Seen By: Ivory Cabezas MD    ** Please Note: Dictation voice to text software may have been used in the creation of this document  **        Attestation signed by Barry Good DO at 11/24/2022  2:08 PM (Updated):  Patient seen examined with the resident, I independently made all medical decisions, assessment plan was discussed in detail  Plan of care discussed with toxicology, continue supportive care, plan of care discussed with ICU nurse, maintain map greater than 65, continue with respiratory support, can wean Vapotherm over the next 24 to 48 hours  Suspected pneumonia, continue current broad-spectrum antibiotics    Follow-up cultures  Case discussed in detail with the patient's primary contact via telephone today    Critical care time today 40 minutes

## 2022-11-24 ENCOUNTER — APPOINTMENT (INPATIENT)
Dept: CT IMAGING | Facility: HOSPITAL | Age: 67
End: 2022-11-24

## 2022-11-24 PROBLEM — J96.00 ACUTE RESPIRATORY FAILURE (HCC): Status: ACTIVE | Noted: 2022-11-24

## 2022-11-24 PROBLEM — E87.20 METABOLIC ACIDOSIS WITH INCREASED ANION GAP AND ACCUMULATION OF ORGANIC ACIDS: Status: RESOLVED | Noted: 2022-11-23 | Resolved: 2022-11-24

## 2022-11-24 PROBLEM — R65.21 SEPTIC SHOCK (HCC): Status: ACTIVE | Noted: 2022-11-24

## 2022-11-24 PROBLEM — A41.9 SEPTIC SHOCK (HCC): Status: ACTIVE | Noted: 2022-11-24

## 2022-11-24 LAB
ALBUMIN SERPL BCP-MCNC: 2.2 G/DL (ref 3.5–5)
ALP SERPL-CCNC: 124 U/L (ref 46–116)
ALT SERPL W P-5'-P-CCNC: 21 U/L (ref 12–78)
ANION GAP SERPL CALCULATED.3IONS-SCNC: 10 MMOL/L (ref 4–13)
ANION GAP SERPL CALCULATED.3IONS-SCNC: 11 MMOL/L (ref 4–13)
ANION GAP SERPL CALCULATED.3IONS-SCNC: 17 MMOL/L (ref 4–13)
ANION GAP SERPL CALCULATED.3IONS-SCNC: 17 MMOL/L (ref 4–13)
ANION GAP SERPL CALCULATED.3IONS-SCNC: 18 MMOL/L (ref 4–13)
ARTERIAL PATENCY WRIST A: YES
ARTERIAL PATENCY WRIST A: YES
AST SERPL W P-5'-P-CCNC: 34 U/L (ref 5–45)
BASE EXCESS BLDA CALC-SCNC: -3.2 MMOL/L
BASE EXCESS BLDA CALC-SCNC: -6.5 MMOL/L
BASOPHILS # BLD MANUAL: 0 THOUSAND/UL (ref 0–0.1)
BASOPHILS NFR MAR MANUAL: 0 % (ref 0–1)
BILIRUB SERPL-MCNC: 0.45 MG/DL (ref 0.2–1)
BUN SERPL-MCNC: 21 MG/DL (ref 5–25)
BUN SERPL-MCNC: 23 MG/DL (ref 5–25)
BUN SERPL-MCNC: 23 MG/DL (ref 5–25)
BUN SERPL-MCNC: 24 MG/DL (ref 5–25)
BUN SERPL-MCNC: 24 MG/DL (ref 5–25)
CA-I BLD-SCNC: 1.15 MMOL/L (ref 1.12–1.32)
CALCIUM ALBUM COR SERPL-MCNC: 9.2 MG/DL (ref 8.3–10.1)
CALCIUM SERPL-MCNC: 7.7 MG/DL (ref 8.3–10.1)
CALCIUM SERPL-MCNC: 7.7 MG/DL (ref 8.3–10.1)
CALCIUM SERPL-MCNC: 7.8 MG/DL (ref 8.3–10.1)
CALCIUM SERPL-MCNC: 8 MG/DL (ref 8.3–10.1)
CALCIUM SERPL-MCNC: 8 MG/DL (ref 8.3–10.1)
CHLORIDE SERPL-SCNC: 100 MMOL/L (ref 96–108)
CHLORIDE SERPL-SCNC: 102 MMOL/L (ref 96–108)
CHLORIDE SERPL-SCNC: 95 MMOL/L (ref 96–108)
CHLORIDE SERPL-SCNC: 95 MMOL/L (ref 96–108)
CHLORIDE SERPL-SCNC: 97 MMOL/L (ref 96–108)
CHOLEST SERPL-MCNC: 106 MG/DL
CO2 SERPL-SCNC: 14 MMOL/L (ref 21–32)
CO2 SERPL-SCNC: 14 MMOL/L (ref 21–32)
CO2 SERPL-SCNC: 16 MMOL/L (ref 21–32)
CO2 SERPL-SCNC: 20 MMOL/L (ref 21–32)
CO2 SERPL-SCNC: 21 MMOL/L (ref 21–32)
CREAT SERPL-MCNC: 0.72 MG/DL (ref 0.6–1.3)
CREAT SERPL-MCNC: 0.83 MG/DL (ref 0.6–1.3)
CREAT SERPL-MCNC: 0.96 MG/DL (ref 0.6–1.3)
CREAT SERPL-MCNC: 1 MG/DL (ref 0.6–1.3)
CREAT SERPL-MCNC: 1.06 MG/DL (ref 0.6–1.3)
EOSINOPHIL # BLD MANUAL: 0 THOUSAND/UL (ref 0–0.4)
EOSINOPHIL NFR BLD MANUAL: 0 % (ref 0–6)
ERYTHROCYTE [DISTWIDTH] IN BLOOD BY AUTOMATED COUNT: 12.9 % (ref 11.6–15.1)
EST. AVERAGE GLUCOSE BLD GHB EST-MCNC: 94 MG/DL
GFR SERPL CREATININE-BSD FRML MDRD: 72 ML/MIN/1.73SQ M
GFR SERPL CREATININE-BSD FRML MDRD: 78 ML/MIN/1.73SQ M
GFR SERPL CREATININE-BSD FRML MDRD: 82 ML/MIN/1.73SQ M
GFR SERPL CREATININE-BSD FRML MDRD: 91 ML/MIN/1.73SQ M
GFR SERPL CREATININE-BSD FRML MDRD: 97 ML/MIN/1.73SQ M
GLUCOSE SERPL-MCNC: 125 MG/DL (ref 65–140)
GLUCOSE SERPL-MCNC: 153 MG/DL (ref 65–140)
GLUCOSE SERPL-MCNC: 169 MG/DL (ref 65–140)
GLUCOSE SERPL-MCNC: 190 MG/DL (ref 65–140)
GLUCOSE SERPL-MCNC: 196 MG/DL (ref 65–140)
GLUCOSE SERPL-MCNC: 84 MG/DL (ref 65–140)
HBA1C MFR BLD: 4.9 %
HCO3 BLDA-SCNC: 15.6 MMOL/L (ref 22–28)
HCO3 BLDA-SCNC: 19 MMOL/L (ref 22–28)
HCT VFR BLD AUTO: 28.8 % (ref 36.5–49.3)
HDLC SERPL-MCNC: 63 MG/DL
HFNC FLOW LPM: 20
HFNC FLOW LPM: 36
HGB BLD-MCNC: 10.3 G/DL (ref 12–17)
INR PPP: 1.21 (ref 0.84–1.19)
LDLC SERPL CALC-MCNC: 37 MG/DL (ref 0–100)
LYMPHOCYTES # BLD AUTO: 0 % (ref 14–44)
LYMPHOCYTES # BLD AUTO: 0 THOUSAND/UL (ref 0.6–4.47)
MAGNESIUM SERPL-MCNC: 1.7 MG/DL (ref 1.6–2.6)
MCH RBC QN AUTO: 31 PG (ref 26.8–34.3)
MCHC RBC AUTO-ENTMCNC: 35.8 G/DL (ref 31.4–37.4)
MCV RBC AUTO: 87 FL (ref 82–98)
MONOCYTES # BLD AUTO: 0.26 THOUSAND/UL (ref 0–1.22)
MONOCYTES NFR BLD: 2 % (ref 4–12)
NEUTROPHILS # BLD MANUAL: 12.75 THOUSAND/UL (ref 1.85–7.62)
NEUTS BAND NFR BLD MANUAL: 6 % (ref 0–8)
NEUTS SEG NFR BLD AUTO: 92 % (ref 43–75)
NON VENT HFNC FIO2: 25
NON VENT HFNC FIO2: 36
NON VENT TYPE HFNC: ABNORMAL
NON VENT TYPE HFNC: ABNORMAL
NONHDLC SERPL-MCNC: 43 MG/DL
O2 CT BLDA-SCNC: 14 ML/DL (ref 16–23)
O2 CT BLDA-SCNC: 14.6 ML/DL (ref 16–23)
OXYHGB MFR BLDA: 95.5 % (ref 94–97)
OXYHGB MFR BLDA: 95.6 % (ref 94–97)
PCO2 BLDA: 22 MM HG (ref 36–44)
PCO2 BLDA: 25.6 MM HG (ref 36–44)
PH BLDA: 7.47 [PH] (ref 7.35–7.45)
PH BLDA: 7.49 [PH] (ref 7.35–7.45)
PHOSPHATE SERPL-MCNC: 4.2 MG/DL (ref 2.3–4.1)
PLATELET # BLD AUTO: 353 THOUSANDS/UL (ref 149–390)
PLATELET BLD QL SMEAR: ADEQUATE
PMV BLD AUTO: 9.5 FL (ref 8.9–12.7)
PO2 BLDA: 81.4 MM HG (ref 75–129)
PO2 BLDA: 87.6 MM HG (ref 75–129)
POTASSIUM SERPL-SCNC: 3.4 MMOL/L (ref 3.5–5.3)
POTASSIUM SERPL-SCNC: 3.7 MMOL/L (ref 3.5–5.3)
POTASSIUM SERPL-SCNC: 3.7 MMOL/L (ref 3.5–5.3)
POTASSIUM SERPL-SCNC: 3.9 MMOL/L (ref 3.5–5.3)
POTASSIUM SERPL-SCNC: 4 MMOL/L (ref 3.5–5.3)
PROCALCITONIN SERPL-MCNC: 0.7 NG/ML
PROLACTIN SERPL-MCNC: 8.2 NG/ML (ref 2.5–17.4)
PROT SERPL-MCNC: 5.6 G/DL (ref 6.4–8.4)
PROTHROMBIN TIME: 15.6 SECONDS (ref 11.6–14.5)
RBC # BLD AUTO: 3.32 MILLION/UL (ref 3.88–5.62)
SODIUM SERPL-SCNC: 126 MMOL/L (ref 135–147)
SODIUM SERPL-SCNC: 127 MMOL/L (ref 135–147)
SODIUM SERPL-SCNC: 130 MMOL/L (ref 135–147)
SODIUM SERPL-SCNC: 131 MMOL/L (ref 135–147)
SODIUM SERPL-SCNC: 133 MMOL/L (ref 135–147)
SPECIMEN SOURCE: ABNORMAL
SPECIMEN SOURCE: ABNORMAL
TRIGL SERPL-MCNC: 32 MG/DL
WBC # BLD AUTO: 13.01 THOUSAND/UL (ref 4.31–10.16)

## 2022-11-24 RX ORDER — MAGNESIUM SULFATE 1 G/100ML
1 INJECTION INTRAVENOUS ONCE
Status: COMPLETED | OUTPATIENT
Start: 2022-11-24 | End: 2022-11-24

## 2022-11-24 RX ORDER — LORAZEPAM 2 MG/ML
0.5 INJECTION INTRAMUSCULAR EVERY 6 HOURS PRN
Status: DISCONTINUED | OUTPATIENT
Start: 2022-11-24 | End: 2022-11-25

## 2022-11-24 RX ORDER — LORAZEPAM 2 MG/ML
1 INJECTION INTRAMUSCULAR ONCE
Status: COMPLETED | OUTPATIENT
Start: 2022-11-24 | End: 2022-11-24

## 2022-11-24 RX ORDER — POTASSIUM CHLORIDE 14.9 MG/ML
20 INJECTION INTRAVENOUS
Status: COMPLETED | OUTPATIENT
Start: 2022-11-24 | End: 2022-11-24

## 2022-11-24 RX ORDER — DEXAMETHASONE SODIUM PHOSPHATE 4 MG/ML
4 INJECTION, SOLUTION INTRA-ARTICULAR; INTRALESIONAL; INTRAMUSCULAR; INTRAVENOUS; SOFT TISSUE EVERY 8 HOURS SCHEDULED
Status: DISCONTINUED | OUTPATIENT
Start: 2022-11-24 | End: 2022-11-25

## 2022-11-24 RX ADMIN — LORAZEPAM 1 MG: 2 INJECTION INTRAMUSCULAR; INTRAVENOUS at 12:37

## 2022-11-24 RX ADMIN — CEFEPIME HYDROCHLORIDE 2000 MG: 2 INJECTION, SOLUTION INTRAVENOUS at 16:55

## 2022-11-24 RX ADMIN — CEFEPIME HYDROCHLORIDE 2000 MG: 2 INJECTION, SOLUTION INTRAVENOUS at 05:03

## 2022-11-24 RX ADMIN — DEXAMETHASONE SODIUM PHOSPHATE 4 MG: 4 INJECTION, SOLUTION INTRAMUSCULAR; INTRAVENOUS at 22:57

## 2022-11-24 RX ADMIN — MAGNESIUM SULFATE IN DEXTROSE 1 G: 10 INJECTION, SOLUTION INTRAVENOUS at 19:44

## 2022-11-24 RX ADMIN — DEXTROSE AND SODIUM CHLORIDE 200 ML/HR: 5; .9 INJECTION, SOLUTION INTRAVENOUS at 01:05

## 2022-11-24 RX ADMIN — DEXAMETHASONE SODIUM PHOSPHATE 4 MG: 4 INJECTION, SOLUTION INTRAMUSCULAR; INTRAVENOUS at 09:47

## 2022-11-24 RX ADMIN — THIAMINE HYDROCHLORIDE 500 MG: 100 INJECTION, SOLUTION INTRAMUSCULAR; INTRAVENOUS at 15:06

## 2022-11-24 RX ADMIN — IOHEXOL 100 ML: 350 INJECTION, SOLUTION INTRAVENOUS at 11:20

## 2022-11-24 RX ADMIN — ENOXAPARIN SODIUM 40 MG: 40 INJECTION SUBCUTANEOUS at 08:53

## 2022-11-24 RX ADMIN — THIAMINE HYDROCHLORIDE 500 MG: 100 INJECTION, SOLUTION INTRAMUSCULAR; INTRAVENOUS at 22:58

## 2022-11-24 RX ADMIN — POTASSIUM CHLORIDE 20 MEQ: 14.9 INJECTION, SOLUTION INTRAVENOUS at 19:44

## 2022-11-24 RX ADMIN — DEXTROSE AND SODIUM CHLORIDE 125 ML/HR: 5; .9 INJECTION, SOLUTION INTRAVENOUS at 20:03

## 2022-11-24 RX ADMIN — DEXAMETHASONE SODIUM PHOSPHATE 4 MG: 4 INJECTION, SOLUTION INTRAMUSCULAR; INTRAVENOUS at 15:06

## 2022-11-24 RX ADMIN — POTASSIUM CHLORIDE 20 MEQ: 14.9 INJECTION, SOLUTION INTRAVENOUS at 20:58

## 2022-11-24 NOTE — ASSESSMENT & PLAN NOTE
Possible component of angioedema, possible infectious component, continue antibiotics, patient given steroids

## 2022-11-24 NOTE — ASSESSMENT & PLAN NOTE
Mostly due to to be in septic shock however unclear etiology     -continue high-flow per respiratory protocol   -wean and titrate per protocol   -target SpO2> 93%

## 2022-11-24 NOTE — ASSESSMENT & PLAN NOTE
Background:  Likely hyponatremia due to hypovolemia on top of thiazide diuretic use     -received total 4 L IV normal saline boluses within the past 24 hour  -also is maintained on maintenance IV fluids of half dextrose with normal saline at 200 mils per hour     -serum sodium levels normalizing; trending up to 130 from 123 on presentation  Plan:  Given the patient's borderline low blood pressures and his need for pressor will continue IV fluids at maintenance  -currently Levophed is at 1 ml/hr   -wean and titrate Levophed drip per protocol   -re-evaluate Q 4 neuro checks, cardiopulmonary check, telemetry   -monitor I/OS with Moreno's catheter in place   -daily weight  -morning labs

## 2022-11-24 NOTE — ASSESSMENT & PLAN NOTE
Background:  Patient presented with altered mental status, lethargic but responds to verbal stimuli, follows commands  Acute metabolic encephalopathy present on admission, likely due to underlying infection    Update:()   Patient noted at bedside to be withdrawing with painful stimuli, not following commands, nonverbal, asymmetrical pupils  Right pupil nonreactive to light  GCS:  5  ABG:  PH 7 469, PO2:  87, pCO2:  22, HC03:  16  RB  Pan CT scan with contrast ordered; pending  ( stat)    Plan as following:  Given his deteriorating mental state and nonspecific blood work to date at this time considering septic shock given his hypotension with pressor support, lethargic, tachypnea, fever and CTA findings regarding retropharyngeal soft tissue swelling  Cannot rule out anoxic brain injury given the unknown timing and duration by which the patient was in a coma state when was found by EMS  -obtain pan CT scan with contrast today   -continue high-flow per respiratory therapy protocol   -day 2 cefepime and doxycycline  Continue antibiotics ( suspected infection of unknown source)   -started on Decadron 4 mg IV Q 8  -Moreno's catheter in place; day 2   -document I/OS, daily weight   -cardiopulmonary monitoring; telemetry  -will consider Neurology consult and possible MRI head and neck if patient continues to be in altered mental state

## 2022-11-24 NOTE — RESPIRATORY THERAPY NOTE
11/24/22 0813   Respiratory Assessment   Assessment Type Assess only   General Appearance Lethargic   Respiratory Pattern Tachypneic;Symmetrical   Chest Assessment Chest expansion symmetrical;Trachea midline   Bilateral Breath Sounds Diminished;Coarse; Expiratory wheezes   Cough None   Resp Comments ABG drawn on this, I did increase the flow from 20L to 25L, patient is tachnypneic, he does have a fever  ABG resulted to Dr Toña Harris, no new orders for me, patient will be going to CAT scan, we will place him on a non-rebreather mask for this  BS: I heard no stridor, patient does have a large neck   He does have bilateral some coarse wheeze bibasalar lower airways   O2 Device Vapotherm   Subjective Data water full   Non-Invasive Information   O2 Interface Device HFNC prongs   Non-Invasive Ventilation Mode HFNC (High flow)   SpO2 98 %   $ Pulse Oximetry Spot Check Charge Completed   Non-Invasive Settings   FiO2 (%) 36   Flow (lpm) 25   Temperature (Set) 34   Non-Invasive Readings   Heater Temperature (Obs) 34

## 2022-11-24 NOTE — ASSESSMENT & PLAN NOTE
Likely hypovolemic hyponatremia, monitor electrolytes closely, will order BMP, magnesium and phosphorus every 2 hours

## 2022-11-24 NOTE — ASSESSMENT & PLAN NOTE
Acute respiratory failure with hypoxia, present on admission, initial chest x-ray negative for acute pathology, possible worsening airway obstruction, verses combination of airway obstruction from increased secretions  Refer to assessment and plan for septic shock and metabolic encephalopathy

## 2022-11-24 NOTE — PLAN OF CARE
Problem: SAFETY ADULT  Goal: Patient will remain free of falls  Description: INTERVENTIONS:  - Educate patient/family on patient safety including physical limitations  - Instruct patient to call for assistance with activity   - Consult OT/PT to assist with strengthening/mobility   - Keep Call bell within reach  - Keep bed low and locked with side rails adjusted as appropriate  - Keep care items and personal belongings within reach  - Initiate and maintain comfort rounds  - Make Fall Risk Sign visible to staff  - Offer Toileting every 2 Hours, in advance of need  - Initiate/Maintain bed/chair alarm  - Apply yellow socks and bracelet for high fall risk patients  - Consider moving patient to room near nurses station  Outcome: Progressing

## 2022-11-24 NOTE — ASSESSMENT & PLAN NOTE
Acute respiratory failure with hypoxia, present on admission, initial chest x-ray negative for acute pathology, possible worsening airway obstruction, verses combination of airway obstruction from increased secretions      Follow-up repeat chest x-ray, patient placed on Vapotherm, admitting to ICU, patient able swallow current secretions, patient is cooperative, elevate head of bed

## 2022-11-24 NOTE — QUICK NOTE
While in the emergency room patient developed hypotension, plan of care discussed with the ER team, central line placed, patient started on Levophed  Continue closely monitoring electrolytes  Given extremely dry mucous membranes, as well as secretions, patient was placed on Vapotherm oxygen

## 2022-11-24 NOTE — ASSESSMENT & PLAN NOTE
Possible component of angioedema, possible infectious component, continue antibiotics, patient given steroids  -EBV panel ordered; pending  -CMV panel ordered; pending    -refer to assessment and plan for septic shock

## 2022-11-24 NOTE — ASSESSMENT & PLAN NOTE
Background:  Patient presented with altered mental status, lethargic but responds to verbal stimuli, follows commands  Acute metabolic encephalopathy present on admission, likely due to underlying infection    Update:()   Patient noted at bedside to be withdrawing with painful stimuli, not following commands, nonverbal, asymmetrical pupils  Right pupil nonreactive to light  GCS:  5  ABG:  PH 7 469, PO2:  87, pCO2:  22, HC03:  16  RB  Pan CT scan with contrast ordered; pending  ( stat)    Plan as following:  Given his deteriorating mental state and nonspecific blood work to date at this time considering septic shock given his hypotension with pressor support, lethargic, tachypnea, fever and CTA findings regarding retropharyngeal soft tissue swelling  Cannot rule out anoxic brain injury given the unknown timing and duration by which the patient was in a coma state when was found by EMS  -obtain pan CT scan with contrast today   -continue high-flow per respiratory therapy protocol   -day 2 cefepime and doxycycline  Continue antibiotics ( suspected infection of unknown source)   -started on Decadron 4 mg IV Q 8  -currently maintained on Levophed drip at 1 mL/hour  Wean and titrate per protocol   -Moreno's catheter in place; day 2   -follow-up on blood and urine cultures  -document I/OS, daily weight   -cardiopulmonary monitoring; telemetry  -will consider Neurology consult and possible MRI head and neck if patient continues to be in altered mental state

## 2022-11-24 NOTE — RESPIRATORY THERAPY NOTE
RT Protocol Note  Tammy Benedict 78 y o  male MRN: [id number]  Unit/Bed#: 407-01 Encounter: [id number]    Assessment    Principal Problem:    Hypoxia  Active Problems:    Metabolic encephalopathy    Sepsis ([address] )    Pharyngeal swelling    Hypothermia    Metabolic acidosis with increased anion gap and accumulation of organic acids    Hyponatremia      Home Pulmonary Medications:  Unknown  Pt AMS and unable to accurately answer questions at this time  Past Medical History:   Diagnosis Date   • Hyperlipidemia    • Hypertension      Social History     Socioeconomic History   • Marital status: Single     Spouse name: None   • Number of children: None   • Years of education: None   • Highest education level: None   Occupational History   • None   Tobacco Use   • Smoking status: Never   • Smokeless tobacco: Never   Vaping Use   • Vaping Use: Never used   Substance and Sexual Activity   • Alcohol use: Yes     Comment: social   • Drug use: No   • Sexual activity: None   Other Topics Concern   • None   Social History Narrative   • None     Social Determinants of Health     Financial Resource Strain: Not on file   Food Insecurity: Not on file   Transportation Needs: Not on file   Physical Activity: Not on file   Stress: Not on file   Social Connections: Not on file   Intimate Partner Violence: Not on file   Housing Stability: Not on file       Subjective     Pt assessed per protocol  Pt came in with Hypoxia and was placed on 100% oxygen and 40 LPM high flow via Vapotherm  Upon assessing the pt, his SPO2 level was 100% and no sign of labored respirations  I titrated his FiO2 down to 60%, and he has been holding an SPO2 level of 97%    Pts lung fields are diminished with no wheezing noted at this time,    Objective    Physical Exam:   Assessment Type: Assess only  General Appearance: Eyes open/responds to stimulus, Sleeping  Respiratory Pattern: Normal  Chest Assessment: Chest expansion symmetrical  Bilateral Breath Sounds: Diminished  Cough: None    Vitals:  Blood pressure 106/56, pulse 74, temperature (!) 92 1 °F (33 4 °C), temperature source Bladder, resp  rate 20, height 5' 9" (1 753 m), weight 85 4 kg (188 lb 4 4 oz), SpO2 98 %  Results from last 7 days   Lab Units 11/23/22  1629   PH ART  7 329*   PCO2 ART mm Hg 33 2*   PO2 ART mm Hg 65 7*   HCO3 ART mmol/L 17 1*   BASE EXC ART mmol/L -7 8   O2 CONTENT ART mL/dL 16 6   O2 HGB, ARTERIAL % 89 9*   ABG SOURCE  Radial, Right   DANA TEST  Yes       Imaging and other studies: I have personally reviewed pertinent reports  Plan     Wean FiO2 and flow rated on Vapotherm as tolerated, and improve hypoxia          Resp Comments: Pt assessed per RT protocol

## 2022-11-24 NOTE — ASSESSMENT & PLAN NOTE
Hypothermia due to exposure in cold house, unknown downtime    Closely monitor metabolic status with rewarming, monitor calcium, patient did have transient hypoglycemia  Continue D5 normal saline infusion and external rewarming as needed     -resolved  Currently borderline hyperthermic

## 2022-11-24 NOTE — ASSESSMENT & PLAN NOTE
Hypothermia due to exposure in cold house, unknown downtime    Closely monitor metabolic status with rewarming, monitor calcium, patient did have transient hypoglycemia    Continue D5 normal saline infusion

## 2022-11-24 NOTE — CONSULTS
TeleConsultation - Medical Toxicology  Georges Marvin 78 y o  male MRN: [id number]  Unit/Bed#: 407-01 Encounter: [id number]      REQUIRED DOCUMENTATION:     1  This service was provided via Telemedicine  2  Provider located at [facility]  3  TeleMed provider: Nya Ma DO   4  Identify all parties in room with patient during tele consult:  Christophe Baker (GURPREET)  5  After connecting through [employer], patient was identified by name and date of birth and assistant checked wristband  Patient was then informed that this was a Telemedicine visit and that the exam was being conducted confidentially over secure lines  My office door was closed  No one else was in the room  Patient acknowledged consent and understanding of privacy and security of the Telemedicine visit, and gave us permission to have the assistant stay in the room in order to assist with the history and to conduct the exam   I informed the patient that I have reviewed their record in Epic and presented the opportunity for them to ask any questions regarding the visit today  The patient agreed to participate  Consultation - Medical Toxicology  Georges Marvin 78 y o  male MRN: [id number]  Unit/Bed#: 407-01 Encounter: [id number]      Reason for Consult / Principal Problem: encephalopathy   Inpatient consult to Toxicology  Consult performed by: Zak Reis DO  Consult ordered by: May Larsen DO        11/24/22      ASSESSMENT:  78year-old male with acute encephalopathy   1  Acute encephalopathy  2  Delirium tremens    3  Hyponatremia   4  Anemia   5  Alcoholic ketoacidosis   6  Alcohol use disorder  7  Pneumonia  8  Septic shock    RECOMMENDATIONS:  Please continue aggressive supportive care   His condition is consistent with a delayed presentation of ethanol withdrawal  I suspect that he experienced ethanol withdrawal at home that progressed to delirium tremens, and without treatment, resulted with significant neurological dysfunction by the time he was discovered at home  He will likely need medical rehabilitation services  Given his mental status and fluid filled esophagus, I suspect he also was vomiting and aspirated, resulting in his pneumonia  His laboratory derangements, including hyponatremia and other decreased electrolytes are indicative of severe alcohol abuse  Metabolic acidosis is consistent with alcoholic ketoacidosis and starvation ketosis  Please also consider GI bleed as source of anemia as he has a history of such in the past and is at risk from alcohol abuse  The detectable APAP of 2 5 ug/mL is not indicative of APAP toxicity with normal transaminases  However, does not exclude evidence of potential overdose  His mental status is too depressed to tolerate SALLY agonist therapy  I would recommend CIWA or more aggressive therapy if patient becomes agitated, but would hold for RASS of -2 or less  Regarding his alcohol related encephalopathy, please administer high dose thiamine 500 mg IV TID for three days, followed by 100 mg IV TID for four days, then 100 mg PO daily  Please also obtain MRI of brain  For further questions, please call [facility]'s  Service or Patient Access Center to reach the medical  on call  Please see additional teaching note below (if available)    Hx and PE limited by: encephalopathy     HPI: Fenton Bosworth is a 78y o  year old male who presents with acute encephalopathy  He was found unresponsive at home with his  dog  He is known to drink daily and lives alone with dog, recently retired  Became hypotensive in hospital requiring vasopressors  Was not showing obvious signs or symptoms of acute withdrawal on presentation as he was likely experiencing this at home prior to being found  His mental status was too depressed to participate in evaluation       Review of Systems   Unable to perform ROS: Mental status change       Historical Information   Past Medical History:   Diagnosis Date   • Hyperlipidemia    • Hypertension    • Metabolic acidosis with increased anion gap and accumulation of organic acids 11/23/2022     Past Surgical History:   Procedure Laterality Date   • NO PAST SURGERIES       Social History   Social History     Substance and Sexual Activity   Alcohol Use Yes    Comment: social     Social History     Substance and Sexual Activity   Drug Use No     Social History     Tobacco Use   Smoking Status Never   Smokeless Tobacco Never     Family History   Problem Relation Age of Onset   • Stroke Mother    • Hypertension Father    • Diabetes Father         Prior to Admission medications    Medication Sig Start Date End Date Taking?  Authorizing Provider   Garlic (GARLIQUE PO) Take by mouth    Historical Provider, MD   Multiple Vitamin (MULTIVITAMIN) tablet Take 1 tablet by mouth daily    Historical Provider, MD   Omega-3 Fatty Acids (FISH OIL PO) Take by mouth    Historical Provider, MD   Probiotic Product (ALIGN PO) Take by mouth    Historical Provider, MD   psyllium (METAMUCIL) 58 6 % packet Take 1 packet by mouth daily  Patient not taking: Reported on 7/19/2022    Historical Provider, MD   valsartan-hydrochlorothiazide (DIOVAN-HCT) 320-25 MG per tablet Take 1 tablet by mouth daily 7/19/22   Robb Su, DO       Current Facility-Administered Medications   Medication Dose Route Frequency   • cefepime (MAXIPIME) IVPB (premix in dextrose) 2,000 mg 50 mL  2,000 mg Intravenous Q12H   • dexamethasone (DECADRON) injection 4 mg  4 mg Intravenous Q8H [address]   • dextrose 5 % and sodium chloride 0 9 % infusion  125 mL/hr Intravenous Continuous   • enoxaparin (LOVENOX) subcutaneous injection 40 mg  40 mg Subcutaneous Daily   • LORazepam (ATIVAN) injection 0 5 mg  0 5 mg Intravenous Q6H PRN   • norepinephrine (LEVOPHED) 4 mg (STANDARD CONCENTRATION) IV in sodium chloride 0 9% 250 mL  1-30 mcg/min Intravenous Titrated   • thiamine (VITAMIN B1) 500 mg in sodium chloride 0 9 % 50 mL IVPB  500 mg Intravenous Q8H       No Known Allergies    Objective       Intake/Output Summary (Last 24 hours) at 11/24/2022 1515  Last data filed at 11/24/2022 1400  Gross per 24 hour   Intake 1076 5 ml   Output 2235 ml   Net -1158 5 ml       Invasive Devices:   CVC Central Lines 11/23/22 Triple Right Subclavian (Active)   Reasons to continue CVC line  Medications requiring central line 11/24/22 0800   Goal for Removal D/C when meds requiring line finished 11/24/22 0800   Line Necessity Reviewed Yes, reviewed with provider 11/24/22 0800   Site Assessment WDL; Clean;Dry; Intact 11/24/22 0800   Proximal Lumen Status Blood return noted; Flushed 11/24/22 0800   Medial Lumen Status Infusing 11/24/22 0800   Distal Lumen Status Blood return noted; Flushed;Normal saline locked 11/24/22 0800   Dressing Type Chlorhexidine dressing;Securing device 11/24/22 0800   Dressing Status Clean;Dry; Intact 11/24/22 0800   Dressing Change Due 11/30/22 11/24/22 0800       Peripheral IV 11/23/22 Proximal;Right;Ventral (anterior) Forearm (Active)   Site Assessment WDL; Clean;Dry; Intact 11/24/22 0800   Dressing Type Transparent;Securing device 11/24/22 0800   Line Status Blood return noted; Flushed;Saline locked 11/24/22 0800   Dressing Status Clean;Dry; Intact 11/24/22 0800   Dressing Change Due 11/27/22 11/23/22 2021   Reason Not Rotated Not due 11/23/22 2021       Peripheral IV 11/23/22 Dorsal (posterior); Left Hand (Active)   Site Assessment WDL; Clean;Dry; Intact 11/24/22 0800   Dressing Type Transparent;Securing device 11/24/22 0800   Line Status Blood return noted; Flushed;Saline locked 11/24/22 0800   Dressing Status Clean;Dry; Intact 11/24/22 0800   Dressing Change Due 11/24/22 11/23/22 2021   Reason Not Rotated Not due 11/23/22 2021       Urethral Catheter 16 Fr   (Active)   Reasons to continue Urinary Catheter  Accurate I&O assessment in critically ill patients (48 hr  max) 11/24/22 0800   Goal for Removal Remove after 48 hrs of I/O monitoring 11/24/22 0800   Site Assessment Clean;Skin intact 11/24/22 0800   [place] Care Done 11/24/22 0800   Collection Container Standard drainage bag 11/24/22 0800   Securement Method Securing device (Describe) 11/24/22 0800   Output (mL) 100 mL 11/24/22 1400       Vitals   Vitals:    11/24/22 1245 11/24/22 1300 11/24/22 1330 11/24/22 1400   BP: 138/70 119/58 131/69 124/62   TempSrc: Bladder Bladder Bladder Bladder   Pulse: 80 84 81 80   Resp: (!) 28 (!) 24 (!) 23 (!) 30   Patient Position - Orthostatic VS: Lying Lying Lying Lying   Temp: 98 4 °F (36 9 °C) 98 3 °F (36 8 °C) 98 2 °F (36 8 °C) 98 2 °F (36 8 °C)       Physical Exam  Vitals and nursing note reviewed  Exam conducted with a chaperone present (assisted by PCA for telemedicine )  Constitutional:       Appearance: He is ill-appearing and toxic-appearing  HENT:      Head: Normocephalic  Mouth/Throat:      Mouth: Mucous membranes are moist    Eyes:      Pupils: Pupils are equal, round, and reactive to light  Cardiovascular:      Rate and Rhythm: Normal rate and regular rhythm  Pulmonary:      Effort: Pulmonary effort is normal    Abdominal:      General: Abdomen is flat  Palpations: Abdomen is soft  Musculoskeletal:         General: Normal range of motion  Neurological:      Mental Status: He is lethargic  Comments: RASS -3           EKG, Pathology, and Other Studies: I have personally reviewed pertinent reports  Lab Results: I have personally reviewed pertinent reports        Labs:  Results from last 7 days   Lab Units 11/24/22  0504   WBC Thousand/uL 13 01*   HEMOGLOBIN g/dL 10 3*   HEMATOCRIT % 28 8*   PLATELETS Thousands/uL 353   LYMPHO PCT % 0*   MONO PCT % 2*      Results from last 7 days   Lab Units 11/24/22  1245 11/24/22  0504 11/23/22 2024 11/23/22  1846   SODIUM mmol/L 131* 130*   < > 123*   POTASSIUM mmol/L 3 7 4 0   < > 3 1*   CHLORIDE mmol/L 100 97   < > 90* CO2 mmol/L 21 16*   < > 18*   BUN mg/dL 23 23   < > 28*   CREATININE mg/dL 1 06 0 96   < > 0 85   CALCIUM mg/dL 7 7* 7 8*   < > 8 2*   ALK PHOS U/L  --  124*  --   --    ALT U/L  --  21  --   --    AST U/L  --  34  --   --    MAGNESIUM mg/dL  --  1 7  --  1 8   PHOSPHORUS mg/dL  --  4 2*  --  3 6    < > = values in this interval not displayed  Results from last 7 days   Lab Units 11/24/22  0504 11/23/22  1320   INR  1 21* 1 13   PTT seconds  --  36     Results from last 7 days   Lab Units 11/23/22 2024 11/23/22  1328   LACTIC ACID mmol/L 0 8 0 9     0   Lab Value Date/Time    TROPONINI 0 07 (H) 01/01/2018 2009    TROPONINI 0 09 (H) 01/01/2018 1725    TROPONINI 0 11 (H) 01/01/2018 1348    TROPONINI 0 11 (H) 01/01/2018 1348    TROPONINI 0 07 (HH) 01/01/2018 0810     Results from last 7 days   Lab Units 11/23/22  1328   PH KALEE  7 265*   PCO2 KALEE mm Hg 41 8*   PO2 KALEE mm Hg 38 2   HCO3 KALEE mmol/L 18 5*   O2 CONTENT KALEE ml/dL 12 4   O2 HGB, VENOUS % 62 2     Results from last 7 days   Lab Units 11/23/22 2024   ACETAMINOPHEN LVL ug/mL 2 5*   ETHANOL LVL mg/dL <3   SALICYLATE LVL mg/dL 3     Invalid input(s): EXTPREGUR    Imaging Studies: I have personally reviewed pertinent reports  Minutes of critical care time 45  Critical care time was exclusive of seperately billable procedures and teaching time  Critical care was necessary to treat or prevent imminent or life-threatening deterioration of the following condition (s): cardiac failure, circulatory failure, CNS failure/comprimise, dehydration, endocrine crisis, hepatic failure, metabolic crisis, renal failure, respiratory failure, sepsis, shock, toxidrome, trauma    Critical care time was spent personally by me on the following activities: obtaining history from patient/surrogate, development of a treatment plan, discussions with primary provider(s), evaluation of patient's response to the treatment, examination of the patient, recommending treatments and interventions, re-evaluation of the patient's condition, review of old charts, recommending/reviewing laboratory studies, recommending/reviewing of radiographic studies

## 2022-11-24 NOTE — ASSESSMENT & PLAN NOTE
Patient presented with altered mental status, currently lethargic but responds to verbal stimuli, follows commands    Acute metabolic encephalopathy present on admission, likely due to underlying infection

## 2022-11-24 NOTE — PLAN OF CARE
Problem: MOBILITY - ADULT  Goal: Maintain or return to baseline ADL function  Description: INTERVENTIONS:  -  Assess patient's ability to carry out ADLs; assess patient's baseline for ADL function and identify physical deficits which impact ability to perform ADLs (bathing, care of mouth/teeth, toileting, grooming, dressing, etc )  - Assess/evaluate cause of self-care deficits   - Assess range of motion  - Assess patient's mobility; develop plan if impaired  - Assess patient's need for assistive devices and provide as appropriate  - Encourage maximum independence but intervene and supervise when necessary  - Involve family in performance of ADLs  - Assess for home care needs following discharge   - Consider OT consult to assist with ADL evaluation and planning for discharge  - Provide patient education as appropriate  Outcome: Progressing  Goal: Maintains/Returns to pre admission functional level  Description: INTERVENTIONS:  - Perform BMAT or MOVE assessment daily    - Set and communicate daily mobility goal to care team and patient/family/caregiver  - Collaborate with rehabilitation services on mobility goals if consulted  - Perform Range of Motion multiple times a day  - Reposition patient every 2 hours    - Dangle patient multiple times a day  - Stand patient multiple times a day  - Ambulate patient multiple times a day  - Out of bed to chair 4 times a day   - Out of bed for meals 3 times a day  - Out of bed for toileting  - Record patient progress and toleration of activity level   Outcome: Progressing     Problem: Prexisting or High Potential for Compromised Skin Integrity  Goal: Skin integrity is maintained or improved  Description: INTERVENTIONS:  - Identify patients at risk for skin breakdown  - Assess and monitor skin integrity  - Assess and monitor nutrition and hydration status  - Monitor labs   - Assess for incontinence   - Turn and reposition patient  - Assist with mobility/ambulation  - Relieve pressure over bony prominences  - Avoid friction and shearing  - Provide appropriate hygiene as needed including keeping skin clean and dry  - Evaluate need for skin moisturizer/barrier cream  - Collaborate with interdisciplinary team   - Patient/family teaching  - Consider wound care consult   Outcome: Progressing     Problem: PAIN - ADULT  Goal: Verbalizes/displays adequate comfort level or baseline comfort level  Description: Interventions:  - Encourage patient to monitor pain and request assistance  - Assess pain using appropriate pain scale  - Administer analgesics based on type and severity of pain and evaluate response  - Implement non-pharmacological measures as appropriate and evaluate response  - Consider cultural and social influences on pain and pain management  - Notify physician/advanced practitioner if interventions unsuccessful or patient reports new pain  Outcome: Progressing     Problem: INFECTION - ADULT  Goal: Absence or prevention of progression during hospitalization  Description: INTERVENTIONS:  - Assess and monitor for signs and symptoms of infection  - Monitor lab/diagnostic results  - Monitor all insertion sites, i e  indwelling lines, tubes, and drains  - Monitor endotracheal if appropriate and nasal secretions for changes in amount and color  - [place] appropriate cooling/warming therapies per order  - Administer medications as ordered  - Instruct and encourage patient and family to use good hand hygiene technique  - Identify and instruct in appropriate isolation precautions for identified infection/condition  Outcome: Progressing  Goal: Absence of fever/infection during neutropenic period  Description: INTERVENTIONS:  - Monitor WBC    Outcome: Progressing     Problem: INFECTION - ADULT  Goal: Absence or prevention of progression during hospitalization  Description: INTERVENTIONS:  - Assess and monitor for signs and symptoms of infection  - Monitor lab/diagnostic results  - Monitor all insertion sites, i e  indwelling lines, tubes, and drains  - Monitor endotracheal if appropriate and nasal secretions for changes in amount and color  - [place] appropriate cooling/warming therapies per order  - Administer medications as ordered  - Instruct and encourage patient and family to use good hand hygiene technique  - Identify and instruct in appropriate isolation precautions for identified infection/condition  Outcome: Progressing  Goal: Absence of fever/infection during neutropenic period  Description: INTERVENTIONS:  - Monitor WBC    Outcome: Progressing     Problem: SAFETY ADULT  Goal: Maintain or return to baseline ADL function  Description: INTERVENTIONS:  -  Assess patient's ability to carry out ADLs; assess patient's baseline for ADL function and identify physical deficits which impact ability to perform ADLs (bathing, care of mouth/teeth, toileting, grooming, dressing, etc )  - Assess/evaluate cause of self-care deficits   - Assess range of motion  - Assess patient's mobility; develop plan if impaired  - Assess patient's need for assistive devices and provide as appropriate  - Encourage maximum independence but intervene and supervise when necessary  - Involve family in performance of ADLs  - Assess for home care needs following discharge   - Consider OT consult to assist with ADL evaluation and planning for discharge  - Provide patient education as appropriate  Outcome: Progressing  Goal: Maintains/Returns to pre admission functional level  Description: INTERVENTIONS:  - Perform BMAT or MOVE assessment daily    - Set and communicate daily mobility goal to care team and patient/family/caregiver  - Collaborate with rehabilitation services on mobility goals if consulted  - Perform Range of Motion multiple times a day  - Reposition patient every 2 hours    - Dangle patient multiple times a day  - Stand patient multiple times a day  - Ambulate patient multiple times a day  - Out of bed to chair 4 times a day   - Out of bed for meals 3 times a day  - Out of bed for toileting  - Record patient progress and toleration of activity level   Outcome: Progressing  Goal: Patient will remain free of falls  Description: INTERVENTIONS:  - Educate patient/family on patient safety including physical limitations  - Instruct patient to call for assistance with activity   - Consult OT/PT to assist with strengthening/mobility   - Keep Call bell within reach  - Keep bed low and locked with side rails adjusted as appropriate  - Keep care items and personal belongings within reach  - Initiate and maintain comfort rounds  - Make Fall Risk Sign visible to staff  - Offer Toileting every 2 Hours, in advance of need  - Initiate/Maintain bed alarm  - Apply yellow socks and bracelet for high fall risk patients  - Consider moving patient to room near nurses station  Outcome: Progressing     Problem: DISCHARGE PLANNING  Goal: Discharge to home or other facility with appropriate resources  Description: INTERVENTIONS:  - Identify barriers to discharge w/patient and caregiver  - Arrange for needed discharge resources and transportation as appropriate  - Identify discharge learning needs (meds, wound care, etc )  - Arrange for interpretive services to assist at discharge as needed  - Refer to Case Management Department for coordinating discharge planning if the patient needs post-hospital services based on physician/advanced practitioner order or complex needs related to functional status, cognitive ability, or social support system  Outcome: Progressing     Problem: Knowledge Deficit  Goal: Patient/family/caregiver demonstrates understanding of disease process, treatment plan, medications, and discharge instructions  Description: Complete learning assessment and assess knowledge base    Interventions:  - Provide teaching at level of understanding  - Provide teaching via preferred learning methods  Outcome: Progressing     Problem: Nutrition/Hydration-ADULT  Goal: Nutrient/Hydration intake appropriate for improving, restoring or maintaining nutritional needs  Description: Monitor and assess patient's nutrition/hydration status for malnutrition  Collaborate with interdisciplinary team and initiate plan and interventions as ordered  Monitor patient's weight and dietary intake as ordered or per policy  Utilize nutrition screening tool and intervene as necessary  Determine patient's food preferences and provide high-protein, high-caloric foods as appropriate       INTERVENTIONS:  - Monitor oral intake, urinary output, labs, and treatment plans  - Assess nutrition and hydration status and recommend course of action  - Evaluate amount of meals eaten  - Assist patient with eating if necessary   - Allow adequate time for meals  - Recommend/ encourage appropriate diets, oral nutritional supplements, and vitamin/mineral supplements  - Order, calculate, and assess calorie counts as needed  - Recommend, monitor, and adjust tube feedings and TPN/PPN based on assessed needs  - Assess need for intravenous fluids  - Provide specific nutrition/hydration education as appropriate  - Include patient/family/caregiver in decisions related to nutrition  Outcome: Progressing     Problem: Potential for Falls  Goal: Patient will remain free of falls  Description: INTERVENTIONS:  - Educate patient/family on patient safety including physical limitations  - Instruct patient to call for assistance with activity   - Consult OT/PT to assist with strengthening/mobility   - Keep Call bell within reach  - Keep bed low and locked with side rails adjusted as appropriate  - Keep care items and personal belongings within reach  - Initiate and maintain comfort rounds  - Make Fall Risk Sign visible to staff  - Offer Toileting every 2 Hours, in advance of need  - Initiate/Maintain bed alarm  - Apply yellow socks and bracelet for high fall risk patients  - Consider moving patient to room near nurses station  Outcome: Progressing

## 2022-11-24 NOTE — ASSESSMENT & PLAN NOTE
No clear source, possible source would include pneumonia, possible acute pharyngitis given abnormal CT scan    Patient started on vancomycin and cefepime, dose of Decadron ordered  Sirs criteria with possible infection, consistent with sepsis, now septic shock given ongoing hypotension, will trend repeat lactic acid level, patient is well perfused  Patient started on IV Levophed given lack of correction of hypotension with IV fluid

## 2022-11-24 NOTE — QUICK NOTE
I called the patient's preferred contacts per the contact list   Mr Isidro Tuttle  I discussed the current management plan and ongoing hospital course work for Mr Raphael Woodard    His friend noted that at baseline the patient is alert oriented x3, he is a former   and retired 1 year ago  The patient lives alone at home with no direct family relatives  More over: The patient drinks alcohol on daily basis  -his friend last contacted him 1 week ago and the patient was at his baseline normal status in terms of alertness and orientation with no concerns

## 2022-11-24 NOTE — H&P
[address] [facility] [address] [place]  [facility] 1955, 78 y o  [facility] MRN: [id number]  Unit/Bed#: TR05 Encounter: [id number]  Primary Care Provider: Shanda Spencer DO   Date and time admitted to hospital: 11/23/2022  1:09 PM    * Hypoxia  Assessment & Plan  Acute respiratory failure with hypoxia, present on admission, initial chest x-ray negative for acute pathology, possible worsening airway obstruction, verses combination of airway obstruction from increased secretions  Follow-up repeat chest x-ray, patient placed on Vapotherm, admitting to ICU, patient able swallow current secretions, patient is cooperative, elevate head of bed     Sepsis ([facility] [address] )  Assessment & Plan  No clear source, possible source would include pneumonia, possible acute pharyngitis given abnormal CT scan    Patient started on vancomycin and cefepime, dose of Decadron ordered  Sirs criteria with possible infection, consistent with sepsis, now septic shock given ongoing hypotension, will trend repeat lactic acid level, patient is well perfused  Patient started on IV Levophed given lack of correction of hypotension with IV fluid  Metabolic encephalopathy  Assessment & Plan  Patient presented with altered mental status, currently lethargic but responds to verbal stimuli, follows commands    Acute metabolic encephalopathy present on admission, likely due to underlying infection    Pharyngeal swelling  Assessment & Plan  Possible component of angioedema, possible infectious component, continue antibiotics, patient given steroids  Hypothermia  Assessment & Plan  Hypothermia due to exposure in cold house, unknown downtime    Closely monitor metabolic status with rewarming, monitor calcium, patient did have transient hypoglycemia    Continue D5 normal saline infusion    Hyponatremia  Assessment & Plan  Likely hypovolemic hyponatremia, monitor electrolytes closely, will order BMP, magnesium and phosphorus every 2 hours    Metabolic acidosis with increased anion gap and accumulation of organic acids  Assessment & Plan  Metabolic acidosis with anion gap      VTE Pharmacologic Prophylaxis:   High Risk (Score >/= 5) - Pharmacological DVT Prophylaxis Ordered: enoxaparin (Lovenox)  Sequential Compression Devices Ordered  Code Status full code  Discussion with family: Attempted to contact the 2 friends listed in the medical chart, no answer, 1 of the phone numbers is no longer an active number  Anticipated Length of Stay: Patient will be admitted on an inpatient basis with an anticipated length of stay of greater than 2 midnights secondary to Severe sepsis with septic shock  Total Time for Visit, including Counseling / Coordination of Care: 90 minutes critical care time 50 minutes, this time did not include any procedures, this time was spent in direct patient care, coordinating care with the ER team and with nursing staff directly at bedside    Chief Complaint:  Altered mental status    History of Present Illness:  Usman Barr is a 78 y o  male with a PMH of hypertension who presents with altered mental status from home  Limited history, see note below as per ER/EMS    "  Altered Mental Status        Patient presents for altered mental status, found on the floor greater than 24 hours      Patient presents via EMS for evaluation of altered mental status  His neighbor had not seen him for 2 days, corroborated by the presence of mail piled up in his mailbox, per EMS  His dog was found dead in the home  There were human and canine feces throughout the apartment  The patient was found on the floor, soiled in urine and stool, confused but able to state his name  He was cold to the touch but had no external signs of trauma other than healing abrasions to his knees  He has no documented significant medical history and was last in [address] records for a well visit to his PCP, Dr Khushboo Wu, in July    His pre-hospital blood sugar was 97 mg/dL  An IV was established and saline started to be infused pre-hospital   Upon arrival to the ED, he is cool to the touch, but able to follow commands and answer most questions  He does appear to be having difficulty articulating but is not dysarthric  He does appear to be alert and oriented to me  No recent travel or sick contacts  Denies f/c, HA, CP, SOB, abdominal pain, n/v/d  12 system ROS o/w negative  Review of Systems:  Review of Systems   All other systems reviewed and are negative  Garbled speech, able to answer questions appropriately, continue me the town that he lives in, he is somewhat difficult to understand but oriented to person place and time  Patient denies any pain, no headache, no neck pain, no fever no chills, no abdominal pain, no extremity pain  Patient is hypothermic but denies feeling cold  No reported fever, prior history is somewhat limited    Past Medical and Surgical History:   Past Medical History:   Diagnosis Date   • Hyperlipidemia    • Hypertension        Past Surgical History:   Procedure Laterality Date   • NO PAST SURGERIES         Meds/Allergies:  Prior to Admission medications    Medication Sig Start Date End Date Taking? Authorizing Provider   Garlic (GARLIQUE PO) Take by mouth    Historical Provider, MD   Multiple Vitamin (MULTIVITAMIN) tablet Take 1 tablet by mouth daily    Historical Provider, MD   Omega-3 Fatty Acids (FISH OIL PO) Take by mouth    Historical Provider, MD   Probiotic Product (ALIGN PO) Take by mouth    Historical Provider, MD   psyllium (METAMUCIL) 58 6 % packet Take 1 packet by mouth daily  Patient not taking: Reported on 7/19/2022    Historical Provider, MD   valsartan-hydrochlorothiazide (DIOVAN-HCT) 320-25 MG per tablet Take 1 tablet by mouth daily 7/19/22   Saira Bob, DO     I have reviewed home medications using recent Epic encounter      Allergies: No Known Allergies    Social History:  Marital Status: Single   Occupation:  Unknown  Patient Pre-hospital Living Situation: Home independentlyPatient Pre-hospital Level of Mobility: Apparently mobile at baseline, is independent without need of assistive device  Patient Pre-hospital Diet Restrictions:  None  Substance Use History:   Social History     Substance and Sexual Activity   Alcohol Use Yes    Comment: social     Social History     Tobacco Use   Smoking Status Never   Smokeless Tobacco Never     Social History     Substance and Sexual Activity   Drug Use No       Family History:  Family History   Problem Relation Age of Onset   • Stroke Mother    • Hypertension Father    • Diabetes Father        Physical Exam:     Vitals:   Blood Pressure: 96/53 (11/23/22 1845)  Pulse: 72 (11/23/22 1845)  Temperature: (!) 91 6 °F (33 1 °C) (11/23/22 1845)  Temp Source: Core (11/23/22 1800)  Respirations: 20 (11/23/22 1845)  Height: 5' 9" (175 3 cm) (11/23/22 1315)  Weight - Scale: 85 4 kg (188 lb 4 4 oz) (11/23/22 1315)  SpO2: 97 % (11/23/22 1845)    Physical Exam  Vitals and nursing note reviewed  Exam conducted with a chaperone present  Constitutional:       General: He is not in acute distress  Appearance: He is ill-appearing  He is not toxic-appearing or diaphoretic  Comments: Lethargic, following commands   HENT:      Head: Normocephalic and atraumatic  Right Ear: External ear normal       Left Ear: External ear normal       Mouth/Throat:      Pharynx: No oropharyngeal exudate or posterior oropharyngeal erythema  Comments: Patient does have some tongue swelling, tongue protrudes at midline, uvula does not appear edematous, there is no retropharyngeal erythema, no evidence of purulence    No oral lesions noted, overall poor dentition  Eyes:      General: No scleral icterus  Right eye: No discharge  Left eye: No discharge  Pupils: Pupils are equal, round, and reactive to light     Cardiovascular:      Rate and Rhythm: Normal rate       Pulses: Normal pulses  Pulmonary:      Effort: Pulmonary effort is normal  No respiratory distress  Breath sounds: Rhonchi present  No wheezing or rales  Abdominal:      General: Bowel sounds are normal  There is no distension  Palpations: Abdomen is soft  Tenderness: There is no abdominal tenderness  Musculoskeletal:      Cervical back: Normal range of motion  Skin:     Findings: No lesion or rash  Comments: Peripheral extremities with mild erythema, warm to touch palpable pulses 2/4 in all extremities    No rashes or lesions, patient denies any pruritus, patient with some healing scabs bilateral knees, no new abrasions noted   Neurological:      Cranial Nerves: No cranial nerve deficit  Sensory: No sensory deficit  Motor: No weakness  Coordination: Coordination normal    Psychiatric:      Comments: Flat affect          Additional Data:     Lab Results:  Results from last 7 days   Lab Units 11/23/22  1320   WBC Thousand/uL 7 31   HEMOGLOBIN g/dL 13 4   HEMATOCRIT % 37 4   PLATELETS Thousands/uL 322     Results from last 7 days   Lab Units 11/23/22  1846   SODIUM mmol/L 123*   POTASSIUM mmol/L 3 1*   CHLORIDE mmol/L 90*   CO2 mmol/L 18*   BUN mg/dL 28*   CREATININE mg/dL 0 85   ANION GAP mmol/L 15*   CALCIUM mg/dL 8 2*   GLUCOSE RANDOM mg/dL 116     Results from last 7 days   Lab Units 11/23/22  1320   INR  1 13     Results from last 7 days   Lab Units 11/23/22  1657 11/23/22  1313   POC GLUCOSE mg/dl 79 71         Results from last 7 days   Lab Units 11/23/22  1328   LACTIC ACID mmol/L 0 9   PROCALCITONIN ng/ml 0 26*       Lines/Drains:  Invasive Devices     Central Venous Catheter Line  Duration           CVC Central Lines 11/23/22 Triple Right Subclavian <1 day          Peripheral Intravenous Line  Duration           Peripheral IV 11/23/22 Dorsal (posterior); Left Hand <1 day    Peripheral IV 11/23/22 Proximal;Right;Ventral (anterior) Forearm <1 day Central Line:  Goal for removal: Will discontinue when hemodynamically stable  Imaging: Reviewed radiology reports from this admission including: chest xray, CT head and CT neck  X-ray chest 1 view portable   Final Result by Belkys Livingston MD (11/23 6973)      No acute cardiopulmonary disease  In the setting of clinically suspected/proven COVID-19, this plain film appearance does not contain findings that raise concern for viral pneumonia such as COVID-19, but does not rule out this diagnosis  Workstation performed: [id number]         CTA head and neck w wo contrast   Final Result by Javed Roa MD (11/23 6752)      1  No acute intracranial CT abnormality  2   Patent major vasculature of [place] without high-grade stenosis  3   No hemodynamically significant stenosis of cervical carotid arteries  4   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  5   Significant retropharyngeal effusion extending from C1 through inferior C5 effacing nasopharyngeal and pharyngeal airways  No apparent rim enhancement to suggest abscess  Uvula appears edematous  Recommend ENT consultation  6   Acute sinusitis  7   Fluid-filled esophagus extending to the level of the neck base  Correlate for GERD  I personally discussed this study with CAMERON MEJIA on 11/23/2022 at 2:26 PM                Workstation performed: [id number]         XR chest 1 view portable    (Results Pending)       EKG and Other Studies Reviewed on Admission:   · EKG: NSR  HR 62 beats per minute  ** Please Note: This note has been constructed using a voice recognition system   **

## 2022-11-24 NOTE — PROGRESS NOTES
[address] [facility]  Progress Note Enid Nolasco 1955, 78 y o  male MRN: [id number]  Unit/Bed#: 407-01 Encounter: [id number]  Primary Care Provider: Иван Taylor DO   Date and time admitted to hospital: 2022  1:09 PM    * Septic shock [facility])  Assessment & Plan  Background:  Patient presented with altered mental status, lethargic but responds to verbal stimuli, follows commands  Acute metabolic encephalopathy present on admission, likely due to underlying infection    Update:()   Patient noted at bedside to be withdrawing with painful stimuli, not following commands, nonverbal, asymmetrical pupils  Right pupil nonreactive to light  GCS:  5  ABG:  PH 7 469, PO2:  87, pCO2:  22, HC03:  16  RB  Pan CT scan with contrast ordered; pending  ( stat)    Plan as following:  Given his deteriorating mental state and nonspecific blood work to date at this time considering septic shock given his hypotension with pressor support, lethargic, tachypnea, fever and CTA findings regarding retropharyngeal soft tissue swelling  Cannot rule out anoxic brain injury given the unknown timing and duration by which the patient was in a coma state when was found by EMS  -obtain pan CT scan with contrast today   -continue high-flow per respiratory therapy protocol   -day 2 cefepime and doxycycline  Continue antibiotics ( suspected infection of unknown source)   -started on Decadron 4 mg IV Q 8  -currently maintained on Levophed drip at 1 mL/hour  Wean and titrate per protocol   -Moreno's catheter in place; day 2   -follow-up on blood and urine cultures  -document I/OS, daily weight   -cardiopulmonary monitoring; telemetry  -will consider Neurology consult and possible MRI head and neck if patient continues to be in altered mental state      Acute respiratory failure (HCC)  Assessment & Plan  Mostly due to to be in septic shock however unclear etiology     -continue high-flow per respiratory protocol   -wean and titrate per protocol   -target SpO2> 93%    Hyponatremia  Assessment & Plan  Background:  Likely hyponatremia due to hypovolemia on top of thiazide diuretic use     -received total 4 L IV normal saline boluses within the past 24 hour  -also is maintained on maintenance IV fluids of half dextrose with normal saline at 200 mils per hour     -serum sodium levels normalizing; trending up to 130 from 123 on presentation  Plan:  Given the patient's borderline low blood pressures and his need for pressor will continue IV fluids at maintenance  -currently Levophed is at 1 ml/hr   -wean and titrate Levophed drip per protocol   -re-evaluate Q 4 neuro checks, cardiopulmonary check, telemetry   -monitor I/OS with Moreno's catheter in place   -daily weight  -morning labs  Hypothermia  Assessment & Plan  Hypothermia due to exposure in cold house, unknown downtime    Closely monitor metabolic status with rewarming, monitor calcium, patient did have transient hypoglycemia  Continue D5 normal saline infusion and external rewarming as needed     -resolved  Currently borderline hyperthermic  Hypoxia  Assessment & Plan  Acute respiratory failure with hypoxia, present on admission, initial chest x-ray negative for acute pathology, possible worsening airway obstruction, verses combination of airway obstruction from increased secretions  Refer to assessment and plan for septic shock and metabolic encephalopathy  Pharyngeal swelling  Assessment & Plan  Possible component of angioedema, possible infectious component, continue antibiotics, patient given steroids  -EBV panel ordered; pending  -CMV panel ordered; pending    -refer to assessment and plan for septic shock      Metabolic encephalopathy  Assessment & Plan  Background:  Patient presented with altered mental status, lethargic but responds to verbal stimuli, follows commands  Acute metabolic encephalopathy present on admission, likely due to underlying infection    Update:()   Patient noted at bedside to be withdrawing with painful stimuli, not following commands, nonverbal, asymmetrical pupils  Right pupil nonreactive to light  GCS:  5  ABG:  PH 7 469, PO2:  87, pCO2:  22, HC03:  16  RB  Pan CT scan with contrast ordered; pending  ( stat)    Plan as following:  Given his deteriorating mental state and nonspecific blood work to date at this time considering septic shock given his hypotension with pressor support, lethargic, tachypnea, fever and CTA findings regarding retropharyngeal soft tissue swelling  Cannot rule out anoxic brain injury given the unknown timing and duration by which the patient was in a coma state when was found by EMS  -obtain pan CT scan with contrast today   -continue high-flow per respiratory therapy protocol   -day 2 cefepime and doxycycline  Continue antibiotics ( suspected infection of unknown source)   -started on Decadron 4 mg IV Q 8  -Moreno's catheter in place; day 2   -document I/OS, daily weight   -cardiopulmonary monitoring; telemetry  -will consider Neurology consult and possible MRI head and neck if patient continues to be in altered mental state  Metabolic acidosis with increased anion gap and accumulation of organic acids-resolved as of 2022  Assessment & Plan  Metabolic acidosis with anion gap    -resolved per updated ABG on           VTE Pharmacologic Prophylaxis:   Pharmacologic: Enoxaparin (Lovenox)  Mechanical VTE Prophylaxis in Place: Yes    Patient Centered Rounds: I have performed bedside rounds with nursing staff today  Discussions with Specialists or Other Care Team Provider:  Close friend  Education and Discussions with Family / Patient:  Family over the phone    Time Spent for Care: 30 minutes  More than 50% of total time spent on counseling and coordination of care as described above      Current Length of Stay: 1 day(s)    Current Patient Status: Inpatient   Certification Statement: The patient will continue to require additional inpatient hospital stay due to Septic shock with altered mental status        Code Status: Level 1 - Full Code      Subjective:   Patient was seen today at bedside  Continues to be in altered mental state  None arousable  Nonverbal   GCS score 5     -continues to be on high-flow oxygen  -per discussion with nursing staff the patient was initially with a GCS of 10     -Moreno's catheter in place; functional     Objective:     Vitals:   Temp (24hrs), Av °F (34 4 °C), Min:84 9 °F (29 4 °C), Max:100 2 °F (37 9 °C)    Temp:  [84 9 °F (29 4 °C)-100 2 °F (37 9 °C)] 99 5 °F (37 5 °C)  HR:  [] 92  Resp:  [18-42] 34  BP: ()/(33-90) 109/48  SpO2:  [83 %-100 %] 98 %  Body mass index is 28 49 kg/m²  Input and Output Summary (last 24 hours): Intake/Output Summary (Last 24 hours) at 2022 1015  Last data filed at 2022 1000  Gross per 24 hour   Intake 63 5 ml   Output 1960 ml   Net -1896 5 ml       Physical Exam:     Physical Exam  Vitals and nursing note reviewed  Constitutional:       Appearance: He is ill-appearing  HENT:      Nose: No congestion  Mouth/Throat:      Mouth: Mucous membranes are moist    Eyes:      Conjunctiva/sclera: Conjunctivae normal       Pupils: Pupils are equal, round, and reactive to light  Cardiovascular:      Rate and Rhythm: Normal rate and regular rhythm  Pulses: Normal pulses  Radial pulses are 2+ on the right side and 2+ on the left side  Dorsalis pedis pulses are 2+ on the right side and 2+ on the left side  Heart sounds: Normal heart sounds, S1 normal and S2 normal  No murmur heard  Pulmonary:      Effort: Tachypnea present  No accessory muscle usage, prolonged expiration, respiratory distress or retractions  Breath sounds: Normal air entry  Rales present  Abdominal:      General: Abdomen is flat  There is no distension  Palpations: Abdomen is soft  Musculoskeletal:      Cervical back: Normal range of motion and neck supple  No rigidity  Right lower leg: No edema  Left lower leg: No edema  Lymphadenopathy:      Cervical: No cervical adenopathy  Skin:     General: Skin is warm and dry  Capillary Refill: Capillary refill takes less than 2 seconds  Neurological:      Mental Status: He is disoriented and unresponsive  GCS: GCS eye subscore is 1  GCS verbal subscore is 1  GCS motor subscore is 1  Deep Tendon Reflexes: Reflexes abnormal       Reflex Scores:       Patellar reflexes are 0 on the right side and 2+ on the left side  Additional Data:     Labs:    Results from last 7 days   Lab Units 11/24/22  0504   WBC Thousand/uL 13 01*   HEMOGLOBIN g/dL 10 3*   HEMATOCRIT % 28 8*   PLATELETS Thousands/uL 353   BANDS PCT % 6   LYMPHO PCT % 0*   MONO PCT % 2*   EOS PCT % 0     Results from last 7 days   Lab Units 11/24/22  0504   SODIUM mmol/L 130*   POTASSIUM mmol/L 4 0   CHLORIDE mmol/L 97   CO2 mmol/L 16*   BUN mg/dL 23   CREATININE mg/dL 0 96   ANION GAP mmol/L 17*   CALCIUM mg/dL 7 8*   ALBUMIN g/dL 2 2*   TOTAL BILIRUBIN mg/dL 0 45   ALK PHOS U/L 124*   ALT U/L 21   AST U/L 34   GLUCOSE RANDOM mg/dL 153*     Results from last 7 days   Lab Units 11/24/22  0504   INR  1 21*     Results from last 7 days   Lab Units 11/24/22  0800 11/23/22  1657 11/23/22  1313   POC GLUCOSE mg/dl 169* 79 71         Results from last 7 days   Lab Units 11/24/22  0504 11/23/22  2024 11/23/22  1328   LACTIC ACID mmol/L  --  0 8 0 9   PROCALCITONIN ng/ml 0 70*  --  0 26*           * I Have Reviewed All Lab Data Listed Above  * Additional Pertinent Lab Tests Reviewed: [address] Admission Reviewed    Imaging:    Imaging Reports Reviewed Today Include:   XR chest 1 view portable   Final Result      Right-sided central venous catheter placement without pneumothorax              Workstation performed: [id number]         X-ray chest 1 view portable   Final Result      No acute cardiopulmonary disease  In the setting of clinically suspected/proven COVID-19, this plain film appearance does not contain findings that raise concern for viral pneumonia such as COVID-19, but does not rule out this diagnosis  Workstation performed: [id number]         CTA head and neck w wo contrast   Final Result      1  No acute intracranial CT abnormality  2   Patent major vasculature of [place] without high-grade stenosis  3   No hemodynamically significant stenosis of cervical carotid arteries  4   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  5   Significant retropharyngeal effusion extending from C1 through inferior C5 effacing nasopharyngeal and pharyngeal airways  No apparent rim enhancement to suggest abscess  Uvula appears edematous  Recommend ENT consultation  6   Acute sinusitis  7   Fluid-filled esophagus extending to the level of the neck base  Correlate for GERD  I personally discussed this study with CAMERON MEJIA on 11/23/2022 at 2:26 PM                Workstation performed: [id number]         CT head chest abdomen pelvis w contrast    (Results Pending)           Recent Cultures (last 7 days):     Results from last 7 days   Lab Units 11/23/22  1339 11/23/22  1328   BLOOD CULTURE  Received in Microbiology Lab  Culture in Progress  Received in Microbiology Lab  Culture in Progress         Last 24 Hours Medication List:   Current Facility-Administered Medications   Medication Dose Route Frequency Provider Last Rate   • cefepime  2,000 mg Intravenous Q12H Juanice Nailer Brian, DO 2,000 mg (11/24/22 0503)   • dexamethasone  4 mg Intravenous Q8H [address] Geena Rios MD     • dextrose 5 % and sodium chloride 0 9 %  200 mL/hr Intravenous Continuous Juanice Nailer Brian,  mL/hr (11/24/22 0105)   • enoxaparin  40 mg Subcutaneous Daily Jenn cason,      • norepinephrine  1-30 mcg/min Intravenous Titrated Jenn cason DO 4 mcg/min (11/24/22 0845)        Today, Patient Was Seen By: Lulu Cardoza MD    ** Please Note: Dictation voice to text software may have been used in the creation of this document   **

## 2022-11-25 ENCOUNTER — APPOINTMENT (OUTPATIENT)
Dept: NON INVASIVE DIAGNOSTICS | Facility: HOSPITAL | Age: 67
End: 2022-11-25

## 2022-11-25 ENCOUNTER — APPOINTMENT (INPATIENT)
Dept: RADIOLOGY | Facility: HOSPITAL | Age: 67
End: 2022-11-25

## 2022-11-25 PROBLEM — D64.89 OTHER SPECIFIED ANEMIAS: Status: ACTIVE | Noted: 2022-11-25

## 2022-11-25 PROBLEM — T68.XXXA HYPOTHERMIA: Status: RESOLVED | Noted: 2022-11-23 | Resolved: 2022-11-25

## 2022-11-25 PROBLEM — J18.9 PNEUMONIA: Status: ACTIVE | Noted: 2022-11-25

## 2022-11-25 PROBLEM — F10.939 ALCOHOL WITHDRAWAL (HCC): Status: ACTIVE | Noted: 2022-11-25

## 2022-11-25 PROBLEM — M53.3 SACRAL LESION: Status: ACTIVE | Noted: 2022-11-25

## 2022-11-25 PROBLEM — E87.1 HYPONATREMIA: Status: RESOLVED | Noted: 2022-11-23 | Resolved: 2022-11-25

## 2022-11-25 LAB
ANION GAP SERPL CALCULATED.3IONS-SCNC: 8 MMOL/L (ref 4–13)
AORTIC ROOT: 3.1 CM
APICAL FOUR CHAMBER EJECTION FRACTION: 71 %
ATRIAL RATE: 84 BPM
AV PEAK GRADIENT: 8 MMHG
BUN SERPL-MCNC: 15 MG/DL (ref 5–25)
CALCIUM SERPL-MCNC: 8.2 MG/DL (ref 8.3–10.1)
CHLORIDE SERPL-SCNC: 104 MMOL/L (ref 96–108)
CO2 SERPL-SCNC: 20 MMOL/L (ref 21–32)
CREAT SERPL-MCNC: 0.83 MG/DL (ref 0.6–1.3)
E WAVE DECELERATION TIME: 226 MS
ERYTHROCYTE [DISTWIDTH] IN BLOOD BY AUTOMATED COUNT: 13.5 % (ref 11.6–15.1)
FERRITIN SERPL-MCNC: 425 NG/ML (ref 8–388)
FRACTIONAL SHORTENING: 33 % (ref 28–44)
GFR SERPL CREATININE-BSD FRML MDRD: 91 ML/MIN/1.73SQ M
GLUCOSE SERPL-MCNC: 196 MG/DL (ref 65–140)
HCT VFR BLD AUTO: 25.8 % (ref 36.5–49.3)
HGB BLD-MCNC: 9.1 G/DL (ref 12–17)
INTERVENTRICULAR SEPTUM IN DIASTOLE (PARASTERNAL SHORT AXIS VIEW): 0.8 CM
INTERVENTRICULAR SEPTUM: 0.8 CM (ref 0.6–1.1)
IRON SATN MFR SERPL: 22 % (ref 20–50)
IRON SERPL-MCNC: 41 UG/DL (ref 65–175)
LEFT ATRIUM AREA SYSTOLE SINGLE PLANE A4C: 18.2 CM2
LEFT ATRIUM SIZE: 3.7 CM
LEFT INTERNAL DIMENSION IN SYSTOLE: 3.5 CM (ref 2.1–4)
LEFT VENTRICULAR INTERNAL DIMENSION IN DIASTOLE: 5.2 CM (ref 3.5–6)
LEFT VENTRICULAR POSTERIOR WALL IN END DIASTOLE: 1 CM
LEFT VENTRICULAR STROKE VOLUME: 80 ML
LVSV (TEICH): 80 ML
MAGNESIUM SERPL-MCNC: 2.1 MG/DL (ref 1.6–2.6)
MCH RBC QN AUTO: 31.2 PG (ref 26.8–34.3)
MCHC RBC AUTO-ENTMCNC: 35.3 G/DL (ref 31.4–37.4)
MCV RBC AUTO: 88 FL (ref 82–98)
MV E'TISSUE VEL-SEP: 14 CM/S
MV PEAK A VEL: 1.18 M/S
MV PEAK E VEL: 104 CM/S
MV STENOSIS PRESSURE HALF TIME: 66 MS
MV VALVE AREA P 1/2 METHOD: 3.33 CM2
P AXIS: 14 DEGREES
PHOSPHATE SERPL-MCNC: 2.3 MG/DL (ref 2.3–4.1)
PLATELET # BLD AUTO: 173 THOUSANDS/UL (ref 149–390)
PMV BLD AUTO: 8.9 FL (ref 8.9–12.7)
POTASSIUM SERPL-SCNC: 3.2 MMOL/L (ref 3.5–5.3)
PR INTERVAL: 152 MS
QRS AXIS: -8 DEGREES
QRSD INTERVAL: 102 MS
QT INTERVAL: 440 MS
QTC INTERVAL: 519 MS
RA PRESSURE ESTIMATED: 8 MMHG
RBC # BLD AUTO: 2.92 MILLION/UL (ref 3.88–5.62)
RIGHT ATRIUM AREA SYSTOLE A4C: 18.3 CM2
RIGHT VENTRICLE ID DIMENSION: 3.5 CM
RV PSP: 41 MMHG
SL CV LV EF: 65
SL CV PED ECHO LEFT VENTRICLE DIASTOLIC VOLUME (MOD BIPLANE) 2D: 129 ML
SL CV PED ECHO LEFT VENTRICLE SYSTOLIC VOLUME (MOD BIPLANE) 2D: 50 ML
SODIUM SERPL-SCNC: 132 MMOL/L (ref 135–147)
T WAVE AXIS: -68 DEGREES
TIBC SERPL-MCNC: 184 UG/DL (ref 250–450)
TR MAX PG: 33 MMHG
TR PEAK VELOCITY: 2.9 M/S
TRICUSPID ANNULAR PLANE SYSTOLIC EXCURSION: 2.4 CM
TRICUSPID VALVE PEAK REGURGITATION VELOCITY: 2.87 M/S
VENTRICULAR RATE: 84 BPM
WBC # BLD AUTO: 11.22 THOUSAND/UL (ref 4.31–10.16)

## 2022-11-25 RX ORDER — DOXYCYCLINE HYCLATE 50 MG/1
324 CAPSULE, GELATIN COATED ORAL
Status: DISCONTINUED | OUTPATIENT
Start: 2022-11-26 | End: 2022-11-26

## 2022-11-25 RX ORDER — POTASSIUM CHLORIDE 14.9 MG/ML
20 INJECTION INTRAVENOUS
Status: COMPLETED | OUTPATIENT
Start: 2022-11-25 | End: 2022-11-26

## 2022-11-25 RX ORDER — PANTOPRAZOLE SODIUM 40 MG/10ML
40 INJECTION, POWDER, LYOPHILIZED, FOR SOLUTION INTRAVENOUS
Status: DISCONTINUED | OUTPATIENT
Start: 2022-11-25 | End: 2022-11-26

## 2022-11-25 RX ADMIN — CEFEPIME HYDROCHLORIDE 2000 MG: 2 INJECTION, SOLUTION INTRAVENOUS at 04:33

## 2022-11-25 RX ADMIN — PANTOPRAZOLE SODIUM 40 MG: 40 INJECTION, POWDER, FOR SOLUTION INTRAVENOUS at 05:49

## 2022-11-25 RX ADMIN — THIAMINE HYDROCHLORIDE 500 MG: 100 INJECTION, SOLUTION INTRAMUSCULAR; INTRAVENOUS at 05:49

## 2022-11-25 RX ADMIN — CEFEPIME HYDROCHLORIDE 2000 MG: 2 INJECTION, SOLUTION INTRAVENOUS at 17:22

## 2022-11-25 RX ADMIN — THIAMINE HYDROCHLORIDE 500 MG: 100 INJECTION, SOLUTION INTRAMUSCULAR; INTRAVENOUS at 16:04

## 2022-11-25 RX ADMIN — THIAMINE HYDROCHLORIDE 500 MG: 100 INJECTION, SOLUTION INTRAMUSCULAR; INTRAVENOUS at 21:56

## 2022-11-25 RX ADMIN — DEXAMETHASONE SODIUM PHOSPHATE 4 MG: 4 INJECTION, SOLUTION INTRAMUSCULAR; INTRAVENOUS at 05:49

## 2022-11-25 RX ADMIN — DEXTROSE AND SODIUM CHLORIDE 175 ML/HR: 5; .9 INJECTION, SOLUTION INTRAVENOUS at 03:28

## 2022-11-25 RX ADMIN — DEXTROSE AND SODIUM CHLORIDE 75 ML/HR: 5; .9 INJECTION, SOLUTION INTRAVENOUS at 15:47

## 2022-11-25 RX ADMIN — ENOXAPARIN SODIUM 40 MG: 40 INJECTION SUBCUTANEOUS at 08:43

## 2022-11-25 RX ADMIN — POTASSIUM CHLORIDE 20 MEQ: 14.9 INJECTION, SOLUTION INTRAVENOUS at 08:43

## 2022-11-25 RX ADMIN — POTASSIUM CHLORIDE 20 MEQ: 14.9 INJECTION, SOLUTION INTRAVENOUS at 10:42

## 2022-11-25 NOTE — ASSESSMENT & PLAN NOTE
Hemoglobin with a drop within the past 4 days from 13 to 9 1 today     -intravascular volume expansion versus acute blood loss anemia  Plan:  Given the patient's history of GI bleed and hospitalizatio GI team were consulted; pending recommendations  Type and screen  Repeat H and H  Consider blood transfusion for HGB 7 or less  Fecal occult blood test 1/3 sent  Processing

## 2022-11-25 NOTE — PROGRESS NOTES
[facility] [address]  Progress Note Camilla Jensen 1955, 78 y o  male MRN: [id number]  Unit/Bed#:  Encounter: [id number]  Primary Care Provider: James Sparrow DO   Date and time admitted to hospital: 11/23/2022  1:09 PM    Pneumonia  Assessment & Plan  CT Ca P:  1  Bilateral pneumonia and pleural effusions  2   Distended esophagus containing fluid  Nonurgent evaluation with swallowing study is recommended  3   Sacral bony lesion has enlarged compared to the previous exam and is indeterminate  Recommend correlation with bone scan or MRI pelvis  Sclerotic lesion at the left pedicle at T11 is unchanged     -weaned off high-flow oxygen on 11/24 successful  Currently oxygen saturation within normal limits on 4 liters/minute of oxygen via nasal cannula  -Bcx: prelim growth gram positive cocci in clusters 1/4, gram variable rods prelim  1/4  2/4 NGTD   -UCx: negative to date     Plan:  In the picture of multifocal pneumonia on top of sepsis with associated delirium tremens   -continue IV antibiotics  -respiratory protocol  -oxygen therapy per protocol   -wean and titrate for target SpO2 93%>  -pulmonary toilet  -refer to assessment and plan for septic shock        Septic shock ([facility] [address] )  Assessment & Plan  Background:  Patient presented with altered mental status, lethargic but responds to verbal stimuli, follows commands  Acute metabolic encephalopathy present on admission, likely due to underlying infection    Update:(11/24)   Patient noted at bedside to be withdrawing with painful stimuli, not following commands, nonverbal, asymmetrical pupils  Right pupil nonreactive to light  GCS:  5  Continues to be on Levophed drip, high-flow oxygen  Case was discussed with toxicology due to concerns regarding delirium tremens  Per the family patient drinks alcohol on a daily basis    Given him being in a coma state for the past 72 hours at least high chance that his altered mental state is due to sepsis on top of delirium tremens  Pan CT scan 11/24:  1  Bilateral pneumonia and pleural effusions  2   Distended esophagus containing fluid  Nonurgent evaluation with swallowing study is recommended  3   Sacral bony lesion has enlarged compared to the previous exam and is indeterminate  4  Sclerotic lesion at the left pedicle at T11 is unchanged  5  No acute intracranial abnormality  Chronic microangiopathic changes  Air-fluid level right sphenoid sinus  Acute sphenoid sinusitis should be considered in the right clinical setting  11/25:   Patient is arousable, awake, oriented to person only, follows commands, GCS 13    -weaned off norepinephrine drip since 18 hours  Maintaining blood pressure within normal limits without pressure support  -currently on 4 liters/minute of oxygen via nasal cannula  Down titrated from high-flow oxygen   -speech and swallow eval done; recommended at continue NPO status for the next 24 hours and to re-evaluate accordingly  Plan as following:  -per CT scan findings suspected multifocal pneumonia on top of alcohol withdrawal with delirium tremens   -day 3 cefepime and vancomycin     -day 2 Decadron 4 mg IV Q 8  -Moreno's catheter in place; day 3   -follow-up on blood and urine cultures for final read  Plan on obtaining new set of cultures  -document I/OS, daily weight   -cardiopulmonary monitoring; telemetry  -will consider Neurology consult and possible MRI head and neck if patient continues to be in altered mental state   -septic shock resolved  * Metabolic encephalopathy  Assessment & Plan  Background:  Patient presented with altered mental status, lethargic but responds to verbal stimuli, follows commands  Acute metabolic encephalopathy present on admission, likely due to underlying infection      11/25:  Remarkably improved today  GCS score 13  Refer to assessment and plan for septic shock      Alcohol withdrawal ([facility] [address] )  Assessment & Plan  Background:  Social history as given by his close friends; daily alcohol drinker for the past 1 year at least   Smokes cigarettes I pack per day for same duration  Given the patient's estimated timing of the fall at home his altered mental state could be most likely caused by a combination between septic shock and alcohol withdrawal     -medical toxicology consultant on board; agreed on the plan as following:    -Ativan p r n  for agitation and seizures  -IV thiamine supplement  -IV folic acid supplement  -follow-up with rehab when stable  Other specified anemias  Assessment & Plan  Hemoglobin with a drop within the past 4 days from 13 to 9 1 today     -intravascular volume expansion versus acute blood loss anemia  Plan:  Given the patient's history of GI bleed and hospitalizatio GI team were consulted; pending recommendations  Type and screen  Repeat H and H  Consider blood transfusion for HGB 7 or less  Fecal occult blood test 1/3 sent  Processing  Sacral lesion  Assessment & Plan  Incidental finding noted on CT scan and x-rays as following:    3  Sacral bony lesion has enlarged compared to the previous exam and is indeterminate  Plan:  -follow-up with primary care provider accordingly  -May and spit for an MRI when stable  Acute respiratory failure (HCC)  Assessment & Plan    CT Ca P:  1  Bilateral pneumonia and pleural effusions  2   Distended esophagus containing fluid  Nonurgent evaluation with swallowing study is recommended  3   Sacral bony lesion has enlarged compared to the previous exam and is indeterminate  Recommend correlation with bone scan or MRI pelvis  Sclerotic lesion at the left pedicle at T11 is unchanged     -weaned off high-flow oxygen on 11/24 successful  Currently oxygen saturation within normal limits on 4 liters/minute of oxygen via nasal cannula      Plan:  In the picture of multifocal pneumonia on top of sepsis with associated delirium tremens   -continue IV antibiotics  -respiratory protocol  -oxygen therapy per protocol   -wean and titrate for target SpO2 93%>  -pulmonary toilet  -refer to assessment and plan for septic shock        Hypoxia  Assessment & Plan    CT Ca P:  1  Bilateral pneumonia and pleural effusions  2   Distended esophagus containing fluid  Nonurgent evaluation with swallowing study is recommended  3   Sacral bony lesion has enlarged compared to the previous exam and is indeterminate  Recommend correlation with bone scan or MRI pelvis  Sclerotic lesion at the left pedicle at T11 is unchanged     -weaned off high-flow oxygen on 11/24 successful  Currently oxygen saturation within normal limits on 4 liters/minute of oxygen via nasal cannula  Plan:  In the picture of multifocal pneumonia on top of sepsis with associated delirium tremens   -continue IV antibiotics  -respiratory protocol  -oxygen therapy per protocol   -wean and titrate for target SpO2 93%>  -pulmonary toilet  -refer to assessment and plan for septic shock      Pharyngeal swelling  Assessment & Plan  Possible component of angioedema, possible infectious component, continue antibiotics, patient given steroids  -EBV panel ordered; pending  -CMV panel ordered; pending    -refer to assessment and plan for septic shock  Hyponatremia-resolved as of 11/25/2022  Assessment & Plan  Background:  Likely hyponatremia due to hypovolemia on top of thiazide diuretic use     -received total 4 L IV normal saline boluses within the past 24 hour  -also is maintained on maintenance IV fluids of half dextrose with normal saline at 200 mils per hour     -serum sodium levels normalizing; trending up to 130 from 123 on presentation  Plan:  Given the patient's borderline low blood pressures and his need for pressor will continue IV fluids at maintenance    -currently Levophed is at 1 ml/hr   -wean and titrate Levophed drip per protocol   -re-evaluate Q 4 neuro checks, cardiopulmonary check, telemetry   -monitor I/OS with Moreno's catheter in place   -daily weight  -morning labs  Metabolic acidosis with increased anion gap and accumulation of organic acids-resolved as of 11/24/2022  Assessment & Plan  Metabolic acidosis with anion gap    -resolved per updated ABG on 11/24        Hypothermia-resolved as of 11/25/2022  Assessment & Plan  Hypothermia due to exposure in cold house, unknown downtime    Closely monitor metabolic status with rewarming, monitor calcium, patient did have transient hypoglycemia  Continue D5 normal saline infusion and external rewarming as needed     -resolved  VTE Pharmacologic Prophylaxis:   Pharmacologic: Enoxaparin (Lovenox)  Mechanical VTE Prophylaxis in Place: Yes    Patient Centered Rounds: I have performed bedside rounds with nursing staff today  Discussions with Specialists or Other Care Team Provider:  Gastroenterology, toxicology, case management, PT OT    Education and Discussions with Family / Patient:  Patient    Time Spent for Care: 30 minutes  More than 50% of total time spent on counseling and coordination of care as described above  Current Length of Stay: 2 day(s)    Current Patient Status: Inpatient   Certification Statement: The patient will continue to require additional inpatient hospital stay due to Ongoing management of sepsis, metabolic encephalopathy      Code Status: Level 1 - Full Code      Subjective:   Patient was seen today at bedside  Noted to be not in acute distress  Not ill appearing  Oriented to person only  Patient is remarkably improved compared to prior day he is able to follow commands and verbalize and communicate with limitations      -speech and swallow eval; failed  Continue NPO re-evaluate in 24 hours   -no documented bowel movements during hospitalization however on ANAHI stool was noted in the rectal area    -Moreno's catheter in place; actively draining light yellow urine with no clots or blood or pus noted  Objective:     Vitals:   Temp (24hrs), Av 9 °F (36 6 °C), Min:97 2 °F (36 2 °C), Max:98 5 °F (36 9 °C)    Temp:  [97 2 °F (36 2 °C)-98 5 °F (36 9 °C)] 97 3 °F (36 3 °C)  HR:  [74-86] 81  Resp:  [12-70] 70  BP: (115-166)/(58-81) 153/81  SpO2:  [95 %-100 %] 100 %  Body mass index is 28 35 kg/m²  Input and Output Summary (last 24 hours): Intake/Output Summary (Last 24 hours) at 2022 1205  Last data filed at 2022 1100  Gross per 24 hour   Intake 4201 15 ml   Output 1500 ml   Net 2701 15 ml       Physical Exam:     Physical Exam  Vitals and nursing note reviewed  Exam conducted with a chaperone present  Constitutional:       General: He is not in acute distress  Appearance: He is obese  He is ill-appearing  He is not toxic-appearing or diaphoretic  HENT:      Head: Normocephalic and atraumatic  Eyes:      Conjunctiva/sclera: Conjunctivae normal       Pupils: Pupils are equal, round, and reactive to light  Cardiovascular:      Rate and Rhythm: Normal rate and regular rhythm  Pulses:           Radial pulses are 2+ on the right side and 2+ on the left side  Dorsalis pedis pulses are 2+ on the right side and 2+ on the left side  Heart sounds: Normal heart sounds, S1 normal and S2 normal  No murmur heard  Pulmonary:      Effort: Pulmonary effort is normal       Breath sounds: Examination of the right-lower field reveals decreased breath sounds  Examination of the left-lower field reveals decreased breath sounds  Decreased breath sounds and rales present  No wheezing  Abdominal:      General: Abdomen is flat  Bowel sounds are normal       Palpations: Abdomen is soft  Tenderness: There is no abdominal tenderness  There is no guarding or rebound  Genitourinary:     Testes: Normal    Musculoskeletal:         General: No swelling  Normal range of motion  Cervical back: Normal range of motion and neck supple  No rigidity or tenderness  Right lower leg: No edema  Left lower leg: No edema  Skin:     General: Skin is warm  Capillary Refill: Capillary refill takes less than 2 seconds  Neurological:      Mental Status: He is alert  GCS: GCS eye subscore is 4  GCS verbal subscore is 4  GCS motor subscore is 5  Motor: Weakness present  Deep Tendon Reflexes:      Reflex Scores:       Bicep reflexes are 2+ on the right side and 2+ on the left side  Patellar reflexes are 2+ on the right side and 2+ on the left side  Comments: Weakness noted on all extremities an active and passive range of motion and against resistance however remarkably improved to yesterday when he was in a comatose state  Additional Data:     Labs:    Results from last 7 days   Lab Units 11/25/22  0431 11/24/22  0504   WBC Thousand/uL 11 22* 13 01*   HEMOGLOBIN g/dL 9 1* 10 3*   HEMATOCRIT % 25 8* 28 8*   PLATELETS Thousands/uL 173 353   BANDS PCT %  --  6   LYMPHO PCT %  --  0*   MONO PCT %  --  2*   EOS PCT %  --  0     Results from last 7 days   Lab Units 11/25/22  0431 11/24/22  1245 11/24/22  0504   SODIUM mmol/L 132*   < > 130*   POTASSIUM mmol/L 3 2*   < > 4 0   CHLORIDE mmol/L 104   < > 97   CO2 mmol/L 20*   < > 16*   BUN mg/dL 15   < > 23   CREATININE mg/dL 0 83   < > 0 96   ANION GAP mmol/L 8   < > 17*   CALCIUM mg/dL 8 2*   < > 7 8*   ALBUMIN g/dL  --   --  2 2*   TOTAL BILIRUBIN mg/dL  --   --  0 45   ALK PHOS U/L  --   --  124*   ALT U/L  --   --  21   AST U/L  --   --  34   GLUCOSE RANDOM mg/dL 196*   < > 153*    < > = values in this interval not displayed       Results from last 7 days   Lab Units 11/24/22  0504   INR  1 21*     Results from last 7 days   Lab Units 11/24/22  0800 11/23/22  1657 11/23/22  1313   POC GLUCOSE mg/dl 169* 79 71     Results from last 7 days   Lab Units 11/24/22  0504   HEMOGLOBIN A1C % 4 9     Results from last 7 days   Lab Units 11/24/22  0504 11/23/22  2024 11/23/22  1328   LACTIC ACID mmol/L  --  0 8 0 9   PROCALCITONIN ng/ml 0 70*  --  0 26*           * I Have Reviewed All Lab Data Listed Above  * Additional Pertinent Lab Tests Reviewed: [facility] Admission Reviewed    Imaging:    Imaging Reports Reviewed Today Include:   XR hips bilateral 3-4 vw w pelvis if performed   Final Result      No acute osseous abnormality  Again seen is a large sclerotic bone lesion in the upper sacrum  This has enlarged comparing the 11/24/2022 CT and a 1/1/2018 CT  This is suspicious for malignancy, and bone scan is recommended for further evaluation  Workstation performed: [id number]         CT chest abdomen pelvis w contrast   Final Result         1  Bilateral pneumonia and pleural effusions  2   Distended esophagus containing fluid  Nonurgent evaluation with swallowing study is recommended  3   Sacral bony lesion has enlarged compared to the previous exam and is indeterminate  Recommend correlation with bone scan or MRI pelvis  Sclerotic lesion at the left pedicle at T11 is unchanged  The study was marked in EPIC for significant notification  Workstation performed: [id number]         CT head wo contrast   Final Result      No acute intracranial abnormality  Chronic microangiopathic changes  Air-fluid level right sphenoid sinus  Acute sphenoid sinusitis should be considered in the right clinical setting  Workstation performed: [id number]         XR chest 1 view portable   Final Result      Right-sided central venous catheter placement without pneumothorax  Workstation performed: [id number]         X-ray chest 1 view portable   Final Result      No acute cardiopulmonary disease  In the setting of clinically suspected/proven COVID-19, this plain film appearance does not contain findings that raise concern for viral pneumonia such as COVID-19, but does not rule out this diagnosis                 Workstation performed: [id number] head and neck w wo contrast   Final Result      1  No acute intracranial CT abnormality  2   Patent major vasculature of [place] without high-grade stenosis  3   No hemodynamically significant stenosis of cervical carotid arteries  4   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  5   Significant retropharyngeal effusion extending from C1 through inferior C5 effacing nasopharyngeal and pharyngeal airways  No apparent rim enhancement to suggest abscess  Uvula appears edematous  Recommend ENT consultation  6   Acute sinusitis  7   Fluid-filled esophagus extending to the level of the neck base  Correlate for GERD                           I personally discussed this study with CAMERON MEJIA on 11/23/2022 at 2:26 PM                Workstation performed: [id number]                 Recent Cultures (last 7 days):     Results from last 7 days   Lab Units 11/23/22  1339 11/23/22  1328   GRAM STAIN RESULT  Gram positive cocci in clusters* Gram variable rods*       Last 24 Hours Medication List:   Current Facility-Administered Medications   Medication Dose Route Frequency Provider Last Rate   • cefepime  2,000 mg Intravenous Q12H Devante Cordon Brian, DO 2,000 mg (11/25/22 0433)   • dexamethasone  4 mg Intravenous Q8H Ana Alexander MD     • dextrose 5 % and sodium chloride 0 9 %  175 mL/hr Intravenous Continuous José Miguel Gu, PA-C 175 mL/hr (11/25/22 0328)   • enoxaparin  40 mg Subcutaneous Daily Devante Cordon Brian, DO     • LORazepam  0 5 mg Intravenous Q6H PRN Devante Cordon Brian, DO     • norepinephrine  1-30 mcg/min Intravenous Titrated Devante Cordon Brian, DO Stopped (11/24/22 1500)   • pantoprazole  40 mg Intravenous Q24H [address] Ivory Cabezas MD     • potassium chloride  20 mEq Intravenous Q2H Ivory Cabezas MD 20 mEq (11/25/22 1042)   • thiamine  500 mg Intravenous Q8H Devante Cordon Canelo dunes,  mg (11/25/22 5594)        Today, Patient Was Seen By: Ivory Cabezas MD    ** Please Note: Dictation voice to text software may have been used in the creation of this document   **

## 2022-11-25 NOTE — ASSESSMENT & PLAN NOTE
CT Ca P:  1  Bilateral pneumonia and pleural effusions  2   Distended esophagus containing fluid  Nonurgent evaluation with swallowing study is recommended  3   Sacral bony lesion has enlarged compared to the previous exam and is indeterminate  Recommend correlation with bone scan or MRI pelvis  Sclerotic lesion at the left pedicle at T11 is unchanged     -weaned off high-flow oxygen on 11/24 successful  Currently oxygen saturation within normal limits on 4 liters/minute of oxygen via nasal cannula  -Bcx: prelim growth gram positive cocci in clusters 1/4, gram variable rods prelim  1/4  2/4 NGTD   -UCx: negative to date     Plan:  In the picture of multifocal pneumonia on top of sepsis with associated delirium tremens   -continue IV antibiotics  -respiratory protocol  -oxygen therapy per protocol   -wean and titrate for target SpO2 93%>  -pulmonary toilet    -refer to assessment and plan for septic shock

## 2022-11-25 NOTE — PLAN OF CARE
Problem: OCCUPATIONAL THERAPY ADULT  Goal: Performs self-care activities at highest level of function for planned discharge setting  See evaluation for individualized goals  Description: Treatment Interventions: ADL retraining, Functional transfer training, UE strengthening/ROM, Endurance training, Cognitive reorientation, Patient/family training, Equipment evaluation/education, Energy conservation, Activityengagement, Fine motor coordination activities          See flowsheet documentation for full assessment, interventions and recommendations  Note: Limitation: Decreased ADL status, Decreased UE strength, Decreased Safe judgement during ADL, Decreased cognition, Decreased endurance, Decreased self-care trans, Decreased high-level ADLs, Decreased fine motor control  Prognosis: Fair  Assessment: Pt is a 78 y o  male who was admitted to [address] [place], [place] [id number] on 55/66/0224 w/ Metabolic encephalopathy  Pt has a past medical history of HTN and metabolic acidosis  Pt lives alone in a one floor apartment with 2-3 ADILENE per pt report  At baseline, pt was completing ADLs independently and functional mobility and transfers independently no AD  Currently, pt requires max to total A overall for ADLs and unable to assess mobility and transfers  Pt presents with deficits in the following categories: limited home support, difficulty performing ADLS, difficulty performing IADLS  and flat affect activity tolerance, endurance, sitting balance/tolerance, UE strength, FMC, GMC, memory, insight, safety  and judgement   These deficits, along with pt's  unsupportive home environment and cognitive impairments limit the pt's ability to safely engage in areas of occupation such as: eating, grooming, bathing/shower, toilet hygiene, dressing, functional mobility and household maintenance  Pt would benefit from continued skilled acute OT in order to maximize independence and safety in ADLs, mobility, and transfers   OT would recommend pt discharge to STR when medically cleared  Pt's raw score on the AM-PAC Daily Activity inpatient short form is 15, standardized score is 26 28, less than 39 4  Patients at this level are likely to benefit from DC to STR however, please refer to therapist recommendation for safe DC planning  OT will work towards the established goals       OT Discharge Recommendation: Post acute rehabilitation services

## 2022-11-25 NOTE — PLAN OF CARE
Problem: MOBILITY - ADULT  Goal: Maintain or return to baseline ADL function  Description: INTERVENTIONS:  -  Assess patient's ability to carry out ADLs; assess patient's baseline for ADL function and identify physical deficits which impact ability to perform ADLs (bathing, care of mouth/teeth, toileting, grooming, dressing, etc )  - Assess/evaluate cause of self-care deficits   - Assess range of motion  - Assess patient's mobility; develop plan if impaired  - Assess patient's need for assistive devices and provide as appropriate  - Encourage maximum independence but intervene and supervise when necessary  - Involve family in performance of ADLs  - Assess for home care needs following discharge   - Consider OT consult to assist with ADL evaluation and planning for discharge  - Provide patient education as appropriate  Outcome: Progressing  Goal: Maintains/Returns to pre admission functional level  Description: INTERVENTIONS:  - Perform BMAT or MOVE assessment daily    - Set and communicate daily mobility goal to care team and patient/family/caregiver  - Collaborate with rehabilitation services on mobility goals if consulted  - Perform Range of Motion multiple times a day  - Reposition patient every 2 hours    - Dangle patient multiple times a day  - Stand patient multiple times a day  - Ambulate patient multiple times a day  - Out of bed to chair 4 times a day   - Out of bed for meals 3 times a day  - Out of bed for toileting  - Record patient progress and toleration of activity level   Outcome: Progressing     Problem: Prexisting or High Potential for Compromised Skin Integrity  Goal: Skin integrity is maintained or improved  Description: INTERVENTIONS:  - Identify patients at risk for skin breakdown  - Assess and monitor skin integrity  - Assess and monitor nutrition and hydration status  - Monitor labs   - Assess for incontinence   - Turn and reposition patient  - Assist with mobility/ambulation  - Relieve pressure over bony prominences  - Avoid friction and shearing  - Provide appropriate hygiene as needed including keeping skin clean and dry  - Evaluate need for skin moisturizer/barrier cream  - Collaborate with interdisciplinary team   - Patient/family teaching  - Consider wound care consult   Outcome: Progressing     Problem: PAIN - ADULT  Goal: Verbalizes/displays adequate comfort level or baseline comfort level  Description: Interventions:  - Encourage patient to monitor pain and request assistance  - Assess pain using appropriate pain scale  - Administer analgesics based on type and severity of pain and evaluate response  - Implement non-pharmacological measures as appropriate and evaluate response  - Consider cultural and social influences on pain and pain management  - Notify physician/advanced practitioner if interventions unsuccessful or patient reports new pain  Outcome: Progressing     Problem: INFECTION - ADULT  Goal: Absence or prevention of progression during hospitalization  Description: INTERVENTIONS:  - Assess and monitor for signs and symptoms of infection  - Monitor lab/diagnostic results  - Monitor all insertion sites, i e  indwelling lines,   - [place] appropriate cooling/warming therapies per order  - Administer medications as ordered  - Instruct and encourage patient and family to use good hand hygiene technique  - Identify and instruct in appropriate isolation precautions for identified infection/condition  Outcome: Progressing  Goal: Absence of fever/infection during neutropenic period  Description: INTERVENTIONS:  - Monitor WBC    Outcome: Progressing     Problem: SAFETY ADULT  Goal: Maintain or return to baseline ADL function  Description: INTERVENTIONS:  -  Assess patient's ability to carry out ADLs; assess patient's baseline for ADL function and identify physical deficits which impact ability to perform ADLs (bathing, care of mouth/teeth, toileting, grooming, dressing, etc )  - Assess/evaluate cause of self-care deficits   - Assess range of motion  - Assess patient's mobility; develop plan if impaired  - Assess patient's need for assistive devices and provide as appropriate  - Encourage maximum independence but intervene and supervise when necessary  - Involve family in performance of ADLs  - Assess for home care needs following discharge   - Consider OT consult to assist with ADL evaluation and planning for discharge  - Provide patient education as appropriate  Outcome: Progressing  Goal: Maintains/Returns to pre admission functional level  Description: INTERVENTIONS:  - Perform BMAT or MOVE assessment daily    - Set and communicate daily mobility goal to care team and patient/family/caregiver  - Collaborate with rehabilitation services on mobility goals if consulted  - Perform Range of Motion multiple times a day  - Reposition patient every 2 hours    - Dangle patient multiple times a day  - Stand patient multiple times a day  - Ambulate patient multiple times a day  - Out of bed to chair 4 times a day   - Out of bed for meals 3 times a day  - Out of bed for toileting  - Record patient progress and toleration of activity level   Outcome: Progressing  Goal: Patient will remain free of falls  Description: INTERVENTIONS:  - Educate patient/family on patient safety including physical limitations  - Instruct patient to call for assistance with activity   - Consult OT/PT to assist with strengthening/mobility   - Keep Call bell within reach  - Keep bed low and locked with side rails adjusted as appropriate  - Keep care items and personal belongings within reach  - Initiate and maintain comfort rounds  - Make Fall Risk Sign visible to staff  - Offer Toileting every 2 Hours, in advance of need  - Initiate/Maintain bed/chair alarm  - Obtain necessary fall risk management equipment:   - Apply yellow socks and bracelet for high fall risk patients  - Consider moving patient to room near nurses station  Outcome: Progressing     Problem: DISCHARGE PLANNING  Goal: Discharge to home or other facility with appropriate resources  Description: INTERVENTIONS:  - Identify barriers to discharge w/patient and caregiver  - Arrange for needed discharge resources and transportation as appropriate  - Identify discharge learning needs (meds, wound care, etc )  - Refer to Case Management Department for coordinating discharge planning if the patient needs post-hospital services based on physician/advanced practitioner order or complex needs related to functional status, cognitive ability, or social support system  Outcome: Progressing     Problem: Knowledge Deficit  Goal: Patient/family/caregiver demonstrates understanding of disease process, treatment plan, medications, and discharge instructions  Description: Complete learning assessment and assess knowledge base  Interventions:  - Provide teaching at level of understanding  - Provide teaching via preferred learning methods  Outcome: Progressing     Problem: Nutrition/Hydration-ADULT  Goal: Nutrient/Hydration intake appropriate for improving, restoring or maintaining nutritional needs  Description: Monitor and assess patient's nutrition/hydration status for malnutrition  Collaborate with interdisciplinary team and initiate plan and interventions as ordered  Monitor patient's weight and dietary intake as ordered or per policy  Utilize nutrition screening tool and intervene as necessary  Determine patient's food preferences and provide high-protein, high-caloric foods as appropriate       INTERVENTIONS:  - Monitor oral intake, urinary output, labs, and treatment plans  - Assess nutrition and hydration status and recommend course of action  - Evaluate amount of meals eaten  - Assist patient with eating if necessary   - Allow adequate time for meals  - Recommend/ encourage appropriate diets, oral nutritional supplements, and vitamin/mineral supplements  - Order, calculate, and assess calorie counts as needed  - Recommend, monitor, and adjust tube feedings and TPN/PPN based on assessed needs  - Assess need for intravenous fluids  - Provide specific nutrition/hydration education as appropriate  - Include patient/family/caregiver in decisions related to nutrition  Outcome: Progressing     Problem: Potential for Falls  Goal: Patient will remain free of falls  Description: INTERVENTIONS:  - Educate patient/family on patient safety including physical limitations  - Instruct patient to call for assistance with activity   - Consult OT/PT to assist with strengthening/mobility   - Keep Call bell within reach  - Keep bed low and locked with side rails adjusted as appropriate  - Keep care items and personal belongings within reach  - Initiate and maintain comfort rounds  - Make Fall Risk Sign visible to staff  - Offer Toileting every 2  Hours, in advance of need  - Initiate/Maintain bed/chair alarm  - Obtain necessary fall risk management equipment:   - Apply yellow socks and bracelet for high fall risk patients  - Consider moving patient to room near nurses station  Outcome: Progressing

## 2022-11-25 NOTE — ASSESSMENT & PLAN NOTE
Incidental finding noted on CT scan and x-rays as following:    3  Sacral bony lesion has enlarged compared to the previous exam and is indeterminate  Plan:  -follow-up with primary care provider accordingly  -May and spit for an MRI when stable

## 2022-11-25 NOTE — ASSESSMENT & PLAN NOTE
CT Ca P:  1  Bilateral pneumonia and pleural effusions  2   Distended esophagus containing fluid  Nonurgent evaluation with swallowing study is recommended  3   Sacral bony lesion has enlarged compared to the previous exam and is indeterminate  Recommend correlation with bone scan or MRI pelvis  Sclerotic lesion at the left pedicle at T11 is unchanged     -weaned off high-flow oxygen on 11/24 successful  Currently oxygen saturation within normal limits on 4 liters/minute of oxygen via nasal cannula  Plan:  In the picture of multifocal pneumonia on top of sepsis with associated delirium tremens   -continue IV antibiotics  -respiratory protocol  -oxygen therapy per protocol   -wean and titrate for target SpO2 93%>  -pulmonary toilet    -refer to assessment and plan for septic shock

## 2022-11-25 NOTE — CONSULTS
Consultation - [facility]  Chanel Fatima 78 y o  male MRN: [id number]  Unit/Bed#: U 207 Encounter: [id number]        Consults    Reason for Consult / Principal Problem:     1  Abnormal CT scan, esophagus      ASSESSMENT AND PLAN:      1  Abnormal CT scan, esophagus    Patient was found at home down with altered mental status  On admission he was found to have sepsis and hypoxia  Patient noted to use alcohol on a regular basis  Seen by Medical toxicology with suspicion of delayed presentation of ethanol withdrawal   CT scan revealed possible pneumonia as well as distended esophagus containing fluid  Suspect significant esophagitis given frequent alcohol use  Would recommend EGD however would hold off at this time given his altered mental status and sepsis  Could also consider speech evaluation with video swallow when able  - outpatient EGD   - speech evaluation with video swallow     2  Anemia     Patient's hemoglobin on admission was normal at 13 4 however it has decreased to 9 1  No overt bleeding has been noted  A stool for occult and iron studies were sent but pending  Patient does have a history of GI bleeding in 2018 and he was recommended for outpatient EGD and colonoscopy however this was not done  Would recommend continuing to observe hemoglobin at this time given his sepsis  Would recommend EGD and colonoscopy as an outpatient unless overt bleeding noted and significant drop in hemoglobin  - trend H&H   - transfuse as needed   - monitor stool color and consistency   - outpatient EGD and colonoscopy    ______________________________________________________________________    HPI:  Chanel Fatima is a 78year old male with a PMH of alcohol use and hypertension that presented to the emergency department with altered mental status  According to EMR, patient was found in his house on the floor, confused, soiled in urine and stool    Patient was found to have sepsis and hypoxia  CT scan showed possible pneumonia as well as a distended esophagus containing fluid  Hemoglobin on admission was normal at 13 4 which has decreased to 9 1  A stool for occult and iron studies were ordered and is pending  Friend at bedside reports frequent regular alcohol use  Patient was seen by Medical toxicology and suspects delayed presentation of ethanol withdrawal   Patient sleepy but arousable to verbal stimuli  He follows commands and is cooperative but oriented to person only  He denies any abdominal pain at this time  Abdomen is soft and nontender on exam       REVIEW OF SYSTEMS:    Unable due to AMS         Historical Information   Past Medical History:   Diagnosis Date   • Hyperlipidemia    • Hypertension    • Hyponatremia 11/23/2022   • Hypothermia 35/07/9687   • Metabolic acidosis with increased anion gap and accumulation of organic acids 11/23/2022     Past Surgical History:   Procedure Laterality Date   • NO PAST SURGERIES       Social History   Social History     Substance and Sexual Activity   Alcohol Use Yes    Comment: social     Social History     Substance and Sexual Activity   Drug Use No     Social History     Tobacco Use   Smoking Status Never   Smokeless Tobacco Never     Family History   Problem Relation Age of Onset   • Stroke Mother    • Hypertension Father    • Diabetes Father        Meds/Allergies     Medications Prior to Admission   Medication   • Garlic (GARLIQUE PO)   • Multiple Vitamin (MULTIVITAMIN) tablet   • Omega-3 Fatty Acids (FISH OIL PO)   • Probiotic Product (ALIGN PO)   • psyllium (METAMUCIL) 58 6 % packet   • valsartan-hydrochlorothiazide (DIOVAN-HCT) 320-25 MG per tablet     Current Facility-Administered Medications   Medication Dose Route Frequency   • cefepime (MAXIPIME) IVPB (premix in dextrose) 2,000 mg 50 mL  2,000 mg Intravenous Q12H   • dexamethasone (DECADRON) injection 4 mg  4 mg Intravenous Q8H [address]   • dextrose 5 % and sodium chloride 0 9 % infusion  175 mL/hr Intravenous Continuous   • enoxaparin (LOVENOX) subcutaneous injection 40 mg  40 mg Subcutaneous Daily   • LORazepam (ATIVAN) injection 0 5 mg  0 5 mg Intravenous Q6H PRN   • norepinephrine (LEVOPHED) 4 mg (STANDARD CONCENTRATION) IV in sodium chloride 0 9% 250 mL  1-30 mcg/min Intravenous Titrated   • pantoprazole (PROTONIX) injection 40 mg  40 mg Intravenous Q24H MALLIKA   • potassium chloride 20 mEq IVPB (premix)  20 mEq Intravenous Q2H   • thiamine (VITAMIN B1) 500 mg in sodium chloride 0 9 % 50 mL IVPB  500 mg Intravenous Q8H       No Known Allergies        Objective     Blood pressure 153/81, pulse 81, temperature (!) 97 3 °F (36 3 °C), resp  rate (!) 70, height 5' 9" (1 753 m), weight 87 1 kg (192 lb), SpO2 100 %  Body mass index is 28 35 kg/m²  Intake/Output Summary (Last 24 hours) at 11/25/2022 1156  Last data filed at 11/25/2022 1100  Gross per 24 hour   Intake 4201 15 ml   Output 1500 ml   Net 2701 15 ml         PHYSICAL EXAM:      General Appearance:   Sleepy but arouseable to voice, cooperative, no distress   HEENT:   Normocephalic, atraumatic, anicteric      Neck:  Supple, symmetrical, trachea midline   Lungs:   Clear to auscultation bilaterally; no rales, rhonchi or wheezing; respirations unlabored    Heart[de-identified]   Regular rate and rhythm; no murmur, rub, or gallop  Abdomen:   Soft, non-tender, non-distended; normal bowel sounds; no masses, no organomegaly    Genitalia:   Deferred    Rectal:   Deferred    Extremities:  No cyanosis, clubbing or edema    Pulses:  2+ and symmetric all extremities    Skin:  No jaundice, rashes, or lesions             Lab Results:   No results displayed because visit has over 200 results  Imaging Studies: I have personally reviewed pertinent imaging studies

## 2022-11-25 NOTE — SPEECH THERAPY NOTE
Speech-Language Pathology Bedside Swallow Evaluation    Patient Name: Cayteano MEREDITHZIJANAE Date: 11/25/2022     Problem List  Principal Problem:    Septic shock ([address] )  Active Problems:    Metabolic encephalopathy    Pharyngeal swelling    Hypoxia    Hypothermia    Hyponatremia    Acute respiratory failure [facility])    Past Medical History  Past Medical History:   Diagnosis Date   • Hyperlipidemia    • Hypertension    • Metabolic acidosis with increased anion gap and accumulation of organic acids 11/23/2022     Past Surgical History  Past Surgical History:   Procedure Laterality Date   • NO PAST SURGERIES       Summary   Pt presented with s/s suggestive of moderate-severe oral and suspected moderate-severe pharyngeal dysphagia  Symptoms or concerns included decreased bolus formation, delayed oral intiation and reduced labial seal and lingual strength/ROM suspected pharyngeal swallow delay, suspected pharyngeal residue, multiple swallows and throat clearing/coughing and wet vocal quality        Risk/s for Aspiration: high     Recommended Diet: NPO   Recommended Form of Meds: non-oral if possible   Aspiration precautions and swallowing strategies: none at this time  Other Recommendations: Continue frequent oral care, possible VFSS pending results from further ongoing bedside assessment    Current Medical Status  Per 11/23/22 H&P - Pt is a 78 y o  male with a PMH of hypertension who presents with altered mental status from home      Limited history, see note below as per ER/EMS   Patient presents via EMS for evaluation of altered mental status   His neighbor had not seen him for 2 days, corroborated by the presence of mail piled up in his mailbox, per EMS   His dog was found dead in the home  Viona Mindy were human and canine feces throughout the apartment   The patient was found on the floor, soiled in urine and stool, confused but able to state his name  Loli Peña was cold to the touch but had no external signs of trauma other than healing abrasions to his knees   He has no documented significant medical history and was last in Epic records for a well visit to his PCP, Dr Lawrence Perez, in [facility] pre-hospital blood sugar was 97 mg/dL   An IV was established and saline started to be infused pre-hospital  Lázaro Winters arrival to the ED, he is cool to the touch, but able to follow commands and answer most questions   He does appear to be having difficulty articulating but is not dysarthric  Magda Humphries does appear to be alert and oriented to me  Current Precautions:  Fall  Aspiration Delirium    Allergies:  No known food allergies    Special Studies:  Chest XR impression 11/23/22 - Right-sided central venous catheter placement without pneumothorax    CT chest abdomen pelvis impression 11/24/22 -   1  Bilateral pneumonia and pleural effusions  2   Distended esophagus containing fluid  Nonurgent evaluation with swallowing study is recommended  3   Sacral bony lesion has enlarged compared to the previous exam and is indeterminate  Recommend correlation with bone scan or MRI pelvis  Sclerotic lesion at the left pedicle at T11 is unchanged  CT head impression - No acute intracranial abnormality  Chronic microangiopathic changes  Air-fluid level right sphenoid sinus  Acute sphenoid sinusitis should be considered in the right clinical setting  Social/Education/Vocational Hx:  Pt lives alone    Swallow Information   Current Risks for Dysphagia & Aspiration: AMS  Current Symptoms/Concerns: change in respiratory status and NPO status  Current Diet: NPO   Baseline Diet: regular diet and thin liquids    Baseline Assessment   Behavior/Cognition: alert and decreased attention  Speech/Language Status: able to follow commands inconsistently and limited verbal output  Patient Positioning: upright in bed  Pain Status/Interventions/Response to Interventions: No report of or nonverbal indications of pain      Swallow Mechanism Exam  Facial: left facial droop  Labial: bilateral decreased ROM and decreased coordination  Lingual: decreased ROM and bilateral decreased strength  Velum: symmetrical  Mandible:  decreased ROM  Dentition: natural dentition, several broken/missing teeth  Vocal quality:clear/adequate   Volitional Cough: strong/productive   Respiratory Status: on 2L O2  Tracheostomy: n/a    Consistencies Assessed and Performance   Consistencies Administered: ice chips, nectar thick, honey thick and puree  Materials administered included: ice, NTL and HTL water, and applesauce    Oral Stage: moderate-severe  Mastication was prolonged with the materials administered today  Bolus formation and transfer were delayed, as well with no significant oral residue noted  Pt exhibited delayed labial seal/closure and reduced coordination of oral structures  No overt s/s reduced oral control  Pharyngeal Stage: moderate-severe  Swallow Mechanics:  Swallowing initiation appeared delayed  Laryngeal rise was palpated and judged to be within functional limits  Mild coughing, frequent throat clearing, and slight change in vocal quality (wet) noted today  Esophageal Concerns: none reported    Strategies and Efficacy: small tsp size boluses not effective in improvement of tolerance of p o  trials    Summary and Recommendations (see above)    Results Reviewed with: patient, RN and MD     Treatment Recommended: yes     Frequency of treatment: 1-3x/wk    Patient Stated Goal: Patient unable to state goal    Dysphagia LTG  -Patient will demonstrate safe and effective oral intake (without overt s/s significant oral/pharyngeal dysphagia including s/s penetration or aspiration) for the highest appropriate diet level       Short Term Goals:  -Pt will tolerate Dysphagia 1/pureed diet and honey thick thin liquid with no significant s/s oral or pharyngeal dysphagia across 1-3 diagnostic session/s    -Patient will tolerate trials of upgraded food and/or liquid texture with no significant s/s of oral or pharyngeal dysphagia including aspiration across 1-3 diagnostic sessions     -Patient will complete daily oral motor exercises to increase lingual strength, range of motion and coordination with min cues to 90% effectiveness to improve bolus formation, transfer and control with minimal/no cues needed    -Patient will demonstrate the ability to adequately self-monitor swallowing skills and perform appropriate compensatory techniques to reduce overt s/sx of penetration/aspiration to safely tolerate least restrictive food/liquid consistencies      Speech Therapy Prognosis   Prognosis: good    Prognosis Considerations: age, medical status, cognitive status and respiratory status

## 2022-11-25 NOTE — OCCUPATIONAL THERAPY NOTE
Occupational Therapy Evaluation     Patient Name: Tammy JORGENSEN Date: 11/25/2022  Problem List  Principal Problem:    Metabolic encephalopathy  Active Problems:    Pharyngeal swelling    Hypoxia    Septic shock (HCC)    Acute respiratory failure (HCC)    Pneumonia    Alcohol withdrawal ([address] )    Sacral lesion    Other specified anemias    Past Medical History  Past Medical History:   Diagnosis Date    Hyperlipidemia     Hypertension     Hyponatremia 11/23/2022    Hypothermia 80/88/3797    Metabolic acidosis with increased anion gap and accumulation of organic acids 11/23/2022     Past Surgical History  Past Surgical History:   Procedure Laterality Date    NO PAST SURGERIES             11/25/22 1311   OT Last Visit   OT Visit Date 11/25/22   Note Type   Note type Evaluation   Pain Assessment   Pain Assessment Tool FLACC   Pain Rating: FLACC (Rest) - Face 0   Pain Rating: FLACC (Rest) - Legs 0   Pain Rating: FLACC (Rest) - Activity 0   Pain Rating: FLACC (Rest) - Cry 0   Pain Rating: FLACC (Rest) - Consolability 0   Score: FLACC (Rest) 0   Pain Rating: FLACC (Activity) - Face 0   Pain Rating: FLACC (Activity) - Legs 0   Pain Rating: FLACC (Activity) - Activity 0   Pain Rating: FLACC (Activity) - Cry 0   Pain Rating: FLACC (Activity) - Consolability 0   Score: FLACC (Activity) 0   Restrictions/Precautions   Weight Bearing Precautions Per Order No   Other Precautions Cognitive; Bed Alarm;Multiple lines;Telemetry; Fall Risk   Home Living   Type of Home Apartment   Home Layout One level   Bathroom Shower/Tub Walk-in shower   Bathroom Toilet Standard   Bathroom Equipment Grab bars in [facility] [address];Grab bars   Additional Comments Questionable historian due to decreased cognition  Per chart review and pt - PTA pt was living alone in an apartment with approx 2-3 ADILENE with HR on a one floor apartment     Prior Function   Level of [place] Independent with ADLs;Independent with IADLS; Independent with functional mobility   Lives With (S)  Alone   Receives Help From Other (Comment)  (Unsure of social support - per EMR pt is limited)   IADLs Independent with driving; Independent with meal prep; Independent with medication management   Falls in the last 6 months 0   Vocational Retired   Lifestyle   Autonomy PTA pt was independent with all tasks no use of AD per pt  Questionable historian and PLOF/living conditions per chart review   Reciprocal Relationships Limited social support per EMR - continue to assess during hospitalization   Service to Others Recently retired - per EMR pt retired last year as an    Intrinsic Gratification None stated  Questionable living conditions PTA   Subjective   Subjective "It's "   ADL   Where Assessed Edge of bed   LB Dressing Assistance 1  Total Assistance   LB Dressing Deficit Don/doff R sock; Don/doff L sock   Bed Mobility   Supine to Sit 2  Maximal assistance   Additional items Assist x 1; Increased time required;Verbal cues;LE management;HOB elevated; Bedrails   Sit to Supine 2  Maximal assistance   Additional items Assist x 2; Increased time required;Verbal cues;LE management   Additional Comments Total A of 2 to scoot up in bed and position safely   Transfers   Additional Comments Attempted STS transfers 2-3x with max A of 2 but unable to achieve stance due to posteiror lean, increased tone (flexion) and inability to follow directions   Balance   Static Sitting Fair   Dynamic Sitting Fair -   Activity Tolerance   Activity Tolerance Patient limited by fatigue  (Severity of functional limitations)   Medical Staff Made Aware Yes   RUE Assessment   RUE Assessment X  (Increased tone and decreased coordination limiting BUE strength - grossly 3/5)   LUE Assessment   LUE Assessment X  (Increased tone and decreased coordination limiting BUE strength - grossly 3/5)   Hand Function   Gross Motor Coordination Impaired   Fine Motor Coordination Impaired   Psychosocial   Psychosocial (WDL) X   Patient Behaviors/Mood Flat affect; Withdrawn   Cognition   Overall Cognitive Status Impaired   Arousal/Participation Responsive;Arousable   Attention Difficulty attending to directions   Orientation Level Oriented to person   Memory Decreased recall of biographical information;Decreased short term memory;Decreased recall of recent events   Following Commands Follows one step commands inconsistently   Comments Pleasant but flat affect; oriented to name only - stating that he was at home and it was March 1999   Assessment   Limitation Decreased ADL status; Decreased UE strength;Decreased Safe judgement during ADL;Decreased cognition;Decreased endurance;Decreased self-care trans;Decreased high-level ADLs; Decreased fine motor control   Prognosis Fair   Assessment Pt is a 78 y o  male who was admitted to [address] [place] [address],[address] on 21/56/4965 w/ Metabolic encephalopathy  Pt has a past medical history of HTN and metabolic acidosis  Pt lives alone in a one floor apartment with 2-3 ADILENE per pt report  At baseline, pt was completing ADLs independently and functional mobility and transfers independently no AD  Currently, pt requires max to total A overall for ADLs and unable to assess mobility and transfers  Pt presents with deficits in the following categories: limited home support, difficulty performing ADLS, difficulty performing IADLS  and flat affect activity tolerance, endurance, sitting balance/tolerance, UE strength, FMC, GMC, memory, insight, safety  and judgement   These deficits, along with pt's  unsupportive home environment and cognitive impairments limit the pt's ability to safely engage in areas of occupation such as: eating, grooming, bathing/shower, toilet hygiene, dressing, functional mobility and household maintenance  Pt would benefit from continued skilled acute OT in order to maximize independence and safety in ADLs, mobility, and transfers   OT would recommend pt discharge to STR when medically cleared  Pt's raw score on the AM-PAC Daily Activity inpatient short form is 15, standardized score is 26 28, less than 39 4  Patients at this level are likely to benefit from DC to STR however, please refer to therapist recommendation for safe DC planning  OT will work towards the established goals  Goals   Patient Goals None stated at this time   LTG Time Frame 10-14   Long Term Goal #1 Refer to established   Plan   Treatment Interventions ADL retraining;Functional transfer training;UE strengthening/ROM; Endurance training;Cognitive reorientation;Patient/family training;Equipment evaluation/education; Energy conservation; Activityengagement; Fine motor coordination activities   Goal Expiration Date 12/09/22   OT Treatment Day 1   OT Frequency 3-5x/wk   Recommendation   OT Discharge Recommendation Post acute rehabilitation services   AM-[facility] Daily Activity Inpatient   Lower Body Dressing 1   Bathing 2   Toileting 1   Upper Body Dressing 2   Grooming 2   Eating 2   Daily Activity Raw Score 10   Turning Head Towards Sound 3   Follow Simple Instructions 2   Low Function Daily Activity Raw Score 15   Low Function Daily Activity Standardized Score 26 28   AM-PAC Applied Cognition Inpatient   Following a Speech/Presentation 1   Understanding Ordinary Conversation 2   Taking Medications 1   Remembering Where Things Are Placed or Put Away 1   Remembering List of 4-5 Errands 1   Taking Care of Complicated Tasks 1   Applied Cognition Raw Score 7   Applied Cognition Standardized Score 15 17   Additional Treatment Session   Start Time 1247   End Time 1311   Treatment Assessment Pt seen for additional treatment session in order to assess bed mobility and functional transfers  Supine to sit at EOB max A of 1 - able to maintain sitting balance at EOB initially with mod to max A of 1 progressing to CGA/SBA for approx 5 minutes   Attempted STS transfers 2-3x but unsuccessful with max A of 2  Returned to supine in bed with max A of 2  All needs in reach         OCCUPATIONAL THERAPY GOALS:  Mod I/I with all UB and LB ADLs with AD prn   Mod I/I with all functional mobility and transfers while demonstrating good safety and judgement  Mod I/I with toileting and clothing management   Increase activity tolerance to 40-45 minutes in order to participate in ADLs and leisure activities   Demonstrate good carryover of body mechanics and energy conservation techniques in order to participate in functional mobility, transfers, ADLs, IADLs, and leisure exploration   Pt to participate in functional cognitive assessment with good participation and attention in order to assist with safe discharge recommendations   Assess DME needs      Lonn Able

## 2022-11-25 NOTE — ASSESSMENT & PLAN NOTE
Background:  Patient presented with altered mental status, lethargic but responds to verbal stimuli, follows commands  Acute metabolic encephalopathy present on admission, likely due to underlying infection      11/25:  Remarkably improved today  GCS score 13  Refer to assessment and plan for septic shock

## 2022-11-25 NOTE — RESPIRATORY THERAPY NOTE
11/25/22 1025   Respiratory Assessment   Resp Comments discussing patient with doctor, trying patient now on room air   Subjective Data patient has a good spont congested but clearing cough   Oxygen Therapy/Pulse Ox   O2 Device None (Room air)   O2 Therapy Room air   SpO2 Activity At Rest   Oximetry Probe Site Changed Yes

## 2022-11-25 NOTE — SEPSIS NOTE
Sepsis Note   Maricel Maisha 78 y o  male MRN: [id number]  Unit/Bed#:  Encounter: [id number]       qSOFA     Row Name 11/25/22 1100 11/25/22 1000 11/25/22 0900 11/25/22 0800 11/25/22 0725    Altered mental status GCS < 15 -- 1 -- 1 --    Respiratory Rate > / =22 1 1 0 1 --    Systolic BP < / =515 0 0 0 0 0    Q Sofa Score 2 2 1 2 --    Row Name 11/25/22 0600 11/25/22 0400 11/25/22 0200 11/25/22 0000 11/24/22 2200    Altered mental status GCS < 15 1 1 1 1 1    Respiratory Rate > / =22 -- -- -- 0 0    Systolic BP < / =978 -- -- -- 0 0    Q Sofa Score -- 1 1 1 1    Row Name 11/24/22 2000 11/24/22 1800 11/24/22 1700 11/24/22 1600 11/24/22 1500    Altered mental status GCS < 15 1 -- -- 1 --    Respiratory Rate > / =22 1 0 0 0 0    Systolic BP < / =175 0 0 0 0 0    Q Sofa Score 2 1 1 1 1    Row Name 11/24/22 1430 11/24/22 1400 11/24/22 1330 11/24/22 1300 11/24/22 1245    Altered mental status GCS < 15 -- 1 -- -- --    Respiratory Rate > / =22 0 1 1 1 1    Systolic BP < / =905 0 0 0 0 0    Q Sofa Score 1 2 2 2 2    Row Name 11/24/22 1230 11/24/22 1200 11/24/22 1130 11/24/22 1100 11/24/22 1030    Altered mental status GCS < 15 -- 1 -- 1 --    Respiratory Rate > / =22 1 1 1 1 1    Systolic BP < / =589 0 0 0 1 1    Q Sofa Score 2 2 2 3 3    Row Name 11/24/22 1000 11/24/22 0945 11/24/22 0930 11/24/22 0915 11/24/22 0900    Altered mental status GCS < 15 1 -- -- -- --    Respiratory Rate > / =22 1 1 1 1 1    Systolic BP < / =318 0 0 1 1 0    Q Sofa Score 2 2 3 3 2    Row Name 11/24/22 0845 11/24/22 0830 11/24/22 0814 11/24/22 0800 11/24/22 0745    Altered mental status GCS < 15 -- -- 1 -- --    Respiratory Rate > / =22 1 1 1 1 1    Systolic BP < / =606 1 1 1 1 1    Q Sofa Score 3 3 3 3 3    Row Name 11/24/22 0730 11/24/22 0700 11/24/22 0613 11/24/22 0600 11/24/22 0500    Altered mental status GCS < 15 -- -- 1 -- --    Respiratory Rate > / =22 1 1 -- -- --    Systolic BP < / =124 1 1 -- 0 1    Q Sofa Score 3 3 -- -- 2    Row Name 11/24/22 0417 11/24/22 0400 11/24/22 0300 11/24/22 0205 11/24/22 0200    Altered mental status GCS < 15 1 -- -- 1 --    Respiratory Rate > / =22 -- -- -- -- --    Systolic BP < / =440 -- 0 0 -- 0    Q Sofa Score 2 1 1 2 1    Row Name 11/24/22 0150 11/24/22 0135 11/24/22 0130 11/24/22 0120 11/24/22 0100    Altered mental status GCS < 15 1 1 -- 1 --    Respiratory Rate > / =22 -- -- -- -- 0    Systolic BP < / =673 -- -- 1 -- 1    Q Sofa Score 2 2 2 2 1    Row Name 11/24/22 0030 11/24/22 0028 11/24/22 0000 11/23/22 2300 11/23/22 2200    Altered mental status GCS < 15 -- 1 -- -- 1    Respiratory Rate > / =22 -- -- -- -- --    Systolic BP < / =395 0 -- 0 0 0    Q Sofa Score -- -- -- 1 1    Row Name 11/23/22 2100 11/23/22 2021 11/23/22 2000 11/23/22 1910 11/23/22 1845    Altered mental status GCS < 15 -- 1 -- -- --    Respiratory Rate > / =22 -- -- -- 0 0    Systolic BP < / =045 0 -- 0 0 1    Q Sofa Score 1 1 0 0 1    Row Name 11/23/22 1830 11/23/22 1815 11/23/22 1800 11/23/22 1745 11/23/22 1730    Altered mental status GCS < 15 -- -- -- -- --    Respiratory Rate > / =22 0 0 0 0 0    Systolic BP < / =841 1 1 1 1 1    Q Sofa Score 1 1 1 1 1    Row Name 11/23/22 1715 11/23/22 1700 11/23/22 1630 11/23/22 1615 11/23/22 1545    Altered mental status GCS < 15 -- -- -- -- --    Respiratory Rate > / =22 0 0 0 0 0    Systolic BP < / =839 1 -- -- 0 0    Q Sofa Score 1 0 0 0 0    Row Name 11/23/22 1530 11/23/22 1500 11/23/22 1445 11/23/22 1430 11/23/22 1415    Altered mental status GCS < 15 -- -- -- -- --    Respiratory Rate > / =22 0 0 0 0 0    Systolic BP < / =321 1 1 1 1 1    Q Sofa Score 1 1 1 1 1    Row Name 11/23/22 1400 11/23/22 1345 11/23/22 1315          Altered mental status GCS < 15 -- -- --      Respiratory Rate > / =22 0 0 1      Systolic BP < / =704 0 1 0      Q Sofa Score 0 1 1                 Initial Sepsis Screening     Row Name 11/23/22 1448                Is the patient's history suggestive of a new or worsening infection? No  -AC        Suspected source of infection --        Are two or more of the following signs & symptoms of infection both present and new to the patient? Yes (Proceed)  though I do not think they are due to sepsis  -AC        Indicate SIRS criteria Leukocytosis (WBC > 93306 IJL); Hypothermia < 36C (96 8F)  -AC        If the answer is yes to both questions, suspicion of sepsis is present --        If severe sepsis is present AND tissue hypoperfusion perists in the hour after fluid resuscitation or lactate > 4, the patient meets criteria for SEPTIC SHOCK --        Are any of the following organ dysfunction criteria present within 6 hours of suspected infection and SIRS criteria that are NOT considered to be chronic conditions? --        Organ dysfunction --        Date of presentation of severe sepsis --        Time of presentation of severe sepsis --        Tissue hypoperfusion persists in the hour after crystalloid fluid administration, evidenced, by either: --        Was hypotension present within one hour of the conclusion of crystalloid fluid administration? --        Date of presentation of septic shock --        Time of presentation of septic shock --              User Key  (r) = Recorded By, (t) = Taken By, (c) = Cosigned By    [id number]  Name Provider Type    [facility] Leticia Sampson DO Physician                  Default Flowsheet Data (last 720 hours)     Sepsis Reassess     [address] [place] [address] Name 11/25/22 1245                   Repeat Volume Status and Tissue Perfusion Assessment Performed    Repeat Volume Status and Tissue Perfusion Assessment Performed Yes  -AA           Volume Status and Tissue Perfusion Post Fluid Resuscitation * Must Document All *    Vital Signs Reviewed (HR, RR, BP, T) Yes  -AA        Shock Index Reviewed Yes  -AA        Arterial Oxygen Saturation Reviewed (POx, SaO2 or SpO2) --        Cardio Normal S1/S2; Regular rate and rhythm  -AA        Pulmonary Normal effort;Rales; Rhonchi  -AA Capillary Refill --        Peripheral Pulses Radial;Dorsalis Pedis  -AA        Peripheral Pulse +2  -AA        Dorsalis Pedis +2  -AA        Skin Warm;Dry  -AA        Urine output assessed Adequate  -AA           *OR*   Intensive Monitoring- Must Document One of the Following Four *:    Vital Signs Reviewed Yes  -AA        * Central Venous Pressure (CVP or RAP) --        * Central Venous Oxygen (SVO2, ScvO2 or Oxygen saturation via central catheter) --        * Bedside Cardiovascular US in IVC diameter and % collapse --        * Passive Leg Raise OR Crystalloid Challenge --        Passive leg exam --              User Key  (r) = Recorded By, (t) = Taken By, (c) = Cosigned By    [address] Name Provider Type    AA Page MD Berenice Resident

## 2022-11-25 NOTE — PLAN OF CARE
Problem: PAIN - ADULT  Goal: Verbalizes/displays adequate comfort level or baseline comfort level  Description: Interventions:  - Encourage patient to monitor pain and request assistance  - Assess pain using appropriate pain scale  - Administer analgesics based on type and severity of pain and evaluate response  - Implement non-pharmacological measures as appropriate and evaluate response  - Consider cultural and social influences on pain and pain management  - Notify physician/advanced practitioner if interventions unsuccessful or patient reports new pain  Outcome: Progressing     Problem: INFECTION - ADULT  Goal: Absence or prevention of progression during hospitalization  Description: INTERVENTIONS:  - Assess and monitor for signs and symptoms of infection  - Monitor lab/diagnostic results  - Monitor all insertion sites, i e  indwelling lines, tubes, and drains  - Monitor endotracheal if appropriate and nasal secretions for changes in amount and color  - [place] appropriate cooling/warming therapies per order  - Administer medications as ordered  - Instruct and encourage patient and family to use good hand hygiene technique  - Identify and instruct in appropriate isolation precautions for identified infection/condition  Outcome: Progressing  Goal: Absence of fever/infection during neutropenic period  Description: INTERVENTIONS:  - Monitor WBC    Outcome: Progressing     Problem: Knowledge Deficit  Goal: Patient/family/caregiver demonstrates understanding of disease process, treatment plan, medications, and discharge instructions  Description: Complete learning assessment and assess knowledge base    Interventions:  - Provide teaching at level of understanding  - Provide teaching via preferred learning methods  Outcome: Progressing

## 2022-11-25 NOTE — ASSESSMENT & PLAN NOTE
Hypothermia due to exposure in cold house, unknown downtime    Closely monitor metabolic status with rewarming, monitor calcium, patient did have transient hypoglycemia  Continue D5 normal saline infusion and external rewarming as needed     -resolved

## 2022-11-25 NOTE — ASSESSMENT & PLAN NOTE
Background:  Social history as given by his close friends; daily alcohol drinker for the past 1 year at least   Smokes cigarettes I pack per day for same duration  Given the patient's estimated timing of the fall at home his altered mental state could be most likely caused by a combination between septic shock and alcohol withdrawal     -medical toxicology consultant on board; agreed on the plan as following:    -Ativan p r n  for agitation and seizures  -IV thiamine supplement  -IV folic acid supplement  -follow-up with rehab when stable

## 2022-11-25 NOTE — ASSESSMENT & PLAN NOTE
Background:  Patient presented with altered mental status, lethargic but responds to verbal stimuli, follows commands  Acute metabolic encephalopathy present on admission, likely due to underlying infection    Update:(11/24)   Patient noted at bedside to be withdrawing with painful stimuli, not following commands, nonverbal, asymmetrical pupils  Right pupil nonreactive to light  GCS:  5  Continues to be on Levophed drip, high-flow oxygen  Case was discussed with toxicology due to concerns regarding delirium tremens  Per the family patient drinks alcohol on a daily basis  Given him being in a coma state for the past 72 hours at least high chance that his altered mental state is due to sepsis on top of delirium tremens  Pan CT scan 11/24:  1  Bilateral pneumonia and pleural effusions  2   Distended esophagus containing fluid  Nonurgent evaluation with swallowing study is recommended  3   Sacral bony lesion has enlarged compared to the previous exam and is indeterminate  4  Sclerotic lesion at the left pedicle at T11 is unchanged  5  No acute intracranial abnormality  Chronic microangiopathic changes  Air-fluid level right sphenoid sinus  Acute sphenoid sinusitis should be considered in the right clinical setting  11/25:   Patient is arousable, awake, oriented to person only, follows commands, GCS 13    -weaned off norepinephrine drip since 18 hours  Maintaining blood pressure within normal limits without pressure support  -currently on 4 liters/minute of oxygen via nasal cannula  Down titrated from high-flow oxygen   -speech and swallow eval done; recommended at continue NPO status for the next 24 hours and to re-evaluate accordingly      Plan as following:  -per CT scan findings suspected multifocal pneumonia on top of alcohol withdrawal with delirium tremens   -day 3 cefepime and vancomycin     -day 2 Decadron 4 mg IV Q 8  -Moreno's catheter in place; day 3   -follow-up on blood and urine cultures for final read  Plan on obtaining new set of cultures  -document I/OS, daily weight   -cardiopulmonary monitoring; telemetry  -will consider Neurology consult and possible MRI head and neck if patient continues to be in altered mental state   -septic shock resolved

## 2022-11-26 PROBLEM — R13.19 OTHER DYSPHAGIA: Status: ACTIVE | Noted: 2022-11-26

## 2022-11-26 LAB
ANION GAP SERPL CALCULATED.3IONS-SCNC: 9 MMOL/L (ref 4–13)
BASOPHILS # BLD AUTO: 0.01 THOUSANDS/ÂΜL (ref 0–0.1)
BASOPHILS NFR BLD AUTO: 0 % (ref 0–1)
BUN SERPL-MCNC: 15 MG/DL (ref 5–25)
CALCIUM SERPL-MCNC: 8.7 MG/DL (ref 8.3–10.1)
CHLORIDE SERPL-SCNC: 109 MMOL/L (ref 96–108)
CMV IGG SERPL IA-ACNC: <0.6 U/ML (ref 0–0.59)
CMV IGM SERPL IA-ACNC: <30 AU/ML (ref 0–29.9)
CO2 SERPL-SCNC: 20 MMOL/L (ref 21–32)
CREAT SERPL-MCNC: 0.8 MG/DL (ref 0.6–1.3)
EBV NA IGG SER IA-ACNC: <18 U/ML (ref 0–17.9)
EBV VCA IGG SER IA-ACNC: <18 U/ML (ref 0–17.9)
EBV VCA IGM SER IA-ACNC: <36 U/ML (ref 0–35.9)
EOSINOPHIL # BLD AUTO: 0 THOUSAND/ÂΜL (ref 0–0.61)
EOSINOPHIL NFR BLD AUTO: 0 % (ref 0–6)
ERYTHROCYTE [DISTWIDTH] IN BLOOD BY AUTOMATED COUNT: 14.5 % (ref 11.6–15.1)
GFR SERPL CREATININE-BSD FRML MDRD: 93 ML/MIN/1.73SQ M
GLUCOSE SERPL-MCNC: 128 MG/DL (ref 65–140)
HCT VFR BLD AUTO: 26.9 % (ref 36.5–49.3)
HGB BLD-MCNC: 9.1 G/DL (ref 12–17)
IMM GRANULOCYTES # BLD AUTO: 0.05 THOUSAND/UL (ref 0–0.2)
IMM GRANULOCYTES NFR BLD AUTO: 1 % (ref 0–2)
INTERPRETATION: NORMAL
LYMPHOCYTES # BLD AUTO: 0.81 THOUSANDS/ÂΜL (ref 0.6–4.47)
LYMPHOCYTES NFR BLD AUTO: 11 % (ref 14–44)
MAGNESIUM SERPL-MCNC: 2.1 MG/DL (ref 1.6–2.6)
MCH RBC QN AUTO: 31 PG (ref 26.8–34.3)
MCHC RBC AUTO-ENTMCNC: 33.8 G/DL (ref 31.4–37.4)
MCV RBC AUTO: 92 FL (ref 82–98)
MONOCYTES # BLD AUTO: 0.99 THOUSAND/ÂΜL (ref 0.17–1.22)
MONOCYTES NFR BLD AUTO: 13 % (ref 4–12)
NEUTROPHILS # BLD AUTO: 5.78 THOUSANDS/ÂΜL (ref 1.85–7.62)
NEUTS SEG NFR BLD AUTO: 75 % (ref 43–75)
NRBC BLD AUTO-RTO: 0 /100 WBCS
PHOSPHATE SERPL-MCNC: 2.1 MG/DL (ref 2.3–4.1)
PLATELET # BLD AUTO: 147 THOUSANDS/UL (ref 149–390)
PMV BLD AUTO: 9.2 FL (ref 8.9–12.7)
POTASSIUM SERPL-SCNC: 3.5 MMOL/L (ref 3.5–5.3)
RBC # BLD AUTO: 2.94 MILLION/UL (ref 3.88–5.62)
SODIUM SERPL-SCNC: 138 MMOL/L (ref 135–147)
WBC # BLD AUTO: 7.64 THOUSAND/UL (ref 4.31–10.16)

## 2022-11-26 RX ORDER — MECLIZINE HCL 12.5 MG/1
12.5 TABLET ORAL EVERY 8 HOURS SCHEDULED
Status: DISCONTINUED | OUTPATIENT
Start: 2022-11-26 | End: 2022-11-26

## 2022-11-26 RX ORDER — DEXTROSE AND SODIUM CHLORIDE 5; .9 G/100ML; G/100ML
50 INJECTION, SOLUTION INTRAVENOUS CONTINUOUS
Status: DISCONTINUED | OUTPATIENT
Start: 2022-11-26 | End: 2022-11-28

## 2022-11-26 RX ORDER — LORAZEPAM 2 MG/ML
0.5 INJECTION INTRAMUSCULAR ONCE
Status: COMPLETED | OUTPATIENT
Start: 2022-11-26 | End: 2022-11-26

## 2022-11-26 RX ORDER — PANTOPRAZOLE SODIUM 40 MG/10ML
40 INJECTION, POWDER, LYOPHILIZED, FOR SOLUTION INTRAVENOUS EVERY 12 HOURS SCHEDULED
Status: DISCONTINUED | OUTPATIENT
Start: 2022-11-26 | End: 2022-11-30

## 2022-11-26 RX ADMIN — CEFEPIME HYDROCHLORIDE 2000 MG: 2 INJECTION, SOLUTION INTRAVENOUS at 04:32

## 2022-11-26 RX ADMIN — PANTOPRAZOLE SODIUM 40 MG: 40 INJECTION, POWDER, FOR SOLUTION INTRAVENOUS at 06:17

## 2022-11-26 RX ADMIN — THIAMINE HYDROCHLORIDE 500 MG: 100 INJECTION, SOLUTION INTRAMUSCULAR; INTRAVENOUS at 13:03

## 2022-11-26 RX ADMIN — ENOXAPARIN SODIUM 40 MG: 40 INJECTION SUBCUTANEOUS at 08:54

## 2022-11-26 RX ADMIN — THIAMINE HYDROCHLORIDE 500 MG: 100 INJECTION, SOLUTION INTRAMUSCULAR; INTRAVENOUS at 23:12

## 2022-11-26 RX ADMIN — CEFEPIME HYDROCHLORIDE 2000 MG: 2 INJECTION, SOLUTION INTRAVENOUS at 15:51

## 2022-11-26 RX ADMIN — THIAMINE HYDROCHLORIDE 500 MG: 100 INJECTION, SOLUTION INTRAMUSCULAR; INTRAVENOUS at 06:17

## 2022-11-26 RX ADMIN — POTASSIUM PHOSPHATE, MONOBASIC AND POTASSIUM PHOSPHATE, DIBASIC 12 MMOL: 224; 236 INJECTION, SOLUTION, CONCENTRATE INTRAVENOUS at 17:16

## 2022-11-26 RX ADMIN — PANTOPRAZOLE SODIUM 40 MG: 40 INJECTION, POWDER, FOR SOLUTION INTRAVENOUS at 23:12

## 2022-11-26 RX ADMIN — DEXTROSE AND SODIUM CHLORIDE 50 ML/HR: 5; .9 INJECTION, SOLUTION INTRAVENOUS at 11:58

## 2022-11-26 RX ADMIN — LORAZEPAM 0.5 MG: 2 INJECTION INTRAMUSCULAR; INTRAVENOUS at 13:01

## 2022-11-26 NOTE — SPEECH THERAPY NOTE
Speech/Language Pathology Progress Note    Patient Name: Nuria Hightower  [id number] Date: 11/26/2022     Subjective:  "I'm thirsty for a beer "    Objective:  Clinician engaged pt in oral care to effectively moisten oral structures and clear debris/dried secretions  Pt required clinician encouragement and instructions to participate at times, secondary to pt appearing hesitant or refusing various portions of oral care  Clinician also provided education regarding rationale of treatment to determine LRD consistency  Pt remains NPO  Prior to p o  trials, wet/moist breathing and vocal quality noted, requiring max verbal cues and models to effectively clear with coughing  Without models, pt's coughing was weak and unproductive  Eventual clearance of suspected secretions was noted and trials were initiated  Ice chips, and HTL via tsp were implemented this date to assess tolerance  Assessment:  Pt exhibited improved ability to control bolus orally, as compared to status at evaluation, also exhibiting timely labial seal/closure on tsp  4-5 swallows were yielded from trial of one ice chip, resulting in 3 instances of throat clearing and one cough with slightly wet vocal quality present  HTL trialled via tsp x2, noting 4-6 swallows necessary per tsp with moderate throat clearing, as well  Pt began demonstrating reduced alertness at this point in session and trials were discontinued  Plan/Recommendations:  Remain NPO  Continue trials with ST only  VFSS to be scheduled for Monday, 11/28/22

## 2022-11-26 NOTE — PLAN OF CARE
Problem: MOBILITY - ADULT  Goal: Maintain or return to baseline ADL function  Description: INTERVENTIONS:  -  Assess patient's ability to carry out ADLs; assess patient's baseline for ADL function and identify physical deficits which impact ability to perform ADLs (bathing, care of mouth/teeth, toileting, grooming, dressing, etc )  - Assess/evaluate cause of self-care deficits   - Assess range of motion  - Assess patient's mobility; develop plan if impaired  - Assess patient's need for assistive devices and provide as appropriate  - Encourage maximum independence but intervene and supervise when necessary  - Involve family in performance of ADLs  - Assess for home care needs following discharge   - Consider OT consult to assist with ADL evaluation and planning for discharge  - Provide patient education as appropriate  Outcome: Progressing  Goal: Maintains/Returns to pre admission functional level  Description: INTERVENTIONS:  - Perform BMAT or MOVE assessment daily    - Set and communicate daily mobility goal to care team and patient/family/caregiver  - Collaborate with rehabilitation services on mobility goals if consulted  - Perform Range of Motion multiple times a day  - Reposition patient every 2 hours    - Dangle patient multiple times a day  - Stand patient multiple times a day  - Ambulate patient multiple times a day  - Out of bed to chair 4 times a day   - Out of bed for meals 3 times a day  - Out of bed for toileting  - Record patient progress and toleration of activity level   Outcome: Progressing     Problem: Prexisting or High Potential for Compromised Skin Integrity  Goal: Skin integrity is maintained or improved  Description: INTERVENTIONS:  - Identify patients at risk for skin breakdown  - Assess and monitor skin integrity  - Assess and monitor nutrition and hydration status  - Monitor labs   - Assess for incontinence   - Turn and reposition patient  - Assist with mobility/ambulation  - Relieve pressure over bony prominences  - Avoid friction and shearing  - Provide appropriate hygiene as needed including keeping skin clean and dry  - Evaluate need for skin moisturizer/barrier cream  - Collaborate with interdisciplinary team   - Patient/family teaching  - Consider wound care consult   Outcome: Progressing     Problem: PAIN - ADULT  Goal: Verbalizes/displays adequate comfort level or baseline comfort level  Description: Interventions:  - Encourage patient to monitor pain and request assistance  - Assess pain using appropriate pain scale  - Administer analgesics based on type and severity of pain and evaluate response  - Implement non-pharmacological measures as appropriate and evaluate response  - Consider cultural and social influences on pain and pain management  - Notify physician/advanced practitioner if interventions unsuccessful or patient reports new pain  Outcome: Progressing     Problem: INFECTION - ADULT  Goal: Absence or prevention of progression during hospitalization  Description: INTERVENTIONS:  - Assess and monitor for signs and symptoms of infection  - Monitor lab/diagnostic results  - Monitor all insertion sites, i e  indwelling lines, tubes, and drains  - Monitor endotracheal if appropriate and nasal secretions for changes in amount and color  - [place] appropriate cooling/warming therapies per order  - Administer medications as ordered  - Instruct and encourage patient and family to use good hand hygiene technique  - Identify and instruct in appropriate isolation precautions for identified infection/condition  Outcome: Progressing  Goal: Absence of fever/infection during neutropenic period  Description: INTERVENTIONS:  - Monitor WBC    Outcome: Progressing     Problem: SAFETY ADULT  Goal: Maintain or return to baseline ADL function  Description: INTERVENTIONS:  -  Assess patient's ability to carry out ADLs; assess patient's baseline for ADL function and identify physical deficits which impact ability to perform ADLs (bathing, care of mouth/teeth, toileting, grooming, dressing, etc )  - Assess/evaluate cause of self-care deficits   - Assess range of motion  - Assess patient's mobility; develop plan if impaired  - Assess patient's need for assistive devices and provide as appropriate  - Encourage maximum independence but intervene and supervise when necessary  - Involve family in performance of ADLs  - Assess for home care needs following discharge   - Consider OT consult to assist with ADL evaluation and planning for discharge  - Provide patient education as appropriate  Outcome: Progressing  Goal: Maintains/Returns to pre admission functional level  Description: INTERVENTIONS:  - Perform BMAT or MOVE assessment daily    - Set and communicate daily mobility goal to care team and patient/family/caregiver  - Collaborate with rehabilitation services on mobility goals if consulted  - Perform Range of Motion multiple times a day  - Reposition patient every 2 hours    - Dangle patient multiple times a day  - Stand patient multiple times a day  - Ambulate patient multiple times a day  - Out of bed to chair 4 times a day   - Out of bed for meals 3 times a day  - Out of bed for toileting  - Record patient progress and toleration of activity level   Outcome: Progressing  Goal: Patient will remain free of falls  Description: INTERVENTIONS:  - Educate patient/family on patient safety including physical limitations  - Instruct patient to call for assistance with activity   - Consult OT/PT to assist with strengthening/mobility   - Keep Call bell within reach  - Keep bed low and locked with side rails adjusted as appropriate  - Keep care items and personal belongings within reach  - Initiate and maintain comfort rounds  - Make Fall Risk Sign visible to staff  - Offer Toileting every 2 Hours, in advance of need  - Initiate/Maintain bed/chair alarm  - Obtain necessary fall risk management equipment: as needed  - Apply yellow socks and bracelet for high fall risk patients  - Consider moving patient to room near nurses station  Outcome: Progressing     Problem: DISCHARGE PLANNING  Goal: Discharge to home or other facility with appropriate resources  Description: INTERVENTIONS:  - Identify barriers to discharge w/patient and caregiver  - Arrange for needed discharge resources and transportation as appropriate  - Identify discharge learning needs (meds, wound care, etc )  - Arrange for interpretive services to assist at discharge as needed  - Refer to Case Management Department for coordinating discharge planning if the patient needs post-hospital services based on physician/advanced practitioner order or complex needs related to functional status, cognitive ability, or social support system  Outcome: Progressing     Problem: Knowledge Deficit  Goal: Patient/family/caregiver demonstrates understanding of disease process, treatment plan, medications, and discharge instructions  Description: Complete learning assessment and assess knowledge base  Interventions:  - Provide teaching at level of understanding  - Provide teaching via preferred learning methods  Outcome: Progressing     Problem: Nutrition/Hydration-ADULT  Goal: Nutrient/Hydration intake appropriate for improving, restoring or maintaining nutritional needs  Description: Monitor and assess patient's nutrition/hydration status for malnutrition  Collaborate with interdisciplinary team and initiate plan and interventions as ordered  Monitor patient's weight and dietary intake as ordered or per policy  Utilize nutrition screening tool and intervene as necessary  Determine patient's food preferences and provide high-protein, high-caloric foods as appropriate       INTERVENTIONS:  - Monitor oral intake, urinary output, labs, and treatment plans  - Assess nutrition and hydration status and recommend course of action  - Evaluate amount of meals eaten  - Assist patient with eating if necessary   - Allow adequate time for meals  - Recommend/ encourage appropriate diets, oral nutritional supplements, and vitamin/mineral supplements  - Order, calculate, and assess calorie counts as needed  - Recommend, monitor, and adjust tube feedings and TPN/PPN based on assessed needs  - Assess need for intravenous fluids  - Provide specific nutrition/hydration education as appropriate  - Include patient/family/caregiver in decisions related to nutrition  Outcome: Progressing     Problem: Potential for Falls  Goal: Patient will remain free of falls  Description: INTERVENTIONS:  - Educate patient/family on patient safety including physical limitations  - Instruct patient to call for assistance with activity   - Consult OT/PT to assist with strengthening/mobility   - Keep Call bell within reach  - Keep bed low and locked with side rails adjusted as appropriate  - Keep care items and personal belongings within reach  - Initiate and maintain comfort rounds  - Make Fall Risk Sign visible to staff  - Offer Toileting every 2 Hours, in advance of need  - Initiate/Maintain BED/CHAIR alarm  - Obtain necessary fall risk management equipment: as needed  - Apply yellow socks and bracelet for high fall risk patients  - Consider moving patient to room near nurses station  Outcome: Progressing

## 2022-11-26 NOTE — ASSESSMENT & PLAN NOTE
Resolved, likely secondary to aspiration, with suspected aspiration pneumonia, continue current broad-spectrum antibiotics, cultures

## 2022-11-26 NOTE — ASSESSMENT & PLAN NOTE
CT Ca P:  1  Bilateral pneumonia and pleural effusions  2   Distended esophagus containing fluid  Nonurgent evaluation with swallowing study is recommended  3   Sacral bony lesion has enlarged compared to the previous exam and is indeterminate  Recommend correlation with bone scan or MRI pelvis  Sclerotic lesion at the left pedicle at T11 is unchanged

## 2022-11-26 NOTE — ASSESSMENT & PLAN NOTE
Likely secondary to pneumonia, continue current broad-spectrum antibiotics and follow-up blood cultures

## 2022-11-26 NOTE — CASE MANAGEMENT
Case Management Assessment & Discharge Planning Note    Patient name Chanel Fatima  Location [facility] [id number] MRN [id number]  : 1955 Date 2022       Current Admission Date: 2022  Current Admission Diagnosis:Metabolic encephalopathy   Patient Active Problem List    Diagnosis Date Noted   • Pneumonia 2022   • Alcohol withdrawal ([facility] [address] ) 2022   • Sacral lesion 2022   • Other specified anemias 2022   • Septic shock ([facility] ) 2022   • Acute respiratory failure ([facility] [facility] )    • Metabolic encephalopathy 15/70/2515   • Pharyngeal swelling 2022   • Hypoxia 2022   • GI bleed 2018   • NSTEMI (non-ST elevated myocardial infarction) ([facility] [address] ) 2018   • Multiple rib fractures 2018      LOS (days): 3  Geometric Mean LOS (GMLOS) (days): 5 00  Days to GMLOS:2 2     OBJECTIVE:    Risk of Unplanned Readmission Score: 12 21         Current admission status: Inpatient       Preferred Pharmacy:   [address] [place] [address] [address] [facility], [address] [address]  Phone: [phone] Fax: [phone]    [facility]/pharmacy #[id number] [place] [address]  Phone: [phone] Fax: [phone]    Primary Care Provider: Quincy White DO    Primary Insurance: MEDICARE  Secondary Insurance: AARP    ASSESSMENT:  Sarah Nunes Proxies    There are no active Health Care Proxies on file         Advance Directives  Does patient have a [address] [place] [address] Avenue?: No  Was patient offered paperwork?: Yes (declines)  Does patient currently have a Health Care decision maker?: No  Does patient have Advance Directives?: No  Was patient offered paperwork?: Yes (declines)  Primary Contact: Estefania Houser [facility]   [address] [address] [phone] (H)         Readmission Root Cause  30 Day Readmission: No    Patient Information  Admitted from[de-identified] Home  Mental Status: Other (Comment), Alert (vague historian)  During Assessment patient was accompanied by: Not accompanied during assessment  Assessment information provided by[de-identified] Patient, Osmin Morales Yary Aponte:Friend)  Support Systems: Macy of Residence: [address] do you live in?: [address] entry access options   Select all that apply : Stairs  Number of steps to enter home : 3  Type of Current Residence: Apartment  Upon entering residence, is there a bedroom on the main floor (no further steps)?: Yes  Upon entering residence, is there a bathroom on the main floor (no further steps)?: Yes  In the last 12 months, how many places have you lived?: 1  In the last 12 months, was there a time when you did not have a steady place to sleep or slept in a shelter (including now)?: No  Homeless/housing insecurity resource given?: N/A  Living Arrangements: Lives Alone  Is patient a ?: No    Activities of Daily Living Prior to Admission  Functional Status: Independent  Completes ADLs independently?: Yes  Ambulates independently?: Yes  Does patient use assisted devices?: No  Does patient currently own DME?: No  Does patient have a history of Outpatient Therapy (PT/OT)?: No  Does the patient have a history of Short-Term Rehab?: No  Does patient have a history of HHC?: No  Does patient currently have [facility]?: No         Patient Information Continued  Income Source: Pension/[facility]  Does patient have prescription coverage?: Yes  Within the past 12 months, you worried that your food would run out before you got the money to buy more : Never true  Within the past 12 months, the food you bought just didn't last and you didn't have money to get more : Never true  Food insecurity resource given?: N/A  Does patient receive dialysis treatments?: No  Does patient have a history of substance abuse?: Yes  Historical substance use preference: Alcohol/ETOH  Is patient currently in treatment for substance abuse?: No  Patient declined treatment information  Does patient have a history of Mental Health Diagnosis?: No         Means of Transportation  Means of Transport to Appts[de-identified] Drives Self  In the past 12 months, has lack of transportation kept you from medical appointments or from getting medications?: No  In the past 12 months, has lack of transportation kept you from meetings, work, or from getting things needed for daily living?: No  Was application for public transport provided?: N/A        DISCHARGE DETAILS:    Discharge planning discussed with[de-identified] patient and friend:Saúl Aponte           Were Treatment Team discharge recommendations reviewed with patient/caregiver?: Yes  Did patient/caregiver verbalize understanding of patient care needs?: Yes  Were patient/caregiver advised of the risks associated with not following Treatment Team discharge recommendations?: Yes      Would you like to participate in our [address] service program?  : No - Declined       Discharge Destination Plan[de-identified]  (?rehab on dc vs home w OP follow up)  Transport at Discharge : Other (Comment) (if home, friend to transport  If to rehab Cm to arrange)      CM will continue to follow pt's medical status and progress with PT/OT  At this time disposition questionable  ?rehab vs home w OP follow up

## 2022-11-26 NOTE — PHYSICAL THERAPY NOTE
Physical Therapy Evaluation     Patient Name: Usman Barr    KURQS'A Date: 11/26/2022     Problem List  Principal Problem:    Metabolic encephalopathy  Active Problems:    Pharyngeal swelling    Hypoxia    Septic shock ([facility] [address] )    Acute respiratory failure (HCC)    Pneumonia    Alcohol withdrawal ([facility] [address] )    Sacral lesion    Other specified anemias    Other dysphagia       Past Medical History  Past Medical History:   Diagnosis Date    Hyperlipidemia     Hypertension     Hyponatremia 11/23/2022    Hypothermia 89/52/2400    Metabolic acidosis with increased anion gap and accumulation of organic acids 11/23/2022        Past Surgical History  Past Surgical History:   Procedure Laterality Date    NO PAST SURGERIES             11/26/22 1256   Note Type   Note type Evaluation    Home Living   Type of Home Apartment   Home Layout One level   Bathroom Shower/Tub Walk-in shower   Bathroom Toilet Standard   Bathroom Equipment Grab bars in shower   [address] Walker;Grab bars   Prior Function   Level of [place] Independent with ADLs   Lives With Alone   IADLs Independent with driving; Independent with meal prep; Independent with medication management   Falls in the last 6 months 0   Vocational Retired   Cognition   Overall Cognitive Status Impaired   Arousal/Participation Uncooperative   Attention CARMELO   Orientation Level Disoriented X4   Memory Unable to assess   Following Commands Unable to follow multistep commands   Comments (S)  Pt very confused t/o session, hypertonic in all extremities, reports " he is falling' while lying in the bed  Hallucinating that there is a rope hanging from the celilng  adamant that he is in [place] and not the hospital   RLE Assessment   RLE Assessment   (CARMELO)   LLE Assessment   LLE Assessment   (CARMELO)   Bed Mobility   Rolling R 2  Maximal assistance   Additional items Assist x 2; Increased time required;Verbal cues  (Patient resisting  Increased Tone in extremities)   Additional Comments Uncooperative, and was unable to sit EOB this session  Max assist x 3 to boost in bed  Agitated at times   Transfers   Additional Comments CARMELO   Ambulation/Elevation   Ambulation/Elevation Additional Comments CARMELO   Assessment   Prognosis Good   Problem List Decreased strength;Decreased range of motion;Decreased endurance; Impaired balance;Decreased mobility   Assessment Pt is 78 y o  male seen for PT evaluation s/p admit to [address] on 62/57/0139 w/ Metabolic encephalopathy  PT consulted to assess pt's functional mobility and d/c needs  Order placed for PT eval and tx, w/ up and OOB as tolerated order  Comorbidities affecting pt's physical performance at time of assessment include: Metabolic encephalopathy  PTA, pt was independent w/ all functional mobility w/ No AD   Personal factors affecting pt at time of IE include: inability to ambulate household distances, inability to navigate community distances, inability to navigate level surfaces w/o external assistance, unable to perform dynamic tasks in community, decreased cognition, unable to perform physical activity, limited insight into impairments, inability to perform IADLs, inability to perform ADLs and inability to live alone  Please find objective findings from PT assessment regarding body systems outlined above with impairments and limitations including weakness, decreased ROM, impaired balance, decreased endurance, impaired coordination, gait deviations, pain, decreased activity tolerance, decreased functional mobility tolerance, decreased safety awareness and impaired judgement Pt to benefit from continued PT tx to address deficits as defined above and maximize level of functional independent mobility and consistency   From PT/mobility standpoint, recommendation at time of d/c would be post acute rehabilitation services pending progress in order to facilitate return to PLOF  Goals   Patient Goals to get better   LTG Expiration Date 12/10/22   Long Term Goal #1 Patient will be (I) with all transfers and bed mobility   Long Term Goal #2 Patient will ambulate 25 feet with supervison   Plan   Treatment/Interventions ADL retraining;Functional transfer training;LE strengthening/ROM; Elevations; Therapeutic exercise; Endurance training;Bed mobility;Gait training   PT Frequency 3-5x/wk   Recommendation   PT Discharge Recommendation Post acute rehabilitation services   [facility] Basic Mobility Inpatient   Turning in Bed Without Bedrails 2   Lying on Back to Sitting on Edge of Flat Bed 2   Moving Bed to Chair 2   Standing Up From Chair 2   Walk in Room 2   Climb 3-5 Stairs 2   Basic Mobility Inpatient Raw Score 12   Basic Mobility Standardized Score 32 23   Highest Level Of Mobility   [facility] Goal 4: Move to chair/commode   -[facility] Achieved 2: Bed activities/Dependent transfer     Patient in bed at the end of the session, all lines intact, all needs within reach  Patients raw score on the [facility] Basic Mobility inpatient short form is 12, standardized score is 32 23, (/less than) 42  9  patient's at this level are likely to benefit from post acute rehab services, however, please refer to therapist recommendation for safe D/C Plan  Pt benefited from co-evaluation of skilled OT and PT therapists in order to most appropriately address functional deficits d/t extensive assistance required for safe functional mobility, decreased activity tolerance, and regression from functioning level prior to admission and/or onset of present illness  OT/PT objectives were addressed separately; please see OT note for specific goal areas targeted

## 2022-11-26 NOTE — ASSESSMENT & PLAN NOTE
Patient seen by speech therapy twice, still not safe for p o   Intake, video swallow Monday, check MRI to rule out CVA

## 2022-11-26 NOTE — PLAN OF CARE
Problem: MOBILITY - ADULT  Goal: Maintain or return to baseline ADL function  Description: INTERVENTIONS:  -  Assess patient's ability to carry out ADLs; assess patient's baseline for ADL function and identify physical deficits which impact ability to perform ADLs (bathing, care of mouth/teeth, toileting, grooming, dressing, etc )  - Assess/evaluate cause of self-care deficits   - Assess range of motion  - Assess patient's mobility; develop plan if impaired  - Assess patient's need for assistive devices and provide as appropriate  - Encourage maximum independence but intervene and supervise when necessary  - Involve family in performance of ADLs  - Assess for home care needs following discharge   - Consider OT consult to assist with ADL evaluation and planning for discharge  - Provide patient education as appropriate  Outcome: Progressing  Goal: Maintains/Returns to pre admission functional level  Description: INTERVENTIONS:  - Perform BMAT or MOVE assessment daily    - Set and communicate daily mobility goal to care team and patient/family/caregiver  - Collaborate with rehabilitation services on mobility goals if consulted  - Perform Range of Motion multiple times a day  - Reposition patient every 2 hours    - Dangle patient multiple times a day  - Stand patient multiple times a day  - Ambulate patient multiple times a day  - Out of bed to chair 4 times a day   - Out of bed for meals 3 times a day  - Out of bed for toileting  - Record patient progress and toleration of activity level   Outcome: Progressing     Problem: Prexisting or High Potential for Compromised Skin Integrity  Goal: Skin integrity is maintained or improved  Description: INTERVENTIONS:  - Identify patients at risk for skin breakdown  - Assess and monitor skin integrity  - Assess and monitor nutrition and hydration status  - Monitor labs   - Assess for incontinence   - Turn and reposition patient  - Assist with mobility/ambulation  - Relieve pressure over bony prominences  - Avoid friction and shearing  - Provide appropriate hygiene as needed including keeping skin clean and dry  - Evaluate need for skin moisturizer/barrier cream  - Collaborate with interdisciplinary team   - Patient/family teaching  - Consider wound care consult   Outcome: Progressing     Problem: PAIN - ADULT  Goal: Verbalizes/displays adequate comfort level or baseline comfort level  Description: Interventions:  - Encourage patient to monitor pain and request assistance  - Assess pain using appropriate pain scale  - Administer analgesics based on type and severity of pain and evaluate response  - Implement non-pharmacological measures as appropriate and evaluate response  - Consider cultural and social influences on pain and pain management  - Notify physician/advanced practitioner if interventions unsuccessful or patient reports new pain  Outcome: Progressing     Problem: INFECTION - ADULT  Goal: Absence or prevention of progression during hospitalization  Description: INTERVENTIONS:  - Assess and monitor for signs and symptoms of infection  - Monitor lab/diagnostic results  - Monitor all insertion sites, i e  indwelling lines, tubes, and drains  - Monitor endotracheal if appropriate and nasal secretions for changes in amount and color  - [place] appropriate cooling/warming therapies per order  - Administer medications as ordered  - Instruct and encourage patient and family to use good hand hygiene technique  - Identify and instruct in appropriate isolation precautions for identified infection/condition  Outcome: Progressing  Goal: Absence of fever/infection during neutropenic period  Description: INTERVENTIONS:  - Monitor WBC    Outcome: Progressing     Problem: SAFETY ADULT  Goal: Maintain or return to baseline ADL function  Description: INTERVENTIONS:  -  Assess patient's ability to carry out ADLs; assess patient's baseline for ADL function and identify physical deficits which impact ability to perform ADLs (bathing, care of mouth/teeth, toileting, grooming, dressing, etc )  - Assess/evaluate cause of self-care deficits   - Assess range of motion  - Assess patient's mobility; develop plan if impaired  - Assess patient's need for assistive devices and provide as appropriate  - Encourage maximum independence but intervene and supervise when necessary  - Involve family in performance of ADLs  - Assess for home care needs following discharge   - Consider OT consult to assist with ADL evaluation and planning for discharge  - Provide patient education as appropriate  Outcome: Progressing  Goal: Maintains/Returns to pre admission functional level  Description: INTERVENTIONS:  - Perform BMAT or MOVE assessment daily    - Set and communicate daily mobility goal to care team and patient/family/caregiver  - Collaborate with rehabilitation services on mobility goals if consulted  - Perform Range of Motion multiple times a day  - Reposition patient every 2 hours    - Dangle patient multiple times a day  - Stand patient multiple times a day  - Ambulate patient multiple times a day  - Out of bed to chair 4 times a day   - Out of bed for meals 3 times a day  - Out of bed for toileting  - Record patient progress and toleration of activity level   Outcome: Progressing  Goal: Patient will remain free of falls  Description: INTERVENTIONS:  - Educate patient/family on patient safety including physical limitations  - Instruct patient to call for assistance with activity   - Consult OT/PT to assist with strengthening/mobility   - Keep Call bell within reach  - Keep bed low and locked with side rails adjusted as appropriate  - Keep care items and personal belongings within reach  - Initiate and maintain comfort rounds  - Make Fall Risk Sign visible to staff  - Offer Toileting every 2 Hours, in advance of need  - Initiate/Maintain bed alarm  - Obtain necessary fall risk management equipment: nonskid socks  - Apply yellow socks and bracelet for high fall risk patients  - Consider moving patient to room near nurses station  Outcome: Progressing     Problem: DISCHARGE PLANNING  Goal: Discharge to home or other facility with appropriate resources  Description: INTERVENTIONS:  - Identify barriers to discharge w/patient and caregiver  - Arrange for needed discharge resources and transportation as appropriate  - Identify discharge learning needs (meds, wound care, etc )  - Arrange for interpretive services to assist at discharge as needed  - Refer to Case Management Department for coordinating discharge planning if the patient needs post-hospital services based on physician/advanced practitioner order or complex needs related to functional status, cognitive ability, or social support system  Outcome: Progressing     Problem: Knowledge Deficit  Goal: Patient/family/caregiver demonstrates understanding of disease process, treatment plan, medications, and discharge instructions  Description: Complete learning assessment and assess knowledge base  Interventions:  - Provide teaching at level of understanding  - Provide teaching via preferred learning methods  Outcome: Progressing     Problem: Nutrition/Hydration-ADULT  Goal: Nutrient/Hydration intake appropriate for improving, restoring or maintaining nutritional needs  Description: Monitor and assess patient's nutrition/hydration status for malnutrition  Collaborate with interdisciplinary team and initiate plan and interventions as ordered  Monitor patient's weight and dietary intake as ordered or per policy  Utilize nutrition screening tool and intervene as necessary  Determine patient's food preferences and provide high-protein, high-caloric foods as appropriate       INTERVENTIONS:  - Monitor oral intake, urinary output, labs, and treatment plans  - Assess nutrition and hydration status and recommend course of action  - Evaluate amount of meals eaten  - Assist patient with eating if necessary   - Allow adequate time for meals  - Recommend/ encourage appropriate diets, oral nutritional supplements, and vitamin/mineral supplements  - Order, calculate, and assess calorie counts as needed  - Recommend, monitor, and adjust tube feedings and TPN/PPN based on assessed needs  - Assess need for intravenous fluids  - Provide specific nutrition/hydration education as appropriate  - Include patient/family/caregiver in decisions related to nutrition  Outcome: Progressing     Problem: Potential for Falls  Goal: Patient will remain free of falls  Description: INTERVENTIONS:  - Educate patient/family on patient safety including physical limitations  - Instruct patient to call for assistance with activity   - Consult OT/PT to assist with strengthening/mobility   - Keep Call bell within reach  - Keep bed low and locked with side rails adjusted as appropriate  - Keep care items and personal belongings within reach  - Initiate and maintain comfort rounds  - Make Fall Risk Sign visible to staff  - Offer Toileting every 2 Hours, in advance of need  - Initiate/Maintain bed alarm  - Obtain necessary fall risk management equipment: nonskid socks  - Apply yellow socks and bracelet for high fall risk patients  - Consider moving patient to room near nurses station  Outcome: Progressing

## 2022-11-26 NOTE — ASSESSMENT & PLAN NOTE
Background:  Patient presented with altered mental status, lethargic but responds to verbal stimuli, follows commands  Acute metabolic encephalopathy present on admission, likely due to underlying infection, plus alcohol withdrawal      Mental status has improved, patient is still confused    Check MRI patient seemed to have some vertigo, trial meclizine, give 1 dose of Ativan

## 2022-11-26 NOTE — PROGRESS NOTES
[address] [facility]  Progress Note Dariel Hamman 1955, 78 y o  male MRN: [id number]  Unit/Bed#: 404-01 Encounter: [id number]  Primary Care Provider: Betina Hinds DO   Date and time admitted to hospital: 11/23/2022  1:09 PM    Other dysphagia  Assessment & Plan  Patient seen by speech therapy twice, still not safe for p o  Intake, video swallow Monday, check MRI to rule out CVA    * Metabolic encephalopathy  Assessment & Plan  Background:  Patient presented with altered mental status, lethargic but responds to verbal stimuli, follows commands  Acute metabolic encephalopathy present on admission, likely due to underlying infection, plus alcohol withdrawal      Mental status has improved, patient is still confused    Check MRI patient seemed to have some vertigo, trial meclizine, give 1 dose of Ativan  Hypoxia  Assessment & Plan  Resolved, likely secondary to aspiration, with suspected aspiration pneumonia, continue current broad-spectrum antibiotics, cultures    Pharyngeal swelling  Assessment & Plan  Follow-up video swallow Monday    Other specified anemias  Assessment & Plan  Continue monitoring hemoglobin    Sacral lesion  Assessment & Plan  Incidental finding noted on CT scan and x-rays as following:    3  Sacral bony lesion has enlarged compared to the previous exam and is indeterminate  Plan:  -follow-up with primary care provider accordingly  -May and spit for an MRI when stable  Alcohol withdrawal ([facility], [address] )  Assessment & Plan  Background:  Social history as given by his close friends; daily alcohol drinker for the past 1 year at least   Smokes cigarettes I pack per day for same duration     Given the patient's estimated timing of the fall at home his altered mental state could be most likely caused by a combination between septic shock and alcohol withdrawal     -medical toxicology consultant on board; agreed on the plan as following:    -Ativan p r n  for agitation and seizures  -IV thiamine supplement  -IV folic acid supplement  -follow-up with rehab when stable  Pneumonia  Assessment & Plan  CT Ca P:  1  Bilateral pneumonia and pleural effusions  2   Distended esophagus containing fluid  Nonurgent evaluation with swallowing study is recommended  3   Sacral bony lesion has enlarged compared to the previous exam and is indeterminate  Recommend correlation with bone scan or MRI pelvis  Sclerotic lesion at the left pedicle at T11 is unchanged  Acute respiratory failure (HCC)  Assessment & Plan  Present on admission, resolved    Septic shock ([address] )  Assessment & Plan  Likely secondary to pneumonia, continue current broad-spectrum antibiotics and follow-up blood cultures      Code Status: Level 1 - Full Code    Subjective:   Denies pain, still seems confused, is not a reliable historian    Objective:     Vitals:   Temp (24hrs), Av 3 °F (36 3 °C), Min:97 2 °F (36 2 °C), Max:97 4 °F (36 3 °C)    Temp:  [97 2 °F (36 2 °C)-97 4 °F (36 3 °C)] 97 4 °F (36 3 °C)  HR:  [76-79] 77  Resp:  [16-18] 16  BP: (141-153)/(72-89) 141/72  SpO2:  [95 %-100 %] 95 %  Body mass index is 29 37 kg/m²  Input and Output Summary (last 24 hours): Intake/Output Summary (Last 24 hours) at 2022 1204  Last data filed at 2022 1128  Gross per 24 hour   Intake 1388 29 ml   Output 600 ml   Net 788 29 ml       Physical Exam:   Physical Exam  Vitals and nursing note reviewed  HENT:      Head: Normocephalic and atraumatic  Right Ear: External ear normal       Left Ear: External ear normal    Cardiovascular:      Rate and Rhythm: Normal rate  Pulmonary:      Effort: Pulmonary effort is normal    Musculoskeletal:      Cervical back: Normal range of motion  Neurological:      Mental Status: He is alert  He is disoriented  Motor: No weakness        Comments: Patient is bracing himself in his hospital bed, reports an abnormal sense of movement, seems like he feels as though he is going to fall    Generalized weakness          Additional Data:     Labs:  Results from last 7 days   Lab Units 11/26/22  0929 11/25/22  0431 11/24/22  0504   WBC Thousand/uL 7 64   < > 13 01*   HEMOGLOBIN g/dL 9 1*   < > 10 3*   HEMATOCRIT % 26 9*   < > 28 8*   PLATELETS Thousands/uL 147*   < > 353   BANDS PCT %  --   --  6   NEUTROS PCT % 75  --   --    LYMPHS PCT % 11*  --   --    LYMPHO PCT %  --   --  0*   MONOS PCT % 13*  --   --    MONO PCT %  --   --  2*   EOS PCT % 0  --  0    < > = values in this interval not displayed  Results from last 7 days   Lab Units 11/26/22  0444 11/24/22  1245 11/24/22  0504   SODIUM mmol/L 138   < > 130*   POTASSIUM mmol/L 3 5   < > 4 0   CHLORIDE mmol/L 109*   < > 97   CO2 mmol/L 20*   < > 16*   BUN mg/dL 15   < > 23   CREATININE mg/dL 0 80   < > 0 96   ANION GAP mmol/L 9   < > 17*   CALCIUM mg/dL 8 7   < > 7 8*   ALBUMIN g/dL  --   --  2 2*   TOTAL BILIRUBIN mg/dL  --   --  0 45   ALK PHOS U/L  --   --  124*   ALT U/L  --   --  21   AST U/L  --   --  34   GLUCOSE RANDOM mg/dL 128   < > 153*    < > = values in this interval not displayed  Results from last 7 days   Lab Units 11/24/22  0504   INR  1 21*     Results from last 7 days   Lab Units 11/24/22  0800 11/23/22  1657 11/23/22  1313   POC GLUCOSE mg/dl 169* 79 71     Results from last 7 days   Lab Units 11/24/22  0504   HEMOGLOBIN A1C % 4 9     Results from last 7 days   Lab Units 11/24/22  0504 11/23/22  2024 11/23/22  1328   LACTIC ACID mmol/L  --  0 8 0 9   PROCALCITONIN ng/ml 0 70*  --  0 26*       Lines/Drains:  Invasive Devices     Central Venous Catheter Line  Duration           CVC Central Lines 11/23/22 Triple Right Subclavian 2 days          Peripheral Intravenous Line  Duration           Peripheral IV 11/23/22 Dorsal (posterior); Left Hand 2 days    Peripheral IV 11/23/22 Proximal;Right;Ventral (anterior) Forearm 2 days          Drain  Duration           Urethral Catheter 16 Fr  2 days Urinary Catheter:  Goal for removal: Remove after 48 hrs of I/O monitoring         Central Line:  Goal for removal: Maintain times 24 hours             Imaging: No pertinent imaging reviewed  Recent Cultures (last 7 days):   Results from last 7 days   Lab Units 11/23/22  1339 11/23/22  1328   GRAM STAIN RESULT  Gram positive cocci in clusters* Gram variable rods*       Last 24 Hours Medication List:   Current Facility-Administered Medications   Medication Dose Route Frequency Provider Last Rate   • cefepime  2,000 mg Intravenous Q12H Yuliet Talamantesr Brian, DO 2,000 mg (11/26/22 0432)   • dextrose 5 % and sodium chloride 0 9 %  50 mL/hr Intravenous Continuous [place] Lorene Brian, DO 50 mL/hr (11/26/22 1158)   • enoxaparin  40 mg Subcutaneous Daily Yuliet Lamol, DO     • ferrous gluconate  324 mg Oral BID AC Fabricio Reyes MD     • LORazepam  0 5 mg Intravenous Once Yuliet Lorene Brian, DO     • meclizine  12 5 mg Oral [place], [place]     • pantoprazole  40 mg Intravenous Q24H Raf [address] Caneloann cason      • potassium phosphate  12 mmol Intravenous Once [place] Lorene Brian, DO     • thiamine  500 mg Intravenous Q8H Araseli Last Stopped (11/26/22 5058)        Today, Patient Was Seen By: Violet Jalloh DO    **Please Note: This note may have been constructed using a voice recognition system  **

## 2022-11-26 NOTE — PLAN OF CARE
Problem: OCCUPATIONAL THERAPY ADULT  Goal: Performs self-care activities at highest level of function for planned discharge setting  See evaluation for individualized goals  Description: Treatment Interventions: ADL retraining, Functional transfer training, UE strengthening/ROM, Endurance training, Cognitive reorientation, Patient/family training, Equipment evaluation/education, Energy conservation, Activityengagement, Fine motor coordination activities          See flowsheet documentation for full assessment, interventions and recommendations  Outcome: Not Progressing  Note: Limitation: Decreased ADL status, Decreased UE strength, Decreased Safe judgement during ADL, Decreased cognition, Decreased endurance, Decreased self-care trans, Decreased high-level ADLs, Decreased fine motor control  Prognosis: Fair  Assessment: Patient participated in Skilled OT session this date with interventions consisting of ADL re training with the use of correct body mechnaics, safety awareness and fall prevention techniques,  therapeutic activities to: increase activity tolerance and increase OOB/ sitting tolerance   Co treatment with PT secondary to complex medical condition of pt, mod-max A of 2 required to achieve and maintain transitional movements, requiring the need of skilled therapeutic intervention of 2 therapists to achieve delivery of services  Patient agreeable to OT treatment session, upon arrival patient was found supine in bed  Patient requiring verbal cues for safety, verbal cues for correct technique and cognitive assistance to anticipate next step  Pt requiring significantly increased time, and consistent v c  to attempt supine to seated EOB  Pt max A x 2 to get custodial through supine to seated EOB, when pt becoming agitated, fearful of falling, and tone increase  Pt refusing to be moved back to supine to prevent fall  A x 3 and multiple v c  required for pt to txfer back to supine safely   Pt with significant difficulty following one step directions and initiation movement throughout transfer  Pt disoriented to place, time and situation  Total A to don socks  Pt left supine in bed, all needs met, call bell in reach, bed alarm on  RN made aware  Patient continues to be functioning below baseline level, occupational performance remains limited secondary to factors listed above and increased risk for falls and injury  From OT standpoint, recommendation at time of d/c would be Post acute rehabilitation services  The patient's raw score on the AM-PAC Daily Activity inpatient short form is 9, standardized score is   9,  less than 39 4  Patients at this level are likely to benefit from discharge to post-acute rehabilitation services  Please refer to the recommendation of the Occupational Therapist for safe discharge planning       OT Discharge Recommendation: Post acute rehabilitation services        Jules Ribera

## 2022-11-26 NOTE — PLAN OF CARE
Problem: PHYSICAL THERAPY ADULT  Goal: Performs mobility at highest level of function for planned discharge setting  See evaluation for individualized goals  Description: Treatment/Interventions: ADL retraining, Functional transfer training, LE strengthening/ROM, Elevations, Therapeutic exercise, Endurance training, Bed mobility, Gait training          See flowsheet documentation for full assessment, interventions and recommendations  Note: Prognosis: Good  Problem List: Decreased strength, Decreased range of motion, Decreased endurance, Impaired balance, Decreased mobility  Assessment: Pt is 78 y o  male seen for PT evaluation s/p admit to [address] on 07/82/5073 w/ Metabolic encephalopathy  PT consulted to assess pt's functional mobility and d/c needs  Order placed for PT eval and tx, w/ up and OOB as tolerated order  Comorbidities affecting pt's physical performance at time of assessment include: Metabolic encephalopathy  PTA, pt was independent w/ all functional mobility w/ No AD   Personal factors affecting pt at time of IE include: inability to ambulate household distances, inability to navigate community distances, inability to navigate level surfaces w/o external assistance, unable to perform dynamic tasks in community, decreased cognition, unable to perform physical activity, limited insight into impairments, inability to perform IADLs, inability to perform ADLs and inability to live alone  Please find objective findings from PT assessment regarding body systems outlined above with impairments and limitations including weakness, decreased ROM, impaired balance, decreased endurance, impaired coordination, gait deviations, pain, decreased activity tolerance, decreased functional mobility tolerance, decreased safety awareness and impaired judgement Pt to benefit from continued PT tx to address deficits as defined above and maximize level of functional independent mobility and consistency   From PT/mobility standpoint, recommendation at time of d/c would be post acute rehabilitation services pending progress in order to facilitate return to PLOF  PT Discharge Recommendation: Post acute rehabilitation services    See flowsheet documentation for full assessment

## 2022-11-26 NOTE — OCCUPATIONAL THERAPY NOTE
Occupational Therapy Treatment Note     Patient Name: Mei Matta  CPNUA'Q Date: 11/26/2022  Problem List  Principal Problem:    Metabolic encephalopathy  Active Problems:    Pharyngeal swelling    Hypoxia    Septic shock (HCC)    Acute respiratory failure (HCC)    Pneumonia    Alcohol withdrawal ([facility] [address] )    Sacral lesion    Other specified anemias    Other dysphagia        11/26/22 1252   OT Last Visit   OT Visit Date 11/26/22   Note Type   Note Type Treatment   Pain Assessment   Pain Assessment Tool 0-10   Pain Score No Pain   Restrictions/Precautions   Weight Bearing Precautions Per Order No   Other Precautions Cognitive; Bed Alarm;Multiple lines;Telemetry; Fall Risk   ADL   Where Assessed Supine, bed   LB Dressing Assistance 1  Total Assistance   LB Dressing Deficit Don/doff L sock; Don/doff R sock   LB Dressing Comments Pt total A to don bilateral socks supine in bed  Pt not following one step directions and with poor control of voluntary motor movement  Bed Mobility   Supine to Sit 2  Maximal assistance   Additional items Assist x 2; Increased time required;Verbal cues;LE management   Sit to Supine 2  Maximal assistance   Additional items Assist x 2; Increased time required;Verbal cues; Bedrails   Additional Comments Pt supine in bed at beginning of session  Pt agreeable to attempt to sit EOB  Pt requiring multiple v c  for technique and to initiate movement  Pt with diffiuclty initiating movement, and grabbing onto bedrails and having difficulty following one step directions on where to place BUE  With max A x 2 pt able to get detention from supine to seated EOB  However, in mid transfer, pt with significantly increased tone and noting that he is falling, although he was fully supported on the bed  Pt requiring multiple v c  tor relax  Pt then requiring A x 3 to move back from half off bed to supine, significantly increased and time and consistent v c  reassuring the patient he was not falling   Vitals WNL during transfer  Transfers   Additional Comments Unable to attempt functional transfer, as unable to safely move to sit EOB, pt unable to follow directions and poor initiation of movement  Subjective   Subjective "I am falling"   Cognition   Overall Cognitive Status Impaired   Arousal/Participation Responsive   Attention Difficulty attending to directions   Orientation Level Oriented to person;Disoriented to place; Disoriented to time;Disoriented to situation   Memory Decreased recall of recent events;Decreased recall of precautions;Decreased recall of biographical information   Following Commands Follows one step commands inconsistently   Comments Pt pleasant overall, but disoriented to place  When told he was in the hospital in [place], pt said "no"  Pt with signficant difficulty following one step directions and initating movement  (Pt very confused and reporting things in the room that are not present, such as rope )   Activity Tolerance   Activity Tolerance Treatment limited secondary to medical complications (Comment); Treatment limited secondary to agitation   Medical Staff Made Aware AP Mayorga verbalized pt appropriate for OT treatment  PT Abdi Rides present for co-treat due to pt's medical complexity and hx of requiring 2 to safely perform functional movement  Assessment   Assessment Patient participated in Skilled OT session this date with interventions consisting of ADL re training with the use of correct body mechnaics, safety awareness and fall prevention techniques,  therapeutic activities to: increase activity tolerance and increase OOB/ sitting tolerance   Co treatment with PT secondary to complex medical condition of pt, mod-max A of 2 required to achieve and maintain transitional movements, requiring the need of skilled therapeutic intervention of 2 therapists to achieve delivery of services  Patient agreeable to OT treatment session, upon arrival patient was found supine in bed   Patient requiring verbal cues for safety, verbal cues for correct technique and cognitive assistance to anticipate next step  Pt requiring significantly increased time, and consistent v c  to attempt supine to seated EOB  Pt max A x 2 to get senior care through supine to seated EOB, when pt becoming agitated, fearful of falling, and tone increase  Pt refusing to be moved back to supine to prevent fall  A x 3 and multiple v c  required for pt to txfer back to supine safely  Pt with significant difficulty following one step directions and initiation movement throughout transfer  Pt disoriented to place, time and situation  Total A to don socks  Pt left supine in bed, all needs met, call bell in reach, bed alarm on  RN made aware  Patient continues to be functioning below baseline level, occupational performance remains limited secondary to factors listed above and increased risk for falls and injury  From OT standpoint, recommendation at time of d/c would be Post acute rehabilitation services  The patient's raw score on the AM-PAC Daily Activity inpatient short form is 9, standardized score is   9,  less than 39 4  Patients at this level are likely to benefit from discharge to post-acute rehabilitation services  Please refer to the recommendation of the Occupational Therapist for safe discharge planning  Plan   Treatment Interventions Functional transfer training;ADL retraining;Cognitive reorientation   Goal Expiration Date 12/09/22   OT Treatment Day 2   OT Frequency 3-5x/wk   Recommendation   OT Discharge Recommendation Post acute rehabilitation services   Additional Comments  Pt left supine in bed, all needs met, call bell in reach, bed alarm on  (AP Sepulveda made aware of performance during session  )   AM-PAC Daily Activity Inpatient   Lower Body Dressing 1   Bathing 1   Toileting 1   Upper Body Dressing 2   Grooming 2   Eating 2   Daily Activity Raw Score 9   Turning Head Towards Sound 3   Follow Simple Instructions 2   Low Function Daily Activity Raw Score 14   Low Function Daily Activity Standardized Score 24 79   AM-PAC Applied Cognition Inpatient   Following a Speech/Presentation 1   Understanding Ordinary Conversation 2   Taking Medications 1   Remembering Where Things Are Placed or Put Away 1   Remembering List of 4-5 Errands 1   Taking Care of Complicated Tasks 1   Applied Cognition Raw Score 7   Applied Cognition Standardized Score 15 17     Erick Huizar

## 2022-11-27 PROBLEM — K22.89 ESOPHAGEAL DILATATION: Status: ACTIVE | Noted: 2022-11-27

## 2022-11-27 PROBLEM — Z87.19 HISTORY OF GI BLEED: Status: ACTIVE | Noted: 2022-11-27

## 2022-11-27 PROBLEM — R09.02 HYPOXIA: Status: RESOLVED | Noted: 2022-11-23 | Resolved: 2022-11-27

## 2022-11-27 PROBLEM — J96.00 ACUTE RESPIRATORY FAILURE (HCC): Status: RESOLVED | Noted: 2022-11-24 | Resolved: 2022-11-27

## 2022-11-27 LAB
ALL TARGETS: NOT DETECTED
ALL TARGETS: NOT DETECTED
ANION GAP SERPL CALCULATED.3IONS-SCNC: 6 MMOL/L (ref 4–13)
BACTERIA BLD CULT: ABNORMAL
BACTERIA BLD CULT: ABNORMAL
BASOPHILS # BLD AUTO: 0.02 THOUSANDS/ÂΜL (ref 0–0.1)
BASOPHILS NFR BLD AUTO: 0 % (ref 0–1)
BUN SERPL-MCNC: 20 MG/DL (ref 5–25)
CALCIUM SERPL-MCNC: 8.8 MG/DL (ref 8.3–10.1)
CHLORIDE SERPL-SCNC: 111 MMOL/L (ref 96–108)
CO2 SERPL-SCNC: 23 MMOL/L (ref 21–32)
CREAT SERPL-MCNC: 0.89 MG/DL (ref 0.6–1.3)
EOSINOPHIL # BLD AUTO: 0.07 THOUSAND/ÂΜL (ref 0–0.61)
EOSINOPHIL NFR BLD AUTO: 1 % (ref 0–6)
ERYTHROCYTE [DISTWIDTH] IN BLOOD BY AUTOMATED COUNT: 14.6 % (ref 11.6–15.1)
GFR SERPL CREATININE-BSD FRML MDRD: 89 ML/MIN/1.73SQ M
GLUCOSE SERPL-MCNC: 101 MG/DL (ref 65–140)
GRAM STN SPEC: ABNORMAL
GRAM STN SPEC: ABNORMAL
HCT VFR BLD AUTO: 26.4 % (ref 36.5–49.3)
HCT VFR BLD AUTO: 27.9 % (ref 36.5–49.3)
HGB BLD-MCNC: 8.7 G/DL (ref 12–17)
HGB BLD-MCNC: 9.4 G/DL (ref 12–17)
IMM GRANULOCYTES # BLD AUTO: 0.12 THOUSAND/UL (ref 0–0.2)
IMM GRANULOCYTES NFR BLD AUTO: 2 % (ref 0–2)
INR PPP: 1.14 (ref 0.84–1.19)
LYMPHOCYTES # BLD AUTO: 1.23 THOUSANDS/ÂΜL (ref 0.6–4.47)
LYMPHOCYTES NFR BLD AUTO: 15 % (ref 14–44)
MAGNESIUM SERPL-MCNC: 2 MG/DL (ref 1.6–2.6)
MCH RBC QN AUTO: 30.5 PG (ref 26.8–34.3)
MCHC RBC AUTO-ENTMCNC: 33 G/DL (ref 31.4–37.4)
MCV RBC AUTO: 93 FL (ref 82–98)
MONOCYTES # BLD AUTO: 1.04 THOUSAND/ÂΜL (ref 0.17–1.22)
MONOCYTES NFR BLD AUTO: 13 % (ref 4–12)
NEUTROPHILS # BLD AUTO: 5.66 THOUSANDS/ÂΜL (ref 1.85–7.62)
NEUTS SEG NFR BLD AUTO: 69 % (ref 43–75)
NRBC BLD AUTO-RTO: 0 /100 WBCS
PHOSPHATE SERPL-MCNC: 2.3 MG/DL (ref 2.3–4.1)
PLATELET # BLD AUTO: 146 THOUSANDS/UL (ref 149–390)
PMV BLD AUTO: 9.4 FL (ref 8.9–12.7)
POTASSIUM SERPL-SCNC: 3.6 MMOL/L (ref 3.5–5.3)
PROCALCITONIN SERPL-MCNC: 0.07 NG/ML
PROTHROMBIN TIME: 14.8 SECONDS (ref 11.6–14.5)
RBC # BLD AUTO: 2.85 MILLION/UL (ref 3.88–5.62)
SODIUM SERPL-SCNC: 140 MMOL/L (ref 135–147)
WBC # BLD AUTO: 8.14 THOUSAND/UL (ref 4.31–10.16)

## 2022-11-27 RX ADMIN — DEXTROSE AND SODIUM CHLORIDE 50 ML/HR: 5; .9 INJECTION, SOLUTION INTRAVENOUS at 14:08

## 2022-11-27 RX ADMIN — CEFEPIME HYDROCHLORIDE 2000 MG: 2 INJECTION, SOLUTION INTRAVENOUS at 05:29

## 2022-11-27 RX ADMIN — ENOXAPARIN SODIUM 40 MG: 40 INJECTION SUBCUTANEOUS at 09:20

## 2022-11-27 RX ADMIN — PANTOPRAZOLE SODIUM 40 MG: 40 INJECTION, POWDER, FOR SOLUTION INTRAVENOUS at 09:20

## 2022-11-27 RX ADMIN — CEFEPIME HYDROCHLORIDE 2000 MG: 2 INJECTION, SOLUTION INTRAVENOUS at 17:34

## 2022-11-27 RX ADMIN — THIAMINE HYDROCHLORIDE 500 MG: 100 INJECTION, SOLUTION INTRAMUSCULAR; INTRAVENOUS at 05:58

## 2022-11-27 RX ADMIN — THIAMINE HYDROCHLORIDE 100 MG: 100 INJECTION, SOLUTION INTRAMUSCULAR; INTRAVENOUS at 21:56

## 2022-11-27 RX ADMIN — THIAMINE HYDROCHLORIDE 100 MG: 100 INJECTION, SOLUTION INTRAMUSCULAR; INTRAVENOUS at 14:08

## 2022-11-27 RX ADMIN — ALTEPLASE 2 MG: 2.2 INJECTION, POWDER, LYOPHILIZED, FOR SOLUTION INTRAVENOUS at 15:19

## 2022-11-27 RX ADMIN — PANTOPRAZOLE SODIUM 40 MG: 40 INJECTION, POWDER, FOR SOLUTION INTRAVENOUS at 21:57

## 2022-11-27 NOTE — SPEECH THERAPY NOTE
Speech Language/Pathology    Speech/Language Pathology Progress Note    Patient Name: Delos Castleman  UUSYX'O Date: 11/27/2022     Problem List  Principal Problem:    Metabolic encephalopathy  Active Problems:    Pharyngeal swelling    Septic shock (HCC)    Pneumonia    Alcohol withdrawal (HCC)    Sacral lesion    Other specified anemias    Other dysphagia    Esophageal dilatation    History of GI bleed       Past Medical History  Past Medical History:   Diagnosis Date   • Acute respiratory failure ([address] ) 11/24/2022   • Hyperlipidemia    • Hypertension    • Hyponatremia 11/23/2022   • Hypothermia 11/23/2022   • Hypoxia 63/80/5419   • Metabolic acidosis with increased anion gap and accumulation of organic acids 11/23/2022        Past Surgical History  Past Surgical History:   Procedure Laterality Date   • NO PAST SURGERIES           Subjective:  Pt with garbled speech initially, mostly unintelligible  Mouth very dry  Once trials progressed and mouth more hydrated speech intelligibility increased somewhat  Pt feels his speech and swallow function is "improved" today  Objective:  Received in bed awake and alert  Pt continues with overall reduced strength of the oropharyngeal swallow function  Assessment:  Assessed with honey thick liquids via spoon/cup, single ice chips and pureed solids  Oral phase characterized by overall slow but effective orientation/reception, prolonged but effective AP transport and adequate oral clearance  Pharyngeal phase characterized by suspected delay in the initiation of the swallow especially with cup sips of honey thick liquids and reduced hyolaryngeal elevation  Throat clear x1 with single ice chips  No s s of aspiration with honey thick liquids or pureed solids  Pt with 2 swallows per bolus across all trials  Pt denied any difficulty swallowing across all trials  Plan/Recommendations:  Pending VBS tomorrow to further assess oropharyngeal swallow function   Recommend diet initiation: pureed solids and honey thick liquids via teaspoon only  Meds crushed in puree  Assist with all meals but encourage self feeding  Maintain aspiration precautions

## 2022-11-27 NOTE — ASSESSMENT & PLAN NOTE
Patient with history of GI bleed and previous hospitalization     -since admission hemoglobin levels have been trending down from 11 6 on presentation to 8 7    -2 consecutive readings from past 24 hours with no change  Most likely no active bleeding at this time  Will continue to re-evaluate  -FOBT 1/3:  Negative  Pending completion    Plan:  -trend H and H  -follow-up with morning labs  -continue PPI therapy for stress ulcer prophylaxis  -consider holding anticoagulants if continues to trend down    -GI team consulted; input highly appreciated

## 2022-11-27 NOTE — ASSESSMENT & PLAN NOTE
Background:  Patient presented with altered mental status, lethargic but responds to verbal stimuli, follows commands  Acute metabolic encephalopathy present on admission, likely due to underlying infection, plus alcohol withdrawal      Mental status moderately improved, however he is still confused with minimal change from yesterday     -continues to be in altered mental state, failed 3 trials with speech and swallow eval   Continues to be NPO  Will consider NG tube if did not pass swallow eval within next 24 hours  Check MRI patient seemed to have some vertigo, trial meclizine, give 1 dose of Ativan

## 2022-11-27 NOTE — PROGRESS NOTES
[address] [facility] [address] [place]  Progress Note Phu Sweeney 1955, 78 y o  male MRN: [id number]  Unit/Bed#: 404-01 Encounter: [id number]  Primary Care Provider: Romie Patel DO   Date and time admitted to hospital: 11/23/2022  1:09 PM    Pneumonia  Assessment & Plan  CT Ca P:  1  Bilateral pneumonia and pleural effusions  Blood cultures:     1/4:  Micrococcus luteus, Gram-positive cocci in clusters  1/4: Lactobacillus species Abnormal  Gram Stain ResultGram variable rods Abnormal    2/4: NGTD     Plan:  Given the atypical growth of blood cultures a plan for the following;  -continue cefepime day 5  -follow-up with cultures  -infectious disease team consulted; input highly appreciated  -vital signs per unit  -I/OS, daily weight  -morning labs    Septic shock ([facility] [address] )  Assessment & Plan  Likely secondary to pneumonia, continue current broad-spectrum antibiotics and follow-up blood cultures      Refer to assessment and plan for pneumonia  * Metabolic encephalopathy  Assessment & Plan  Background:  Patient presented with altered mental status, lethargic but responds to verbal stimuli, follows commands  Acute metabolic encephalopathy present on admission, likely due to underlying infection, plus alcohol withdrawal      Mental status moderately improved, however he is still confused with minimal change from yesterday     -continues to be in altered mental state, failed 3 trials with speech and swallow eval   Continues to be NPO  Will consider NG tube if did not pass swallow eval within next 24 hours  Check MRI patient seemed to have some vertigo, trial meclizine, give 1 dose of Ativan  Alcohol withdrawal ([facility] [address] )  Assessment & Plan  Background:  Social history as given by his close friends; daily alcohol drinker for the past 1 year at least   Smokes cigarettes I pack per day for same duration     Given the patient's estimated timing of the fall at home his altered mental state could be most likely caused by a combination between septic shock and alcohol withdrawal     -medical toxicology consultant on board; agreed on the plan as following:    -Ativan p r n  for agitation and seizures  -IV thiamine supplement  -IV folic acid supplement  -follow-up with rehab when stable  History of GI bleed  Assessment & Plan  Patient with history of GI bleed and previous hospitalization     -since admission hemoglobin levels have been trending down from 11 6 on presentation to 8 7    -2 consecutive readings from past 24 hours with no change  Most likely no active bleeding at this time  Will continue to re-evaluate  -FOBT 1/3:  Negative  Pending completion    Plan:  -trend H and H  -follow-up with morning labs  -continue PPI therapy for stress ulcer prophylaxis  -consider holding anticoagulants if continues to trend down  -GI team consulted; input highly appreciated    Esophageal dilatation  Assessment & Plan  In the setting of coma and possible alcohol intoxication when was at home  -CT scan showing esophageal dilation     -unable to tolerate oral diet since admission on 11/23  Daily speech and swallow eval however patient continues to fail  Plan as following:  -GI team consulted; input highly appreciated  -will consider nasogastric tube insertion if no improvement within next 24 hours  Other dysphagia  Assessment & Plan  Patient seen by speech therapy twice, still not safe for p o  Intake, video swallow Monday, check MRI to rule out CVA    Other specified anemias  Assessment & Plan  Continue monitoring hemoglobin and hematocrit     -given his history of GI bleed and previous hospitalization for that reason will plan for the following:    -check folic acid and vitamin B12 levels   -trend H and H and transfuse if needed per protocol  Sacral lesion  Assessment & Plan  Incidental finding noted on CT scan and x-rays as following:    3    Sacral bony lesion has enlarged compared to the previous exam and is indeterminate  Plan:  -follow-up with primary care provider accordingly  -May follow up with an MRI when stable  Pharyngeal swelling  Assessment & Plan    -vitals stable  -SpO2 within normal limits on room air   -not in acute respiratory distress  -CMV and EBV panel negative  Plan:  Follow-up video swallow Monday    Acute respiratory failure (HCC)-resolved as of 11/27/2022  Assessment & Plan  Present on admission, resolved    Hyponatremia-resolved as of 11/25/2022  Assessment & Plan  Background:  Likely hyponatremia due to hypovolemia on top of thiazide diuretic use     -received total 4 L IV normal saline boluses within the past 24 hour  -also is maintained on maintenance IV fluids of half dextrose with normal saline at 200 mils per hour     -serum sodium levels normalizing; trending up to 130 from 123 on presentation  Plan:  Given the patient's borderline low blood pressures and his need for pressor will continue IV fluids at maintenance  -currently Levophed is at 1 ml/hr   -wean and titrate Levophed drip per protocol   -re-evaluate Q 4 neuro checks, cardiopulmonary check, telemetry   -monitor I/OS with Moreno's catheter in place   -daily weight  -morning labs  Metabolic acidosis with increased anion gap and accumulation of organic acids-resolved as of 11/24/2022  Assessment & Plan  Metabolic acidosis with anion gap    -resolved per updated ABG on 11/24        Hypothermia-resolved as of 11/25/2022  Assessment & Plan  Hypothermia due to exposure in cold house, unknown downtime    Closely monitor metabolic status with rewarming, monitor calcium, patient did have transient hypoglycemia  Continue D5 normal saline infusion and external rewarming as needed     -resolved        Hypoxia-resolved as of 11/27/2022  Assessment & Plan  Resolved, likely secondary to aspiration, with suspected aspiration pneumonia, continue current broad-spectrum antibiotics, cultures      VTE Pharmacologic Prophylaxis:   Pharmacologic: Enoxaparin (Lovenox)  Mechanical VTE Prophylaxis in Place: Yes    Patient Centered Rounds: I have performed bedside rounds with nursing staff today  Discussions with Specialists or Other Care Team Provider:  Gastroenterology, case management    Education and Discussions with Family / Patient:  Patient    Time Spent for Care: 30 minutes  More than 50% of total time spent on counseling and coordination of care as described above  Current Length of Stay: 4 day(s)    Current Patient Status: Inpatient   Certification Statement: The patient will continue to require additional inpatient hospital stay due to Ongoing medical management off acute metabolic encephalopathy        Code Status: Level 1 - Full Code      Subjective:   Patient seen at bedside  He does not have any active complaints  Noted to be still confused and oriented to person and time only  Communicating, comprehending and verbalizing accordingly  Following commands  Unchanged from yesterday      -continues to be NPO due to failing speech and swallow eval for the 3rd day   -urine catheter discontinued  PVR/urinary retention protocol in place  Objective:     Vitals:   Temp (24hrs), Av 9 °F (36 6 °C), Min:97 8 °F (36 6 °C), Max:98 °F (36 7 °C)    Temp:  [97 8 °F (36 6 °C)-98 °F (36 7 °C)] 97 8 °F (36 6 °C)  HR:  [62-70] 62  Resp:  [16] 16  BP: (111-146)/(76-89) 146/81  SpO2:  [96 %-98 %] 96 %  Body mass index is 29 01 kg/m²  Input and Output Summary (last 24 hours): Intake/Output Summary (Last 24 hours) at 2022 1359  Last data filed at 2022 1203  Gross per 24 hour   Intake 0 ml   Output 125 ml   Net -125 ml       Physical Exam:   Vitals and nursing note reviewed  HENT:      Head: Normocephalic and atraumatic  Right Ear: External ear normal       Left Ear: External ear normal    Cardiovascular:      Rate and Rhythm: Normal rate  No peripheral edema    No heart murmurs or added sounds  Pulmonary:      Effort: Pulmonary effort is normal, normal bilateral air entry  Coarse crackles audible throughout  Musculoskeletal:      Cervical back: Normal range of motion  Deep tendon reflexes equal symmetrical 2+, power:  5/5 throughout  Neurological:      Mental Status: He is confused and oriented to person and time only  Motor: No weakness  Comments: Patient is bracing himself in his hospital bed, reports an abnormal sense of movement, seems like he feels as though he is going to fall      Additional Data:     Labs:    Results from last 7 days   Lab Units 11/27/22  1228 11/27/22  0453 11/25/22  0431 11/24/22  0504   WBC Thousand/uL  --  8 14   < > 13 01*   HEMOGLOBIN g/dL 9 4* 8 7*   < > 10 3*   HEMATOCRIT % 27 9* 26 4*   < > 28 8*   PLATELETS Thousands/uL  --  146*   < > 353   BANDS PCT %  --   --   --  6   NEUTROS PCT %  --  69   < >  --    LYMPHS PCT %  --  15   < >  --    LYMPHO PCT %  --   --   --  0*   MONOS PCT %  --  13*   < >  --    MONO PCT %  --   --   --  2*   EOS PCT %  --  1   < > 0    < > = values in this interval not displayed  Results from last 7 days   Lab Units 11/27/22  0453 11/24/22  1245 11/24/22  0504   SODIUM mmol/L 140   < > 130*   POTASSIUM mmol/L 3 6   < > 4 0   CHLORIDE mmol/L 111*   < > 97   CO2 mmol/L 23   < > 16*   BUN mg/dL 20   < > 23   CREATININE mg/dL 0 89   < > 0 96   ANION GAP mmol/L 6   < > 17*   CALCIUM mg/dL 8 8   < > 7 8*   ALBUMIN g/dL  --   --  2 2*   TOTAL BILIRUBIN mg/dL  --   --  0 45   ALK PHOS U/L  --   --  124*   ALT U/L  --   --  21   AST U/L  --   --  34   GLUCOSE RANDOM mg/dL 101   < > 153*    < > = values in this interval not displayed       Results from last 7 days   Lab Units 11/27/22  0453   INR  1 14     Results from last 7 days   Lab Units 11/24/22  0800 11/23/22  1657 11/23/22  1313   POC GLUCOSE mg/dl 169* 79 71     Results from last 7 days   Lab Units 11/24/22  0504   HEMOGLOBIN A1C % 4 9     Results from last 7 days   Lab Units 11/27/22  0453 11/24/22  0504 11/23/22  2024 11/23/22  1328   LACTIC ACID mmol/L  --   --  0 8 0 9   PROCALCITONIN ng/ml 0 07 0 70*  --  0 26*           * I Have Reviewed All Lab Data Listed Above  * Additional Pertinent Lab Tests Reviewed: [address] [facility] Admission Reviewed    Imaging:    Imaging Reports Reviewed Today Include:   XR hips bilateral 3-4 vw w pelvis if performed   Final Result      No acute osseous abnormality  Again seen is a large sclerotic bone lesion in the upper sacrum  This has enlarged comparing the 11/24/2022 CT and a 1/1/2018 CT  This is suspicious for malignancy, and bone scan is recommended for further evaluation  Workstation performed: [id number]         CT chest abdomen pelvis w contrast   Final Result         1  Bilateral pneumonia and pleural effusions  2   Distended esophagus containing fluid  Nonurgent evaluation with swallowing study is recommended  3   Sacral bony lesion has enlarged compared to the previous exam and is indeterminate  Recommend correlation with bone scan or MRI pelvis  Sclerotic lesion at the left pedicle at T11 is unchanged  The study was marked in EPIC for significant notification  Workstation performed: [id number]         CT head wo contrast   Final Result      No acute intracranial abnormality  Chronic microangiopathic changes  Air-fluid level right sphenoid sinus  Acute sphenoid sinusitis should be considered in the right clinical setting  Workstation performed: [id number]         XR chest 1 view portable   Final Result      Right-sided central venous catheter placement without pneumothorax  Workstation performed: [id number]         X-ray chest 1 view portable   Final Result      No acute cardiopulmonary disease     In the setting of clinically suspected/proven COVID-19, this plain film appearance does not contain findings that raise concern for viral pneumonia such as COVID-19, but does not rule out this diagnosis  Workstation performed: [id number]         CTA head and neck w wo contrast   Final Result      1  No acute intracranial CT abnormality  2   Patent major vasculature of [place] of [place] without high-grade stenosis  3   No hemodynamically significant stenosis of cervical carotid arteries  4   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  5   Significant retropharyngeal effusion extending from C1 through inferior C5 effacing nasopharyngeal and pharyngeal airways  No apparent rim enhancement to suggest abscess  Uvula appears edematous  Recommend ENT consultation  6   Acute sinusitis  7   Fluid-filled esophagus extending to the level of the neck base  Correlate for GERD                           I personally discussed this study with CAMERON MEJIA on 11/23/2022 at 2:26 PM                Workstation performed: [id number]         FL barium swallow video w speech    (Results Pending)   MRI inpatient order    (Results Pending)           Recent Cultures (last 7 days):     Results from last 7 days   Lab Units 11/23/22  1339 11/23/22  1328   BLOOD CULTURE  Micrococcus luteus* Lactobacillus species*   GRAM STAIN RESULT  Gram positive cocci in clusters* Gram variable rods*       Last 24 Hours Medication List:   Current Facility-Administered Medications   Medication Dose Route Frequency Provider Last Rate   • cefepime  2,000 mg Intravenous Q12H Rayleen Shorts Brian, DO 2,000 mg (11/27/22 0529)   • dextrose 5 % and sodium chloride 0 9 %  50 mL/hr Intravenous Continuous Rayleen Shorts Brian, DO 50 mL/hr (11/26/22 1158)   • enoxaparin  40 mg Subcutaneous Daily Rayleen Shorts Canelo cason, DO     • pantoprazole  40 mg Intravenous Q12H Raf 6027 Caneloann cason DO     • thiamine  100 mg Intravenous Q8H Sofya Sewell DO          Today, Patient Was Seen By: Chucky Condon MD    ** Please Note: Dictation voice to text software may have been used in the creation of this document   **

## 2022-11-27 NOTE — ASSESSMENT & PLAN NOTE
Incidental finding noted on CT scan and x-rays as following:    3  Sacral bony lesion has enlarged compared to the previous exam and is indeterminate  Plan:  -follow-up with primary care provider accordingly  -May follow up with an MRI when stable

## 2022-11-27 NOTE — ASSESSMENT & PLAN NOTE
In the setting of coma and possible alcohol intoxication when was at home  -CT scan showing esophageal dilation     -unable to tolerate oral diet since admission on 11/23  Daily speech and swallow eval however patient continues to fail  Plan as following:  -GI team consulted; input highly appreciated  -will consider nasogastric tube insertion if no improvement within next 24 hours

## 2022-11-27 NOTE — ASSESSMENT & PLAN NOTE
CT Ca P:  1  Bilateral pneumonia and pleural effusions  Blood cultures:     1/4:  Micrococcus luteus, Gram-positive cocci in clusters    1/4: Lactobacillus species Abnormal  Gram Stain ResultGram variable rods Abnormal    2/4: NGTD     Plan:  Given the atypical growth of blood cultures a plan for the following;  -continue cefepime day 5  -follow-up with cultures  -infectious disease team consulted; input highly appreciated  -vital signs per unit  -I/OS, daily weight  -morning labs

## 2022-11-27 NOTE — ASSESSMENT & PLAN NOTE
Likely secondary to pneumonia, continue current broad-spectrum antibiotics and follow-up blood cultures      Refer to assessment and plan for pneumonia

## 2022-11-27 NOTE — PLAN OF CARE
Problem: MOBILITY - ADULT  Goal: Maintain or return to baseline ADL function  Description: INTERVENTIONS:  -  Assess patient's ability to carry out ADLs; assess patient's baseline for ADL function and identify physical deficits which impact ability to perform ADLs (bathing, care of mouth/teeth, toileting, grooming, dressing, etc )  - Assess/evaluate cause of self-care deficits   - Assess range of motion  - Assess patient's mobility; develop plan if impaired  - Assess patient's need for assistive devices and provide as appropriate  - Encourage maximum independence but intervene and supervise when necessary  - Involve family in performance of ADLs  - Assess for home care needs following discharge   - Consider OT consult to assist with ADL evaluation and planning for discharge  - Provide patient education as appropriate  Outcome: Progressing  Goal: Maintains/Returns to pre admission functional level  Description: INTERVENTIONS:  - Perform BMAT or MOVE assessment daily    - Set and communicate daily mobility goal to care team and patient/family/caregiver  - Collaborate with rehabilitation services on mobility goals if consulted  - Perform Range of Motion multiple times a day  - Reposition patient every 2 hours    - Dangle patient multiple times a day  - Stand patient multiple times a day  - Ambulate patient multiple times a day  - Out of bed to chair 4 times a day   - Out of bed for meals 3 times a day  - Out of bed for toileting  - Record patient progress and toleration of activity level   Outcome: Progressing     Problem: Prexisting or High Potential for Compromised Skin Integrity  Goal: Skin integrity is maintained or improved  Description: INTERVENTIONS:  - Identify patients at risk for skin breakdown  - Assess and monitor skin integrity  - Assess and monitor nutrition and hydration status  - Monitor labs   - Assess for incontinence   - Turn and reposition patient  - Assist with mobility/ambulation  - Relieve pressure over bony prominences  - Avoid friction and shearing  - Provide appropriate hygiene as needed including keeping skin clean and dry  - Evaluate need for skin moisturizer/barrier cream  - Collaborate with interdisciplinary team   - Patient/family teaching  - Consider wound care consult   Outcome: Progressing     Problem: PAIN - ADULT  Goal: Verbalizes/displays adequate comfort level or baseline comfort level  Description: Interventions:  - Encourage patient to monitor pain and request assistance  - Assess pain using appropriate pain scale  - Administer analgesics based on type and severity of pain and evaluate response  - Implement non-pharmacological measures as appropriate and evaluate response  - Consider cultural and social influences on pain and pain management  - Notify physician/advanced practitioner if interventions unsuccessful or patient reports new pain  Outcome: Progressing     Problem: INFECTION - ADULT  Goal: Absence or prevention of progression during hospitalization  Description: INTERVENTIONS:  - Assess and monitor for signs and symptoms of infection  - Monitor lab/diagnostic results  - Monitor all insertion sites, i e  indwelling lines, tubes, and drains  - Monitor endotracheal if appropriate and nasal secretions for changes in amount and color  - [place] appropriate cooling/warming therapies per order  - Administer medications as ordered  - Instruct and encourage patient and family to use good hand hygiene technique  - Identify and instruct in appropriate isolation precautions for identified infection/condition  Outcome: Progressing  Goal: Absence of fever/infection during neutropenic period  Description: INTERVENTIONS:  - Monitor WBC    Outcome: Progressing     Problem: SAFETY ADULT  Goal: Maintain or return to baseline ADL function  Description: INTERVENTIONS:  -  Assess patient's ability to carry out ADLs; assess patient's baseline for ADL function and identify physical deficits which impact ability to perform ADLs (bathing, care of mouth/teeth, toileting, grooming, dressing, etc )  - Assess/evaluate cause of self-care deficits   - Assess range of motion  - Assess patient's mobility; develop plan if impaired  - Assess patient's need for assistive devices and provide as appropriate  - Encourage maximum independence but intervene and supervise when necessary  - Involve family in performance of ADLs  - Assess for home care needs following discharge   - Consider OT consult to assist with ADL evaluation and planning for discharge  - Provide patient education as appropriate  Outcome: Progressing  Goal: Maintains/Returns to pre admission functional level  Description: INTERVENTIONS:  - Perform BMAT or MOVE assessment daily    - Set and communicate daily mobility goal to care team and patient/family/caregiver  - Collaborate with rehabilitation services on mobility goals if consulted  - Perform Range of Motion multiple times a day  - Reposition patient every 2 hours    - Dangle patient multiple times a day  - Stand patient multiple times a day  - Ambulate patient multiple times a day  - Out of bed to chair 4 times a day   - Out of bed for meals 3 times a day  - Out of bed for toileting  - Record patient progress and toleration of activity level   Outcome: Progressing  Goal: Patient will remain free of falls  Description: INTERVENTIONS:  - Educate patient/family on patient safety including physical limitations  - Instruct patient to call for assistance with activity   - Consult OT/PT to assist with strengthening/mobility   - Keep Call bell within reach  - Keep bed low and locked with side rails adjusted as appropriate  - Keep care items and personal belongings within reach  - Initiate and maintain comfort rounds  - Make Fall Risk Sign visible to staff  - Offer Toileting every 2 Hours, in advance of need  - Initiate/Maintain bed/chair alarm  - Obtain necessary fall risk management equipment: as needed  - Apply yellow socks and bracelet for high fall risk patients  - Consider moving patient to room near nurses station  Outcome: Progressing     Problem: DISCHARGE PLANNING  Goal: Discharge to home or other facility with appropriate resources  Description: INTERVENTIONS:  - Identify barriers to discharge w/patient and caregiver  - Arrange for needed discharge resources and transportation as appropriate  - Identify discharge learning needs (meds, wound care, etc )  - Arrange for interpretive services to assist at discharge as needed  - Refer to Case Management Department for coordinating discharge planning if the patient needs post-hospital services based on physician/advanced practitioner order or complex needs related to functional status, cognitive ability, or social support system  Outcome: Progressing     Problem: Knowledge Deficit  Goal: Patient/family/caregiver demonstrates understanding of disease process, treatment plan, medications, and discharge instructions  Description: Complete learning assessment and assess knowledge base  Interventions:  - Provide teaching at level of understanding  - Provide teaching via preferred learning methods  Outcome: Progressing     Problem: Nutrition/Hydration-ADULT  Goal: Nutrient/Hydration intake appropriate for improving, restoring or maintaining nutritional needs  Description: Monitor and assess patient's nutrition/hydration status for malnutrition  Collaborate with interdisciplinary team and initiate plan and interventions as ordered  Monitor patient's weight and dietary intake as ordered or per policy  Utilize nutrition screening tool and intervene as necessary  Determine patient's food preferences and provide high-protein, high-caloric foods as appropriate       INTERVENTIONS:  - Monitor oral intake, urinary output, labs, and treatment plans  - Assess nutrition and hydration status and recommend course of action  - Evaluate amount of meals eaten  - Assist patient with eating if necessary   - Allow adequate time for meals  - Recommend/ encourage appropriate diets, oral nutritional supplements, and vitamin/mineral supplements  - Order, calculate, and assess calorie counts as needed  - Recommend, monitor, and adjust tube feedings and TPN/PPN based on assessed needs  - Assess need for intravenous fluids  - Provide specific nutrition/hydration education as appropriate  - Include patient/family/caregiver in decisions related to nutrition  Outcome: Progressing     Problem: Potential for Falls  Goal: Patient will remain free of falls  Description: INTERVENTIONS:  - Educate patient/family on patient safety including physical limitations  - Instruct patient to call for assistance with activity   - Consult OT/PT to assist with strengthening/mobility   - Keep Call bell within reach  - Keep bed low and locked with side rails adjusted as appropriate  - Keep care items and personal belongings within reach  - Initiate and maintain comfort rounds  - Make Fall Risk Sign visible to staff  - Offer Toileting every 2 Hours, in advance of need  - Initiate/Maintain bed/chair alarm  - Obtain necessary fall risk management equipment: as needed  - Apply yellow socks and bracelet for high fall risk patients  - Consider moving patient to room near nurses station  Outcome: Progressing

## 2022-11-27 NOTE — ASSESSMENT & PLAN NOTE
-vitals stable  -SpO2 within normal limits on room air   -not in acute respiratory distress  -CMV and EBV panel negative      Plan:  Follow-up video swallow Monday

## 2022-11-27 NOTE — ASSESSMENT & PLAN NOTE
Continue monitoring hemoglobin and hematocrit     -given his history of GI bleed and previous hospitalization for that reason will plan for the following:    -check folic acid and vitamin B12 levels   -trend H and H and transfuse if needed per protocol

## 2022-11-28 ENCOUNTER — APPOINTMENT (INPATIENT)
Dept: MRI IMAGING | Facility: HOSPITAL | Age: 67
End: 2022-11-28

## 2022-11-28 ENCOUNTER — APPOINTMENT (INPATIENT)
Dept: CT IMAGING | Facility: HOSPITAL | Age: 67
End: 2022-11-28

## 2022-11-28 ENCOUNTER — APPOINTMENT (INPATIENT)
Dept: RADIOLOGY | Facility: HOSPITAL | Age: 67
End: 2022-11-28
Attending: INTERNAL MEDICINE

## 2022-11-28 PROBLEM — R78.81 BACTEREMIA: Status: ACTIVE | Noted: 2022-11-28

## 2022-11-28 PROBLEM — R78.81 POSITIVE BLOOD CULTURE: Status: ACTIVE | Noted: 2022-11-28

## 2022-11-28 RX ORDER — DOXYCYCLINE HYCLATE 50 MG/1
324 CAPSULE, GELATIN COATED ORAL
Status: DISCONTINUED | OUTPATIENT
Start: 2022-11-28 | End: 2022-12-01 | Stop reason: HOSPADM

## 2022-11-28 RX ADMIN — THIAMINE HYDROCHLORIDE 100 MG: 100 INJECTION, SOLUTION INTRAMUSCULAR; INTRAVENOUS at 14:43

## 2022-11-28 RX ADMIN — IOHEXOL 85 ML: 350 INJECTION, SOLUTION INTRAVENOUS at 14:15

## 2022-11-28 RX ADMIN — THIAMINE HYDROCHLORIDE 100 MG: 100 INJECTION, SOLUTION INTRAMUSCULAR; INTRAVENOUS at 21:17

## 2022-11-28 RX ADMIN — ENOXAPARIN SODIUM 40 MG: 40 INJECTION SUBCUTANEOUS at 09:29

## 2022-11-28 RX ADMIN — PANTOPRAZOLE SODIUM 40 MG: 40 INJECTION, POWDER, FOR SOLUTION INTRAVENOUS at 09:29

## 2022-11-28 RX ADMIN — CEFEPIME HYDROCHLORIDE 2000 MG: 2 INJECTION, SOLUTION INTRAVENOUS at 04:27

## 2022-11-28 RX ADMIN — FERROUS GLUCONATE 324 MG: 324 TABLET ORAL at 17:14

## 2022-11-28 RX ADMIN — AMPICILLIN SODIUM AND SULBACTAM SODIUM 3 G: 2; 1 INJECTION, POWDER, FOR SOLUTION INTRAMUSCULAR; INTRAVENOUS at 22:02

## 2022-11-28 RX ADMIN — THIAMINE HYDROCHLORIDE 100 MG: 100 INJECTION, SOLUTION INTRAMUSCULAR; INTRAVENOUS at 05:44

## 2022-11-28 RX ADMIN — PANTOPRAZOLE SODIUM 40 MG: 40 INJECTION, POWDER, FOR SOLUTION INTRAVENOUS at 21:17

## 2022-11-28 RX ADMIN — AMPICILLIN SODIUM AND SULBACTAM SODIUM 3 G: 2; 1 INJECTION, POWDER, FOR SOLUTION INTRAMUSCULAR; INTRAVENOUS at 17:24

## 2022-11-28 NOTE — PROGRESS NOTES
Progress Note- Delos Castleman 78 y o  male MRN: [id number]    Unit/Bed#: 404-01 Encounter: [id number]      Assessment and Plan: This is a patient with alcohol abuse and altered mental status who is doing better  On cefepime day six  GI will follow with you     ______________________________________________________________________    Subjective:     Sandra Every is alert orient x3 today  He states that he feels better      Medication Administration - last 24 hours from 11/27/2022 1108 to 11/28/2022 1108       Date/Time Order Dose Route Action Action by     11/28/2022 0427 EST cefepime (MAXIPIME) IVPB (premix in dextrose) 2,000 mg 50 mL 2,000 mg Intravenous [address] [facility], [place]     11/27/2022 1808 EST cefepime (MAXIPIME) IVPB (premix in dextrose) 2,000 mg 50 mL 0 mg Intravenous Stopped [facility], RN     11/27/2022 1734 EST cefepime (MAXIPIME) IVPB (premix in dextrose) 2,000 mg 50 mL 2,000 mg Intravenous [place] [facility] Cristiano, RN     11/28/2022 8951 EST enoxaparin (LOVENOX) subcutaneous injection 40 mg 40 mg Subcutaneous Given Willie Mark RN     11/27/2022 1536 EST dextrose 5 % and sodium chloride 0 9 % infusion 0 mL/hr Intravenous Paused [facility] Cristiano, RN     11/27/2022 1408 EST dextrose 5 % and sodium chloride 0 9 % infusion 50 mL/hr Intravenous Miguel A Zhong RN     11/28/2022 2488 EST pantoprazole (PROTONIX) injection 40 mg 40 mg Intravenous Given Willie Mark RN     11/27/2022 2157 EST pantoprazole (PROTONIX) injection 40 mg 40 mg Intravenous Given Rachel Wall RN     11/28/2022 0544 EST thiamine (VITAMIN B1) 100 mg in sodium chloride 0 9 % 50 mL IVPB 100 mg Intravenous 77 [facility] Janae, [place]     11/27/2022 2156 EST thiamine (VITAMIN B1) 100 mg in sodium chloride 0 9 % 50 mL IVPB 100 mg Intravenous [address] [facility], [place]     11/27/2022 1527 EST thiamine (VITAMIN B1) 100 mg in sodium chloride 0 9 % 50 mL IVPB 0 mg Intravenous Stopped [facility], RN     11/27/2022 1408 EST thiamine (VITAMIN B1) 100 mg in sodium chloride 0 9 % 50 mL IVPB 100 mg Intravenous [address] Samira Bone RN     11/27/2022 1519 EST alteplase (CATHFLO) injection 2 mg 2 mg Intracatheter Given Samira Bone RN          Objective:     Vitals: Blood pressure 137/78, pulse 63, temperature 97 5 °F (36 4 °C), temperature source Oral, resp  rate 21, height 5' 9" (1 753 m), weight 90 8 kg (200 lb 2 8 oz), SpO2 97 %  ,Body mass index is 29 56 kg/m²  Intake/Output Summary (Last 24 hours) at 11/28/2022 1108  Last data filed at 11/28/2022 0843  Gross per 24 hour   Intake 1308 33 ml   Output --   Net 1308 33 ml       Physical Exam:   General Appearance: Awake and alert, in no acute distress  Abdomen: Soft, non-tender, non-distended; bowel sounds normal; no masses or no organomegaly    Invasive Devices     Central Venous Catheter Line  Duration           CVC Central Lines 11/23/22 Triple Right Subclavian 4 days                Lab Results:  No results displayed because visit has over 200 results  Imaging Studies: I have personally reviewed pertinent imaging studies

## 2022-11-28 NOTE — ASSESSMENT & PLAN NOTE
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

## 2022-11-28 NOTE — ASSESSMENT & PLAN NOTE
In the setting of coma and possible alcohol intoxication when was at home  -CT scan showing esophageal dilation  Plan as following:  -GI team consulted; input highly appreciated  -will consider nasogastric tube insertion if no improvement within next 24 hours

## 2022-11-28 NOTE — OCCUPATIONAL THERAPY NOTE
Occupational Therapy Treatment Note      Usman Barr    11/28/2022    Principal Problem:    Metabolic encephalopathy  Active Problems:    Pharyngeal swelling    Septic shock (HCC)    Pneumonia    Alcohol withdrawal (HCC)    Sacral lesion    Other specified anemias    Other dysphagia    Esophageal dilatation    History of GI bleed      Past Medical History:   Diagnosis Date    Acute respiratory failure ([facility] [address] ) 11/24/2022    Hyperlipidemia     Hypertension     Hyponatremia 11/23/2022    Hypothermia 11/23/2022    Hypoxia 40/84/3874    Metabolic acidosis with increased anion gap and accumulation of organic acids 11/23/2022       Past Surgical History:   Procedure Laterality Date    NO PAST SURGERIES          11/28/22 0850   OT Last Visit   OT Visit Date 11/28/22   Note Type   Note Type Treatment   Pain Assessment   Pain Assessment Tool 0-10   Pain Score No Pain   Restrictions/Precautions   Weight Bearing Precautions Per Order No   ADL   Where Assessed Edge of bed   LB Dressing Assistance 4  Minimal Assistance   LB Dressing Deficit Setup;Verbal cueing;Supervision/safety; Increased time to complete; Don/doff R sock; Don/doff L sock   Toileting Assistance  2  Maximal Assistance   Toileting Deficit Setup;Verbal cueing;Supervison/safety; Increased time to complete;Perineal hygiene; Bedside commode   Toileting Comments A X 3 for transfer to  commode   Functional Standing Tolerance   Time 2 mins  Activity perineal hygiene care   Comments commode placement   Bed Mobility   Sit to Supine 3  Moderate assistance   Additional items Assist x 1   Additional Comments seated at the EOB at the start of care   Transfers   Sit to Stand 2  Maximal assistance   Additional items Assist x 2;Armrests; Increased time required;Verbal cues   Stand to Sit 2  Maximal assistance   Additional items Assist x 2; Increased time required;Verbal cues   Stand pivot 2  Maximal assistance   Additional items Assist x 2   Additional Comments STS transfers X 3  Max  A of 2 with leaning to L side  Toilet Transfers   Toilet Transfer From [employer] Transfer Type To and from   Toilet Transfer to Standard bedside commode   Toilet Transfer Technique   (Pt  stood while commode was placed under him)   Toilet Transfers Maximal assistance   Toilet Transfers Comments A X 3   Therapeutic Exercise - ROM   UE-ROM Yes   ROM- Right Upper Extremities   R Shoulder AAROM; Flexion; Extension;Horizontal ABduction  (horizontal ADD)   R Elbow AROM;Elbow flexion;Elbow extension  (pro/supination)   R Weight/Reps/Sets 2 sets X 10 reps   ROM - Left Upper Extremities    LUE ROM Comment same as RUE   Subjective   Subjective " I sourav to go to the bathroom'   Cognition   Overall Cognitive Status Impaired   Arousal/Participation Alert   Attention Attends with cues to redirect   Orientation Level Oriented to person;Oriented to place; Disoriented to time;Disoriented to situation   Memory Unable to assess   Following Commands Follows one step commands with increased time or repetition   Activity Tolerance   Activity Tolerance Patient tolerated treatment well   Assessment   Assessment Patient participated in Skilled OT session this date with interventions consisting of ADL re training with the use of correct body mechnaics, safety awareness and fall prevention techniques, therapeutic exercise to: increase functional use of BUEs, increase BUE muscle strength ,  therapeutic activities to: increase activity tolerance and increase dynamic sit/ stand balance during functional activity    Co treatment with PTA secondary to complex medical condition of pt, -max A of 2 required to achieve and maintain transitional movements, requiring the need of skilled therapeutic intervention of 2 therapists to achieve delivery of services  Patient agreeable to OT treatment session, upon arrival patient was found seated at edge of bed   Patient requiring frequent re direction, verbal cues for safety, verbal cues for correct technique, verbal cues for pacing thru activity steps and cognitive assistance to anticipate next step  Patient continues to be functioning below baseline level, occupational performance remains limited secondary to factors listed above and increased risk for falls and injury  From OT standpoint, recommendation at time of d/c would be Post acute rehabilitation services  The patient's raw score on the AM-PAC Daily Activity inpatient short form is 11, standardized score is 29 04, less than 39 4  Patients at this level are likely to benefit from discharge to post-acute rehabilitation services  Please refer to the recommendation of the Occupational Therapist for safe discharge planning  Plan   Treatment Interventions ADL retraining;Functional transfer training;UE strengthening/ROM; Endurance training; Activityengagement   Goal Expiration Date 12/09/22   OT Treatment Day 3   OT Frequency 3-5x/wk   Recommendation   OT Discharge Recommendation Post acute rehabilitation services   AM-[facility] Daily Activity Inpatient   Lower Body Dressing 3   Bathing 1   Toileting 1   Upper Body Dressing 2   Grooming 2   Eating 2   Daily Activity Raw Score 11   Daily Activity Standardized Score (Calc for Raw Score >=11) 29 04   Turning Head Towards Sound 3   Follow Simple Instructions 2   Low Function Daily Activity Raw Score 16   Low Function Daily Activity Standardized Score 27 65   AM-PAC Applied Cognition Inpatient   Following a Speech/Presentation 3   Understanding Ordinary Conversation 3   Taking Medications 1   Remembering Where Things Are Placed or Put Away 1   Remembering List of 4-5 Errands 1   Taking Care of Complicated Tasks 1   Applied Cognition Raw Score 10   Applied Cognition Standardized Score 24 98   [facility]

## 2022-11-28 NOTE — PLAN OF CARE
Problem: PHYSICAL THERAPY ADULT  Goal: Performs mobility at highest level of function for planned discharge setting  See evaluation for individualized goals  Description:  Outcome: Progressing  Note: Prognosis: Good  Problem List:  (Decreased strength; Decreased range of motion; Decreased endurance; Impaired balance; Decreased mobility)  Assessment: Pt  seen for PT treatment session this date with interventions consisting of  therapeutic exercises, bed mobility and  transfers w/ emphasis on improving pt's strength, endurance and mobility  Pt  Requiring frequent cues for sequence and safety  In comparison to previous session, Pt  With improvements in activity tolerance compared to IE  Pt able to stand max x 2 with forward flexed posture  Unable to advance LE's or pivot  Pt is in need of continued activity in PT to improve strength balance endurance mobility transfers and ambulation with return to maximize LOF  From PT/mobility standpoint, recommendation at time of d/c would be post acute rehab in order to promote return to PLOF and independence  The patient's AM-PAC Basic Mobility Inpatient Short Form Raw Score is 10  A Raw score of less than or equal to 16 suggests the patient may benefit from discharge to post-acute rehabilitation services  Please also refer to physical therapy recommendation for safe DC planning  PT Discharge Recommendation: Post acute rehabilitation services    See flowsheet documentation for full assessment

## 2022-11-28 NOTE — ASSESSMENT & PLAN NOTE
CT Ca P:  1  Bilateral pneumonia and pleural effusions  Blood cultures:     1/4:  Micrococcus luteus, Gram-positive cocci in clusters  1/4: Lactobacillus species Abnormal  Gram Stain ResultGram variable rods Abnormal    2/4: NGTD     -completed 5 days of cefepime   -day 1 Unasyn    -new set of blood cultures obtained on 11/27  Pending  Plan:  Given the atypical growth of blood cultures infectious disease team were consulted and agreed on the following plan;  -CT neck/soft tissue obtained; with improving retropharyngeal effusions   -antibiotics switched from cefepime to Unasyn   -consider reviewing the CT scan with ENT      -vital signs per unit  -I/OS, daily weight  -morning labs  -follow-up with culture

## 2022-11-28 NOTE — PLAN OF CARE
Problem: Prexisting or High Potential for Compromised Skin Integrity  Goal: Skin integrity is maintained or improved  Description: INTERVENTIONS:  - Identify patients at risk for skin breakdown  - Assess and monitor skin integrity  - Assess and monitor nutrition and hydration status  - Monitor labs   - Assess for incontinence   - Turn and reposition patient  - Assist with mobility/ambulation  - Relieve pressure over bony prominences  - Avoid friction and shearing  - Provide appropriate hygiene as needed including keeping skin clean and dry  - Evaluate need for skin moisturizer/barrier cream  - Collaborate with interdisciplinary team   - Patient/family teaching  - Consider wound care consult   Outcome: Progressing     Problem: PAIN - ADULT  Goal: Verbalizes/displays adequate comfort level or baseline comfort level  Description: Interventions:  - Encourage patient to monitor pain and request assistance  - Assess pain using appropriate pain scale  - Administer analgesics based on type and severity of pain and evaluate response  - Implement non-pharmacological measures as appropriate and evaluate response  - Consider cultural and social influences on pain and pain management  - Notify physician/advanced practitioner if interventions unsuccessful or patient reports new pain  Outcome: Progressing     Problem: SAFETY ADULT  Goal: Maintain or return to baseline ADL function  Description: INTERVENTIONS:  -  Assess patient's ability to carry out ADLs; assess patient's baseline for ADL function and identify physical deficits which impact ability to perform ADLs (bathing, care of mouth/teeth, toileting, grooming, dressing, etc )  - Assess/evaluate cause of self-care deficits   - Assess range of motion  - Assess patient's mobility; develop plan if impaired  - Assess patient's need for assistive devices and provide as appropriate  - Encourage maximum independence but intervene and supervise when necessary  - Involve family in performance of ADLs  - Assess for home care needs following discharge   - Consider OT consult to assist with ADL evaluation and planning for discharge  - Provide patient education as appropriate  Outcome: Progressing  Goal: Patient will remain free of falls  Description: INTERVENTIONS:  - Educate patient/family on patient safety including physical limitations  - Instruct patient to call for assistance with activity   - Consult OT/PT to assist with strengthening/mobility   - Keep Call bell within reach  - Keep bed low and locked with side rails adjusted as appropriate  - Keep care items and personal belongings within reach  - Initiate and maintain comfort rounds  - Make Fall Risk Sign visible to staff  - Offer Toileting every 4 Hours, in advance of need  - Initiate/Maintain Bed alarm  - Obtain necessary fall risk management equipment:   - Apply yellow socks and bracelet for high fall risk patients  - Consider moving patient to room near nurses station  Outcome: Progressing

## 2022-11-28 NOTE — PROGRESS NOTES
[address] [facility]  Progress Note Tamela Pemberton 1955, 78 y o  male MRN: [id number]  Unit/Bed#: 404-01 Encounter: [id number]  Primary Care Provider: Marques Vanessa DO   Date and time admitted to hospital: 11/23/2022  1:09 PM    Pneumonia  Assessment & Plan  CT Ca P:  1  Bilateral pneumonia and pleural effusions  Blood cultures:     1/4:  Micrococcus luteus, Gram-positive cocci in clusters  1/4: Lactobacillus species Abnormal  Gram Stain ResultGram variable rods Abnormal    2/4: NGTD     -completed 5 days of cefepime   -day 1 Unasyn    -new set of blood cultures obtained on 11/27  Pending  Plan:  Given the atypical growth of blood cultures infectious disease team were consulted and agreed on the following plan;  -CT neck/soft tissue obtained; with improving retropharyngeal effusions   -antibiotics switched from cefepime to Unasyn   -consider reviewing the CT scan with ENT  -vital signs per unit  -I/OS, daily weight  -morning labs  -follow-up with culture    Septic shock [facility])  Assessment & Plan  Likely secondary to complicated pneumonia      Refer to assessment and plan for pneumonia  * Metabolic encephalopathy  Assessment & Plan  Background:  Patient presented with altered mental status, lethargic but responds to verbal stimuli, follows commands  Acute metabolic encephalopathy present on admission, likely due to underlying infection, plus alcohol withdrawal      Mental status moderately improved, however he is still confused with minimal change from yesterday     -Bairum swallow study ; report and recommendations per speech therapist is following:  Recommending dysphagia diet for aspiration precautions given the results of the swallow study  -MRI brain; No acute intracranial abnormality  Minor white matter change suggestive of chronic microangiopathy  -PT OT recommending acute rehab services when stable       Plan:  -PT OT eval and treat  -cardiopulmonary monitoring  -assessment and plan for sepsis  Alcohol withdrawal ([facility] [address] )  Assessment & Plan  Background:  Social history as given by his close friends; daily alcohol drinker for the past 1 year at least   Smokes cigarettes I pack per day for same duration  Given the patient's estimated timing of the fall at home his altered mental state could be most likely caused by a combination between septic shock and alcohol withdrawal     -medical toxicology consultant on board; agreed on the plan as following:    -Ativan p r n  for agitation and seizures  -IV thiamine supplement  -IV folic acid supplement  -follow-up with rehab when stable  Positive blood culture  Assessment & Plan  Assessment and plan per Infectious Disease team     Positive blood culture  Blood cultures on admission with delayed growth of 1 set with lactobacillus and the other with micrococcus  I suspect that these organisms were isolated from the patient's skin and are likely contaminant given the manner in which the patient was found  Repeat cultures are pending  2D echo without gross vegetation  No other devices appreciated on  imaging  No antibiotics for this issue  Follow-up repeat blood cultures  Unasyn as above  Continue to trend fever curve/WBC    History of GI bleed  Assessment & Plan  Patient with history of GI bleed and previous hospitalization     -since admission hemoglobin levels have been trending down from 11 6 on presentation to 8 7    -2 consecutive readings from past 24 hours with no change  Most likely no active bleeding at this time  Will continue to re-evaluate  -FOBT 1/3:  positive  Pending complete set  -hemoglobin trend stable  GI team consulted and did not want to further proceed with any interventions at this time for the next 24 hours  Plan:   -GI team consulted; input highly appreciated    -follow-up with morning labs  -continue PPI therapy for stress ulcer prophylaxis  -consider holding anticoagulants if continues to trend down  Esophageal dilatation  Assessment & Plan  In the setting of coma and possible alcohol intoxication when was at home  -CT scan showing esophageal dilation  Plan as following:  -GI team consulted; input highly appreciated  -will consider nasogastric tube insertion if no improvement within next 24 hours  Other dysphagia  Assessment & Plan  Patient seen by speech therapy twice and failed oral intake trial at bedside  Barium swallow study obtained on 11/28  With remarkable signs of aspiration on exam      -MRI brain; no acute changes  Speech therapist with recommendations as following;    Recommended Diet:  puree/level 1 diet and honey thick liquids   Recommended Form of Medications: crushed with puree   Aspiration precautions and compensatory swallowing strategies: upright posture, only feed when fully alert, small bites/sips, no straws, liquids by teaspoon only, chin tuck and alternating bites and sips  Consider referral to:  ENT regarding CP bar, GI regarding suspected GERD  SLP Dysphagia therapy recommended: yes  Recommend pharyngeal strengthening exercises      Other specified anemias  Assessment & Plan  Continue monitoring hemoglobin and hematocrit     -given his history of GI bleed and previous hospitalization for that reason will plan for the following:    -folic acid and vitamin B12 levels within normal range  -fecal occult blood test; pending 1/3   -H and H trend stable  Plan:  -GI team on board; gastroenterologist notified no further steps needed at this time   -follow-up with fecal occult blood testing complete set of 3  -transfuse packed RBC units for hemoglobin less than 7, and per protocol   -morning labs  Sacral lesion  Assessment & Plan  Incidental finding noted on CT scan and x-rays as following:    3  Sacral bony lesion has enlarged compared to the previous exam and is indeterminate       Plan:  -follow-up with primary care provider accordingly  -May follow up with an MRI when stable  Pharyngeal swelling  Assessment & Plan    -vitals stable  -SpO2 within normal limits on room air   -not in acute respiratory distress  -CMV and EBV panel negative  11/28  -follow-up CT neck/soft tissue: There is marked improvement in the previously seen retropharyngeal effusion compared to the study performed 5 days ago  Plan:  -infectious disease team consulted; input highly appreciated  -will consider contacting ENT specialist for further recommendations  Acute respiratory failure (HCC)-resolved as of 11/27/2022  Assessment & Plan  Present on admission, resolved    Hyponatremia-resolved as of 11/25/2022  Assessment & Plan  Background:  Likely hyponatremia due to hypovolemia on top of thiazide diuretic use     -received total 4 L IV normal saline boluses within the past 24 hour  -also is maintained on maintenance IV fluids of half dextrose with normal saline at 200 mils per hour     -serum sodium levels normalizing; trending up to 130 from 123 on presentation  Plan:  Given the patient's borderline low blood pressures and his need for pressor will continue IV fluids at maintenance  -currently Levophed is at 1 ml/hr   -wean and titrate Levophed drip per protocol   -re-evaluate Q 4 neuro checks, cardiopulmonary check, telemetry   -monitor I/OS with Moreno's catheter in place   -daily weight  -morning labs  Metabolic acidosis with increased anion gap and accumulation of organic acids-resolved as of 11/24/2022  Assessment & Plan  Metabolic acidosis with anion gap    -resolved per updated ABG on 11/24        Hypothermia-resolved as of 11/25/2022  Assessment & Plan  Hypothermia due to exposure in cold house, unknown downtime    Closely monitor metabolic status with rewarming, monitor calcium, patient did have transient hypoglycemia  Continue D5 normal saline infusion and external rewarming as needed     -resolved  Hypoxia-resolved as of 2022  Assessment & Plan  Resolved, likely secondary to aspiration, with suspected aspiration pneumonia, continue current broad-spectrum antibiotics, cultures      VTE Pharmacologic Prophylaxis:   Pharmacologic: Enoxaparin (Lovenox)  Mechanical VTE Prophylaxis in Place: Yes    Patient Centered Rounds: I have performed bedside rounds with nursing staff today  Discussions with Specialists or Other Care Team Provider:  Infectious Disease, Gastroenterology    Education and Discussions with Family / Patient:  Patient    Time Spent for Care: 30 minutes  More than 50% of total time spent on counseling and coordination of care as described above  Current Length of Stay: 5 day(s)    Current Patient Status: Inpatient   Certification Statement: The patient will continue to require additional inpatient hospital stay due to Ongoing medical management for acute metabolic encephalopathy and sepsis      Code Status: Level 1 - Full Code      Subjective:   Patient was seen today at bedside  He does not have any active complaints  Noted to be in confused state however remarkably improved compared to prior days since admission  He is alert and oriented to person, place and time only  During my assessment the patient was able to answer questions appropriately however with short statements constituting of 1-2 words maximum  -patient was started on dysphagia level 2 diet today for therapist recommendations  Will continue to evaluate   -stooling and voiding accordingly  Objective:     Vitals:   Temp (24hrs), Av 6 °F (36 4 °C), Min:97 3 °F (36 3 °C), Max:98 °F (36 7 °C)    Temp:  [97 3 °F (36 3 °C)-98 °F (36 7 °C)] 97 5 °F (36 4 °C)  HR:  [60-78] 60  Resp:  [17-21] 21  BP: (137-155)/(73-85) 155/85  SpO2:  [95 %-100 %] 100 %  Body mass index is 29 56 kg/m²  Input and Output Summary (last 24 hours):        Intake/Output Summary (Last 24 hours) at 2022 1622  Last data filed at 11/28/2022 1438  Gross per 24 hour   Intake 0 ml   Output 0 ml   Net 0 ml       Physical Exam:   Vitals and nursing note reviewed  HENT:      Head: Normocephalic and atraumatic       Right Ear: External ear normal       Left Ear: External ear normal    Cardiovascular:      Rate and Rhythm: Normal rate  No peripheral edema  No heart murmurs or added sounds  Pulmonary:      Effort: Pulmonary effort is normal, normal bilateral air entry  Coarse crackles audible throughout  Musculoskeletal:      Cervical back: Normal range of motion  Deep tendon reflexes equal symmetrical 2+, power:  5/5 throughout  Neurological:      Mental Status: He is confused and oriented to person and time only       Motor: No weakness       Comments: Patient is bracing himself in his hospital bed, reports an abnormal sense of movement, seems like he feels as though he is going to fall      Additional Data:     Labs:    Results from last 7 days   Lab Units 11/27/22  1228 11/27/22  0453 11/25/22  0431 11/24/22  0504   WBC Thousand/uL  --  8 14   < > 13 01*   HEMOGLOBIN g/dL 9 4* 8 7*   < > 10 3*   HEMATOCRIT % 27 9* 26 4*   < > 28 8*   PLATELETS Thousands/uL  --  146*   < > 353   BANDS PCT %  --   --   --  6   NEUTROS PCT %  --  69   < >  --    LYMPHS PCT %  --  15   < >  --    LYMPHO PCT %  --   --   --  0*   MONOS PCT %  --  13*   < >  --    MONO PCT %  --   --   --  2*   EOS PCT %  --  1   < > 0    < > = values in this interval not displayed       Results from last 7 days   Lab Units 11/27/22  0453 11/24/22  1245 11/24/22  0504   SODIUM mmol/L 140   < > 130*   POTASSIUM mmol/L 3 6   < > 4 0   CHLORIDE mmol/L 111*   < > 97   CO2 mmol/L 23   < > 16*   BUN mg/dL 20   < > 23   CREATININE mg/dL 0 89   < > 0 96   ANION GAP mmol/L 6   < > 17*   CALCIUM mg/dL 8 8   < > 7 8*   ALBUMIN g/dL  --   --  2 2*   TOTAL BILIRUBIN mg/dL  --   --  0 45   ALK PHOS U/L  --   --  124*   ALT U/L  --   --  21   AST U/L  --   --  34   GLUCOSE RANDOM mg/dL 101   < > 153*    < > = values in this interval not displayed  Results from last 7 days   Lab Units 11/27/22  0453   INR  1 14     Results from last 7 days   Lab Units 11/24/22  0800 11/23/22  1657 11/23/22  1313   POC GLUCOSE mg/dl 169* 79 71     Results from last 7 days   Lab Units 11/24/22  0504   HEMOGLOBIN A1C % 4 9     Results from last 7 days   Lab Units 11/27/22  0453 11/24/22  0504 11/23/22 2024 11/23/22  1328   LACTIC ACID mmol/L  --   --  0 8 0 9   PROCALCITONIN ng/ml 0 07 0 70*  --  0 26*           * I Have Reviewed All Lab Data Listed Above  * Additional Pertinent Lab Tests Reviewed: [facility] 67 Admission Reviewed    Imaging:    Imaging Reports Reviewed Today Include:   CT soft tissue neck w contrast   Final Result      There is marked improvement in the previously seen retropharyngeal effusion compared to the study performed 5 days ago  There is coating of the mucosa of the oral cavity, oropharynx, hypopharynx and larynx from a recently performed fluoroscopy barium swallow earlier today  Small amount of barium is seen within the tracheal bifurcation and proximal right and left mainstem    bronchi posteriorly indicating some aspiration occurred during that barium swallow  Small posterior layering pleural effusions are seen within the lung apices, new since prior CT angiogram             Workstation performed: [id number]         FL barium swallow video w speech   Final Result      MRI brain wo contrast   Final Result      No acute intracranial abnormality  Minor white matter change suggestive of chronic microangiopathy  Workstation performed: [id number]         XR hips bilateral 3-4 vw w pelvis if performed   Final Result      No acute osseous abnormality  Again seen is a large sclerotic bone lesion in the upper sacrum  This has enlarged comparing the 11/24/2022 CT and a 1/1/2018 CT    This is suspicious for malignancy, and bone scan is recommended for further evaluation  Workstation performed: [id number]         CT chest abdomen pelvis w contrast   Final Result         1  Bilateral pneumonia and pleural effusions  2   Distended esophagus containing fluid  Nonurgent evaluation with swallowing study is recommended  3   Sacral bony lesion has enlarged compared to the previous exam and is indeterminate  Recommend correlation with bone scan or MRI pelvis  Sclerotic lesion at the left pedicle at T11 is unchanged  The study was marked in EPIC for significant notification  Workstation performed: [id number]         CT head wo contrast   Final Result      No acute intracranial abnormality  Chronic microangiopathic changes  Air-fluid level right sphenoid sinus  Acute sphenoid sinusitis should be considered in the right clinical setting  Workstation performed: [id number]         XR chest 1 view portable   Final Result      Right-sided central venous catheter placement without pneumothorax  Workstation performed: [id number]         X-ray chest 1 view portable   Final Result      No acute cardiopulmonary disease  In the setting of clinically suspected/proven COVID-19, this plain film appearance does not contain findings that raise concern for viral pneumonia such as COVID-19, but does not rule out this diagnosis  Workstation performed: [id number]         CTA head and neck w wo contrast   Final Result      1  No acute intracranial CT abnormality  2   Patent major vasculature of [place] without high-grade stenosis  3   No hemodynamically significant stenosis of cervical carotid arteries  4   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  5   Significant retropharyngeal effusion extending from C1 through inferior C5 effacing nasopharyngeal and pharyngeal airways  No apparent rim enhancement to suggest abscess  Uvula appears edematous     Recommend ENT consultation  6   Acute sinusitis  7   Fluid-filled esophagus extending to the level of the neck base  Correlate for GERD  I personally discussed this study with CAMERON MEJIA on 11/23/2022 at 2:26 PM                Workstation performed: [id number]                 Recent Cultures (last 7 days):     Results from last 7 days   Lab Units 11/27/22  1226 11/23/22  1339 11/23/22  1328   BLOOD CULTURE  Received in Microbiology Lab  Culture in Progress  Received in Microbiology Lab  Culture in Progress  Micrococcus luteus* Lactobacillus species*   GRAM STAIN RESULT   --  Gram positive cocci in clusters* Gram variable rods*       Last 24 Hours Medication List:   Current Facility-Administered Medications   Medication Dose Route Frequency Provider Last Rate   • ampicillin-sulbactam  3 g Intravenous Q6H Lourdes Varela MD     • enoxaparin  40 mg Subcutaneous Daily Althea Torres DO     • ferrous gluconate  324 mg Oral BID AC Cathy Bell MD     • pantoprazole  40 mg Intravenous Q12H Raf [address] Canelo dunes, DO     • thiamine  100 mg Intravenous [facility],  mg (11/28/22 1443)        Today, Patient Was Seen By: Cathy Bell MD    ** Please Note: Dictation voice to text software may have been used in the creation of this document   **

## 2022-11-28 NOTE — ASSESSMENT & PLAN NOTE
Patient seen by speech therapy twice and failed oral intake trial at bedside  Barium swallow study obtained on 11/28  With remarkable signs of aspiration on exam      -MRI brain; no acute changes  Speech therapist with recommendations as following;    Recommended Diet:  puree/level 1 diet and honey thick liquids   Recommended Form of Medications: crushed with puree   Aspiration precautions and compensatory swallowing strategies: upright posture, only feed when fully alert, small bites/sips, no straws, liquids by teaspoon only, chin tuck and alternating bites and sips  Consider referral to:  ENT regarding CP bar, GI regarding suspected GERD  SLP Dysphagia therapy recommended: yes   Recommend pharyngeal strengthening exercises

## 2022-11-28 NOTE — PLAN OF CARE
Problem: OCCUPATIONAL THERAPY ADULT  Goal: Performs self-care activities at highest level of function for planned discharge setting  See evaluation for individualized goals  Description: Treatment Interventions: ADL retraining, Functional transfer training, UE strengthening/ROM, Endurance training, Cognitive reorientation, Patient/family training, Equipment evaluation/education, Energy conservation, Activityengagement, Fine motor coordination activities          See flowsheet documentation for full assessment, interventions and recommendations  Outcome: Progressing  Note: Limitation: Decreased ADL status, Decreased UE strength, Decreased Safe judgement during ADL, Decreased cognition, Decreased endurance, Decreased self-care trans, Decreased high-level ADLs, Decreased fine motor control  Prognosis: Fair  Assessment: Patient participated in Skilled OT session this date with interventions consisting of ADL re training with the use of correct body mechnaics, safety awareness and fall prevention techniques, therapeutic exercise to: increase functional use of BUEs, increase BUE muscle strength ,  therapeutic activities to: increase activity tolerance and increase dynamic sit/ stand balance during functional activity    Co treatment with PTA secondary to complex medical condition of pt, -max A of 2 required to achieve and maintain transitional movements, requiring the need of skilled therapeutic intervention of 2 therapists to achieve delivery of services  Patient agreeable to OT treatment session, upon arrival patient was found seated at edge of bed  Patient requiring frequent re direction, verbal cues for safety, verbal cues for correct technique, verbal cues for pacing thru activity steps and cognitive assistance to anticipate next step  Patient continues to be functioning below baseline level, occupational performance remains limited secondary to factors listed above and increased risk for falls and injury  From OT standpoint, recommendation at time of d/c would be Post acute rehabilitation services  The patient's raw score on the AM-PAC Daily Activity inpatient short form is 11, standardized score is 29 04, less than 39 4  Patients at this level are likely to benefit from discharge to post-acute rehabilitation services  Please refer to the recommendation of the Occupational Therapist for safe discharge planning       OT Discharge Recommendation: Post acute rehabilitation services     Alba Valentin

## 2022-11-28 NOTE — RESPIRATORY THERAPY NOTE
11/28/22 0753   Respiratory Assessment   Assessment Type Assess only   General Appearance Awake; Alert   Resp Comments Patient is on room air, in no respiratory distress, he does have a npc congested cough on command, he does have an incentive spirometer at bedside   Oxygen Therapy/Pulse Ox   O2 Device None (Room air)   O2 Therapy Room air   SpO2 Activity At Rest   $ Pulse Oximetry Spot Check Charge Completed       At this time I will dc the respiratory protocol, if needed we will gladly re-evaluate the patient

## 2022-11-28 NOTE — PHYSICAL THERAPY NOTE
PHYSICAL THERAPY NOTE          Patient Name: Remy Martinez  [id number]'[id number] Date: 11/28/2022 11/28/22 0849   PT Last Visit   PT Visit Date 11/28/22   Note Type   Note Type Treatment   Pain Assessment   Pain Assessment Tool 0-10   Pain Score No Pain   Restrictions/Precautions   Weight Bearing Precautions Per Order No   Other Precautions   (Cognitive; Bed Alarm; Multiple lines; Telemetry; Fall Risk)   General   Response to Previous Treatment Patient unable to report, no changes reported from family or staff   Family/Caregiver Present No   Cognition   Overall Cognitive Status Impaired   Arousal/Participation Alert   Following Commands Follows one step commands with increased time or repetition   Subjective   Subjective Needs to go to the bathroom   Bed Mobility   Additional Comments seated EOB at start of session   Transfers   Sit to Stand 2  Maximal assistance   Additional items Assist x 2; Increased time required;Verbal cues   Stand to Sit 2  Maximal assistance   Additional items Assist x 2; Increased time required;Verbal cues   Additional Comments STS transfers  x 3  Difficulty achieving full upright posture, and leans to L side  Max x 2 for standing  Requires BSC placed behind pt , unable to advance LE's or pivot  Stood for hygiene  max x 2  Bed then placed behind pt  Returned to bed for MRI/transit  Balance   Static Sitting Fair +   Dynamic Sitting Fair   Static Standing Poor  (rw)   Endurance Deficit   Endurance Deficit Yes   Activity Tolerance   Activity Tolerance Patient limited by fatigue   Exercises   Heelslides Supine;15 reps;AAROM; Bilateral   Hip Flexion Sitting;10 reps;AROM; Bilateral   Hip Abduction Supine;Sitting;10 reps;AAROM;AROM; Bilateral   Hip Adduction Supine;Sitting;10 reps;AAROM;AROM; Bilateral   Knee AROM Long Arc Quad Sitting;10 reps;AROM; Bilateral   Ankle Pumps Sitting;15 reps;AROM; Bilateral   Assessment Prognosis Good   Problem List   (Decreased strength; Decreased range of motion; Decreased endurance; Impaired balance; Decreased mobility)   Assessment Pt  seen for PT treatment session this date with interventions consisting of  therapeutic exercises, bed mobility and  transfers w/ emphasis on improving pt's strength, endurance and mobility  Pt  Requiring frequent cues for sequence and safety  In comparison to previous session, Pt  With improvements in activity tolerance compared to IE  Pt able to stand max x 2 with forward flexed posture  Unable to advance LE's or pivot  Pt is in need of continued activity in PT to improve strength balance endurance mobility transfers and ambulation with return to maximize LOF  From PT/mobility standpoint, recommendation at time of d/c would be post acute rehab in order to promote return to PLOF and independence  The patient's AM-[facility] Basic Mobility Inpatient Short Form Raw Score is 10  A Raw score of less than or equal to 16 suggests the patient may benefit from discharge to post-acute rehabilitation services  Please also refer to physical therapy recommendation for safe DC planning  Goals   LTG Expiration Date 12/10/22   PT Treatment Day 1   Plan   Treatment/Interventions   (Functional transfer training; LE strengthening/ROM; Elevations; Therapeutic exercise;  Endurance training; Bed mobility; Gait training)   Progress Slow progress, decreased activity tolerance   PT Frequency 3-5x/wk   Recommendation   PT Discharge Recommendation Post acute rehabilitation services   AM-PAC Basic Mobility Inpatient   Turning in Bed Without Bedrails 2   Lying on Back to Sitting on Edge of Flat Bed 2   Moving Bed to Chair 2   Standing Up From Chair 2   Walk in Room 1   Climb 3-5 Stairs 1   Basic Mobility Inpatient Raw Score 10   Turning Head Towards Sound 4   Follow Simple Instructions 3   Low Function Basic Mobility Raw Score 17   Low Function Basic Mobility Standardized Score 27 46 Highest Level Of Mobility   JH-HLM Goal 4: Move to chair/commode   -HLM Achieved 4: Move to [facility] Provided Mobility training   Patient Reinforcement needed

## 2022-11-28 NOTE — ASSESSMENT & PLAN NOTE
Patient with history of GI bleed and previous hospitalization     -since admission hemoglobin levels have been trending down from 11 6 on presentation to 8 7    -2 consecutive readings from past 24 hours with no change  Most likely no active bleeding at this time  Will continue to re-evaluate  -FOBT 1/3:  positive  Pending complete set  -hemoglobin trend stable  GI team consulted and did not want to further proceed with any interventions at this time for the next 24 hours  Plan:   -GI team consulted; input highly appreciated  -follow-up with morning labs  -continue PPI therapy for stress ulcer prophylaxis  -consider holding anticoagulants if continues to trend down

## 2022-11-28 NOTE — ASSESSMENT & PLAN NOTE
Continue monitoring hemoglobin and hematocrit     -given his history of GI bleed and previous hospitalization for that reason will plan for the following:    -folic acid and vitamin B12 levels within normal range  -fecal occult blood test; pending 1/3   -H and H trend stable  Plan:  -GI team on board; gastroenterologist notified no further steps needed at this time   -follow-up with fecal occult blood testing complete set of 3  -transfuse packed RBC units for hemoglobin less than 7, and per protocol   -morning labs

## 2022-11-28 NOTE — ASSESSMENT & PLAN NOTE
-vitals stable  -SpO2 within normal limits on room air   -not in acute respiratory distress  -CMV and EBV panel negative  11/28  -follow-up CT neck/soft tissue: There is marked improvement in the previously seen retropharyngeal effusion compared to the study performed 5 days ago  Plan:  -infectious disease team consulted; input highly appreciated  -will consider contacting ENT specialist for further recommendations

## 2022-11-28 NOTE — PROCEDURES
Speech Pathology Videofluoroscopic Swallow Study (VFSS/VBSS/MBSS)    Patient Name: Mei Matta  [id number] Date: 11/28/2022     Problem List  Principal Problem:    Metabolic encephalopathy  Active Problems:    Pharyngeal swelling    Septic shock (HCC)    Pneumonia    Alcohol withdrawal ([facility] )    Sacral lesion    Other specified anemias    Other dysphagia    Esophageal dilatation    History of GI bleed    Past Medical History  Past Medical History:   Diagnosis Date   • Acute respiratory failure ([facility] [address] ) 11/24/2022   • Hyperlipidemia    • Hypertension    • Hyponatremia 11/23/2022   • Hypothermia 11/23/2022   • Hypoxia 95/63/1468   • Metabolic acidosis with increased anion gap and accumulation of organic acids 11/23/2022     Past Surgical History  Past Surgical History:   Procedure Laterality Date   • NO PAST SURGERIES       General Information;  Pt is a 78 y o  male with a PMH remarkable for metabolic encephalopathy, pharyngeal swelling, septic shock, pneumonia, alcohol withdrawal, and esophageal dilatation  Current concerns for dysphagia include s/s asp/penetration and reduced safety, awareness, cognition, and overall sensation  A VFSS was recommended to assess oropharyngeal stage swallowing skills at this time  Pt was viewed sitting upright in the lateral position  Due to concerns for patient safety, trials provided deviated from the MBSImP Validated Protocol  Patient was given 5-mL thin liquid x2, 20-mL cup sip thin, 40-mL sequential swallow thin, 5-mL nectar thick, 20-mL cup sip nectar thick, 40-mL sequential swallow nectar thick, 5-mL Honey thick, 5-mL pudding, and ½ cookie coated with 3-mL pudding  Oral stage:  Pt presented with mild-moderate oral stage dysphagia  Mastication was prolonged with materials administered today  Bolus formation and transfer were reduced  Oral control appeared reduced with infrequent premature spillage over the base of tongue       Pharyngeal stage:  Pt presented with severe pharyngeal dysphagia  Velar elevation weak  Swallowing initiation was prompt  However, laryngeal rise and anterior hyoid excursion appeared mild-moderately reduced  Airway entrance closure appeared insufficient, with frequent inadequate epiglottic inversion  Tongue base retraction appeared to be moderately reduced in strength, noting moderate anterior pharyngeal wall retention and mod-severe vallecular retention  Pharyngeal constriction and pharyngeal stripping wave appeared moderately impaired  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, causing moderate pyriform sinus pooling and retention, as well as retropulsion from esophagus at times  PES opening was timely with intermittent retropulsion  Management of food/liquid/barium tablet follows:   Deep laryngeal penetration and flaca/silent aspiration occurred with thin and nectar thick liquids  Significant pharyngeal residue also occurred with all consistencies  Strategies and Efficacy: Chin tuck facilitated vallecular retention clearance and small sips decreased risk of asp/pen    Aspiration Response and Efficacy: no response to aspiration present    Esophageal stage:  Esophageal phase not viewed in this study  Assessment Summary:    Pt presents with moderate-severe oropharyngeal dysphagia characterized by prolonged mastication and reduced bolus manipulation/transfer, as well as reduced laryngeal excursion, reduced pharyngeal constriction with poor pharyngeal stripping wave, decreased tongue base retraction and vallecular retention, incomplete epiglottic inversion, presence of cricopharyngeal bar with moderate pyriform sinus pooling and retention, as well as occasional retropulsion from esophagus, and laryngeal penetration and aspiration with thin and nectar thick liquids without sensation   Note: Images are available for review in PACS as desired      [facility] Outcome Measurement System): Swallowing "Score"    LEVEL 3: Alternative method of feeding is required as individual takes less than 50% of nutrition and hydration by mouth, and/or swallowing is safe with consistent use of moderate cues to use compensatory strategies and/or requires maximum diet restriction  Recommendations:   Recommended Diet:  puree/level 1 diet and honey thick liquids   Recommended Form of Medications: crushed with puree   Aspiration precautions and compensatory swallowing strategies: upright posture, only feed when fully alert, small bites/sips, no straws, liquids by teaspoon only, chin tuck and alternating bites and sips  Consider referral to:  ENT regarding CP bar, GI regarding suspected GERD  SLP Dysphagia therapy recommended: yes  Recommend pharyngeal strengthening exercises    Results Reviewed with: patient, nursing, and MD   Pt/Family Education: initiated  Pt and caregivers would benefit from/require continued education      Patient Stated Goal: "I need a drink and I'm hungry"    Dysphagia Goals per SLP: pt will tolerate honey thick liquids and puree solids with 80% without s/s of aspiration xpenetration

## 2022-11-28 NOTE — NURSING NOTE
Pt unable to go down to scheduled 0815 am MRI  Pt was being seen with physical therapy  Nurse tried to explain pt needs to be down by 0815 am but pt was already up on commode  Pt had till 0830 am to be down to MRI, pt unable to make time  Physical therapy currantly doing exercises with pt  Will try for MRI at later time today

## 2022-11-28 NOTE — ASSESSMENT & PLAN NOTE
Assessment and plan per Infectious Disease team     Positive blood culture  Blood cultures on admission with delayed growth of 1 set with lactobacillus and the other with micrococcus  I suspect that these organisms were isolated from the patient's skin and are likely contaminant given the manner in which the patient was found  Repeat cultures are pending  2D echo without gross vegetation  No other devices appreciated on  imaging    No antibiotics for this issue  Follow-up repeat blood cultures  Unasyn as above  Continue to trend fever curve/WBC

## 2022-11-28 NOTE — PLAN OF CARE
Problem: MOBILITY - ADULT  Goal: Maintain or return to baseline ADL function  Description: INTERVENTIONS:  -  Assess patient's ability to carry out ADLs; assess patient's baseline for ADL function and identify physical deficits which impact ability to perform ADLs (bathing, care of mouth/teeth, toileting, grooming, dressing, etc )  - Assess/evaluate cause of self-care deficits   - Assess range of motion  - Assess patient's mobility; develop plan if impaired  - Assess patient's need for assistive devices and provide as appropriate  - Encourage maximum independence but intervene and supervise when necessary  - Involve family in performance of ADLs  - Assess for home care needs following discharge   - Consider OT consult to assist with ADL evaluation and planning for discharge  - Provide patient education as appropriate  Outcome: Progressing  Goal: Maintains/Returns to pre admission functional level  Description: INTERVENTIONS:  - Perform BMAT or MOVE assessment daily    - Set and communicate daily mobility goal to care team and patient/family/caregiver  - Collaborate with rehabilitation services on mobility goals if consulted  - Perform Range of Motion multiple times a day  - Reposition patient every 2 hours    - Dangle patient multiple times a day  - Stand patient multiple times a day  - Ambulate patient multiple times a day  - Out of bed to chair 4 times a day   - Out of bed for meals 3 times a day  - Out of bed for toileting  - Record patient progress and toleration of activity level   Outcome: Progressing     Problem: Prexisting or High Potential for Compromised Skin Integrity  Goal: Skin integrity is maintained or improved  Description: INTERVENTIONS:  - Identify patients at risk for skin breakdown  - Assess and monitor skin integrity  - Assess and monitor nutrition and hydration status  - Monitor labs   - Assess for incontinence   - Turn and reposition patient  - Assist with mobility/ambulation  - Relieve pressure over bony prominences  - Avoid friction and shearing  - Provide appropriate hygiene as needed including keeping skin clean and dry  - Evaluate need for skin moisturizer/barrier cream  - Collaborate with interdisciplinary team   - Patient/family teaching  - Consider wound care consult   Outcome: Progressing     Problem: PAIN - ADULT  Goal: Verbalizes/displays adequate comfort level or baseline comfort level  Description: Interventions:  - Encourage patient to monitor pain and request assistance  - Assess pain using appropriate pain scale  - Administer analgesics based on type and severity of pain and evaluate response  - Implement non-pharmacological measures as appropriate and evaluate response  - Consider cultural and social influences on pain and pain management  - Notify physician/advanced practitioner if interventions unsuccessful or patient reports new pain  Outcome: Progressing     Problem: INFECTION - ADULT  Goal: Absence or prevention of progression during hospitalization  Description: INTERVENTIONS:  - Assess and monitor for signs and symptoms of infection  - Monitor lab/diagnostic results  - Monitor all insertion sites, i e  indwelling lines, tubes, and drains  - Monitor endotracheal if appropriate and nasal secretions for changes in amount and color  - [place] appropriate cooling/warming therapies per order  - Administer medications as ordered  - Instruct and encourage patient and family to use good hand hygiene technique  - Identify and instruct in appropriate isolation precautions for identified infection/condition  Outcome: Progressing  Goal: Absence of fever/infection during neutropenic period  Description: INTERVENTIONS:  - Monitor WBC    Outcome: Progressing     Problem: SAFETY ADULT  Goal: Maintain or return to baseline ADL function  Description: INTERVENTIONS:  -  Assess patient's ability to carry out ADLs; assess patient's baseline for ADL function and identify physical deficits which impact ability to perform ADLs (bathing, care of mouth/teeth, toileting, grooming, dressing, etc )  - Assess/evaluate cause of self-care deficits   - Assess range of motion  - Assess patient's mobility; develop plan if impaired  - Assess patient's need for assistive devices and provide as appropriate  - Encourage maximum independence but intervene and supervise when necessary  - Involve family in performance of ADLs  - Assess for home care needs following discharge   - Consider OT consult to assist with ADL evaluation and planning for discharge  - Provide patient education as appropriate  Outcome: Progressing  Goal: Maintains/Returns to pre admission functional level  Description: INTERVENTIONS:  - Perform BMAT or MOVE assessment daily    - Set and communicate daily mobility goal to care team and patient/family/caregiver  - Collaborate with rehabilitation services on mobility goals if consulted  - Perform Range of Motion multiple times a day  - Reposition patient every 2 hours    - Dangle patient multiple times a day  - Stand patient multiple times a day  - Ambulate patient multiple times a day  - Out of bed to chair 4 times a day   - Out of bed for meals 3 times a day  - Out of bed for toileting  - Record patient progress and toleration of activity level   Outcome: Progressing  Goal: Patient will remain free of falls  Description: INTERVENTIONS:  - Educate patient/family on patient safety including physical limitations  - Instruct patient to call for assistance with activity   - Consult OT/PT to assist with strengthening/mobility   - Keep Call bell within reach  - Keep bed low and locked with side rails adjusted as appropriate  - Keep care items and personal belongings within reach  - Initiate and maintain comfort rounds  - Make Fall Risk Sign visible to staff  - Offer Toileting every 2 Hours, in advance of need  - Initiate/Maintain bed/chair alarm  - Obtain necessary fall risk management equipment: non skid socks  - Apply yellow socks and bracelet for high fall risk patients  - Consider moving patient to room near nurses station  Outcome: Progressing     Problem: DISCHARGE PLANNING  Goal: Discharge to home or other facility with appropriate resources  Description: INTERVENTIONS:  - Identify barriers to discharge w/patient and caregiver  - Arrange for needed discharge resources and transportation as appropriate  - Identify discharge learning needs (meds, wound care, etc )  - Arrange for interpretive services to assist at discharge as needed  - Refer to Case Management Department for coordinating discharge planning if the patient needs post-hospital services based on physician/advanced practitioner order or complex needs related to functional status, cognitive ability, or social support system  Outcome: Progressing     Problem: Knowledge Deficit  Goal: Patient/family/caregiver demonstrates understanding of disease process, treatment plan, medications, and discharge instructions  Description: Complete learning assessment and assess knowledge base  Interventions:  - Provide teaching at level of understanding  - Provide teaching via preferred learning methods  Outcome: Progressing     Problem: Nutrition/Hydration-ADULT  Goal: Nutrient/Hydration intake appropriate for improving, restoring or maintaining nutritional needs  Description: Monitor and assess patient's nutrition/hydration status for malnutrition  Collaborate with interdisciplinary team and initiate plan and interventions as ordered  Monitor patient's weight and dietary intake as ordered or per policy  Utilize nutrition screening tool and intervene as necessary  Determine patient's food preferences and provide high-protein, high-caloric foods as appropriate       INTERVENTIONS:  - Monitor oral intake, urinary output, labs, and treatment plans  - Assess nutrition and hydration status and recommend course of action  - Evaluate amount of meals eaten  - Assist patient with eating if necessary   - Allow adequate time for meals  - Recommend/ encourage appropriate diets, oral nutritional supplements, and vitamin/mineral supplements  - Order, calculate, and assess calorie counts as needed  - Recommend, monitor, and adjust tube feedings and TPN/PPN based on assessed needs  - Assess need for intravenous fluids  - Provide specific nutrition/hydration education as appropriate  - Include patient/family/caregiver in decisions related to nutrition  Outcome: Progressing     Problem: Potential for Falls  Goal: Patient will remain free of falls  Description: INTERVENTIONS:  - Educate patient/family on patient safety including physical limitations  - Instruct patient to call for assistance with activity   - Consult OT/PT to assist with strengthening/mobility   - Keep Call bell within reach  - Keep bed low and locked with side rails adjusted as appropriate  - Keep care items and personal belongings within reach  - Initiate and maintain comfort rounds  - Make Fall Risk Sign visible to staff  - Offer Toileting every 2 Hours, in advance of need  - Initiate/Maintain bed/chair alarm  - Obtain necessary fall risk management equipment: non skid socks  - Apply yellow socks and bracelet for high fall risk patients  - Consider moving patient to room near nurses station  Outcome: Progressing

## 2022-11-29 LAB
ALBUMIN SERPL BCP-MCNC: 2.2 G/DL (ref 3.5–5)
ALP SERPL-CCNC: 112 U/L (ref 46–116)
ALT SERPL W P-5'-P-CCNC: 16 U/L (ref 12–78)
ANION GAP SERPL CALCULATED.3IONS-SCNC: 9 MMOL/L (ref 4–13)
AST SERPL W P-5'-P-CCNC: 13 U/L (ref 5–45)
BASOPHILS # BLD AUTO: 0.03 THOUSANDS/ÂΜL (ref 0–0.1)
BASOPHILS NFR BLD AUTO: 1 % (ref 0–1)
BILIRUB SERPL-MCNC: 0.42 MG/DL (ref 0.2–1)
BUN SERPL-MCNC: 16 MG/DL (ref 5–25)
CALCIUM ALBUM COR SERPL-MCNC: 9.7 MG/DL (ref 8.3–10.1)
CALCIUM SERPL-MCNC: 8.3 MG/DL (ref 8.3–10.1)
CHLORIDE SERPL-SCNC: 111 MMOL/L (ref 96–108)
CO2 SERPL-SCNC: 22 MMOL/L (ref 21–32)
CREAT SERPL-MCNC: 0.74 MG/DL (ref 0.6–1.3)
EOSINOPHIL # BLD AUTO: 0.33 THOUSAND/ÂΜL (ref 0–0.61)
EOSINOPHIL NFR BLD AUTO: 5 % (ref 0–6)
ERYTHROCYTE [DISTWIDTH] IN BLOOD BY AUTOMATED COUNT: 14.6 % (ref 11.6–15.1)
ERYTHROCYTE [DISTWIDTH] IN BLOOD BY AUTOMATED COUNT: 15 % (ref 11.6–15.1)
GFR SERPL CREATININE-BSD FRML MDRD: 96 ML/MIN/1.73SQ M
GLUCOSE SERPL-MCNC: 98 MG/DL (ref 65–140)
HCT VFR BLD AUTO: 27.3 % (ref 36.5–49.3)
HCT VFR BLD AUTO: 29.2 % (ref 36.5–49.3)
HGB BLD-MCNC: 8.8 G/DL (ref 12–17)
HGB BLD-MCNC: 9.5 G/DL (ref 12–17)
IMM GRANULOCYTES # BLD AUTO: 0.13 THOUSAND/UL (ref 0–0.2)
IMM GRANULOCYTES NFR BLD AUTO: 2 % (ref 0–2)
LYMPHOCYTES # BLD AUTO: 1.07 THOUSANDS/ÂΜL (ref 0.6–4.47)
LYMPHOCYTES NFR BLD AUTO: 16 % (ref 14–44)
MAGNESIUM SERPL-MCNC: 2 MG/DL (ref 1.6–2.6)
MCH RBC QN AUTO: 30.2 PG (ref 26.8–34.3)
MCH RBC QN AUTO: 30.7 PG (ref 26.8–34.3)
MCHC RBC AUTO-ENTMCNC: 32.2 G/DL (ref 31.4–37.4)
MCHC RBC AUTO-ENTMCNC: 32.5 G/DL (ref 31.4–37.4)
MCV RBC AUTO: 94 FL (ref 82–98)
MCV RBC AUTO: 95 FL (ref 82–98)
MONOCYTES # BLD AUTO: 0.82 THOUSAND/ÂΜL (ref 0.17–1.22)
MONOCYTES NFR BLD AUTO: 12 % (ref 4–12)
NEUTROPHILS # BLD AUTO: 4.26 THOUSANDS/ÂΜL (ref 1.85–7.62)
NEUTS SEG NFR BLD AUTO: 64 % (ref 43–75)
NRBC BLD AUTO-RTO: 0 /100 WBCS
PHOSPHATE SERPL-MCNC: 3.3 MG/DL (ref 2.3–4.1)
PLATELET # BLD AUTO: 126 THOUSANDS/UL (ref 149–390)
PLATELET # BLD AUTO: 150 THOUSANDS/UL (ref 149–390)
PMV BLD AUTO: 10.4 FL (ref 8.9–12.7)
PMV BLD AUTO: 10.5 FL (ref 8.9–12.7)
POTASSIUM SERPL-SCNC: 3.2 MMOL/L (ref 3.5–5.3)
PROT SERPL-MCNC: 5.7 G/DL (ref 6.4–8.4)
RBC # BLD AUTO: 2.91 MILLION/UL (ref 3.88–5.62)
RBC # BLD AUTO: 3.09 MILLION/UL (ref 3.88–5.62)
SODIUM SERPL-SCNC: 142 MMOL/L (ref 135–147)
WBC # BLD AUTO: 6.64 THOUSAND/UL (ref 4.31–10.16)
WBC # BLD AUTO: 7.7 THOUSAND/UL (ref 4.31–10.16)

## 2022-11-29 RX ORDER — POTASSIUM CHLORIDE 14.9 MG/ML
20 INJECTION INTRAVENOUS
Status: COMPLETED | OUTPATIENT
Start: 2022-11-29 | End: 2022-11-29

## 2022-11-29 RX ADMIN — AMPICILLIN SODIUM AND SULBACTAM SODIUM 3 G: 2; 1 INJECTION, POWDER, FOR SOLUTION INTRAMUSCULAR; INTRAVENOUS at 22:54

## 2022-11-29 RX ADMIN — POTASSIUM CHLORIDE 20 MEQ: 14.9 INJECTION, SOLUTION INTRAVENOUS at 12:20

## 2022-11-29 RX ADMIN — PANTOPRAZOLE SODIUM 40 MG: 40 INJECTION, POWDER, FOR SOLUTION INTRAVENOUS at 08:55

## 2022-11-29 RX ADMIN — FERROUS GLUCONATE 324 MG: 324 TABLET ORAL at 17:31

## 2022-11-29 RX ADMIN — POTASSIUM CHLORIDE 20 MEQ: 14.9 INJECTION, SOLUTION INTRAVENOUS at 08:55

## 2022-11-29 RX ADMIN — THIAMINE HYDROCHLORIDE 100 MG: 100 INJECTION, SOLUTION INTRAMUSCULAR; INTRAVENOUS at 22:22

## 2022-11-29 RX ADMIN — THIAMINE HYDROCHLORIDE 100 MG: 100 INJECTION, SOLUTION INTRAMUSCULAR; INTRAVENOUS at 06:02

## 2022-11-29 RX ADMIN — THIAMINE HYDROCHLORIDE 100 MG: 100 INJECTION, SOLUTION INTRAMUSCULAR; INTRAVENOUS at 15:01

## 2022-11-29 RX ADMIN — AMPICILLIN SODIUM AND SULBACTAM SODIUM 3 G: 2; 1 INJECTION, POWDER, FOR SOLUTION INTRAMUSCULAR; INTRAVENOUS at 05:25

## 2022-11-29 RX ADMIN — ENOXAPARIN SODIUM 40 MG: 40 INJECTION SUBCUTANEOUS at 08:55

## 2022-11-29 RX ADMIN — FERROUS GLUCONATE 324 MG: 324 TABLET ORAL at 07:17

## 2022-11-29 RX ADMIN — PANTOPRAZOLE SODIUM 40 MG: 40 INJECTION, POWDER, FOR SOLUTION INTRAVENOUS at 22:22

## 2022-11-29 RX ADMIN — AMPICILLIN SODIUM AND SULBACTAM SODIUM 3 G: 2; 1 INJECTION, POWDER, FOR SOLUTION INTRAMUSCULAR; INTRAVENOUS at 12:20

## 2022-11-29 RX ADMIN — AMPICILLIN SODIUM AND SULBACTAM SODIUM 3 G: 2; 1 INJECTION, POWDER, FOR SOLUTION INTRAMUSCULAR; INTRAVENOUS at 17:31

## 2022-11-29 NOTE — PROGRESS NOTES
Progress Note- Ronak Iverson 78 y o  male MRN: [id number]    Unit/Bed#: 404-01 Encounter: [id number]      Assessment and Plan:    Ronak Iverson is a 78year old male with a PMH of alcohol use and hypertension that presented to the emergency department with altered mental status  According to EMR, patient was found in his house on the floor, confused, soiled in urine and stool  Patient was found to have sepsis and hypoxia  1  Abnormal CT scan, esophagus     CT scan on admission revealed possible pneumonia as well as distended esophagus containing fluid  Patient underwent speech evaluation with video swallow that revealed remarkable signs of aspiration  He was recommended fear pureed/level 1 diet and honey thick liquids with medications crushed with puree  Non urgent EGD recommended  2  Anemia    Patient's hemoglobin on admission was normal at 13 4 with a downtrend to 8 8 this morning however this has increased to 9 5 this afternoon  Patient denies any overt bleeding  Stool for occult with 1 positive and 1 negative  Patient does have a history of GI bleeding in 2018 and he was recommended for outpatient EGD and colonoscopy however this was not completed  Non urgent EGD and colonoscopy recommended     ______________________________________________________________________    Subjective:     Patient awake and alert  He is oriented x3  He currently denies any GI symptoms at this time  He reports that he tolerated breakfast with no choking or trouble swallowing      Medication Administration - last 24 hours from 11/28/2022 1441 to 11/29/2022 1441       Date/Time Order Dose Route Action Action by     11/28/2022 2116 EST cefepime (MAXIPIME) IVPB (premix in dextrose) 2,000 mg 50 mL 0 mg Intravenous Stopped [facility]     11/29/2022 0855 EST enoxaparin (LOVENOX) subcutaneous injection 40 mg 40 mg Subcutaneous Given Mei Hernandez RN     11/28/2022 2116 EST dextrose 5 % and sodium chloride 0 9 % infusion 0 mL/hr Intravenous Stopped [facility]     11/29/2022 0855 EST pantoprazole (PROTONIX) injection 40 mg 40 mg Intravenous Given Vicky Bishop, RN     11/28/2022 2117 EST pantoprazole (PROTONIX) injection 40 mg 40 mg Intravenous Given Rafael Haddad     11/29/2022 0602 EST thiamine (VITAMIN B1) 100 mg in sodium chloride 0 9 % 50 mL IVPB 100 mg Intravenous New Bag [facility]     11/28/2022 2202 EST thiamine (VITAMIN B1) 100 mg in sodium chloride 0 9 % 50 mL IVPB 0 mg Intravenous Stopped [facility]     11/28/2022 2117 EST thiamine (VITAMIN B1) 100 mg in sodium chloride 0 9 % 50 mL IVPB 100 mg Intravenous New Bag [facility]     11/28/2022 1443 EST thiamine (VITAMIN B1) 100 mg in sodium chloride 0 9 % 50 mL IVPB 100 mg Intravenous [address] Vicky Navarro, RN     11/29/2022 0525 EST ampicillin-sulbactam (UNASYN) 3 g in sodium chloride 0 9 % 100 mL IVPB 3 g Intravenous New [facility]     11/28/2022 2202 EST ampicillin-sulbactam (UNASYN) 3 g in sodium chloride 0 9 % 100 mL IVPB 3 g Intravenous New Bag [facility]     11/28/2022 1724 EST ampicillin-sulbactam (UNASYN) 3 g in sodium chloride 0 9 % 100 mL IVPB 3 g Intravenous [address] Vicky Bishop, RN     11/28/2022 1442 EST ampicillin-sulbactam (UNASYN) 3 g in sodium chloride 0 9 % 100 mL IVPB 3 g Intravenous Not Given Vicky Bishop, RN     11/29/2022 0717 EST ferrous gluconate (FERGON) tablet 324 mg 324 mg Oral Given Vicky Bishop, RN     11/28/2022 1714 EST ferrous gluconate (FERGON) tablet 324 mg 324 mg Oral Given Vicky Bishop, RN     11/29/2022 1220 EST potassium chloride 20 mEq IVPB (premix) 20 mEq Intravenous [address] Vicky Bishop, RN     11/29/2022 0855 EST potassium chloride 20 mEq IVPB (premix) 20 mEq Intravenous [address] Vicky Bishop RN     11/29/2022 1220 EST ampicillin-sulbactam (UNASYN) 3 g in sodium chloride 0 9 % 100 mL IVPB 3 g Intravenous New Bag Vicky Bishop RN          Objective:     Vitals: Blood pressure 137/74, pulse 56, temperature 97 7 °F (36 5 °C), resp  rate 17, height 5' 9" (1 753 m), weight 88 6 kg (195 lb 5 2 oz), SpO2 99 %  ,Body mass index is 28 84 kg/m²  No intake or output data in the 24 hours ending 11/29/22 1441    Physical Exam:   PHYSICAL EXAMINATION:    General Appearance:   Alert, cooperative, no distress   HEENT:  Normocephalic, atraumatic, anicteric  Neck supple, symmetrical, trachea midline  Lungs:   Equal chest rise and unlabored breathing, normal effort, no coughing  Cardiovascular:   No visualized JVD  Abdomen:   No abdominal distension  Skin:   No jaundice, rashes, or lesions  Musculoskeletal:   Normal range of motion visualized  Psych:  Normal affect and normal insight  Neuro:  Alert and appropriate  Invasive Devices     Central Venous Catheter Line  Duration           CVC Central Lines 11/23/22 Triple Right Subclavian 5 days          Peripheral Intravenous Line  Duration           Peripheral IV 11/29/22 Right Antecubital <1 day                Lab Results:  No results displayed because visit has over 200 results  Imaging Studies: I have personally reviewed pertinent imaging studies

## 2022-11-29 NOTE — PHYSICAL THERAPY NOTE
PHYSICAL THERAPY NOTE          Patient Name: Delos Castleman  TCUCC'U Date: 11/29/2022 11/29/22 1039   PT Last Visit   PT Visit Date 11/29/22   Note Type   Note Type Treatment   Pain Assessment   Pain Assessment Tool 0-10   Pain Score No Pain   Restrictions/Precautions   Weight Bearing Precautions Per Order No   Other Precautions   (Cognitive; Bed Alarm; Multiple lines; Fall Risk)   General   Chart Reviewed Yes   Response to Previous Treatment Patient unable to report, no changes reported from family or staff   Cognition   Overall Cognitive Status Impaired   Arousal/Participation Alert; Cooperative   Following Commands Follows one step commands with increased time or repetition   Subjective   Subjective Agreeable to therapy  Feeling better  c/o weakness  Bed Mobility   Supine to Sit 3  Moderate assistance   Additional items Assist x 1;HOB elevated; Bedrails; Increased time required;Verbal cues   Additional Comments Increased time to transition to EOB  Sat at EOB with UE support and fair+ balance  Completed seated TE  Transfers   Sit to Stand 3  Moderate assistance   Additional items Assist x 1;Bedrails; Increased time required;Verbal cues   Stand to Sit 3  Moderate assistance   Additional items Assist x 1; Armrests; Increased time required;Verbal cues   Stand pivot 3  Moderate assistance   Additional items Verbal cues; Increased time required   Additional Comments RW used   Ambulation/Elevation   Gait pattern Excessively slow; Short stride; Foward flexed;Decreased foot clearance; Improper Weight shift   Gait Assistance 3  Moderate assist   Additional items Assist x 1;Verbal cues   Assistive Device Rolling walker   Distance 3' x 1, 5' x 1   Balance   Static Sitting Fair +   Dynamic Sitting Fair   Static Standing Fair -   Dynamic Standing Fair -   Ambulatory Poor +  (RW)   Endurance Deficit   Endurance Deficit Yes   Activity Tolerance Activity Tolerance Patient limited by fatigue   Exercises   Hip Flexion Sitting;15 reps;AROM; Bilateral   Hip Abduction Sitting;15 reps;AROM; Bilateral   Hip Adduction Sitting;15 reps;AROM; Bilateral   Knee AROM Long Arc Quad Sitting;15 reps;AROM; Bilateral   Ankle Pumps Sitting;15 reps;AROM; Bilateral   Assessment   Prognosis Good   Problem List   (Decreased strength; Decreased range of motion; Decreased endurance; Impaired balance; Decreased mobility)   Assessment Pt seen for PT treatment session this date with interventions consisting of gait training to normalize gait pattern to decrease fall risk, therapeutic exercise to improve endurance to improve functional mobility and therapeutic activity to improve transfers and increase activity tolerance with functional mobility to decrease fall risk  Pt agreeable to PT treatment session upon arrival, pt found in bed in no apparent distress  Since previous session, pt has made good progress as evidenced by improved tx/ambulation  Barriers during this session include weakness, fatigue, decreased balance  Pt continues to be functioning below baseline level, and remains limited 2* factors listed above and including impaired activity tolerance  Pt prognosis for achieving goals is good pending pt progress with hospitalization/medical status improvements, and indicated by motivated to participate in therapy and ability to follow cues  PT will continue to see pt during current hospitalization in order to address the deficits listed above and provide interventions consistent w/ POC in effort to achieve goals  Current goals and POC remain appropriate, pt continues to have rehab potential  Pt is in need of continued activity in PT to improve strength balance endurance mobility transfers and ambulation with return to maximize LOF  From PT/mobility standpoint, recommendation at time of d/c would be post acute rehab  in order to promote return to PLOF and independence    The patient's AM-PAC Basic Mobility Inpatient Short Form Raw Score is 12  A Raw score of less than or equal to 16 suggests the patient may benefit from discharge to post-acute rehabilitation services  Please also refer to the recommendation of the Physical Therapy for safe discharge planning  Goals   LTG Expiration Date 12/10/22   PT Treatment Day 2   Plan   Treatment/Interventions   (Functional transfer training; LE strengthening/ROM; Elevations; Therapeutic exercise; Endurance training; Bed mobility; Gait training)   Progress Slow progress, decreased activity tolerance   PT Frequency 3-5x/wk   Recommendation   PT Discharge Recommendation Post acute rehabilitation services   AM-PAC Basic Mobility Inpatient   Turning in Bed Without Bedrails 3   Lying on Back to Sitting on Edge of Flat Bed 2   Moving Bed to Chair 2   Standing Up From Chair 2   Walk in Room 2   Climb 3-5 Stairs 1   Basic Mobility Inpatient Raw Score 12   Basic Mobility Standardized Score 32 23   Turning Head Towards Sound 4   Follow Simple Instructions 3   Low Function Basic Mobility Raw Score 19   Low Function Basic Mobility Standardized Score 31 06   Highest Level Of Mobility   JH-HLM Goal 4: Move to chair/commode   JH-HLM Achieved 5: Stand (1 or more minutes)   Education   Education Provided Mobility training   Patient Demonstrates verbal understanding;Reinforcement needed   End of Consult   Patient Position at End of Consult Bedside chair;Bed/Chair alarm activated; All needs within reach   End of Consult Comments discussed POC with PT

## 2022-11-29 NOTE — SPEECH THERAPY NOTE
Speech/Language Pathology Progress Note    Patient Name: Usman Barr  YNIOG'P Date: 11/29/2022    Subjective:  Patient awake and alert, sitting upright in recliner chair and exhibiting active participation in tx session  Objective:  Clinician engaged pt in p o  intake trials with training for use of compensatory strategies, deemed necessary to minimize risk of asp/pen during VFSS completed on 11/28/22  Exercises to improve pharyngeal strengthening/constriction also implemented  Clinician also provided education regarding rationale of HTL via tsp and chin tuck positioning to pt  Assessment:  Pt exhibited 66% accuracy with effective use of chin tuck for swallow of HTL via tsp, given mod-max verbal and visual cues  Tolerance of HTL via tsp was noted to be 75% accuracy, noting wet vocal quality following swallow x1  Pt at times independently initiated secondary swallow, as well  Exercises for pharyngeal constriction/strength included CTAR (chin tuck against resistance), pudding sucks, and effortful swallow, noting 65% accuracy  Plan/Recommendations:  Continue ST services to provide training for necessary compensatory strategies to MI level, as well as implement exercises for strengthening  Continue diet consistency of puree/HTL via tsp, meds crushed in puree, and aspiration precautions  No straws

## 2022-11-29 NOTE — PLAN OF CARE
Problem: PHYSICAL THERAPY ADULT  Goal: Performs mobility at highest level of function for planned discharge setting  See evaluation for individualized goals  Description  Outcome: Progressing  Note: Prognosis: Good  Problem List:  (Decreased strength; Decreased range of motion; Decreased endurance; Impaired balance; Decreased mobility)  Assessment: Pt seen for PT treatment session this date with interventions consisting of gait training to normalize gait pattern to decrease fall risk, therapeutic exercise to improve endurance to improve functional mobility and therapeutic activity to improve transfers and increase activity tolerance with functional mobility to decrease fall risk  Pt agreeable to PT treatment session upon arrival, pt found in bed in no apparent distress  Since previous session, pt has made good progress as evidenced by improved tx/ambulation  Barriers during this session include weakness, fatigue, decreased balance  Pt continues to be functioning below baseline level, and remains limited 2* factors listed above and including impaired activity tolerance  Pt prognosis for achieving goals is good pending pt progress with hospitalization/medical status improvements, and indicated by motivated to participate in therapy and ability to follow cues  PT will continue to see pt during current hospitalization in order to address the deficits listed above and provide interventions consistent w/ POC in effort to achieve goals  Current goals and POC remain appropriate, pt continues to have rehab potential  Pt is in need of continued activity in PT to improve strength balance endurance mobility transfers and ambulation with return to maximize LOF  From PT/mobility standpoint, recommendation at time of d/c would be post acute rehab  in order to promote return to PLOF and independence  The patient's AM-PAC Basic Mobility Inpatient Short Form Raw Score is 12   A Raw score of less than or equal to 16 suggests the patient may benefit from discharge to post-acute rehabilitation services  Please also refer to the recommendation of the Physical Therapy for safe discharge planning  PT Discharge Recommendation: Post acute rehabilitation services    See flowsheet documentation for full assessment

## 2022-11-29 NOTE — PROGRESS NOTES
[address] [facility] [address]  Progress Note Raysa Leach 1955, 78 y o  male MRN: [id number]  Unit/Bed#: 404-01 Encounter: [id number]  Primary Care Provider: Tim Herrera DO   Date and time admitted to hospital: 11/23/2022  1:09 PM    Pneumonia  Assessment & Plan  CT Ca P:  1  Bilateral pneumonia and pleural effusions  Blood cultures:     1/4:  Micrococcus luteus, Gram-positive cocci in clusters  1/4: Lactobacillus species Abnormal  Gram Stain ResultGram variable rods Abnormal    2/4: NGTD     -completed 5 days of cefepime   -day 1 Unasyn    -new set of blood cultures obtained on 11/27  No growth to date     -infectious disease team on board; agreed on the following plan:  Continue Unasyn 3 g every 6 hours  Plan for total of 10 days of therapy given 4, through 12/2  Can transition to oral Augmentin once p o  intake is reliable  Continue to trend fever curve/vitals  Repeat CBC/CMP tomorrow  Follow-up repeat blood cultures while admitted  Additional supportive care/discharge as per primary  Septic shock [facility])  Assessment & Plan  Likely secondary to complicated pneumonia      Refer to assessment and plan for pneumonia  * Metabolic encephalopathy  Assessment & Plan  Background:  Patient presented with altered mental status, lethargic but responds to verbal stimuli, follows commands  Acute metabolic encephalopathy present on admission, likely due to underlying infection, plus alcohol withdrawal      Mental status moderately improved, however he is still confused with minimal change from yesterday     -Bairum swallow study ; report and recommendations per speech therapist is following:  Recommending dysphagia diet for aspiration precautions given the results of the swallow study  -MRI brain; No acute intracranial abnormality  Minor white matter change suggestive of chronic microangiopathy  -PT OT recommending acute rehab services when stable  Plan:   Today patient is alert and oriented x3  Remarkably improved in terms of alertness and orientation since admission  Will continue to recheck and follow-up  -PT OT eval and treat  -cardiopulmonary monitoring  -assessment and plan for sepsis  Alcohol withdrawal ([facility] [address] )  Assessment & Plan  Background:  Social history as given by his close friends; daily alcohol drinker for the past 1 year at least   Smokes cigarettes I pack per day for same duration  Given the patient's estimated timing of the fall at home his altered mental state could be most likely caused by a combination between septic shock and alcohol withdrawal     -medical toxicology consultant on board; agreed on the plan as following:    -Ativan p r n  for agitation and seizures  -IV thiamine supplement  -IV folic acid supplement  -follow-up with rehab when stable  Positive blood culture  Assessment & Plan  Assessment and plan per Infectious Disease team     Positive blood culture  Blood cultures on admission with delayed growth of 1 set with lactobacillus and the other with micrococcus  I suspect that these organisms were isolated from the patient's skin and are likely contaminant given the manner in which the patient was found  Repeat cultures are pending  2D echo without gross vegetation  No other devices appreciated on  imaging  No antibiotics for this issue  Follow-up repeat blood cultures  Unasyn as above  Continue to trend fever curve/WBC    History of GI bleed  Assessment & Plan  Patient with history of GI bleed and previous hospitalization     -since admission hemoglobin levels have been trending down from 11 6 on presentation to 8 7    -2 consecutive readings from past 24 hours with no change  Most likely no active bleeding at this time  Will continue to re-evaluate  -FOBT 1/3:  positive  Pending complete set  -hemoglobin trend stable    GI team consulted and did not want to further proceed with any interventions at this time for the next 24 hours  Plan:   -GI team consulted; input highly appreciated  -follow-up with morning labs  -continue PPI therapy for stress ulcer prophylaxis  -consider holding anticoagulants if continues to trend down  Esophageal dilatation  Assessment & Plan  In the setting of coma and possible alcohol intoxication when was at home  -CT scan showing esophageal dilation  Plan as following:  -GI team consulted; input highly appreciated  -will consider nasogastric tube insertion if no improvement within next 24 hours  Other dysphagia  Assessment & Plan  Patient seen by speech therapy twice and failed oral intake trial at bedside  Barium swallow study obtained on 11/28  With remarkable signs of aspiration on exam      -MRI brain; no acute changes  Speech therapist with recommendations as following;    Recommended Diet:  puree/level 1 diet and honey thick liquids   Recommended Form of Medications: crushed with puree   Aspiration precautions and compensatory swallowing strategies: upright posture, only feed when fully alert, small bites/sips, no straws, liquids by teaspoon only, chin tuck and alternating bites and sips  Consider referral to:  ENT regarding CP bar, GI regarding suspected GERD  SLP Dysphagia therapy recommended: yes  Recommend pharyngeal strengthening exercises      Other specified anemias  Assessment & Plan  Continue monitoring hemoglobin and hematocrit     -given his history of GI bleed and previous hospitalization for that reason will plan for the following:    -folic acid and vitamin B12 levels within normal range  -fecal occult blood test; pending 1/3   -H and H trend stable  Plan:  -GI team on board; gastroenterologist notified no further steps needed at this time   -follow-up with fecal occult blood testing complete set of 3  -transfuse packed RBC units for hemoglobin less than 7, and per protocol   -morning labs      Sacral lesion  Assessment & Plan  Incidental finding noted on CT scan and x-rays as following:    3  Sacral bony lesion has enlarged compared to the previous exam and is indeterminate  Plan:  -follow-up with primary care provider accordingly  -May follow up with an MRI when stable  Pharyngeal swelling  Assessment & Plan    -vitals stable  -SpO2 within normal limits on room air   -not in acute respiratory distress  -CMV and EBV panel negative  11/28  -follow-up CT neck/soft tissue: There is marked improvement in the previously seen retropharyngeal effusion compared to the study performed 5 days ago  Plan:  -will consider contacting ENT specialist for further recommendations  Acute respiratory failure (HCC)-resolved as of 11/27/2022  Assessment & Plan  Present on admission, resolved    Hyponatremia-resolved as of 11/25/2022  Assessment & Plan  Background:  Likely hyponatremia due to hypovolemia on top of thiazide diuretic use     -received total 4 L IV normal saline boluses within the past 24 hour  -also is maintained on maintenance IV fluids of half dextrose with normal saline at 200 mils per hour     -serum sodium levels normalizing; trending up to 130 from 123 on presentation  Plan:  Given the patient's borderline low blood pressures and his need for pressor will continue IV fluids at maintenance  -currently Levophed is at 1 ml/hr   -wean and titrate Levophed drip per protocol   -re-evaluate Q 4 neuro checks, cardiopulmonary check, telemetry   -monitor I/OS with Moreno's catheter in place   -daily weight  -morning labs  Metabolic acidosis with increased anion gap and accumulation of organic acids-resolved as of 11/24/2022  Assessment & Plan  Metabolic acidosis with anion gap    -resolved per updated ABG on 11/24        Hypothermia-resolved as of 11/25/2022  Assessment & Plan  Hypothermia due to exposure in cold house, unknown downtime    Closely monitor metabolic status with rewarming, monitor calcium, patient did have transient hypoglycemia  Continue D5 normal saline infusion and external rewarming as needed     -resolved  Hypoxia-resolved as of 2022  Assessment & Plan  Resolved, likely secondary to aspiration, with suspected aspiration pneumonia, continue current broad-spectrum antibiotics, cultures        VTE Pharmacologic Prophylaxis:   Pharmacologic: Enoxaparin (Lovenox)  Mechanical VTE Prophylaxis in Place: Yes    Patient Centered Rounds: I have performed bedside rounds with nursing staff today  Discussions with Specialists or Other Care Team Provider: infectious disease, GI, case management    Education and Discussions with Family / Patient: patient     Time Spent for Care: 30 minutes  More than 50% of total time spent on counseling and coordination of care as described above  Current Length of Stay: 6 day(s)    Current Patient Status: Inpatient   Certification Statement: The patient will continue to require additional inpatient hospital stay due to ongoing medical management for metabolic encephalopathy        Code Status: Level 1 - Full Code      Subjective:   Patient was seen at bedside  AOX3, continues to be improving in terms of alertness and awareness  Tolerating oral diet for the past 24 hrs with restrictions per speech PT  He doesn't have any complaints   No chest pain or tightness, oxygen saturation stable on RA  stooling and voiding accordingly  Objective:     Vitals:   Temp (24hrs), Av 9 °F (36 6 °C), Min:97 7 °F (36 5 °C), Max:98 2 °F (36 8 °C)    Temp:  [97 7 °F (36 5 °C)-98 2 °F (36 8 °C)] 97 7 °F (36 5 °C)  HR:  [56-65] 56  Resp:  [17-18] 17  BP: (136-155)/(74-85) 137/74  SpO2:  [98 %-100 %] 99 %  Body mass index is 28 84 kg/m²  Input and Output Summary (last 24 hours): Intake/Output Summary (Last 24 hours) at 2022 1153  Last data filed at 2022 1438  Gross per 24 hour   Intake 0 ml   Output 0 ml   Net 0 ml       Physical Exam:   Vitals and nursing note reviewed     HENT:      Head: Normocephalic and atraumatic       Right Ear: External ear normal       Left Ear: External ear normal    Cardiovascular:      Rate and Rhythm: Normal rate   No peripheral edema   No heart murmurs or added sounds  Pulmonary:      Effort: Pulmonary effort is normal, normal bilateral air entry   Coarse crackles audible throughout  Musculoskeletal:      Cervical back: Normal range of motion   Deep tendon reflexes equal symmetrical 2+, power:  5/5 throughout  Neurological:      Mental Status:AOX3     Motor: No weakness  DTR 2+ equal ( patellar and biceps)       Additional Data:     Labs:    Results from last 7 days   Lab Units 11/29/22  0531 11/25/22  0431 11/24/22  0504   WBC Thousand/uL 6 64   < > 13 01*   HEMOGLOBIN g/dL 8 8*   < > 10 3*   HEMATOCRIT % 27 3*   < > 28 8*   PLATELETS Thousands/uL 126*   < > 353   BANDS PCT %  --   --  6   NEUTROS PCT % 64   < >  --    LYMPHS PCT % 16   < >  --    LYMPHO PCT %  --   --  0*   MONOS PCT % 12   < >  --    MONO PCT %  --   --  2*   EOS PCT % 5   < > 0    < > = values in this interval not displayed  Results from last 7 days   Lab Units 11/29/22  0531   SODIUM mmol/L 142   POTASSIUM mmol/L 3 2*   CHLORIDE mmol/L 111*   CO2 mmol/L 22   BUN mg/dL 16   CREATININE mg/dL 0 74   ANION GAP mmol/L 9   CALCIUM mg/dL 8 3   ALBUMIN g/dL 2 2*   TOTAL BILIRUBIN mg/dL 0 42   ALK PHOS U/L 112   ALT U/L 16   AST U/L 13   GLUCOSE RANDOM mg/dL 98     Results from last 7 days   Lab Units 11/27/22  0453   INR  1 14     Results from last 7 days   Lab Units 11/24/22  0800 11/23/22  1657 11/23/22  1313   POC GLUCOSE mg/dl 169* 79 71     Results from last 7 days   Lab Units 11/24/22  0504   HEMOGLOBIN A1C % 4 9     Results from last 7 days   Lab Units 11/27/22  0453 11/24/22  0504 11/23/22  2024 11/23/22  1328   LACTIC ACID mmol/L  --   --  0 8 0 9   PROCALCITONIN ng/ml 0 07 0 70*  --  0 26*           * I Have Reviewed All Lab Data Listed Above  * Additional Pertinent Lab Tests Reviewed:  [facility]    Imaging:    Imaging Reports Reviewed Today Include:   CT soft tissue neck w contrast   Final Result      There is marked improvement in the previously seen retropharyngeal effusion compared to the study performed 5 days ago  There is coating of the mucosa of the oral cavity, oropharynx, hypopharynx and larynx from a recently performed fluoroscopy barium swallow earlier today  Small amount of barium is seen within the tracheal bifurcation and proximal right and left mainstem    bronchi posteriorly indicating some aspiration occurred during that barium swallow  Small posterior layering pleural effusions are seen within the lung apices, new since prior CT angiogram             Workstation performed: [id number]         FL barium swallow video w speech   Final Result      MRI brain wo contrast   Final Result      No acute intracranial abnormality  Minor white matter change suggestive of chronic microangiopathy  Workstation performed: [id number]         XR hips bilateral 3-4 vw w pelvis if performed   Final Result      No acute osseous abnormality  Again seen is a large sclerotic bone lesion in the upper sacrum  This has enlarged comparing the 11/24/2022 CT and a 1/1/2018 CT  This is suspicious for malignancy, and bone scan is recommended for further evaluation  Workstation performed: [id number]         CT chest abdomen pelvis w contrast   Final Result         1  Bilateral pneumonia and pleural effusions  2   Distended esophagus containing fluid  Nonurgent evaluation with swallowing study is recommended  3   Sacral bony lesion has enlarged compared to the previous exam and is indeterminate  Recommend correlation with bone scan or MRI pelvis  Sclerotic lesion at the left pedicle at T11 is unchanged  The study was marked in EPIC for significant notification        Workstation performed: [id number]         CT head wo contrast   Final Result      No acute intracranial abnormality  Chronic microangiopathic changes  Air-fluid level right sphenoid sinus  Acute sphenoid sinusitis should be considered in the right clinical setting  Workstation performed: [id number]         XR chest 1 view portable   Final Result      Right-sided central venous catheter placement without pneumothorax  Workstation performed: [id number]         X-ray chest 1 view portable   Final Result      No acute cardiopulmonary disease  In the setting of clinically suspected/proven COVID-19, this plain film appearance does not contain findings that raise concern for viral pneumonia such as COVID-19, but does not rule out this diagnosis  Workstation performed: [id number]         CTA head and neck w wo contrast   Final Result      1  No acute intracranial CT abnormality  2   Patent major vasculature of [place] without high-grade stenosis  3   No hemodynamically significant stenosis of cervical carotid arteries  4   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  5   Significant retropharyngeal effusion extending from C1 through inferior C5 effacing nasopharyngeal and pharyngeal airways  No apparent rim enhancement to suggest abscess  Uvula appears edematous  Recommend ENT consultation  6   Acute sinusitis  7   Fluid-filled esophagus extending to the level of the neck base  Correlate for GERD  I personally discussed this study with CAMERON MEJIA on 11/23/2022 at 2:26 PM                Workstation performed: [id number]                 Recent Cultures (last 7 days):     Results from last 7 days   Lab Units 11/27/22  1226 11/23/22  1339 11/23/22  1328   BLOOD CULTURE  No Growth at 24 hrs  No Growth at 24 hrs   Micrococcus luteus* Lactobacillus species*   GRAM STAIN RESULT   --  Gram positive cocci in clusters* Gram variable rods*       Last 24 Hours Medication List:   Current Facility-Administered Medications   Medication Dose Route Frequency Provider Last Rate   • ampicillin-sulbactam  3 g Intravenous Q6H Anel Ulrich MD     • enoxaparin  40 mg Subcutaneous Daily Eliverto Jovita Torres DO     • ferrous gluconate  324 mg Oral BID AC Ilda Newell MD     • pantoprazole  40 mg Intravenous Q12H Raf 6027 Canelo cason, DO     • potassium chloride  20 mEq Intravenous Q2H Ilda Newell MD 20 mEq (11/29/22 0855)   • thiamine  100 mg Intravenous Q8H Eliverto Jovita cason,  mg (11/29/22 0602)        Today, Patient Was Seen By: Ilda Newell MD    ** Please Note: Dictation voice to text software may have been used in the creation of this document   **

## 2022-11-29 NOTE — ASSESSMENT & PLAN NOTE
Background:  Patient presented with altered mental status, lethargic but responds to verbal stimuli, follows commands  Acute metabolic encephalopathy present on admission, likely due to underlying infection, plus alcohol withdrawal      Mental status moderately improved, however he is still confused with minimal change from yesterday     -Bairum swallow study ; report and recommendations per speech therapist is following:  Recommending dysphagia diet for aspiration precautions given the results of the swallow study  -MRI brain; No acute intracranial abnormality  Minor white matter change suggestive of chronic microangiopathy  -PT OT recommending acute rehab services when stable  Plan: Today patient is alert and oriented x3  Remarkably improved in terms of alertness and orientation since admission  Will continue to recheck and follow-up  -PT OT eval and treat  -cardiopulmonary monitoring  -assessment and plan for sepsis

## 2022-11-29 NOTE — PLAN OF CARE
Problem: MOBILITY - ADULT  Goal: Maintain or return to baseline ADL function  Description: INTERVENTIONS:  -  Assess patient's ability to carry out ADLs; assess patient's baseline for ADL function and identify physical deficits which impact ability to perform ADLs (bathing, care of mouth/teeth, toileting, grooming, dressing, etc )  - Assess/evaluate cause of self-care deficits   - Assess range of motion  - Assess patient's mobility; develop plan if impaired  - Assess patient's need for assistive devices and provide as appropriate  - Encourage maximum independence but intervene and supervise when necessary  - Involve family in performance of ADLs  - Assess for home care needs following discharge   - Consider OT consult to assist with ADL evaluation and planning for discharge  - Provide patient education as appropriate  Outcome: Progressing  Goal: Maintains/Returns to pre admission functional level  Description: INTERVENTIONS:  - Perform BMAT or MOVE assessment daily    - Set and communicate daily mobility goal to care team and patient/family/caregiver  - Collaborate with rehabilitation services on mobility goals if consulted  - Perform Range of Motion multiple times a day  - Reposition patient every 2 hours    - Dangle patient multiple times a day  - Stand patient multiple times a day  - Ambulate patient multiple times a day  - Out of bed to chair 4 times a day   - Out of bed for meals 3 times a day  - Out of bed for toileting  - Record patient progress and toleration of activity level   Outcome: Progressing     Problem: Prexisting or High Potential for Compromised Skin Integrity  Goal: Skin integrity is maintained or improved  Description: INTERVENTIONS:  - Identify patients at risk for skin breakdown  - Assess and monitor skin integrity  - Assess and monitor nutrition and hydration status  - Monitor labs   - Assess for incontinence   - Turn and reposition patient  - Assist with mobility/ambulation  - Relieve pressure over bony prominences  - Avoid friction and shearing  - Provide appropriate hygiene as needed including keeping skin clean and dry  - Evaluate need for skin moisturizer/barrier cream  - Collaborate with interdisciplinary team   - Patient/family teaching  - Consider wound care consult   Outcome: Progressing     Problem: PAIN - ADULT  Goal: Verbalizes/displays adequate comfort level or baseline comfort level  Description: Interventions:  - Encourage patient to monitor pain and request assistance  - Assess pain using appropriate pain scale  - Administer analgesics based on type and severity of pain and evaluate response  - Implement non-pharmacological measures as appropriate and evaluate response  - Consider cultural and social influences on pain and pain management  - Notify physician/advanced practitioner if interventions unsuccessful or patient reports new pain  Outcome: Progressing     Problem: INFECTION - ADULT  Goal: Absence or prevention of progression during hospitalization  Description: INTERVENTIONS:  - Assess and monitor for signs and symptoms of infection  - Monitor lab/diagnostic results  - Monitor all insertion sites, i e  indwelling lines, tubes, and drains  - Monitor endotracheal if appropriate and nasal secretions for changes in amount and color  - [place] appropriate cooling/warming therapies per order  - Administer medications as ordered  - Instruct and encourage patient and family to use good hand hygiene technique  - Identify and instruct in appropriate isolation precautions for identified infection/condition  Outcome: Progressing  Goal: Absence of fever/infection during neutropenic period  Description: INTERVENTIONS:  - Monitor WBC    Outcome: Progressing     Problem: SAFETY ADULT  Goal: Maintain or return to baseline ADL function  Description: INTERVENTIONS:  -  Assess patient's ability to carry out ADLs; assess patient's baseline for ADL function and identify physical deficits which impact ability to perform ADLs (bathing, care of mouth/teeth, toileting, grooming, dressing, etc )  - Assess/evaluate cause of self-care deficits   - Assess range of motion  - Assess patient's mobility; develop plan if impaired  - Assess patient's need for assistive devices and provide as appropriate  - Encourage maximum independence but intervene and supervise when necessary  - Involve family in performance of ADLs  - Assess for home care needs following discharge   - Consider OT consult to assist with ADL evaluation and planning for discharge  - Provide patient education as appropriate  Outcome: Progressing  Goal: Maintains/Returns to pre admission functional level  Description: INTERVENTIONS:  - Perform BMAT or MOVE assessment daily    - Set and communicate daily mobility goal to care team and patient/family/caregiver  - Collaborate with rehabilitation services on mobility goals if consulted  - Perform Range of Motion multiple times a day  - Reposition patient every 2 hours    - Dangle patient multiple times a day  - Stand patient multiple times a day  - Ambulate patient multiple times a day  - Out of bed to chair 4 times a day   - Out of bed for meals 3 times a day  - Out of bed for toileting  - Record patient progress and toleration of activity level   Outcome: Progressing  Goal: Patient will remain free of falls  Description: INTERVENTIONS:  - Educate patient/family on patient safety including physical limitations  - Instruct patient to call for assistance with activity   - Consult OT/PT to assist with strengthening/mobility   - Keep Call bell within reach  - Keep bed low and locked with side rails adjusted as appropriate  - Keep care items and personal belongings within reach  - Initiate and maintain comfort rounds  - Make Fall Risk Sign visible to staff  - Offer Toileting every 2 Hours, in advance of need  - Initiate/Maintain bed/chair alarm  - Obtain necessary fall risk management equipment: non skid socks  - Apply yellow socks and bracelet for high fall risk patients  - Consider moving patient to room near nurses station  Outcome: Progressing     Problem: DISCHARGE PLANNING  Goal: Discharge to home or other facility with appropriate resources  Description: INTERVENTIONS:  - Identify barriers to discharge w/patient and caregiver  - Arrange for needed discharge resources and transportation as appropriate  - Identify discharge learning needs (meds, wound care, etc )  - Arrange for interpretive services to assist at discharge as needed  - Refer to Case Management Department for coordinating discharge planning if the patient needs post-hospital services based on physician/advanced practitioner order or complex needs related to functional status, cognitive ability, or social support system  Outcome: Progressing     Problem: Knowledge Deficit  Goal: Patient/family/caregiver demonstrates understanding of disease process, treatment plan, medications, and discharge instructions  Description: Complete learning assessment and assess knowledge base  Interventions:  - Provide teaching at level of understanding  - Provide teaching via preferred learning methods  Outcome: Progressing     Problem: Nutrition/Hydration-ADULT  Goal: Nutrient/Hydration intake appropriate for improving, restoring or maintaining nutritional needs  Description: Monitor and assess patient's nutrition/hydration status for malnutrition  Collaborate with interdisciplinary team and initiate plan and interventions as ordered  Monitor patient's weight and dietary intake as ordered or per policy  Utilize nutrition screening tool and intervene as necessary  Determine patient's food preferences and provide high-protein, high-caloric foods as appropriate       INTERVENTIONS:  - Monitor oral intake, urinary output, labs, and treatment plans  - Assess nutrition and hydration status and recommend course of action  - Evaluate amount of meals eaten  - Assist patient with eating if necessary   - Allow adequate time for meals  - Recommend/ encourage appropriate diets, oral nutritional supplements, and vitamin/mineral supplements  - Order, calculate, and assess calorie counts as needed  - Recommend, monitor, and adjust tube feedings and TPN/PPN based on assessed needs  - Assess need for intravenous fluids  - Provide specific nutrition/hydration education as appropriate  - Include patient/family/caregiver in decisions related to nutrition  Outcome: Progressing     Problem: Potential for Falls  Goal: Patient will remain free of falls  Description: INTERVENTIONS:  - Educate patient/family on patient safety including physical limitations  - Instruct patient to call for assistance with activity   - Consult OT/PT to assist with strengthening/mobility   - Keep Call bell within reach  - Keep bed low and locked with side rails adjusted as appropriate  - Keep care items and personal belongings within reach  - Initiate and maintain comfort rounds  - Make Fall Risk Sign visible to staff  - Offer Toileting every 2 Hours, in advance of need  - Initiate/Maintain bed/chair alarm  - Obtain necessary fall risk management equipment: non skid socks  - Apply yellow socks and bracelet for high fall risk patients  - Consider moving patient to room near nurses station  Outcome: Progressing

## 2022-11-29 NOTE — ASSESSMENT & PLAN NOTE
CT Ca P:  1  Bilateral pneumonia and pleural effusions  Blood cultures:     1/4:  Micrococcus luteus, Gram-positive cocci in clusters  1/4: Lactobacillus species Abnormal  Gram Stain ResultGram variable rods Abnormal    2/4: NGTD     -completed 5 days of cefepime   -day 1 Unasyn    -new set of blood cultures obtained on 11/27  No growth to date     -infectious disease team on board; agreed on the following plan:  Continue Unasyn 3 g every 6 hours  Plan for total of 10 days of therapy given 4, through 12/2  Can transition to oral Augmentin once p o  intake is reliable  Continue to trend fever curve/vitals  Repeat CBC/CMP tomorrow  Follow-up repeat blood cultures while admitted  Additional supportive care/discharge as per primary

## 2022-11-29 NOTE — PROGRESS NOTES
Progress Note - Infectious Disease   Phyllis Clonts 78 y o  male MRN: [id number]  Unit/Bed#: 404-01 Encounter: [id number]        REQUIRED DOCUMENTATION:   1  This service was provided via Telemedicine  2  Provider located at [facility]  3  TeleMed provider: Walter Velasquez MD   4  Identify all parties in room with patient during tele consult: RN  5  After connecting through [employer], patient was identified by name and date of birth and assistant checked wristband  Patient was then informed that this was a Telemedicine visit and that the exam was being conducted confidentially over secure lines  My office door was closed  No one else was in the room  Patient acknowledged consent and understanding of privacy and security of the Telemedicine visit, and gave us permission to have the assistant stay in the room in order to assist with the history and to conduct the exam   I informed the patient that I have reviewed their record in Epic and presented the opportunity for them to ask any questions regarding the visit today  The patient agreed to participate  Assessment/Recommendations   1  Septic shock  Patient earlier in admission required pressors and was felt to meet sepsis criteria  Likely source is problem 2  Would question if patient may have had episode of vomiting/aspiration in the setting of 6 which then also contributed to 4  Clinical parameters improved  Suspect blood cultures represent contaminant  Repeat blood cultures NGTD  Repeat CT of the neck with significant improvement  Oral intake remains limited due to risk of aspiration  MRI of the brain negative    Continue Unasyn 3 g every 6 hours  Plan for total of 10 days of therapy given 4, through 12/2  Can transition to oral Augmentin once p o  intake is reliable  Continue to trend fever curve/vitals  Repeat CBC/CMP tomorrow  Follow-up repeat blood cultures while admitted  Additional supportive care/discharge as per primary      2  Multifocal pneumonia  Imaging over admission notable for multifocal disease  Esophagus noted to be distended and fluid-filled on imaging  Suspect a component of aspiration pneumonia in the setting of 6  Procalcitonin has since normalized  Continue antibiotic as above  Continue to trend fever curve/vitals  Repeat labs tomorrow  Monitor respiratory status  Follow-up pending blood cultures while admitted     3  Acute hypoxic respiratory failure  Noted early on presentation  Likely due to problem 2 as well as 6  Currently stable on room air  Supportive care as per primary      4  Abnormal CT neck  Patient noted to have retropharyngeal effusion on initial imaging on presentation  Again would question if this may be inflammatory due to an episode of vomiting, consider ingestion  Consider also developing upper respiratory infection  Clinical parameters improved  Repeat CT imaging with near resolution of effusion noted  Continue antibiotics as above  Continue to trend fever curve/WBC  Monitor exam and for any recurrent symptoms     5  Positive blood culture  Blood cultures on admission with delayed growth of 1 set with lactobacillus and the other with micrococcus  I suspect that these organisms were isolated from the patient's skin and are likely contaminant given the manner in which the patient was found  Repeat cultures are NGTD  2D echo without gross vegetation  No other devices appreciated on  imaging  No antibiotics for this issue  Follow-up repeat blood cultures while admitted  Unasyn as above  Continue to trend fever curve/WBC     6  Multiple electrolyte abnormalities and alcohol abuse/withdrawal   Suspect that this is the primary reason for patient's presentation and likely contributed to the above complications  Toxicology evaluation appreciated  Fluid hydration as per primary  Antibiotics as above     7  Metabolic encephalopathy  This is primarily related to 6  CT head imaging unremarkable    Formal MRI of the head unremarkable  Mental status continues to improve  Monitor mental status  Ongoing follow-up by speech therapy  Antibiotics as above     8  Abnormal CT abdomen  Patient's CT imaging of the abdomen notable for a sclerotic bony lesion at the sacrum  Uncertain etiology  Patient will need further imaging/biopsy as outpatient  Monitor for progressing pain  Additional care as per primary     Above plan discussed in detail with the patient and with primary service  ID Consult service will continue to follow  History       Subjective:  Patient is much more awake and responsive today  He denies having any nausea, vomiting, chest pain or shortness of breath  He is able to perform full range of motion of his neck  Unfortunately noted to have some bits of aspiration happening on speech evaluations on subsequent imaging  Repeat imaging of the neck reviewed which does note improvement of the previously seen retropharyngeal phlegmon  Almost nearly resolved  The patient again has limited recall of events leading up to admission  He denies any previous history of DTs or shakes with stopping alcohol use  Reports actively still working  Antibiotics:  Unasyn 2  Total antibody 7      Physical Exam     Temp:  [97 7 °F (36 5 °C)-98 2 °F (36 8 °C)] 97 7 °F (36 5 °C)  HR:  [56-65] 56  Resp:  [17-18] 17  BP: (136-155)/(74-85) 137/74  SpO2:  [98 %-100 %] 99 %  Temp (24hrs), Av 9 °F (36 6 °C), Min:97 7 °F (36 5 °C), Max:98 2 °F (36 8 °C)  Current: Temperature: 97 7 °F (36 5 °C)    Intake/Output Summary (Last 24 hours) at 2022 0936  Last data filed at 2022 1438  Gross per 24 hour   Intake 0 ml   Output 0 ml   Net 0 ml       Physical exam findings reported by bedside and primary medical team staff      General Appearance:  Appearing well, nontoxic, and in no distress, appears stated age; slow to respond to some questions but is answering more appropriately today     Throat: Oropharynx moist without lesions; lips, mucosa, and tongue normal   Lungs:   Decreased breath sounds throughout, no wheezes, rales or rhonchi   Heart:  Regular rate and rhythm, S1, S2 normal, no murmur, rub or gallop report   Abdomen:   Soft, non-tender, non-distended, positive bowel sounds, no masses, no organomegaly    No CVA tenderness   Extremities: Extremities normal, atraumatic, no cyanosis, clubbing or edema; full range of motion appreciated of the neck   Skin: Skin color, texture, turgor normal, no rashes or lesions  No draining wounds noted  Neurologic: Alert and oriented times 3, sitting up in chair unassisted and is able to participate with exam maneuvers  Is oriented but has very limited recall  Invasive Devices:   CVC Central Lines 11/23/22 Triple Right Subclavian (Active)   Reasons to continue CVC line  Other (comment) 11/27/22 1200   Goal for Removal No longer needed- Will place order to discontinue 11/27/22 1200   Line Necessity Reviewed Yes, reviewed with provider 11/27/22 1200   Site Assessment WDL 11/28/22 1000   Proximal Lumen Status Flushed 11/28/22 1000   Medial Lumen Status Flushed 11/28/22 1000   Distal Lumen Status Flushed 11/28/22 1000   Dressing Type Chlorhexidine dressing 11/27/22 2156   Dressing Status Clean;Dry; Intact 11/28/22 1000   Dressing Change Due 11/30/22 11/27/22 2156       Labs, Imaging, & Other Studies     Lab Results:    I have personally reviewed pertinent labs      Results from last 7 days   Lab Units 11/29/22  0531 11/27/22  1228 11/27/22  0453 11/26/22  0929   WBC Thousand/uL 6 64  --  8 14 7 64   HEMOGLOBIN g/dL 8 8* 9 4* 8 7* 9 1*   PLATELETS Thousands/uL 126*  --  146* 147*     Results from last 7 days   Lab Units 11/29/22  0531 11/24/22  1245 11/24/22  0504   POTASSIUM mmol/L 3 2*   < > 4 0   CHLORIDE mmol/L 111*   < > 97   CO2 mmol/L 22   < > 16*   BUN mg/dL 16   < > 23   CREATININE mg/dL 0 74   < > 0 96   EGFR ml/min/1 73sq m 96   < > 82   CALCIUM mg/dL 8 3   < > 7 8* AST U/L 13  --  34   ALT U/L 16  --  21   ALK PHOS U/L 112  --  124*    < > = values in this interval not displayed  Results from last 7 days   Lab Units 11/27/22  1226 11/23/22  1339 11/23/22  1328   BLOOD CULTURE  No Growth at 24 hrs  No Growth at 24 hrs  Micrococcus luteus* Lactobacillus species*   GRAM STAIN RESULT   --  Gram positive cocci in clusters* Gram variable rods*       Imaging Studies:   I have personally reviewed pertinent imaging study reports and images in PACS  EKG, Pathology, and Other Studies:   I have personally reviewed pertinent reports  Counseling/Coordination of care: Total 35 minutes communication with the patient via telehealth  Labs, medical tests and imaging studies were independently reviewed by me as noted above

## 2022-11-29 NOTE — PLAN OF CARE
Problem: MOBILITY - ADULT  Goal: Maintain or return to baseline ADL function  Description: INTERVENTIONS:  -  Assess patient's ability to carry out ADLs; assess patient's baseline for ADL function and identify physical deficits which impact ability to perform ADLs (bathing, care of mouth/teeth, toileting, grooming, dressing, etc )  - Assess/evaluate cause of self-care deficits   - Assess range of motion  - Assess patient's mobility; develop plan if impaired  - Assess patient's need for assistive devices and provide as appropriate  - Encourage maximum independence but intervene and supervise when necessary  - Involve family in performance of ADLs  - Assess for home care needs following discharge   - Consider OT consult to assist with ADL evaluation and planning for discharge  - Provide patient education as appropriate  Outcome: Progressing  Goal: Maintains/Returns to pre admission functional level  Description: INTERVENTIONS:  - Perform BMAT or MOVE assessment daily    - Set and communicate daily mobility goal to care team and patient/family/caregiver  - Collaborate with rehabilitation services on mobility goals if consulted  - Perform Range of Motion multiple times a day  - Reposition patient every 2 hours    - Dangle patient multiple times a day  - Stand patient multiple times a day  - Ambulate patient multiple times a day  - Out of bed to chair 4 times a day   - Out of bed for meals 3 times a day  - Out of bed for toileting  - Record patient progress and toleration of activity level   Outcome: Progressing     Problem: Prexisting or High Potential for Compromised Skin Integrity  Goal: Skin integrity is maintained or improved  Description: INTERVENTIONS:  - Identify patients at risk for skin breakdown  - Assess and monitor skin integrity  - Assess and monitor nutrition and hydration status  - Monitor labs   - Assess for incontinence   - Turn and reposition patient  - Assist with mobility/ambulation  - Relieve pressure over bony prominences  - Avoid friction and shearing  - Provide appropriate hygiene as needed including keeping skin clean and dry  - Evaluate need for skin moisturizer/barrier cream  - Collaborate with interdisciplinary team   - Patient/family teaching  - Consider wound care consult   Outcome: Progressing     Problem: PAIN - ADULT  Goal: Verbalizes/displays adequate comfort level or baseline comfort level  Description: Interventions:  - Encourage patient to monitor pain and request assistance  - Assess pain using appropriate pain scale  - Administer analgesics based on type and severity of pain and evaluate response  - Implement non-pharmacological measures as appropriate and evaluate response  - Consider cultural and social influences on pain and pain management  - Notify physician/advanced practitioner if interventions unsuccessful or patient reports new pain  Outcome: Progressing     Problem: INFECTION - ADULT  Goal: Absence or prevention of progression during hospitalization  Description: INTERVENTIONS:  - Assess and monitor for signs and symptoms of infection  - Monitor lab/diagnostic results  - Monitor all insertion sites, i e  indwelling lines, tubes, and drains  - Monitor endotracheal if appropriate and nasal secretions for changes in amount and color  - [place] appropriate cooling/warming therapies per order  - Administer medications as ordered  - Instruct and encourage patient and family to use good hand hygiene technique  - Identify and instruct in appropriate isolation precautions for identified infection/condition  Outcome: Progressing  Goal: Absence of fever/infection during neutropenic period  Description: INTERVENTIONS:  - Monitor WBC    Outcome: Progressing     Problem: SAFETY ADULT  Goal: Maintain or return to baseline ADL function  Description: INTERVENTIONS:  -  Assess patient's ability to carry out ADLs; assess patient's baseline for ADL function and identify physical deficits which impact ability to perform ADLs (bathing, care of mouth/teeth, toileting, grooming, dressing, etc )  - Assess/evaluate cause of self-care deficits   - Assess range of motion  - Assess patient's mobility; develop plan if impaired  - Assess patient's need for assistive devices and provide as appropriate  - Encourage maximum independence but intervene and supervise when necessary  - Involve family in performance of ADLs  - Assess for home care needs following discharge   - Consider OT consult to assist with ADL evaluation and planning for discharge  - Provide patient education as appropriate  Outcome: Progressing  Goal: Maintains/Returns to pre admission functional level  Description: INTERVENTIONS:  - Perform BMAT or MOVE assessment daily    - Set and communicate daily mobility goal to care team and patient/family/caregiver  - Collaborate with rehabilitation services on mobility goals if consulted  - Perform Range of Motion multiple times a day  - Reposition patient every 2 hours    - Dangle patient multiple times a day  - Stand patient multiple times a day  - Ambulate patient multiple times a day  - Out of bed to chair 4 times a day   - Out of bed for meals 3 times a day  - Out of bed for toileting  - Record patient progress and toleration of activity level   Outcome: Progressing  Goal: Patient will remain free of falls  Description: INTERVENTIONS:  - Educate patient/family on patient safety including physical limitations  - Instruct patient to call for assistance with activity   - Consult OT/PT to assist with strengthening/mobility   - Keep Call bell within reach  - Keep bed low and locked with side rails adjusted as appropriate  - Keep care items and personal belongings within reach  - Initiate and maintain comfort rounds  - Make Fall Risk Sign visible to staff  - Offer Toileting every 2 Hours, in advance of need  - Initiate/Maintain bed alarm  - Obtain necessary fall risk management equipment: nonskid socks  - Apply yellow socks and bracelet for high fall risk patients  - Consider moving patient to room near nurses station  Outcome: Progressing     Problem: DISCHARGE PLANNING  Goal: Discharge to home or other facility with appropriate resources  Description: INTERVENTIONS:  - Identify barriers to discharge w/patient and caregiver  - Arrange for needed discharge resources and transportation as appropriate  - Identify discharge learning needs (meds, wound care, etc )  - Arrange for interpretive services to assist at discharge as needed  - Refer to Case Management Department for coordinating discharge planning if the patient needs post-hospital services based on physician/advanced practitioner order or complex needs related to functional status, cognitive ability, or social support system  Outcome: Progressing     Problem: Knowledge Deficit  Goal: Patient/family/caregiver demonstrates understanding of disease process, treatment plan, medications, and discharge instructions  Description: Complete learning assessment and assess knowledge base  Interventions:  - Provide teaching at level of understanding  - Provide teaching via preferred learning methods  Outcome: Progressing     Problem: Nutrition/Hydration-ADULT  Goal: Nutrient/Hydration intake appropriate for improving, restoring or maintaining nutritional needs  Description: Monitor and assess patient's nutrition/hydration status for malnutrition  Collaborate with interdisciplinary team and initiate plan and interventions as ordered  Monitor patient's weight and dietary intake as ordered or per policy  Utilize nutrition screening tool and intervene as necessary  Determine patient's food preferences and provide high-protein, high-caloric foods as appropriate       INTERVENTIONS:  - Monitor oral intake, urinary output, labs, and treatment plans  - Assess nutrition and hydration status and recommend course of action  - Evaluate amount of meals eaten  - Assist patient with eating if necessary   - Allow adequate time for meals  - Recommend/ encourage appropriate diets, oral nutritional supplements, and vitamin/mineral supplements  - Order, calculate, and assess calorie counts as needed  - Recommend, monitor, and adjust tube feedings and TPN/PPN based on assessed needs  - Assess need for intravenous fluids  - Provide specific nutrition/hydration education as appropriate  - Include patient/family/caregiver in decisions related to nutrition  Outcome: Progressing     Problem: Potential for Falls  Goal: Patient will remain free of falls  Description: INTERVENTIONS:  - Educate patient/family on patient safety including physical limitations  - Instruct patient to call for assistance with activity   - Consult OT/PT to assist with strengthening/mobility   - Keep Call bell within reach  - Keep bed low and locked with side rails adjusted as appropriate  - Keep care items and personal belongings within reach  - Initiate and maintain comfort rounds  - Make Fall Risk Sign visible to staff  - Offer Toileting every 2 Hours, in advance of need  - Initiate/Maintain bed alarm  - Obtain necessary fall risk management equipment: nonskid socks  - Apply yellow socks and bracelet for high fall risk patients  - Consider moving patient to room near nurses station  Outcome: Progressing

## 2022-11-29 NOTE — ASSESSMENT & PLAN NOTE
-vitals stable  -SpO2 within normal limits on room air   -not in acute respiratory distress  -CMV and EBV panel negative  11/28  -follow-up CT neck/soft tissue: There is marked improvement in the previously seen retropharyngeal effusion compared to the study performed 5 days ago  Plan:  -will consider contacting ENT specialist for further recommendations

## 2022-11-30 LAB
ANION GAP SERPL CALCULATED.3IONS-SCNC: 7 MMOL/L (ref 4–13)
ATRIAL RATE: 59 BPM
BASOPHILS # BLD AUTO: 0.03 THOUSANDS/ÂΜL (ref 0–0.1)
BASOPHILS NFR BLD AUTO: 0 % (ref 0–1)
BUN SERPL-MCNC: 16 MG/DL (ref 5–25)
CALCIUM SERPL-MCNC: 8.3 MG/DL (ref 8.3–10.1)
CHLORIDE SERPL-SCNC: 110 MMOL/L (ref 96–108)
CO2 SERPL-SCNC: 24 MMOL/L (ref 21–32)
CREAT SERPL-MCNC: 0.67 MG/DL (ref 0.6–1.3)
EOSINOPHIL # BLD AUTO: 0.35 THOUSAND/ÂΜL (ref 0–0.61)
EOSINOPHIL NFR BLD AUTO: 5 % (ref 0–6)
ERYTHROCYTE [DISTWIDTH] IN BLOOD BY AUTOMATED COUNT: 15.2 % (ref 11.6–15.1)
GFR SERPL CREATININE-BSD FRML MDRD: 100 ML/MIN/1.73SQ M
GLUCOSE SERPL-MCNC: 96 MG/DL (ref 65–140)
HCT VFR BLD AUTO: 32.3 % (ref 36.5–49.3)
HGB BLD-MCNC: 10.4 G/DL (ref 12–17)
IMM GRANULOCYTES # BLD AUTO: 0.07 THOUSAND/UL (ref 0–0.2)
IMM GRANULOCYTES NFR BLD AUTO: 1 % (ref 0–2)
LEAD BLD-MCNC: 4.4 UG/DL (ref 0–3.4)
LYMPHOCYTES # BLD AUTO: 1.21 THOUSANDS/ÂΜL (ref 0.6–4.47)
LYMPHOCYTES NFR BLD AUTO: 18 % (ref 14–44)
MAGNESIUM SERPL-MCNC: 1.9 MG/DL (ref 1.6–2.6)
MCH RBC QN AUTO: 30.9 PG (ref 26.8–34.3)
MCHC RBC AUTO-ENTMCNC: 32.2 G/DL (ref 31.4–37.4)
MCV RBC AUTO: 96 FL (ref 82–98)
MONOCYTES # BLD AUTO: 0.63 THOUSAND/ÂΜL (ref 0.17–1.22)
MONOCYTES NFR BLD AUTO: 9 % (ref 4–12)
NEUTROPHILS # BLD AUTO: 4.52 THOUSANDS/ÂΜL (ref 1.85–7.62)
NEUTS SEG NFR BLD AUTO: 67 % (ref 43–75)
NRBC BLD AUTO-RTO: 0 /100 WBCS
P AXIS: 44 DEGREES
PHOSPHATE SERPL-MCNC: 3.3 MG/DL (ref 2.3–4.1)
PLATELET # BLD AUTO: 164 THOUSANDS/UL (ref 149–390)
PMV BLD AUTO: 10.8 FL (ref 8.9–12.7)
POTASSIUM SERPL-SCNC: 3.4 MMOL/L (ref 3.5–5.3)
PR INTERVAL: 154 MS
QRS AXIS: 9 DEGREES
QRSD INTERVAL: 96 MS
QT INTERVAL: 432 MS
QTC INTERVAL: 427 MS
RBC # BLD AUTO: 3.37 MILLION/UL (ref 3.88–5.62)
SODIUM SERPL-SCNC: 141 MMOL/L (ref 135–147)
T WAVE AXIS: 6 DEGREES
VENTRICULAR RATE: 59 BPM
WBC # BLD AUTO: 6.81 THOUSAND/UL (ref 4.31–10.16)

## 2022-11-30 RX ORDER — PANTOPRAZOLE SODIUM 40 MG/1
40 TABLET, DELAYED RELEASE ORAL
Status: DISCONTINUED | OUTPATIENT
Start: 2022-12-01 | End: 2022-12-01 | Stop reason: HOSPADM

## 2022-11-30 RX ADMIN — PANTOPRAZOLE SODIUM 40 MG: 40 INJECTION, POWDER, FOR SOLUTION INTRAVENOUS at 08:38

## 2022-11-30 RX ADMIN — THIAMINE HYDROCHLORIDE 100 MG: 100 INJECTION, SOLUTION INTRAMUSCULAR; INTRAVENOUS at 21:48

## 2022-11-30 RX ADMIN — THIAMINE HYDROCHLORIDE 100 MG: 100 INJECTION, SOLUTION INTRAMUSCULAR; INTRAVENOUS at 14:59

## 2022-11-30 RX ADMIN — FERROUS GLUCONATE 324 MG: 324 TABLET ORAL at 08:38

## 2022-11-30 RX ADMIN — FERROUS GLUCONATE 324 MG: 324 TABLET ORAL at 16:46

## 2022-11-30 RX ADMIN — AMPICILLIN SODIUM AND SULBACTAM SODIUM 3 G: 2; 1 INJECTION, POWDER, FOR SOLUTION INTRAMUSCULAR; INTRAVENOUS at 05:22

## 2022-11-30 RX ADMIN — AMPICILLIN SODIUM AND SULBACTAM SODIUM 3 G: 2; 1 INJECTION, POWDER, FOR SOLUTION INTRAMUSCULAR; INTRAVENOUS at 12:20

## 2022-11-30 RX ADMIN — ENOXAPARIN SODIUM 40 MG: 40 INJECTION SUBCUTANEOUS at 08:38

## 2022-11-30 RX ADMIN — AMPICILLIN SODIUM AND SULBACTAM SODIUM 3 G: 2; 1 INJECTION, POWDER, FOR SOLUTION INTRAMUSCULAR; INTRAVENOUS at 16:46

## 2022-11-30 RX ADMIN — THIAMINE HYDROCHLORIDE 100 MG: 100 INJECTION, SOLUTION INTRAMUSCULAR; INTRAVENOUS at 05:00

## 2022-11-30 RX ADMIN — AMPICILLIN SODIUM AND SULBACTAM SODIUM 3 G: 2; 1 INJECTION, POWDER, FOR SOLUTION INTRAMUSCULAR; INTRAVENOUS at 23:08

## 2022-11-30 NOTE — PHYSICAL THERAPY NOTE
PHYSICAL THERAPY TREATMENT NOTE  NAME:  Meseret Alvarez  DATE: 11/30/22    Length Of Stay: 7  Performed at least 2 patient identifiers during session: Name and ID bracelet    TREATMENT FLOWSHEET:    11/30/22 1401   PT Last Visit   PT Visit Date 11/30/22   Note Type   Note Type Treatment   Pain Assessment   Pain Assessment Tool 0-10   Pain Score No Pain   Restrictions/Precautions   Weight Bearing Precautions Per Order No   Other Precautions Cognitive; Chair Alarm; Bed Alarm; Fall Risk   General   Chart Reviewed Yes   Response to Previous Treatment Patient with no complaints from previous session  Family/Caregiver Present No   Cognition   Overall Cognitive Status Impaired   Arousal/Participation Alert; Cooperative   Attention Within functional limits   Orientation Level Oriented to person;Oriented to place;Oriented to time   Memory Decreased recall of precautions;Decreased recall of recent events;Decreased short term memory   Following Commands Follows one step commands without difficulty   Comments Pt  very cooperative and eager to improve  Pt  seems to be less confused and open to new learning  Subjective   Subjective "If you told me I would be this was 6 months ago, I would have told you, your crazy "   Bed Mobility   Additional Comments Pt  found sitting OOB in recliner upon entering room  Transfers   Sit to Stand 4  Minimal assistance   Additional items Assist x 1; Armrests; Increased time required;Verbal cues  (RW)   Stand to Sit 4  Minimal assistance   Additional items Assist x 1; Armrests; Increased time required;Verbal cues   Stand pivot 4  Minimal assistance   Additional items Assist x 1; Armrests; Increased time required;Verbal cues  (RW)   Toilet transfer 4  Minimal assistance   Additional items Assist x 1; Armrests; Increased time required;Verbal cues;Standard toilet  (RW grab bars utilized)   Additional Comments Pt  required follow through assistance with posterior hygiene needs and CGA with clothing management  VC's for hand placement during transfers and and how to find proper postural alignment to improve balance  Ambulation/Elevation   Gait pattern Improper Weight shift;Decreased foot clearance; Short stride; Excessively slow;Decreased heel strike;Decreased toe off;Step through pattern; Foward flexed   Gait Assistance 4  Minimal assist   Additional items Assist x 1;Verbal cues   Assistive Device Rolling walker   Distance 50 ft and 15 ft x2   Ambulation/Elevation Additional Comments VC's provided to shift weight off heels and onto balls of feet and to get nose over toes to improve balance   Balance   Static Sitting Fair +   Dynamic Sitting Fair   Static Standing Fair -   Dynamic Standing Fair -   Ambulatory Fair -   Endurance Deficit   Endurance Deficit Yes   Activity Tolerance   Activity Tolerance Patient limited by fatigue   Medical Staff Made Aware RN and PCA aware   Exercises   Quad Sets Sitting;15 reps;AROM; Bilateral   Heelslides Sitting;15 reps;AROM; Bilateral   Glute Sets Sitting;15 reps;AROM; Bilateral   Hip Flexion Sitting;15 reps;AROM; Bilateral   Hip Abduction Sitting;15 reps;AROM; Bilateral   Hip Adduction Sitting;15 reps;AROM; Bilateral   Knee AROM Long Arc Quad Sitting;15 reps;AROM; Bilateral   Ankle Pumps Sitting;15 reps;AROM; Bilateral   Marching Sitting;15 reps;AROM; Bilateral   Neuro re-ed dynamic standing balaance activities performed including multidirectional weight shifting, reaching across midline, and turnung 360* B direction supported by RW and CGAx1   Assessment   Prognosis Good   Problem List Decreased strength;Decreased endurance; Impaired balance;Decreased mobility; Decreased safety awareness   Goals   Patient Goals to get better and go home   PT Treatment Day 3   Plan   Treatment/Interventions Functional transfer training;LE strengthening/ROM; Elevations; Therapeutic exercise; Endurance training;Patient/family training;Equipment eval/education; Bed mobility;Gait training;Spoke to nursing Progress Progressing toward goals   PT Frequency 3-5x/wk   Recommendation   PT Discharge Recommendation Post acute rehabilitation services   AM-PAC Basic Mobility Inpatient   Turning in Bed Without Bedrails 3   Lying on Back to Sitting on Edge of Flat Bed 2   Moving Bed to Chair 3   Standing Up From Chair 3   Walk in Room 3   Climb 3-5 Stairs 2   Basic Mobility Inpatient Raw Score 16   Basic Mobility Standardized Score 38 32   Highest Level Of Mobility   -[facility] Goal 5: Stand one or more mins   JH-HL Achieved 7: Walk 25 feet or more       The patient's AM-PAC Basic Mobility Inpatient Short Form Raw Score is 16, Standardized Score is 38 32  A standardized score less than 42 9 suggests the patient may benefit from discharge to post-acute rehabilitation services  Please also refer to the recommendation of the Physical Therapist for safe discharge planning  Pt seen for PT treatment session this date with interventions consisting of transfer training, seated TE, gait training, toilet transfers, neuromuscular re-education of movement performed to improve dynamic standing balance, and education provided as needed for safety and direction to improve functional mobility, activity tolerance, and balance  Pt agreeable to PT treatment session upon arrival, pt found sitting OOB in recliner  At end of session, pt left sitting OOB in recliner with chair alarm activated and all needs in reach  In comparison to previous session, pt with improvements in ambulation distance and amount of assistance required for all mobility   Continue to recommend STR at time of d/c in order to maximize pt's functional independence and safety w/ mobility  Pt continues to be functioning below baseline level  PT will continue to see pt while here in order to address the deficits listed above and provide interventions consistent w/ POC in effort to achieve STGs      [place], [place]

## 2022-11-30 NOTE — ASSESSMENT & PLAN NOTE
In the setting of coma and possible alcohol intoxication when was at home  -CT scan showing esophageal dilation      Tolerating diet

## 2022-11-30 NOTE — ASSESSMENT & PLAN NOTE
CT Ca P:  1  Bilateral pneumonia and pleural effusions      Blood cultures consistent with contaminant son admission    -completed 5 days of cefepime       -infectious disease team on board; agreed on the following plan:  Continue Unasyn 3 g every 6 hours  Plan for total of 10 days of therapy given 4, through 12/2  Can transition to oral Augmentin once p o  intake is reliable

## 2022-11-30 NOTE — CASE MANAGEMENT
Case Management Discharge Planning Note    Patient name Chanel Fatima  Location [id number] MRN [id number]  : 1955 Date 2022       Current Admission Date: 2022  Current Admission Diagnosis:Metabolic encephalopathy   Patient Active Problem List    Diagnosis Date Noted   • Positive blood culture 2022   • Esophageal dilatation 2022   • History of GI bleed 2022   • Other dysphagia 2022   • Pneumonia 2022   • Alcohol withdrawal ([facility] [address] ) 2022   • Sacral lesion 2022   • Other specified anemias 2022   • Septic shock ([facility] [address] )    • Metabolic encephalopathy    • Pharyngeal swelling 2022   • GI bleed 2018   • NSTEMI (non-ST elevated myocardial infarction) ([facility] [address] ) 2018   • Multiple rib fractures 2018      LOS (days): 7  Geometric Mean LOS (GMLOS) (days): 5 00  Days to GMLOS:-2     OBJECTIVE:  Risk of Unplanned Readmission Score: 11 59         Current admission status: Inpatient   Preferred Pharmacy:   [address] [place] [address] [address] [facility], [address]/ [facility] [address]  Phone: [phone] Fax: [phone]    [facility] [place]acy [id number] [place] [id number]  Phone: [phone] Fax: [phone]    Primary Care Provider: Quincy White DO    Primary Insurance: MEDICARE  Secondary Insurance: AARP    DISCHARGE DETAILS:    Discharge planning discussed with[de-identified] patient  Freedom of Choice: Yes  Comments - Freedom of Choice: blanket referrals to SNF's        Discharge Destination Plan[de-identified] SNF, Short Term Rehab

## 2022-11-30 NOTE — ASSESSMENT & PLAN NOTE
Background:  Social history as given by his close friends; daily alcohol drinker for the past 1 year at least   Smokes cigarettes I pack per day for same duration         Patient was Likely past alcohol withdrawal on admission, unknown down time at home  -IV thiamine supplement per toxicology, please see their plan of care  -IV folic acid supplement  -follow-up with rehab when stable, I recommended outpatient or inpatient alcohol rehab after short-term nursing care stay

## 2022-11-30 NOTE — PROGRESS NOTES
[address] [facility] [address]  Progress Note Ally Joseph 1955, 78 y o  male MRN: [id number]  Unit/Bed#: 404-01 Encounter: [id number]  Primary Care Provider: Leigh Ryan DO   Date and time admitted to hospital: 11/23/2022  1:09 PM    Other dysphagia  Assessment & Plan  Continue speech therapy with diet modifications    * Metabolic encephalopathy  Assessment & Plan  Background:  Patient presented with altered mental status, lethargic but responds to verbal stimuli, follows commands  Acute metabolic encephalopathy present on admission, likely due to underlying infection, plus alcohol withdrawal      Mental status continues to improve, discussion with patient's friend/emergency contact and the patient at bedside, patient is agreeable to short-term rehab placement    -Bairum swallow study ; report and recommendations per speech therapist is following:  Recommending dysphagia diet for aspiration precautions given the results of the swallow study  -MRI brain; No acute intracranial abnormality  -PT OT recommending acute rehab services when stable  Plan: Today patient is alert and oriented x3  Remarkably improved in terms of alertness and orientation since admission  Will continue to recheck and follow-up  -PT OT eval and treat  -cardiopulmonary monitoring  -assessment and plan for sepsis  Pharyngeal swelling  Assessment & Plan  Continue IV antibiotics per Infectious Disease, can transition to p o  Augmentin at discharge    Positive blood culture  Assessment & Plan  Assessment and plan per Infectious Disease team     Positive blood culture  Blood cultures on admission with delayed growth of 1 set with lactobacillus and the other with micrococcus  I suspect that these organisms were isolated from the patient's skin and are likely contaminant given the manner in which the patient was found  Repeat cultures are pending  2D echo without gross vegetation    No other devices appreciated on  imaging  No antibiotics for this issue  Follow-up repeat blood cultures  Unasyn as above  Continue to trend fever curve/WBC    History of GI bleed  Assessment & Plan  Patient with history of GI bleed and previous hospitalization  No evidence of active bleeding    Esophageal dilatation  Assessment & Plan  In the setting of coma and possible alcohol intoxication when was at home  -CT scan showing esophageal dilation  Tolerating diet    Other specified anemias  Assessment & Plan  -H and H trend stable  Plan:  -GI team on board; gastroenterologist notified no further steps needed at this time  Sacral lesion  Assessment & Plan  Incidental finding noted on CT scan and x-rays as following:    3  Sacral bony lesion has enlarged compared to the previous exam and is indeterminate  Plan:  Patient should follow-up with [address] [facility] team and/or PCP    Alcohol withdrawal [facility])  Assessment & Plan  Background:  Social history as given by his close friends; daily alcohol drinker for the past 1 year at least   Smokes cigarettes I pack per day for same duration  Patient was Likely past alcohol withdrawal on admission, unknown down time at home  -IV thiamine supplement per toxicology, please see their plan of care  -IV folic acid supplement  -follow-up with rehab when stable, I recommended outpatient or inpatient alcohol rehab after short-term nursing care stay      Pneumonia  Assessment & Plan  CT Ca P:  1  Bilateral pneumonia and pleural effusions      Blood cultures consistent with contaminant son admission    -completed 5 days of cefepime       -infectious disease team on board; agreed on the following plan:  Continue Unasyn 3 g every 6 hours  Plan for total of 10 days of therapy given 4, through 12/2  Can transition to oral Augmentin once p o  intake is reliable      Septic shock ([facility], [place] [address] )  Assessment & Plan  Likely secondary to complicated pneumonia, present on admission, resolved              Code Status: Level 1 - Full Code    Subjective:   No pain    Objective:     Vitals:   Temp (24hrs), Av 7 °F (36 5 °C), Min:97 1 °F (36 2 °C), Max:98 4 °F (36 9 °C)    Temp:  [97 1 °F (36 2 °C)-98 4 °F (36 9 °C)] 98 4 °F (36 9 °C)  HR:  [59-69] 69  Resp:  [16-19] 18  BP: (142-154)/(78-90) 154/90  SpO2:  [98 %-100 %] 98 %  Body mass index is 28 84 kg/m²  Input and Output Summary (last 24 hours): Intake/Output Summary (Last 24 hours) at 2022 1651  Last data filed at 2022 1300  Gross per 24 hour   Intake 720 ml   Output 100 ml   Net 620 ml       Physical Exam:   Physical Exam  Vitals and nursing note reviewed  Constitutional:       General: He is not in acute distress  Appearance: He is not ill-appearing, toxic-appearing or diaphoretic  Comments: Chronically ill-appearing male, no acute distress   HENT:      Head: Normocephalic and atraumatic  Right Ear: External ear normal       Left Ear: External ear normal    Cardiovascular:      Pulses: Normal pulses  Pulmonary:      Effort: Pulmonary effort is normal       Breath sounds: Normal breath sounds  Abdominal:      General: Abdomen is flat  There is no distension  Palpations: Abdomen is soft  Tenderness: There is no abdominal tenderness  Musculoskeletal:      Cervical back: Normal range of motion  Right lower leg: Edema present  Left lower leg: Edema present  Neurological:      General: No focal deficit present  Mental Status: He is alert  Psychiatric:         Mood and Affect: Mood normal          Behavior: Behavior normal          Thought Content:  Thought content normal          Judgment: Judgment normal           Additional Data:     Labs:  Results from last 7 days   Lab Units 22  0517 22  0431 22  0504   WBC Thousand/uL 6 81   < > 13 01*   HEMOGLOBIN g/dL 10 4*   < > 10 3*   HEMATOCRIT % 32 3*   < > 28 8*   PLATELETS Thousands/uL 164   < > 353   BANDS PCT %  --   --  6   NEUTROS PCT % 67   < >  --    LYMPHS PCT % 18   < >  --    LYMPHO PCT %  --   --  0*   MONOS PCT % 9   < >  --    MONO PCT %  --   --  2*   EOS PCT % 5   < > 0    < > = values in this interval not displayed  Results from last 7 days   Lab Units 11/30/22  0517 11/29/22  0531   SODIUM mmol/L 141 142   POTASSIUM mmol/L 3 4* 3 2*   CHLORIDE mmol/L 110* 111*   CO2 mmol/L 24 22   BUN mg/dL 16 16   CREATININE mg/dL 0 67 0 74   ANION GAP mmol/L 7 9   CALCIUM mg/dL 8 3 8 3   ALBUMIN g/dL  --  2 2*   TOTAL BILIRUBIN mg/dL  --  0 42   ALK PHOS U/L  --  112   ALT U/L  --  16   AST U/L  --  13   GLUCOSE RANDOM mg/dL 96 98     Results from last 7 days   Lab Units 11/27/22  0453   INR  1 14     Results from last 7 days   Lab Units 11/24/22  0800 11/23/22  1657   POC GLUCOSE mg/dl 169* 79     Results from last 7 days   Lab Units 11/24/22  0504   HEMOGLOBIN A1C % 4 9     Results from last 7 days   Lab Units 11/27/22  0453 11/24/22  0504 11/23/22  2024   LACTIC ACID mmol/L  --   --  0 8   PROCALCITONIN ng/ml 0 07 0 70*  --        Lines/Drains:  Invasive Devices     Peripheral Intravenous Line  Duration           Peripheral IV 11/29/22 Right Antecubital 1 day                      Imaging: No pertinent imaging reviewed  Recent Cultures (last 7 days):   Results from last 7 days   Lab Units 11/27/22  1226   BLOOD CULTURE  No Growth at 48 hrs  No Growth at 48 hrs         Last 24 Hours Medication List:   Current Facility-Administered Medications   Medication Dose Route Frequency Provider Last Rate   • ampicillin-sulbactam  3 g Intravenous Q6H Jordy Christianson MD 3 g (11/30/22 1646)   • enoxaparin  40 mg Subcutaneous Daily Julian Torres DO     • ferrous gluconate  324 mg Oral BID AC Abby Becerra MD     • [START ON 12/1/2022] pantoprazole  40 mg Oral Early Morning Julian cason DO     • thiamine  100 mg Intravenous Donnamaria Brennarbobie cason  mg (11/30/22 5832)        Today, Patient Was Seen By: Myrna Ayoub, DO    **Please Note: This note may have been constructed using a voice recognition system  **

## 2022-11-30 NOTE — PLAN OF CARE
Problem: MOBILITY - ADULT  Goal: Maintain or return to baseline ADL function  Description: INTERVENTIONS:  -  Assess patient's ability to carry out ADLs; assess patient's baseline for ADL function and identify physical deficits which impact ability to perform ADLs (bathing, care of mouth/teeth, toileting, grooming, dressing, etc )  - Assess/evaluate cause of self-care deficits   - Assess range of motion  - Assess patient's mobility; develop plan if impaired  - Assess patient's need for assistive devices and provide as appropriate  - Encourage maximum independence but intervene and supervise when necessary  - Involve family in performance of ADLs  - Assess for home care needs following discharge   - Consider OT consult to assist with ADL evaluation and planning for discharge  - Provide patient education as appropriate  Outcome: Progressing  Goal: Maintains/Returns to pre admission functional level  Description: INTERVENTIONS:  - Perform BMAT or MOVE assessment daily    - Set and communicate daily mobility goal to care team and patient/family/caregiver  - Collaborate with rehabilitation services on mobility goals if consulted  - Perform Range of Motion multiple times a day  - Reposition patient every 2 hours    - Dangle patient multiple times a day  - Stand patient multiple times a day  - Ambulate patient multiple times a day  - Out of bed to chair 4 times a day   - Out of bed for meals 3 times a day  - Out of bed for toileting  - Record patient progress and toleration of activity level   Outcome: Progressing     Problem: Prexisting or High Potential for Compromised Skin Integrity  Goal: Skin integrity is maintained or improved  Description: INTERVENTIONS:  - Identify patients at risk for skin breakdown  - Assess and monitor skin integrity  - Assess and monitor nutrition and hydration status  - Monitor labs   - Assess for incontinence   - Turn and reposition patient  - Assist with mobility/ambulation  - Relieve pressure over bony prominences  - Avoid friction and shearing  - Provide appropriate hygiene as needed including keeping skin clean and dry  - Evaluate need for skin moisturizer/barrier cream  - Collaborate with interdisciplinary team   - Patient/family teaching  - Consider wound care consult   Outcome: Progressing     Problem: PAIN - ADULT  Goal: Verbalizes/displays adequate comfort level or baseline comfort level  Description: Interventions:  - Encourage patient to monitor pain and request assistance  - Assess pain using appropriate pain scale  - Administer analgesics based on type and severity of pain and evaluate response  - Implement non-pharmacological measures as appropriate and evaluate response  - Consider cultural and social influences on pain and pain management  - Notify physician/advanced practitioner if interventions unsuccessful or patient reports new pain  Outcome: Progressing     Problem: INFECTION - ADULT  Goal: Absence or prevention of progression during hospitalization  Description: INTERVENTIONS:  - Assess and monitor for signs and symptoms of infection  - Monitor lab/diagnostic results  - Monitor all insertion sites, i e  indwelling lines, tubes, and drains  - Monitor endotracheal if appropriate and nasal secretions for changes in amount and color  - Ada appropriate cooling/warming therapies per order  - Administer medications as ordered  - Instruct and encourage patient and family to use good hand hygiene technique  - Identify and instruct in appropriate isolation precautions for identified infection/condition  Outcome: Progressing  Goal: Absence of fever/infection during neutropenic period  Description: INTERVENTIONS:  - Monitor WBC    Outcome: Progressing     Problem: SAFETY ADULT  Goal: Maintain or return to baseline ADL function  Description: INTERVENTIONS:  -  Assess patient's ability to carry out ADLs; assess patient's baseline for ADL function and identify physical deficits which impact ability to perform ADLs (bathing, care of mouth/teeth, toileting, grooming, dressing, etc )  - Assess/evaluate cause of self-care deficits   - Assess range of motion  - Assess patient's mobility; develop plan if impaired  - Assess patient's need for assistive devices and provide as appropriate  - Encourage maximum independence but intervene and supervise when necessary  - Involve family in performance of ADLs  - Assess for home care needs following discharge   - Consider OT consult to assist with ADL evaluation and planning for discharge  - Provide patient education as appropriate  Outcome: Progressing  Goal: Maintains/Returns to pre admission functional level  Description: INTERVENTIONS:  - Perform BMAT or MOVE assessment daily    - Set and communicate daily mobility goal to care team and patient/family/caregiver  - Collaborate with rehabilitation services on mobility goals if consulted  - Perform Range of Motion multiple times a day  - Reposition patient every 2 hours    - Dangle patient multiple times a day  - Stand patient multiple times a day  - Ambulate patient multiple times a day  - Out of bed to chair 4 times a day   - Out of bed for meals 3 times a day  - Out of bed for toileting  - Record patient progress and toleration of activity level   Outcome: Progressing  Goal: Patient will remain free of falls  Description: INTERVENTIONS:  - Educate patient/family on patient safety including physical limitations  - Instruct patient to call for assistance with activity   - Consult OT/PT to assist with strengthening/mobility   - Keep Call bell within reach  - Keep bed low and locked with side rails adjusted as appropriate  - Keep care items and personal belongings within reach  - Initiate and maintain comfort rounds  - Make Fall Risk Sign visible to staff  - Apply yellow socks and bracelet for high fall risk patients  - Consider moving patient to room near nurses station  Outcome: Progressing     Problem: DISCHARGE PLANNING  Goal: Discharge to home or other facility with appropriate resources  Description: INTERVENTIONS:  - Identify barriers to discharge w/patient and caregiver  - Arrange for needed discharge resources and transportation as appropriate  - Identify discharge learning needs (meds, wound care, etc )  - Arrange for interpretive services to assist at discharge as needed  - Refer to Case Management Department for coordinating discharge planning if the patient needs post-hospital services based on physician/advanced practitioner order or complex needs related to functional status, cognitive ability, or social support system  Outcome: Progressing     Problem: Knowledge Deficit  Goal: Patient/family/caregiver demonstrates understanding of disease process, treatment plan, medications, and discharge instructions  Description: Complete learning assessment and assess knowledge base  Interventions:  - Provide teaching at level of understanding  - Provide teaching via preferred learning methods  Outcome: Progressing     Problem: Nutrition/Hydration-ADULT  Goal: Nutrient/Hydration intake appropriate for improving, restoring or maintaining nutritional needs  Description: Monitor and assess patient's nutrition/hydration status for malnutrition  Collaborate with interdisciplinary team and initiate plan and interventions as ordered  Monitor patient's weight and dietary intake as ordered or per policy  Utilize nutrition screening tool and intervene as necessary  Determine patient's food preferences and provide high-protein, high-caloric foods as appropriate       INTERVENTIONS:  - Monitor oral intake, urinary output, labs, and treatment plans  - Assess nutrition and hydration status and recommend course of action  - Evaluate amount of meals eaten  - Assist patient with eating if necessary   - Allow adequate time for meals  - Recommend/ encourage appropriate diets, oral nutritional supplements, and vitamin/mineral supplements  - Order, calculate, and assess calorie counts as needed  - Recommend, monitor, and adjust tube feedings and TPN/PPN based on assessed needs  - Assess need for intravenous fluids  - Provide specific nutrition/hydration education as appropriate  - Include patient/family/caregiver in decisions related to nutrition  Outcome: Progressing     Problem: Potential for Falls  Goal: Patient will remain free of falls  Description: INTERVENTIONS:  - Educate patient/family on patient safety including physical limitations  - Instruct patient to call for assistance with activity   - Consult OT/PT to assist with strengthening/mobility   - Keep Call bell within reach  - Keep bed low and locked with side rails adjusted as appropriate  - Keep care items and personal belongings within reach  - Initiate and maintain comfort rounds  - Make Fall Risk Sign visible to staff  - Apply yellow socks and bracelet for high fall risk patients  - Consider moving patient to room near nurses station  Outcome: Progressing

## 2022-11-30 NOTE — PLAN OF CARE
Problem: MOBILITY - ADULT  Goal: Maintain or return to baseline ADL function  Description: INTERVENTIONS:  -  Assess patient's ability to carry out ADLs; assess patient's baseline for ADL function and identify physical deficits which impact ability to perform ADLs (bathing, care of mouth/teeth, toileting, grooming, dressing, etc )  - Assess/evaluate cause of self-care deficits   - Assess range of motion  - Assess patient's mobility; develop plan if impaired  - Assess patient's need for assistive devices and provide as appropriate  - Encourage maximum independence but intervene and supervise when necessary  - Involve family in performance of ADLs  - Assess for home care needs following discharge   - Consider OT consult to assist with ADL evaluation and planning for discharge  - Provide patient education as appropriate  Outcome: Progressing  Goal: Maintains/Returns to pre admission functional level  Description: INTERVENTIONS:  - Perform BMAT or MOVE assessment daily    - Set and communicate daily mobility goal to care team and patient/family/caregiver  - Collaborate with rehabilitation services on mobility goals if consulted  - Perform Range of Motion multiple times a day  - Reposition patient every 2 hours    - Dangle patient multiple times a day  - Stand patient multiple times a day  - Ambulate patient multiple times a day  - Out of bed to chair 4 times a day   - Out of bed for meals 3 times a day  - Out of bed for toileting  - Record patient progress and toleration of activity level   Outcome: Progressing     Problem: Prexisting or High Potential for Compromised Skin Integrity  Goal: Skin integrity is maintained or improved  Description: INTERVENTIONS:  - Identify patients at risk for skin breakdown  - Assess and monitor skin integrity  - Assess and monitor nutrition and hydration status  - Monitor labs   - Assess for incontinence   - Turn and reposition patient  - Assist with mobility/ambulation  - Relieve pressure over bony prominences  - Avoid friction and shearing  - Provide appropriate hygiene as needed including keeping skin clean and dry  - Evaluate need for skin moisturizer/barrier cream  - Collaborate with interdisciplinary team   - Patient/family teaching  - Consider wound care consult   Outcome: Progressing     Problem: PAIN - ADULT  Goal: Verbalizes/displays adequate comfort level or baseline comfort level  Description: Interventions:  - Encourage patient to monitor pain and request assistance  - Assess pain using appropriate pain scale  - Administer analgesics based on type and severity of pain and evaluate response  - Implement non-pharmacological measures as appropriate and evaluate response  - Consider cultural and social influences on pain and pain management  - Notify physician/advanced practitioner if interventions unsuccessful or patient reports new pain  Outcome: Progressing     Problem: INFECTION - ADULT  Goal: Absence or prevention of progression during hospitalization  Description: INTERVENTIONS:  - Assess and monitor for signs and symptoms of infection  - Monitor lab/diagnostic results  - Monitor all insertion sites, i e  indwelling lines, tubes, and drains  - Monitor endotracheal if appropriate and nasal secretions for changes in amount and color  - [place] appropriate cooling/warming therapies per order  - Administer medications as ordered  - Instruct and encourage patient and family to use good hand hygiene technique  - Identify and instruct in appropriate isolation precautions for identified infection/condition  Outcome: Progressing  Goal: Absence of fever/infection during neutropenic period  Description: INTERVENTIONS:  - Monitor WBC    Outcome: Progressing     Problem: SAFETY ADULT  Goal: Maintain or return to baseline ADL function  Description: INTERVENTIONS:  -  Assess patient's ability to carry out ADLs; assess patient's baseline for ADL function and identify physical deficits which impact ability to perform ADLs (bathing, care of mouth/teeth, toileting, grooming, dressing, etc )  - Assess/evaluate cause of self-care deficits   - Assess range of motion  - Assess patient's mobility; develop plan if impaired  - Assess patient's need for assistive devices and provide as appropriate  - Encourage maximum independence but intervene and supervise when necessary  - Involve family in performance of ADLs  - Assess for home care needs following discharge   - Consider OT consult to assist with ADL evaluation and planning for discharge  - Provide patient education as appropriate  Outcome: Progressing  Goal: Maintains/Returns to pre admission functional level  Description: INTERVENTIONS:  - Perform BMAT or MOVE assessment daily    - Set and communicate daily mobility goal to care team and patient/family/caregiver  - Collaborate with rehabilitation services on mobility goals if consulted  - Perform Range of Motion multiple times a day  - Reposition patient every 2 hours    - Dangle patient multiple times a day  - Stand patient multiple times a day  - Ambulate patient multiple times a day  - Out of bed to chair 4 times a day   - Out of bed for meals 3 times a day  - Out of bed for toileting  - Record patient progress and toleration of activity level   Outcome: Progressing  Goal: Patient will remain free of falls  Description: INTERVENTIONS:  - Educate patient/family on patient safety including physical limitations  - Instruct patient to call for assistance with activity   - Consult OT/PT to assist with strengthening/mobility   - Keep Call bell within reach  - Keep bed low and locked with side rails adjusted as appropriate  - Keep care items and personal belongings within reach  - Initiate and maintain comfort rounds  - Make Fall Risk Sign visible to staff  - Offer Toileting every 2 Hours, in advance of need  - Initiate/Maintain bed/chair alarm  - Obtain necessary fall risk management equipment: non skid socks  - Apply yellow socks and bracelet for high fall risk patients  - Consider moving patient to room near nurses station  Outcome: Progressing     Problem: DISCHARGE PLANNING  Goal: Discharge to home or other facility with appropriate resources  Description: INTERVENTIONS:  - Identify barriers to discharge w/patient and caregiver  - Arrange for needed discharge resources and transportation as appropriate  - Identify discharge learning needs (meds, wound care, etc )  - Arrange for interpretive services to assist at discharge as needed  - Refer to Case Management Department for coordinating discharge planning if the patient needs post-hospital services based on physician/advanced practitioner order or complex needs related to functional status, cognitive ability, or social support system  Outcome: Progressing     Problem: Knowledge Deficit  Goal: Patient/family/caregiver demonstrates understanding of disease process, treatment plan, medications, and discharge instructions  Description: Complete learning assessment and assess knowledge base  Interventions:  - Provide teaching at level of understanding  - Provide teaching via preferred learning methods  Outcome: Progressing     Problem: Nutrition/Hydration-ADULT  Goal: Nutrient/Hydration intake appropriate for improving, restoring or maintaining nutritional needs  Description: Monitor and assess patient's nutrition/hydration status for malnutrition  Collaborate with interdisciplinary team and initiate plan and interventions as ordered  Monitor patient's weight and dietary intake as ordered or per policy  Utilize nutrition screening tool and intervene as necessary  Determine patient's food preferences and provide high-protein, high-caloric foods as appropriate       INTERVENTIONS:  - Monitor oral intake, urinary output, labs, and treatment plans  - Assess nutrition and hydration status and recommend course of action  - Evaluate amount of meals eaten  - Assist patient with eating if necessary   - Allow adequate time for meals  - Recommend/ encourage appropriate diets, oral nutritional supplements, and vitamin/mineral supplements  - Order, calculate, and assess calorie counts as needed  - Recommend, monitor, and adjust tube feedings and TPN/PPN based on assessed needs  - Assess need for intravenous fluids  - Provide specific nutrition/hydration education as appropriate  - Include patient/family/caregiver in decisions related to nutrition  Outcome: Progressing     Problem: Potential for Falls  Goal: Patient will remain free of falls  Description: INTERVENTIONS:  - Educate patient/family on patient safety including physical limitations  - Instruct patient to call for assistance with activity   - Consult OT/PT to assist with strengthening/mobility   - Keep Call bell within reach  - Keep bed low and locked with side rails adjusted as appropriate  - Keep care items and personal belongings within reach  - Initiate and maintain comfort rounds  - Make Fall Risk Sign visible to staff  - Offer Toileting every 2 Hours, in advance of need  - Initiate/Maintain bed/chair alarm  - Obtain necessary fall risk management equipment: non skid socks  - Apply yellow socks and bracelet for high fall risk patients  - Consider moving patient to room near nurses station  Outcome: Progressing

## 2022-11-30 NOTE — PROGRESS NOTES
Progress Note - Infectious Disease   Ema Horowitz 78 y o  male MRN: [id number]  Unit/Bed#: 404-01 Encounter: [id number]        REQUIRED DOCUMENTATION:   1  This service was provided via Telemedicine  2  Provider located at [address] [place] [address]  3  TeleMed provider: Jenn Omalley MD   4  Identify all parties in room with patient during tele consult: ELISEO  5  After connecting through [place], patient was identified by name and date of birth and assistant checked wristband  Patient was then informed that this was a Telemedicine visit and that the exam was being conducted confidentially over secure lines  My office door was closed  No one else was in the room  Patient acknowledged consent and understanding of privacy and security of the Telemedicine visit, and gave us permission to have the assistant stay in the room in order to assist with the history and to conduct the exam   I informed the patient that I have reviewed their record in Epic and presented the opportunity for them to ask any questions regarding the visit today  The patient agreed to participate  Assessment/Recommendations   1  Septic shock   Patient earlier in admission required pressors and was felt to meet sepsis criteria  Jack Delgado source is problem 2  Would question if patient may have had episode of vomiting/aspiration in the setting of 6 which then also contributed to 4  Clinical parameters improved   Suspect blood cultures represent contaminant   Repeat blood cultures NGTD  Repeat CT of the neck with significant improvement  Oral intake slowly progressing due to risk of aspiration  MRI of the brain negative  Continue Unasyn 3 g every 6 hours for now  Plan for total of 10 days of therapy given 4, through 12/2  Can transition to oral Augmentin once p o  intake is reliable  Continue to trend fever curve/vitals  Repeat CBC/CMP tomorrow    Follow-up repeat blood cultures while admitted  Additional supportive care/discharge as per primary      2  Multifocal pneumonia   Imaging over admission notable for multifocal disease   Esophagus noted to be distended and fluid-filled on imaging   Suspect a component of aspiration pneumonia in the setting of 6  Procalcitonin has since normalized  Continue antibiotic as above  Continue to trend fever curve/vitals  Repeat labs tomorrow if admitted  Monitor respiratory status  Follow-up pending blood cultures while admitted     3  Acute hypoxic respiratory failure   Noted early on presentation   Likely due to problem 2 as well as 6  Currently stable on room air   Supportive care as per primary      4  Abnormal CT neck   Patient noted to have retropharyngeal effusion on initial imaging on presentation   Again would question if this may be inflammatory due to an episode of vomiting, consider ingestion  Consider also developing upper respiratory infection   Clinical parameters improved  Repeat CT imaging with near resolution of effusion noted  No focal complaints on exam   Continue antibiotics as above  Continue to trend fever curve/WBC  Monitor exam and for any recurrent symptoms     5  Positive blood culture   Blood cultures on admission with delayed growth of 1 set with lactobacillus and the other with micrococcus   I suspect that these organisms were isolated from the patient's skin and are likely contaminant given the manner in which the patient was found   Repeat cultures are NGTD   2D echo without gross vegetation   No other devices appreciated on  imaging  No antibiotics for this issue  Follow-up repeat blood cultures while admitted  Unasyn as above  Continue to trend fever curve/WBC     6  Multiple electrolyte abnormalities and alcohol abuse/withdrawal   Suspect that this is the primary reason for patient's presentation and likely contributed to the above complications  Toxicology evaluation appreciated  Fluid hydration as per primary  Antibiotics as above     7   Metabolic encephalopathy   This is primarily related to 6  CT head imaging unremarkable   Formal MRI of the head unremarkable  Mental status continues to improve  Monitor mental status  Ongoing follow-up by speech therapy  Antibiotics as above     8  Abnormal CT abdomen   Patient's CT imaging of the abdomen notable for a sclerotic bony lesion at the sacrum   Uncertain etiology  Patient will need further imaging/biopsy as outpatient  Monitor for progressing pain  Additional care/workup as per primary    Above plan discussed in detail with the patient and with primary service  ID Consult service will continue to follow  History       Subjective:  Patient is much more interactive today  Again he has very poor recall of the events leading up to his admission  Denies having any nausea, vomiting, chest pain or shortness of breath  Denies having any neck discomfort  Denies having any throat discomfort  Denies having any joint complaints  Overall tolerating antibiotic without itching or rash      Antibiotics:  Unasyn 3  Total antibody 8      Physical Exam     Temp:  [97 1 °F (36 2 °C)-97 5 °F (36 4 °C)] 97 5 °F (36 4 °C)  HR:  [59-69] 59  Resp:  [16-19] 19  BP: (142-154)/(78-90) 142/78  SpO2:  [98 %-100 %] 98 %  Temp (24hrs), Av 4 °F (36 3 °C), Min:97 1 °F (36 2 °C), Max:97 5 °F (36 4 °C)  Current: Temperature: 97 5 °F (36 4 °C)    Intake/Output Summary (Last 24 hours) at 2022 0816  Last data filed at 2022 1900  Gross per 24 hour   Intake 840 ml   Output 800 ml   Net 40 ml       Physical exam findings reported by bedside and primary medical team staff      General Appearance:  Appearing well, nontoxic, and in no distress, appears stated age   Throat: Oropharynx moist without lesions; lips, mucosa, and tongue normal   Lungs:   Clear to auscultation bilaterally, no audible wheezes, rhonchi and rales, respirations unlabored on room air   Heart:  Regular rate and rhythm, S1, S2 normal, no murmur, rub or zion reported   Abdomen:   Soft, non-tender, non-distended, positive bowel sounds, no masses, no organomegaly   Extremities: Extremities normal, atraumatic, no cyanosis, clubbing or edema   Skin: Skin color, texture, turgor normal, no rashes or lesions  No draining wounds noted  Neurologic: Alert and oriented times 3, extremity strength 5/5 and symmetric; again has very poor recall of recent events but is much more interactive and participate with exam compared to prior  Invasive Devices:   Peripheral IV 11/29/22 Right Antecubital (Active)   Site Assessment WDL 11/29/22 1200   Dressing Type Transparent 11/29/22 1200   Line Status Flushed; Infusing 11/29/22 1200   Dressing Status Clean;Dry; Intact 11/29/22 1200       Labs, Imaging, & Other Studies     Lab Results:    I have personally reviewed pertinent labs  Results from last 7 days   Lab Units 11/30/22  0517 11/29/22  1238 11/29/22  0531   WBC Thousand/uL 6 81 7 70 6 64   HEMOGLOBIN g/dL 10 4* 9 5* 8 8*   PLATELETS Thousands/uL 164 150 126*     Results from last 7 days   Lab Units 11/30/22  0517 11/29/22  0531 11/24/22  1245 11/24/22  0504   POTASSIUM mmol/L 3 4* 3 2*   < > 4 0   CHLORIDE mmol/L 110* 111*   < > 97   CO2 mmol/L 24 22   < > 16*   BUN mg/dL 16 16   < > 23   CREATININE mg/dL 0 67 0 74   < > 0 96   EGFR ml/min/1 73sq m 100 96   < > 82   CALCIUM mg/dL 8 3 8 3   < > 7 8*   AST U/L  --  13  --  34   ALT U/L  --  16  --  21   ALK PHOS U/L  --  112  --  124*    < > = values in this interval not displayed  Results from last 7 days   Lab Units 11/27/22  1226 11/23/22  1339 11/23/22  1328   BLOOD CULTURE  No Growth at 48 hrs  No Growth at 48 hrs  Micrococcus luteus* Lactobacillus species*   GRAM STAIN RESULT   --  Gram positive cocci in clusters* Gram variable rods*       Imaging Studies:   I have personally reviewed pertinent imaging study reports and images in PACS        EKG, Pathology, and Other Studies:   I have personally reviewed pertinent reports  Counseling/Coordination of care: Total 35 minutes communication with the patient via telehealth  Labs, medical tests and imaging studies were independently reviewed by me as noted above

## 2022-11-30 NOTE — PLAN OF CARE
Problem: PHYSICAL THERAPY ADULT  Goal: Performs mobility at highest level of function for planned discharge setting  See evaluation for individualized goals  Description: Treatment/Interventions: Functional transfer training, LE strengthening/ROM, Elevations, Therapeutic exercise, Endurance training, Patient/family training, Equipment eval/education, Bed mobility, Gait training, Spoke to nursing          See flowsheet documentation for full assessment, interventions and recommendations  Outcome: Progressing  Note: Prognosis: Good  Problem List: Decreased strength, Decreased endurance, Impaired balance, Decreased mobility, Decreased safety awareness  Assessment: Pt seen for PT treatment session this date with interventions consisting of transfer training, seated TE, gait training, toilet transfers, neuromuscular re-education of movement performed to improve dynamic standing balance, and education provided as needed for safety and direction to improve functional mobility, activity tolerance, and balance  Pt agreeable to PT treatment session upon arrival, pt found sitting OOB in recliner  At end of session, pt left sitting OOB in recliner with chair alarm activated and all needs in reach  In comparison to previous session, pt with improvements in ambulation distance and amount of assistance required for all mobility   Continue to recommend STR at time of d/c in order to maximize pt's functional independence and safety w/ mobility  Pt continues to be functioning below baseline level  PT will continue to see pt while here in order to address the deficits listed above and provide interventions consistent w/ POC in effort to achieve STGs  PT Discharge Recommendation: Post acute rehabilitation services    See flowsheet documentation for full assessment

## 2022-11-30 NOTE — ASSESSMENT & PLAN NOTE
Incidental finding noted on CT scan and x-rays as following:    3  Sacral bony lesion has enlarged compared to the previous exam and is indeterminate       Plan:  Patient should follow-up with [facility] Orthopedic team and/or PCP

## 2022-11-30 NOTE — ASSESSMENT & PLAN NOTE
Background:  Patient presented with altered mental status, lethargic but responds to verbal stimuli, follows commands  Acute metabolic encephalopathy present on admission, likely due to underlying infection, plus alcohol withdrawal      Mental status continues to improve, discussion with patient's friend/emergency contact and the patient at bedside, patient is agreeable to short-term rehab placement    -B[place]um swallow study ; report and recommendations per speech therapist is following:  Recommending dysphagia diet for aspiration precautions given the results of the swallow study  -MRI brain; No acute intracranial abnormality  -PT OT recommending acute rehab services when stable  Plan: Today patient is alert and oriented x3  Remarkably improved in terms of alertness and orientation since admission  Will continue to recheck and follow-up  -PT OT eval and treat  -cardiopulmonary monitoring  -assessment and plan for sepsis

## 2022-11-30 NOTE — ASSESSMENT & PLAN NOTE
-H and H trend stable  Plan:  -GI team on board; gastroenterologist notified no further steps needed at this time

## 2022-12-01 VITALS
HEART RATE: 66 BPM | WEIGHT: 196.21 LBS | BODY MASS INDEX: 29.06 KG/M2 | HEIGHT: 69 IN | SYSTOLIC BLOOD PRESSURE: 140 MMHG | OXYGEN SATURATION: 100 % | RESPIRATION RATE: 16 BRPM | TEMPERATURE: 97.9 F | DIASTOLIC BLOOD PRESSURE: 74 MMHG

## 2022-12-01 PROBLEM — A41.9 SEPTIC SHOCK (HCC): Status: RESOLVED | Noted: 2022-11-24 | Resolved: 2022-12-01

## 2022-12-01 PROBLEM — F10.939 ALCOHOL WITHDRAWAL (HCC): Status: RESOLVED | Noted: 2022-11-25 | Resolved: 2022-12-01

## 2022-12-01 PROBLEM — R78.81 POSITIVE BLOOD CULTURE: Status: RESOLVED | Noted: 2022-11-28 | Resolved: 2022-12-01

## 2022-12-01 PROBLEM — G93.41 METABOLIC ENCEPHALOPATHY: Status: RESOLVED | Noted: 2022-11-23 | Resolved: 2022-12-01

## 2022-12-01 PROBLEM — R65.21 SEPTIC SHOCK (HCC): Status: RESOLVED | Noted: 2022-11-24 | Resolved: 2022-12-01

## 2022-12-01 PROBLEM — I10 HYPERTENSION: Status: ACTIVE | Noted: 2022-12-01

## 2022-12-01 PROBLEM — J18.9 PNEUMONIA: Status: RESOLVED | Noted: 2022-11-25 | Resolved: 2022-12-01

## 2022-12-01 LAB — SARS-COV-2 RNA RESP QL NAA+PROBE: NEGATIVE

## 2022-12-01 RX ORDER — FUROSEMIDE 10 MG/ML
40 INJECTION INTRAMUSCULAR; INTRAVENOUS ONCE
Status: COMPLETED | OUTPATIENT
Start: 2022-12-01 | End: 2022-12-01

## 2022-12-01 RX ORDER — AMOXICILLIN AND CLAVULANATE POTASSIUM 875; 125 MG/1; MG/1
1 TABLET, FILM COATED ORAL EVERY 12 HOURS SCHEDULED
Status: DISCONTINUED | OUTPATIENT
Start: 2022-12-01 | End: 2022-12-01 | Stop reason: HOSPADM

## 2022-12-01 RX ORDER — DOXYCYCLINE HYCLATE 50 MG/1
324 CAPSULE, GELATIN COATED ORAL
Qty: 160 TABLET | Refills: 0
Start: 2022-12-01 | End: 2023-02-19

## 2022-12-01 RX ORDER — AMOXICILLIN AND CLAVULANATE POTASSIUM 875; 125 MG/1; MG/1
1 TABLET, FILM COATED ORAL EVERY 12 HOURS SCHEDULED
Qty: 4 TABLET | Refills: 0 | Status: SHIPPED | OUTPATIENT
Start: 2022-12-01 | End: 2022-12-03

## 2022-12-01 RX ORDER — PANTOPRAZOLE SODIUM 20 MG/1
20 TABLET, DELAYED RELEASE ORAL DAILY
Qty: 30 TABLET | Refills: 3
Start: 2022-12-01

## 2022-12-01 RX ORDER — POTASSIUM CHLORIDE 20 MEQ/1
40 TABLET, EXTENDED RELEASE ORAL ONCE
Status: COMPLETED | OUTPATIENT
Start: 2022-12-01 | End: 2022-12-01

## 2022-12-01 RX ADMIN — PANTOPRAZOLE SODIUM 40 MG: 40 TABLET, DELAYED RELEASE ORAL at 06:23

## 2022-12-01 RX ADMIN — FERROUS GLUCONATE 324 MG: 324 TABLET ORAL at 08:34

## 2022-12-01 RX ADMIN — FUROSEMIDE 40 MG: 10 INJECTION, SOLUTION INTRAMUSCULAR; INTRAVENOUS at 12:30

## 2022-12-01 RX ADMIN — AMOXICILLIN AND CLAVULANATE POTASSIUM 1 TABLET: 875; 125 TABLET, FILM COATED ORAL at 13:43

## 2022-12-01 RX ADMIN — THIAMINE HYDROCHLORIDE 100 MG: 100 INJECTION, SOLUTION INTRAMUSCULAR; INTRAVENOUS at 06:23

## 2022-12-01 RX ADMIN — POTASSIUM CHLORIDE 40 MEQ: 1500 TABLET, EXTENDED RELEASE ORAL at 08:34

## 2022-12-01 RX ADMIN — AMPICILLIN SODIUM AND SULBACTAM SODIUM 3 G: 2; 1 INJECTION, POWDER, FOR SOLUTION INTRAMUSCULAR; INTRAVENOUS at 06:23

## 2022-12-01 RX ADMIN — ENOXAPARIN SODIUM 40 MG: 40 INJECTION SUBCUTANEOUS at 08:33

## 2022-12-01 NOTE — ASSESSMENT & PLAN NOTE
Background:  Patient presented with altered mental status, lethargic but responds to verbal stimuli, follows commands  Acute metabolic encephalopathy present on admission, likely due to underlying infection, plus alcohol withdrawal      Mental status continues to improve, discussion with patient's friend/emergency contact and the patient at bedside, patient is agreeable to short-term rehab placement    -Bairum swallow study ; report and recommendations per speech therapist is following:  Recommending dysphagia diet for aspiration precautions given the results of the swallow study  -MRI brain; No acute intracranial abnormality  -PT OT recommending acute rehab services when stable  Plan: Today patient is alert and oriented x3  Remarkably improved in terms of alertness and orientation since admission   -at baseline  -patient being discharged to nursing facility for physical therapy, occupation therapy and rehab purposes

## 2022-12-01 NOTE — PROGRESS NOTES
Progress Note - Infectious Disease   Celestine Buenrostro 78 y o  male MRN: [id number]  Unit/Bed#: 404-01 Encounter: [id number]      Impression/Plan:  1  Septic shock   Patient earlier in admission required pressors and was felt to meet sepsis criteria  Chaim Sanchez source is problem 2  Would question if patient may have had episode of vomiting/aspiration in the setting of 6 which then also contributed to 4  Clinical parameters improved   Suspect blood cultures represent contaminant   Repeat blood cultures NGTD   Repeat CT of the neck with significant improvement   Oral intake slowly progressing due to risk of aspiration   MRI of the brain negative  Will change Unasyn to PO Amox-Clav 875-125 mg BID  Plan for total of 10 days of therapy given 4, through 12/2  Can transition to oral Augmentin once p o  intake is reliable  Continue to trend fever curve/vitals  Repeat CBC/CMP tomorrow  Additional supportive care/discharge as per primary      2  Multifocal pneumonia   Imaging over admission notable for multifocal disease   Esophagus noted to be distended and fluid-filled on imaging   Suspect a component of aspiration pneumonia in the setting of 6  Procalcitonin has since normalized  Continue antibiotic as above, plan to complete 10 day course through 12/02/2022  Continue to trend fever curve/vitals  Repeat labs tomorrow if admitted  Monitor respiratory status     3  Acute hypoxic respiratory failure   Noted early on presentation   Likely due to problem 2 as well as 6  Currently stable on room air   Supportive care as per primary      4  Abnormal CT neck   Patient noted to have retropharyngeal effusion on initial imaging on presentation   Again would question if this may be inflammatory due to an episode of vomiting, consider ingestion   Consider also developing upper respiratory infection   Clinical parameters improved   Repeat CT imaging with near resolution of effusion noted    No focal complaints on exam   Continue antibiotics as above  Continue to trend fever curve/WBC  Monitor exam and for any recurrent symptoms     5  Positive blood culture   Blood cultures on admission with delayed growth of 1 set with lactobacillus and the other with micrococcus   I suspect that these organisms were isolated from the patient's skin and are likely contaminant given the manner in which the patient was found   Repeat cultures are NGTD   2D echo without gross vegetation   No other devices appreciated on  imaging  No antibiotics needed for this issue  Continue to trend fever curve/WBC     6  Multiple electrolyte abnormalities and alcohol abuse/withdrawal   Suspect that this is the primary reason for patient's presentation and likely contributed to the above complications  Toxicology evaluation appreciated  Fluid hydration as per primary  Antibiotics as above     7  Metabolic encephalopathy   This is primarily related to 6  CT head imaging unremarkable   Formal MRI of the head unremarkable   Mental status continues to improve  Monitor mental status  Ongoing follow-up by speech therapy  Antibiotics as above     8  Abnormal CT abdomen   Patient's CT imaging of the abdomen notable for a sclerotic bony lesion at the sacrum   Uncertain etiology  Patient will need further imaging/biopsy as outpatient  Monitor for progressing pain  Additional care/workup as per primary     Above plan discussed in detail with the patient and with primary service  ID will sign off  Please call with questions  Antibiotics:  Amp-Sulbactam: 4  Total antibiotic days: 9    Subjective:  Patient doing well overall   Denies fever, chills, cough, SOB, nausea, emesis    Objective:  Vitals:  Temp:  [97 9 °F (36 6 °C)-98 4 °F (36 9 °C)] 97 9 °F (36 6 °C)  HR:  [66-69] 66  Resp:  [16-18] 16  BP: (140-179)/(74-90) 140/74  SpO2:  [98 %-100 %] 100 %  Temp (24hrs), Av 2 °F (36 8 °C), Min:97 9 °F (36 6 °C), Max:98 4 °F (36 9 °C)  Current: Temperature: 97 9 °F (36 6 °C)    Physical Exam:   General Appearance:  Alert, interactive, nontoxic, no acute distress  Throat: Oropharynx moist without lesions  Lungs:   Clear to auscultation bilaterally; no wheezes, rhonchi or rales; respirations unlabored   Heart:  RRR; no murmur, rub or gallop   Abdomen:   Soft, non-tender, non-distended, positive bowel sounds  Extremities: No clubbing, cyanosis or edema   Skin: No new rashes or lesions  No draining wounds noted  Labs: All pertinent labs and imaging studies were personally reviewed  Results from last 7 days   Lab Units 11/30/22  0517 11/29/22  1238 11/29/22  0531   WBC Thousand/uL 6 81 7 70 6 64   HEMOGLOBIN g/dL 10 4* 9 5* 8 8*   PLATELETS Thousands/uL 164 150 126*     Results from last 7 days   Lab Units 11/30/22  0517 11/29/22  0531 11/27/22  0453   SODIUM mmol/L 141 142 140   POTASSIUM mmol/L 3 4* 3 2* 3 6   CHLORIDE mmol/L 110* 111* 111*   CO2 mmol/L 24 22 23   BUN mg/dL 16 16 20   CREATININE mg/dL 0 67 0 74 0 89   EGFR ml/min/1 73sq m 100 96 89   CALCIUM mg/dL 8 3 8 3 8 8   AST U/L  --  13  --    ALT U/L  --  16  --    ALK PHOS U/L  --  112  --      Results from last 7 days   Lab Units 11/27/22  0453   PROCALCITONIN ng/ml 0 07         Results from last 7 days   Lab Units 11/25/22  0431   FERRITIN ng/mL 425*           Micro:  Results from last 7 days   Lab Units 11/27/22  1226   BLOOD CULTURE  No Growth at 72 hrs  No Growth at 72 hrs         Imaging:          José Mendes MD  [facility]

## 2022-12-01 NOTE — DISCHARGE SUMMARY
[address] [facility] [address]  Discharge- Monty Mckeon 1955, 78 y o  male MRN: [id number]  Unit/Bed#: 404-01 Encounter: [id number]  Primary Care Provider: Philipp Alves DO   Date and time admitted to hospital: 11/23/2022  1:09 PM    Pneumonia-resolved as of 12/1/2022  Assessment & Plan  CT Ca P:  1  Bilateral pneumonia and pleural effusions  Blood cultures consistent with contaminant son admission    -completed 5 days of cefepime   -completed 9 days of Unasyn     -resolved  -follow-up with PCP accordingly  Septic shock (HCC)-resolved as of 12/1/2022  Assessment & Plan  Likely secondary to complicated pneumonia, present on admission, resolved          * Metabolic encephalopathy-resolved as of 12/1/2022  Assessment & Plan  Background:  Patient presented with altered mental status, lethargic but responds to verbal stimuli, follows commands  Acute metabolic encephalopathy present on admission, likely due to underlying infection, plus alcohol withdrawal      Mental status continues to improve, discussion with patient's friend/emergency contact and the patient at bedside, patient is agreeable to short-term rehab placement    -Bairum swallow study ; report and recommendations per speech therapist is following:  Recommending dysphagia diet for aspiration precautions given the results of the swallow study  -MRI brain; No acute intracranial abnormality  -PT OT recommending acute rehab services when stable  Plan: Today patient is alert and oriented x3  Remarkably improved in terms of alertness and orientation since admission   -at baseline  -patient being discharged to nursing facility for physical therapy, occupation therapy and rehab purposes  Alcohol withdrawal (HCC)-resolved as of 12/1/2022  Assessment & Plan  Background:  Social history as given by his close friends; daily alcohol drinker for the past 1 year at least   Smokes cigarettes I pack per day for same duration  Patient was Likely past alcohol withdrawal on admission, unknown down time at home  -IV thiamine supplement per toxicology, please see their plan of care  -IV folic acid supplement  -multivitamins ordered  Patient being discharged to nursing facility/rehab in a stable state  Hypertension  Assessment & Plan  Restart home medication at discharge time      -continue hctz/valsartan at same dose    -f/u with PCP accordingly    History of GI bleed  Assessment & Plan  Patient with history of GI bleed and previous hospitalization  No evidence of active bleeding    Esophageal dilatation  Assessment & Plan  In the setting of coma and possible alcohol intoxication when was at home     -initial CT scan showing esophageal dilation  Tolerating diet  -follow-up with gastroenterology offices accordingly  Other dysphagia  Assessment & Plan  As noted during hospitalization  Diet modifications documented  Patient is being discharged to nursing facility/rehab  Diet recommendations documented in discharge noted  Other specified anemias  Assessment & Plan  -fecal occult blood test 1/3 positive   -H and H trend was unremarkable   -no concerns for acute blood loss anemia at this time  Plan:  -follow-up with gastroenterology offices accordingly  -follow-up with PCP accordingly   -continue 3 month course of iron supplements accordingly  Sacral lesion  Assessment & Plan  Incidental finding noted on CT scan and x-rays as following:    3  Sacral bony lesion has enlarged compared to the previous exam and is indeterminate  Plan:  Patient should follow-up with [address] [place] [address] W team and/or PCP  Incidental note completed  Pharyngeal swelling  Assessment & Plan  CT soft tissue: There is marked improvement in the previously seen retropharyngeal effusion compared to the study performed 5 days ago     -no associated respiratory symptoms    Oxygen saturation within normal limits at room air   -no neck stiffness or tenderness   -no cervical lymphadenopathy  Plan:  -resolving  Positive blood culture-resolved as of 12/1/2022  Assessment & Plan  Assessment and plan per Infectious Disease team     Positive blood culture  Blood cultures on admission with delayed growth of 1 set with lactobacillus and the other with micrococcus  I suspect that these organisms were isolated from the patient's skin and are likely contaminant given the manner in which the patient was found  2D echo without gross vegetation  No other devices appreciated on  imaging  -ID team on board; agreed on the following plan  -repeat blood cultures with no growth to date  Most likely contaminant on initial blood culture   -completed 5 day course of cefepime and 9 day course of Unasyn   -patient is being discharged in a stable state to nursing facility        Discharging Physician / Practitioner: Garrett Lamb MD  PCP: Kem Durbin DO  Admission Date:   Admission Orders (From admission, onward)     Ordered        11/23/22 1610  INPATIENT ADMISSION  Once                      Discharge Date: 12/01/22    Medical Problems     Resolved Problems  Date Reviewed: 12/1/2022          Resolved    * (Principal) Metabolic encephalopathy 13/5/6965     Resolved by  Garrett Lamb MD    Hypoxia 11/27/2022     Resolved by  Garrett Lamb MD    Hypothermia 11/25/2022     Resolved by  Garrett Lamb MD    Metabolic acidosis with increased anion gap and accumulation of organic acids 11/24/2022     Resolved by  Garrett Lamb MD    Hyponatremia 11/25/2022     Resolved by  Garrett Lamb MD    Septic shock ([facility] [address] ) 12/1/2022     Resolved by  Garrett Lamb MD    Acute respiratory failure ([facility] [address] ) 11/27/2022     Resolved by  Garrett Lamb MD    Pneumonia 12/1/2022     Resolved by  Garrett Lamb MD    Alcohol withdrawal ([facility] [address] ) 12/1/2022     Resolved by  Garrett Lamb MD    Positive blood culture 12/1/2022     Resolved by  Garrett Lamb MD Consultations During Hospital Stay:  · Gastroenterology, Infectious Disease, Case Management  Procedures Performed:   CT soft tissue neck w contrast   Final Result      There is marked improvement in the previously seen retropharyngeal effusion compared to the study performed 5 days ago  There is coating of the mucosa of the oral cavity, oropharynx, hypopharynx and larynx from a recently performed fluoroscopy barium swallow earlier today  Small amount of barium is seen within the tracheal bifurcation and proximal right and left mainstem    bronchi posteriorly indicating some aspiration occurred during that barium swallow  Small posterior layering pleural effusions are seen within the lung apices, new since prior CT angiogram             Workstation performed: [id number]         FL barium swallow video w speech   Final Result      MRI brain wo contrast   Final Result      No acute intracranial abnormality  Minor white matter change suggestive of chronic microangiopathy  Workstation performed: [id number]         XR hips bilateral 3-4 vw w pelvis if performed   Final Result      No acute osseous abnormality  Again seen is a large sclerotic bone lesion in the upper sacrum  This has enlarged comparing the 11/24/2022 CT and a 1/1/2018 CT  This is suspicious for malignancy, and bone scan is recommended for further evaluation  Workstation performed: [id number]         CT chest abdomen pelvis w contrast   Final Result         1  Bilateral pneumonia and pleural effusions  2   Distended esophagus containing fluid  Nonurgent evaluation with swallowing study is recommended  3   Sacral bony lesion has enlarged compared to the previous exam and is indeterminate  Recommend correlation with bone scan or MRI pelvis  Sclerotic lesion at the left pedicle at T11 is unchanged  The study was marked in EPIC for significant notification        Workstation performed: [id number]         CT head wo contrast   Final Result      No acute intracranial abnormality  Chronic microangiopathic changes  Air-fluid level right sphenoid sinus  Acute sphenoid sinusitis should be considered in the right clinical setting  Workstation performed: [id number]         XR chest 1 view portable   Final Result      Right-sided central venous catheter placement without pneumothorax  Workstation performed: [id number]         X-ray chest 1 view portable   Final Result      No acute cardiopulmonary disease  In the setting of clinically suspected/proven COVID-19, this plain film appearance does not contain findings that raise concern for viral pneumonia such as COVID-19, but does not rule out this diagnosis  Workstation performed: [id number]         CTA head and neck w wo contrast   Final Result      1  No acute intracranial CT abnormality  2   Patent major vasculature of [place] without high-grade stenosis  3   No hemodynamically significant stenosis of cervical carotid arteries  4   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  5   Significant retropharyngeal effusion extending from C1 through inferior C5 effacing nasopharyngeal and pharyngeal airways  No apparent rim enhancement to suggest abscess  Uvula appears edematous  Recommend ENT consultation  6   Acute sinusitis  7   Fluid-filled esophagus extending to the level of the neck base  Correlate for GERD                           I personally discussed this study with CAMERON MEJIA on 11/23/2022 at 2:26 PM                Workstation performed: [id number]           ·     Significant Findings / Test Results:   Results for orders placed or performed during the hospital encounter of 11/23/22   FLU/RSV/COVID - if FLU/RSV clinically relevant    Specimen: Nose; Nares   Result Value Ref Range    SARS-CoV-2 Negative Negative    INFLUENZA A PCR Negative Negative INFLUENZA B PCR Negative Negative    RSV PCR Negative Negative   Blood culture #1    Specimen: Arm, Left; Blood   Result Value Ref Range    Blood Culture Micrococcus luteus (A)     Gram Stain Result Gram positive cocci in clusters (A)        Susceptibility    Micrococcus luteus - HAFSA     ZID Performed Yes     Blood culture #2    Specimen: Arm, Right; Blood   Result Value Ref Range    Blood Culture Lactobacillus species (A)     Gram Stain Result Gram variable rods (A)    Blood Culture Identification Panel    Specimen: Arm, Right; Blood   Result Value Ref Range    ALL TARGETS Not Detected    Blood Culture Identification Panel    Specimen: Arm, Left; Blood   Result Value Ref Range    ALL TARGETS Not Detected    Occult blood 1-3, stool   Result Value Ref Range    Fecal Occult Blood Diagnostic Negative Negative    Fecal Occult Blood Diagnostic 2      Fecal Occult Blood Diagnostic 3     Occult blood 1-3, stool   Result Value Ref Range    Fecal Occult Blood Diagnostic Positive (A) Negative    Fecal Occult Blood Diagnostic 2 Negative Negative    Fecal Occult Blood Diagnostic 3     Blood culture    Specimen: Hand, Right; Blood   Result Value Ref Range    Blood Culture No Growth at 72 hrs  Blood culture    Specimen: Arm, Right; Blood   Result Value Ref Range    Blood Culture No Growth at 72 hrs      COVID only    Specimen: Nose; Nares   Result Value Ref Range    SARS-CoV-2 Negative Negative   Basic metabolic panel   Result Value Ref Range    Sodium 126 (L) 135 - 147 mmol/L    Potassium 3 7 3 5 - 5 3 mmol/L    Chloride 86 (L) 96 - 108 mmol/L    CO2 21 21 - 32 mmol/L    ANION GAP 19 (H) 4 - 13 mmol/L    BUN 26 (H) 5 - 25 mg/dL    Creatinine 0 68 0 60 - 1 30 mg/dL    Glucose 69 65 - 140 mg/dL    Calcium 9 2 8 3 - 10 1 mg/dL    eGFR 99 ml/min/1 73sq m   CBC and Platelet   Result Value Ref Range    WBC 7 31 4 31 - 10 16 Thousand/uL    RBC 4 32 3 88 - 5 62 Million/uL    Hemoglobin 13 4 12 0 - 17 0 g/dL    Hematocrit 37 4 36 5 - 49 3 %    MCV 87 82 - 98 fL    MCH 31 0 26 8 - 34 3 pg    MCHC 35 8 31 4 - 37 4 g/dL    RDW 12 2 11 6 - 15 1 %    Platelets 473 614 - 053 Thousands/uL    MPV 8 9 8 9 - 12 7 fL   Protime-INR   Result Value Ref Range    Protime 14 8 (H) 11 6 - 14 5 seconds    INR 1 13 0 84 - 1 19   APTT   Result Value Ref Range    PTT 36 23 - 37 seconds   HS Troponin 0hr (reflex protocol)   Result Value Ref Range    hs TnI 0hr 17 "Refer to ACS Flowchart"- see link ng/L   UA w Reflex to Microscopic w Reflex to Culture    Specimen: Urine, Clean Catch   Result Value Ref Range    Color, UA Yellow     Clarity, UA Clear     Specific Gravity, UA >=1 030 1 003 - 1 030    pH, UA 5 5 4 5, 5 0, 5 5, 6 0, 6 5, 7 0, 7 5, 8 0    Leukocytes, UA Negative Negative    Nitrite, UA Negative Negative    Protein, UA Negative Negative mg/dl    Glucose, UA Negative Negative mg/dl    Ketones, UA 40 (2+) (A) Negative mg/dl    Urobilinogen, UA 0 2 0 2, 1 0 E U /dl E U /dl    Bilirubin, UA Negative Negative    Occult Blood, UA Negative Negative   Lactic acid   Result Value Ref Range    LACTIC ACID 0 9 0 5 - 2 0 mmol/L   Procalcitonin   Result Value Ref Range    Procalcitonin 0 26 (H) <=0 25 ng/ml   Blood gas, Venous   Result Value Ref Range    pH, Blue 7 265 (L) 7 300 - 7 400    pCO2, Blue 41 8 (L) 42 0 - 50 0 mm Hg    pO2, Blue 38 2 35 0 - 45 0 mm Hg    HCO3, Blue 18 5 (L) 24 - 30 mmol/L    Base Excess, Blue -8 0 mmol/L    O2 Content, Blue 12 4 ml/dL    O2 HGB, VENOUS 62 2 60 0 - 80 0 %   Rapid drug screen, urine   Result Value Ref Range    Amph/Meth UR Negative Negative    Barbiturate Ur Negative Negative    Benzodiazepine Urine Negative Negative    Cocaine Urine Negative Negative    Methadone Urine Negative Negative    Opiate Urine Negative Negative    PCP Ur Negative Negative    THC Urine Negative Negative    Oxycodone Urine Negative Negative   CK Total with Reflex CKMB   Result Value Ref Range    Total  39 - 308 U/L   HS Troponin I 2hr   Result Value Ref Range    hs TnI 2hr 18 "Refer to ACS Flowchart"- see link ng/L    Delta 2hr hsTnI 1 <20 ng/L   CKMB   Result Value Ref Range    CK-MB Index 9 1 (H) 0 0 - 2 5 %    CK-MB 25 0 (H) 0 0 - 5 0 ng/mL   Ethanol   Result Value Ref Range    Ethanol Lvl <3 0 - 3 mg/dL   Carboxyhemoglobin   Result Value Ref Range    Carbon Monoxide, Blood 1 3 0 0 - 1 5 %   HS Troponin I 4hr   Result Value Ref Range    hs TnI 4hr 21 "Refer to ACS Flowchart"- see link ng/L    Delta 4hr hsTnI 4 <20 ng/L   Blood gas, arterial   Result Value Ref Range    pH, Arterial 7 329 (L) 7 350 - 7 450    pCO2, Arterial 33 2 (L) 36 0 - 44 0 mm Hg    pO2, Arterial 65 7 (L) 75 0 - 129 0 mm Hg    HCO3, Arterial 17 1 (L) 22 0 - 28 0 mmol/L    Base Excess, Arterial -7 8 mmol/L    O2 Content, Arterial 16 6 16 0 - 23 0 mL/dL    O2 HGB,Arterial  89 9 (L) 94 0 - 97 0 %    SOURCE Radial, Right     DANA TEST Yes     Non Vent Type- HFNC HFNC Flow     NV HFNC FIO2 100     HFNC FLOW LPM 40    Basic metabolic panel   Result Value Ref Range    Sodium 131 (L) 135 - 147 mmol/L    Potassium 2 7 (LL) 3 5 - 5 3 mmol/L    Chloride 102 96 - 108 mmol/L    CO2 14 (L) 21 - 32 mmol/L    ANION GAP 15 (H) 4 - 13 mmol/L    BUN 20 5 - 25 mg/dL    Creatinine 0 40 (L) 0 60 - 1 30 mg/dL    Glucose 48 (L) 65 - 140 mg/dL    Calcium 5 9 (LL) 8 3 - 10 1 mg/dL    eGFR 123 ml/min/1 73sq m   TSH   Result Value Ref Range    TSH 3RD GENERATON 2 318 0 450 - 4 500 uIU/mL   Basic metabolic panel   Result Value Ref Range    Sodium 123 (L) 135 - 147 mmol/L    Potassium 3 1 (L) 3 5 - 5 3 mmol/L    Chloride 90 (L) 96 - 108 mmol/L    CO2 18 (L) 21 - 32 mmol/L    ANION GAP 15 (H) 4 - 13 mmol/L    BUN 28 (H) 5 - 25 mg/dL    Creatinine 0 85 0 60 - 1 30 mg/dL    Glucose 116 65 - 140 mg/dL    Calcium 8 2 (L) 8 3 - 10 1 mg/dL    eGFR 90 ml/min/1 73sq m   Magnesium   Result Value Ref Range    Magnesium 1 8 1 6 - 2 6 mg/dL   Phosphorus   Result Value Ref Range    Phosphorus 3 6 2 3 - 4 1 mg/dL   Calcium, ionized Result Value Ref Range    Calcium, Ionized 1 19 1 12 - 1 32 mmol/L   Lactic Acid with Reflex STAT   Result Value Ref Range    LACTIC ACID 0 8 0 5 - 2 0 mmol/L   Hemoglobin and hematocrit, blood   Result Value Ref Range    Hemoglobin 11 7 (L) 12 0 - 17 0 g/dL    Hematocrit 32 4 (L) 36 5 - 49 3 %   Ethanol   Result Value Ref Range    Ethanol Lvl <3 0 - 3 mg/dL   Salicylate level   Result Value Ref Range    Salicylate Lvl 3 3 - 20 mg/dL   Acetaminophen level-If concentration is detectable, please discuss with medical  on call     Result Value Ref Range    Acetaminophen Level 2 5 (L) 10 - 20 ug/mL   Urinalysis with microscopic   Result Value Ref Range    Color, UA Yellow     Clarity, UA Clear     Specific [place], UA 1 010 1 003 - 1 030    pH, UA 6 0 4 5, 5 0, 5 5, 6 0, 6 5, 7 0, 7 5, 8 0    Leukocytes, UA Negative Negative    Nitrite, UA Negative Negative    Protein, UA Negative Negative mg/dl    Glucose, UA Negative Negative mg/dl    Ketones, UA 40 (2+) (A) Negative mg/dl    Urobilinogen, UA 0 2 0 2, 1 0 E U /dl E U /dl    Bilirubin, UA Negative Negative    Occult Blood, UA Small (A) Negative    RBC, UA 1-2 (A) None Seen, 2-4 /hpf    WBC, UA 0-1 (A) None Seen, 2-4, 5-60 /hpf    Epithelial Cells Occasional None Seen, Occasional /hpf    Bacteria, UA Occasional None Seen, Occasional /hpf    Hyaline Casts, UA 1-2 (A) (none) /lpf   Basic metabolic panel   Result Value Ref Range    Sodium 125 (L) 135 - 147 mmol/L    Potassium 3 4 (L) 3 5 - 5 3 mmol/L    Chloride 91 (L) 96 - 108 mmol/L    CO2 17 (L) 21 - 32 mmol/L    ANION GAP 17 (H) 4 - 13 mmol/L    BUN 26 (H) 5 - 25 mg/dL    Creatinine 0 82 0 60 - 1 30 mg/dL    Glucose 116 65 - 140 mg/dL    Calcium 8 3 8 3 - 10 1 mg/dL    eGFR 92 ml/min/1 73sq m   Basic metabolic panel   Result Value Ref Range    Sodium 126 (L) 135 - 147 mmol/L    Potassium 3 6 3 5 - 5 3 mmol/L    Chloride 92 (L) 96 - 108 mmol/L    CO2 17 (L) 21 - 32 mmol/L    ANION GAP 17 (H) 4 - 13 mmol/L BUN 25 5 - 25 mg/dL    Creatinine 0 78 0 60 - 1 30 mg/dL    Glucose 96 65 - 140 mg/dL    Calcium 8 6 8 3 - 10 1 mg/dL    eGFR 94 ml/min/1 73sq m   Basic metabolic panel   Result Value Ref Range    Sodium 126 (L) 135 - 147 mmol/L    Potassium 3 7 3 5 - 5 3 mmol/L    Chloride 95 (L) 96 - 108 mmol/L    CO2 14 (L) 21 - 32 mmol/L    ANION GAP 17 (H) 4 - 13 mmol/L    BUN 24 5 - 25 mg/dL    Creatinine 0 72 0 60 - 1 30 mg/dL    Glucose 84 65 - 140 mg/dL    Calcium 8 0 (L) 8 3 - 10 1 mg/dL    eGFR 97 ml/min/1 73sq m   Basic metabolic panel   Result Value Ref Range    Sodium 127 (L) 135 - 147 mmol/L    Potassium 3 9 3 5 - 5 3 mmol/L    Chloride 95 (L) 96 - 108 mmol/L    CO2 14 (L) 21 - 32 mmol/L    ANION GAP 18 (H) 4 - 13 mmol/L    BUN 24 5 - 25 mg/dL    Creatinine 0 83 0 60 - 1 30 mg/dL    Glucose 125 65 - 140 mg/dL    Calcium 8 0 (L) 8 3 - 10 1 mg/dL    eGFR 91 ml/min/1 73sq m   Procalcitonin, Next Day AM Collection   Result Value Ref Range    Procalcitonin 0 70 (H) <=0 25 ng/ml   CBC and differential   Result Value Ref Range    WBC 13 01 (H) 4 31 - 10 16 Thousand/uL    RBC 3 32 (L) 3 88 - 5 62 Million/uL    Hemoglobin 10 3 (L) 12 0 - 17 0 g/dL    Hematocrit 28 8 (L) 36 5 - 49 3 %    MCV 87 82 - 98 fL    MCH 31 0 26 8 - 34 3 pg    MCHC 35 8 31 4 - 37 4 g/dL    RDW 12 9 11 6 - 15 1 %    MPV 9 5 8 9 - 12 7 fL    Platelets 253 776 - 406 Thousands/uL   Comprehensive metabolic panel   Result Value Ref Range    Sodium 130 (L) 135 - 147 mmol/L    Potassium 4 0 3 5 - 5 3 mmol/L    Chloride 97 96 - 108 mmol/L    CO2 16 (L) 21 - 32 mmol/L    ANION GAP 17 (H) 4 - 13 mmol/L    BUN 23 5 - 25 mg/dL    Creatinine 0 96 0 60 - 1 30 mg/dL    Glucose 153 (H) 65 - 140 mg/dL    Calcium 7 8 (L) 8 3 - 10 1 mg/dL    Corrected Calcium 9 2 8 3 - 10 1 mg/dL    AST 34 5 - 45 U/L    ALT 21 12 - 78 U/L    Alkaline Phosphatase 124 (H) 46 - 116 U/L    Total Protein 5 6 (L) 6 4 - 8 4 g/dL    Albumin 2 2 (L) 3 5 - 5 0 g/dL    Total Bilirubin 0 45 0 20 - 1 00 mg/dL    eGFR 82 ml/min/1 73sq m   Magnesium   Result Value Ref Range    Magnesium 1 7 1 6 - 2 6 mg/dL   Phosphorus   Result Value Ref Range    Phosphorus 4 2 (H) 2 3 - 4 1 mg/dL   Protime-INR   Result Value Ref Range    Protime 15 6 (H) 11 6 - 14 5 seconds    INR 1 21 (H) 0 84 - 1 19   Calcium, ionized   Result Value Ref Range    Calcium, Ionized 1 15 1 12 - 1 32 mmol/L   Hemoglobin A1C   Result Value Ref Range    Hemoglobin A1C 4 9 Normal 3 8-5 6%; PreDiabetic 5 7-6 4%;  Diabetic >=6 5%; Glycemic control for adults with diabetes <7 0% %    EAG 94 mg/dl   Lipid panel   Result Value Ref Range    Cholesterol 106 See Comment mg/dL    Triglycerides 32 See Comment mg/dL    HDL, Direct 63 >=40 mg/dL    LDL Calculated 37 0 - 100 mg/dL    Non-HDL-Chol (CHOL-HDL) 43 mg/dl   EBV acute panel   Result Value Ref Range    EBV VCA IgG <18 0 0 0 - 17 9 U/mL    EBV VCA IgM <36 0 0 0 - 35 9 U/mL    EBV Nuclear Ag Ab <18 0 0 0 - 17 9 U/mL    INTERPRETATION Comment    Blood gas, arterial   Result Value Ref Range    pH, Arterial 7 469 (H) 7 350 - 7 450    pCO2, Arterial 22 0 (LL) 36 0 - 44 0 mm Hg    pO2, Arterial 87 6 75 0 - 129 0 mm Hg    HCO3, Arterial 15 6 (L) 22 0 - 28 0 mmol/L    Base Excess, Arterial -6 5 mmol/L    O2 Content, Arterial 14 0 (L) 16 0 - 23 0 mL/dL    O2 HGB,Arterial  95 6 94 0 - 97 0 %    SOURCE Radial, Right     DANA TEST Yes     Non Vent Type- HFNC HFNC Flow     NV HFNC FIO2 36     HFNC FLOW LPM 20    Basic metabolic panel   Result Value Ref Range    Sodium 131 (L) 135 - 147 mmol/L    Potassium 3 7 3 5 - 5 3 mmol/L    Chloride 100 96 - 108 mmol/L    CO2 21 21 - 32 mmol/L    ANION GAP 10 4 - 13 mmol/L    BUN 23 5 - 25 mg/dL    Creatinine 1 06 0 60 - 1 30 mg/dL    Glucose 190 (H) 65 - 140 mg/dL    Calcium 7 7 (L) 8 3 - 10 1 mg/dL    eGFR 72 ml/min/1 73sq m   CMV IgG/IgM Antibodies   Result Value Ref Range    CMV IGG <0 60 0 00 - 0 59 U/mL    CMV IgM <30 0 0 0 - 29 9 AU/mL   Prolactin   Result Value Ref Range Prolactin 8 2 2 5 - 17 4 ng/mL   Blood gas, arterial   Result Value Ref Range    pH, Arterial 7 488 (H) 7 350 - 7 450    pCO2, Arterial 25 6 (LL) 36 0 - 44 0 mm Hg    pO2, Arterial 81 4 75 0 - 129 0 mm Hg    HCO3, Arterial 19 0 (L) 22 0 - 28 0 mmol/L    Base Excess, Arterial -3 2 mmol/L    O2 Content, Arterial 14 6 (L) 16 0 - 23 0 mL/dL    O2 HGB,Arterial  95 5 94 0 - 97 0 %    SOURCE Radial, Right     DANA TEST Yes     Non Vent Type- HFNC HFNC Flow     NV HFNC FIO2 25     HFNC FLOW LPM 36    Basic metabolic panel   Result Value Ref Range    Sodium 133 (L) 135 - 147 mmol/L    Potassium 3 4 (L) 3 5 - 5 3 mmol/L    Chloride 102 96 - 108 mmol/L    CO2 20 (L) 21 - 32 mmol/L    ANION GAP 11 4 - 13 mmol/L    BUN 21 5 - 25 mg/dL    Creatinine 1 00 0 60 - 1 30 mg/dL    Glucose 196 (H) 65 - 140 mg/dL    Calcium 7 7 (L) 8 3 - 10 1 mg/dL    eGFR 78 ml/min/1 73sq m   Phosphorus   Result Value Ref Range    Phosphorus 2 3 2 3 - 4 1 mg/dL   Magnesium   Result Value Ref Range    Magnesium 2 1 1 6 - 2 6 mg/dL   CBC and differential   Result Value Ref Range    WBC 11 22 (H) 4 31 - 10 16 Thousand/uL    RBC 2 92 (L) 3 88 - 5 62 Million/uL    Hemoglobin 9 1 (L) 12 0 - 17 0 g/dL    Hematocrit 25 8 (L) 36 5 - 49 3 %    MCV 88 82 - 98 fL    MCH 31 2 26 8 - 34 3 pg    MCHC 35 3 31 4 - 37 4 g/dL    RDW 13 5 11 6 - 15 1 %    MPV 8 9 8 9 - 12 7 fL    Platelets 810 029 - 762 Thousands/uL   Basic metabolic panel   Result Value Ref Range    Sodium 132 (L) 135 - 147 mmol/L    Potassium 3 2 (L) 3 5 - 5 3 mmol/L    Chloride 104 96 - 108 mmol/L    CO2 20 (L) 21 - 32 mmol/L    ANION GAP 8 4 - 13 mmol/L    BUN 15 5 - 25 mg/dL    Creatinine 0 83 0 60 - 1 30 mg/dL    Glucose 196 (H) 65 - 140 mg/dL    Calcium 8 2 (L) 8 3 - 10 1 mg/dL    eGFR 91 ml/min/1 73sq m   Iron Saturation %   Result Value Ref Range    Iron Saturation 22 20 - 50 %    TIBC 184 (L) 250 - 450 ug/dL    Iron 41 (L) 65 - 175 ug/dL   Ferritin   Result Value Ref Range    Ferritin 425 (H) 8 - 388 ng/mL   Phosphorus   Result Value Ref Range    Phosphorus 2 1 (L) 2 3 - 4 1 mg/dL   Magnesium   Result Value Ref Range    Magnesium 2 1 1 6 - 2 6 mg/dL   Basic metabolic panel   Result Value Ref Range    Sodium 138 135 - 147 mmol/L    Potassium 3 5 3 5 - 5 3 mmol/L    Chloride 109 (H) 96 - 108 mmol/L    CO2 20 (L) 21 - 32 mmol/L    ANION GAP 9 4 - 13 mmol/L    BUN 15 5 - 25 mg/dL    Creatinine 0 80 0 60 - 1 30 mg/dL    Glucose 128 65 - 140 mg/dL    Calcium 8 7 8 3 - 10 1 mg/dL    eGFR 93 ml/min/1 73sq m   CBC and differential   Result Value Ref Range    WBC 7 64 4 31 - 10 16 Thousand/uL    RBC 2 94 (L) 3 88 - 5 62 Million/uL    Hemoglobin 9 1 (L) 12 0 - 17 0 g/dL    Hematocrit 26 9 (L) 36 5 - 49 3 %    MCV 92 82 - 98 fL    MCH 31 0 26 8 - 34 3 pg    MCHC 33 8 31 4 - 37 4 g/dL    RDW 14 5 11 6 - 15 1 %    MPV 9 2 8 9 - 12 7 fL    Platelets 812 (L) 281 - 390 Thousands/uL    nRBC 0 /100 WBCs    Neutrophils Relative 75 43 - 75 %    Immat GRANS % 1 0 - 2 %    Lymphocytes Relative 11 (L) 14 - 44 %    Monocytes Relative 13 (H) 4 - 12 %    Eosinophils Relative 0 0 - 6 %    Basophils Relative 0 0 - 1 %    Neutrophils Absolute 5 78 1 85 - 7 62 Thousands/µL    Immature Grans Absolute 0 05 0 00 - 0 20 Thousand/uL    Lymphocytes Absolute 0 81 0 60 - 4 47 Thousands/µL    Monocytes Absolute 0 99 0 17 - 1 22 Thousand/µL    Eosinophils Absolute 0 00 0 00 - 0 61 Thousand/µL    Basophils Absolute 0 01 0 00 - 0 10 Thousands/µL   CBC and differential   Result Value Ref Range    WBC 8 14 4 31 - 10 16 Thousand/uL    RBC 2 85 (L) 3 88 - 5 62 Million/uL    Hemoglobin 8 7 (L) 12 0 - 17 0 g/dL    Hematocrit 26 4 (L) 36 5 - 49 3 %    MCV 93 82 - 98 fL    MCH 30 5 26 8 - 34 3 pg    MCHC 33 0 31 4 - 37 4 g/dL    RDW 14 6 11 6 - 15 1 %    MPV 9 4 8 9 - 12 7 fL    Platelets 139 (L) 562 - 390 Thousands/uL    nRBC 0 /100 WBCs    Neutrophils Relative 69 43 - 75 %    Immat GRANS % 2 0 - 2 %    Lymphocytes Relative 15 14 - 44 %    Monocytes Relative 13 (H) 4 - 12 %    Eosinophils Relative 1 0 - 6 %    Basophils Relative 0 0 - 1 %    Neutrophils Absolute 5 66 1 85 - 7 62 Thousands/µL    Immature Grans Absolute 0 12 0 00 - 0 20 Thousand/uL    Lymphocytes Absolute 1 23 0 60 - 4 47 Thousands/µL    Monocytes Absolute 1 04 0 17 - 1 22 Thousand/µL    Eosinophils Absolute 0 07 0 00 - 0 61 Thousand/µL    Basophils Absolute 0 02 0 00 - 0 10 Thousands/µL   Basic metabolic panel   Result Value Ref Range    Sodium 140 135 - 147 mmol/L    Potassium 3 6 3 5 - 5 3 mmol/L    Chloride 111 (H) 96 - 108 mmol/L    CO2 23 21 - 32 mmol/L    ANION GAP 6 4 - 13 mmol/L    BUN 20 5 - 25 mg/dL    Creatinine 0 89 0 60 - 1 30 mg/dL    Glucose 101 65 - 140 mg/dL    Calcium 8 8 8 3 - 10 1 mg/dL    eGFR 89 ml/min/1 73sq m   Magnesium   Result Value Ref Range    Magnesium 2 0 1 6 - 2 6 mg/dL   Phosphorus   Result Value Ref Range    Phosphorus 2 3 2 3 - 4 1 mg/dL   Procalcitonin   Result Value Ref Range    Procalcitonin 0 07 <=0 25 ng/ml   Protime-INR   Result Value Ref Range    Protime 14 8 (H) 11 6 - 14 5 seconds    INR 1 14 0 84 - 1 19   Hemoglobin and hematocrit, blood   Result Value Ref Range    Hemoglobin 9 4 (L) 12 0 - 17 0 g/dL    Hematocrit 27 9 (L) 36 5 - 49 3 %   CBC and differential   Result Value Ref Range    WBC 6 64 4 31 - 10 16 Thousand/uL    RBC 2 91 (L) 3 88 - 5 62 Million/uL    Hemoglobin 8 8 (L) 12 0 - 17 0 g/dL    Hematocrit 27 3 (L) 36 5 - 49 3 %    MCV 94 82 - 98 fL    MCH 30 2 26 8 - 34 3 pg    MCHC 32 2 31 4 - 37 4 g/dL    RDW 14 6 11 6 - 15 1 %    MPV 10 4 8 9 - 12 7 fL    Platelets 289 (L) 043 - 390 Thousands/uL    nRBC 0 /100 WBCs    Neutrophils Relative 64 43 - 75 %    Immat GRANS % 2 0 - 2 %    Lymphocytes Relative 16 14 - 44 %    Monocytes Relative 12 4 - 12 %    Eosinophils Relative 5 0 - 6 %    Basophils Relative 1 0 - 1 %    Neutrophils Absolute 4 26 1 85 - 7 62 Thousands/µL    Immature Grans Absolute 0 13 0 00 - 0 20 Thousand/uL    Lymphocytes Absolute 1  07 0 60 - 4 47 Thousands/µL    Monocytes Absolute 0 82 0 17 - 1 22 Thousand/µL    Eosinophils Absolute 0 33 0 00 - 0 61 Thousand/µL    Basophils Absolute 0 03 0 00 - 0 10 Thousands/µL   Comprehensive metabolic panel   Result Value Ref Range    Sodium 142 135 - 147 mmol/L    Potassium 3 2 (L) 3 5 - 5 3 mmol/L    Chloride 111 (H) 96 - 108 mmol/L    CO2 22 21 - 32 mmol/L    ANION GAP 9 4 - 13 mmol/L    BUN 16 5 - 25 mg/dL    Creatinine 0 74 0 60 - 1 30 mg/dL    Glucose 98 65 - 140 mg/dL    Calcium 8 3 8 3 - 10 1 mg/dL    Corrected Calcium 9 7 8 3 - 10 1 mg/dL    AST 13 5 - 45 U/L    ALT 16 12 - 78 U/L    Alkaline Phosphatase 112 46 - 116 U/L    Total Protein 5 7 (L) 6 4 - 8 4 g/dL    Albumin 2 2 (L) 3 5 - 5 0 g/dL    Total Bilirubin 0 42 0 20 - 1 00 mg/dL    eGFR 96 ml/min/1 73sq m   Lead, blood   Result Value Ref Range    Lead 4 4 (H) 0 0 - 3 4 ug/dL   Magnesium   Result Value Ref Range    Magnesium 2 0 1 6 - 2 6 mg/dL   Phosphorus   Result Value Ref Range    Phosphorus 3 3 2 3 - 4 1 mg/dL   CBC and Platelet   Result Value Ref Range    WBC 7 70 4 31 - 10 16 Thousand/uL    RBC 3 09 (L) 3 88 - 5 62 Million/uL    Hemoglobin 9 5 (L) 12 0 - 17 0 g/dL    Hematocrit 29 2 (L) 36 5 - 49 3 %    MCV 95 82 - 98 fL    MCH 30 7 26 8 - 34 3 pg    MCHC 32 5 31 4 - 37 4 g/dL    RDW 15 0 11 6 - 15 1 %    Platelets 614 210 - 208 Thousands/uL    MPV 10 5 8 9 - 12 7 fL   Phosphorus   Result Value Ref Range    Phosphorus 3 3 2 3 - 4 1 mg/dL   Magnesium   Result Value Ref Range    Magnesium 1 9 1 6 - 2 6 mg/dL   CBC and differential   Result Value Ref Range    WBC 6 81 4 31 - 10 16 Thousand/uL    RBC 3 37 (L) 3 88 - 5 62 Million/uL    Hemoglobin 10 4 (L) 12 0 - 17 0 g/dL    Hematocrit 32 3 (L) 36 5 - 49 3 %    MCV 96 82 - 98 fL    MCH 30 9 26 8 - 34 3 pg    MCHC 32 2 31 4 - 37 4 g/dL    RDW 15 2 (H) 11 6 - 15 1 %    MPV 10 8 8 9 - 12 7 fL    Platelets 222 907 - 075 Thousands/uL    nRBC 0 /100 WBCs    Neutrophils Relative 67 43 - 75 % Immat GRANS % 1 0 - 2 %    Lymphocytes Relative 18 14 - 44 %    Monocytes Relative 9 4 - 12 %    Eosinophils Relative 5 0 - 6 %    Basophils Relative 0 0 - 1 %    Neutrophils Absolute 4 52 1 85 - 7 62 Thousands/µL    Immature Grans Absolute 0 07 0 00 - 0 20 Thousand/uL    Lymphocytes Absolute 1 21 0 60 - 4 47 Thousands/µL    Monocytes Absolute 0 63 0 17 - 1 22 Thousand/µL    Eosinophils Absolute 0 35 0 00 - 0 61 Thousand/µL    Basophils Absolute 0 03 0 00 - 0 10 Thousands/µL   Basic metabolic panel   Result Value Ref Range    Sodium 141 135 - 147 mmol/L    Potassium 3 4 (L) 3 5 - 5 3 mmol/L    Chloride 110 (H) 96 - 108 mmol/L    CO2 24 21 - 32 mmol/L    ANION GAP 7 4 - 13 mmol/L    BUN 16 5 - 25 mg/dL    Creatinine 0 67 0 60 - 1 30 mg/dL    Glucose 96 65 - 140 mg/dL    Calcium 8 3 8 3 - 10 1 mg/dL    eGFR 100 ml/min/1 73sq m   ECG 12 lead   Result Value Ref Range    Ventricular Rate 62 BPM    Atrial Rate 62 BPM    VA Interval 174 ms    QRSD Interval 100 ms    QT Interval 504 ms    QTC Interval 511 ms    P Axis 89 degrees    QRS Axis 2 degrees    T Wave [place] 53 degrees   ECG 12 lead   Result Value Ref Range    Ventricular Rate 84 BPM    Atrial Rate 84 BPM    VA Interval 152 ms    QRSD Interval 102 ms    QT Interval 440 ms    QTC Interval 519 ms    P [place] 14 degrees    QRS Axis -8 degrees    T Wave Axis -68 degrees   ECG 12 lead   Result Value Ref Range    Ventricular Rate 59 BPM    Atrial Rate 59 BPM    VA Interval 154 ms    QRSD Interval 96 ms    QT Interval 432 ms    QTC Interval 427 ms    P [place] 44 degrees    QRS Axis 9 degrees    T Wave Axis 6 degrees   Echo complete w/ contrast if indicated   Result Value Ref Range    LA size 3 7 cm    Triscuspid Valve Regurgitation Peak Gradient 33 0 mmHg    Tricuspid valve peak regurgitation velocity 2 87 m/s    LVPWd 1 00 cm    MV E' Tissue Velocity Septal 14 cm/s    Tricuspid annular plane systolic excursion 8 59 cm    TR Peak Darnell 2 9 m/s    Right Ventricular Peak Systolic Pressure 41 00 mmHg    IVSd 9 58 cm    LV DIASTOLIC VOLUME (MOD BIPLANE) 2D 129 mL    LEFT VENTRICLE SYSTOLIC VOLUME (MOD BIPLANE) 2D 50 mL    Left ventricular stroke volume (2D) 80 00 mL    A4C EF 71 %    LVIDd 5 20 cm    IVS 0 8 cm    LVIDS 3 50 cm    FS 33 28 - 44 %    Ao root 3 10 cm    RVID d 3 5 cm    Est  RA pres 8 0 mmHg    MV valve area p 1/2 method 3 33 cm2    E wave deceleration time 226 ms    AV peak gradient 8 mmHg    MV Peak E Darnell 104 cm/s    MV Peak A Darnell 1 18 m/s    AURY A4C 18 2 cm2    RAA A4C 18 3 cm2    MV stenosis pressure 1/2 time 66 ms    LVSV, 2D 80 mL    LV EF 65    Fingerstick Glucose (POCT)   Result Value Ref Range    POC Glucose 71 65 - 140 mg/dl   Fingerstick Glucose (POCT)   Result Value Ref Range    POC Glucose 79 65 - 140 mg/dl   Fingerstick Glucose (POCT)   Result Value Ref Range    POC Glucose 169 (H) 65 - 140 mg/dl   Manual Differential(PHLEBS Do Not Order)   Result Value Ref Range    Segmented % 92 (H) 43 - 75 %    Bands % 6 0 - 8 %    Lymphocytes % 0 (L) 14 - 44 %    Monocytes % 2 (L) 4 - 12 %    Eosinophils, % 0 0 - 6 %    Basophils % 0 0 - 1 %    Absolute Neutrophils 12 75 (H) 1 85 - 7 62 Thousand/uL    Lymphocytes Absolute 0 00 (L) 0 60 - 4 47 Thousand/uL    Monocytes Absolute 0 26 0 00 - 1 22 Thousand/uL    Eosinophils Absolute 0 00 0 00 - 0 40 Thousand/uL    Basophils Absolute 0 00 0 00 - 0 10 Thousand/uL    Total Counted      Platelet Estimate Adequate Adequate     ·     Incidental Findings:    CT scan : Large predominantly sclerotic bony lesion is demonstrated at the level of the central sacrum  It has enlarged compared to the previous exam and now measures approximately 7 9 x 5 3 cm compared to 5 x 3 8 cm  Sclerotic bony lesion in the left pedicle at T11 is unchanged      Test Results Pending at Discharge (will require follow up):   · none     Outpatient Tests Requested:  · none    Complications:  none    Reason for Admission:  Acute metabolic encephalopathy on top of septic shock and alcohol withdrawal     Hospital Course:     Ema Horowitz is a 78 y o  male patient who originally presented to the hospital on 11/23/2022 via EMS in a coma state with acute metabolic encephalopathy on top of septic shock  During hospitalization patient was noted on day 2 to have myoclonic movements involving his extremities  Due to lack of family history was insufficient on the chart and medical records were limited  Attempt was made to contact friends and they mentioned that the patient is a daily alcohol drinker  Concern was raised regarding alcohol withdrawal/delirium tremens  Patient was placed on CIWA protocol and medical toxicology team were consulted  Patient started regaining consciousness accordingly and continued 2 courses of IV antibiotics for complicated pneumonia  At time of discharge the patient is alert and oriented x3 at baseline state of alertness and orientation  Given history of alcohol use disorder/chronic smoker and the recent hospitalization the patient will need to be placed in rehab center/nursing facility at this time  Please see above list of diagnoses and related plan for additional information  Condition at Discharge: good     Discharge Day Visit / Exam:     Subjective:  Patient was seen today at bedside  Does not have any active complaints  He is tolerating oral diet  Stooling and voiding accordingly  No nausea or vomiting, chest pain or tightness, abdominal pain or tenderness, diarrhea constipation, dizziness or lightheadedness  At bedside the patient is noted to have bilateral upper and lower extremity swelling limited to the hands and ankles   Plus one, pitting  Nontender palpation      Vitals: Blood Pressure: 140/74 (12/01/22 0754)  Pulse: 66 (12/01/22 0754)  Temperature: 97 9 °F (36 6 °C) (11/30/22 2155)  Temp Source: Oral (11/30/22 2155)  Respirations: 16 (12/01/22 0754)  Height: 5' 9" (175 3 cm) (11/25/22 0725)  Weight - Scale: 89 kg (196 lb 3 4 oz) (22 0600)  SpO2: 100 % (22 0754)  Exam:   Physical Exam  Constitutional:       General: He is not in acute distress  Appearance: Normal appearance  He is not ill-appearing  HENT:      Nose: Nose normal  No congestion  Mouth/Throat:      Mouth: Mucous membranes are moist       Pharynx: Oropharynx is clear  No oropharyngeal exudate  Eyes:      Conjunctiva/sclera: Conjunctivae normal       Pupils: Pupils are equal, round, and reactive to light  Cardiovascular:      Rate and Rhythm: Normal rate and regular rhythm  Pulses: Normal pulses  Radial pulses are 2+ on the right side and 2+ on the left side  Posterior tibial pulses are 2+ on the right side and 2+ on the left side  Heart sounds: Normal heart sounds, S1 normal and S2 normal  No murmur heard  Pulmonary:      Effort: Pulmonary effort is normal  No respiratory distress  Breath sounds: Normal breath sounds  No stridor  No wheezing, rhonchi or rales  Abdominal:      General: Abdomen is flat  Bowel sounds are normal  There is no distension  Palpations: Abdomen is soft  Tenderness: There is no abdominal tenderness  There is no guarding  Musculoskeletal:         General: No swelling  Normal range of motion  Cervical back: Normal range of motion and neck supple  No rigidity or tenderness  Right lower le+ Pitting Edema present  Left lower le+ Pitting Edema present  Lymphadenopathy:      Cervical: No cervical adenopathy  Skin:     General: Skin is warm  Capillary Refill: Capillary refill takes less than 2 seconds  Neurological:      General: No focal deficit present  Mental Status: He is alert and oriented to person, place, and time  Mental status is at baseline  Psychiatric:         Mood and Affect: Mood normal          Behavior: Behavior normal          Discussion with Family:  Friends at bedside      Discharge instructions/Information to patient and family:   See after visit summary for information provided to patient and family  Provisions for Follow-Up Care:  See after visit summary for information related to follow-up care and any pertinent home health orders  Disposition:     [facility] to [facility]:   · Not Applicable to this Patient - Not Applicable to this Patient    Planned Readmission: none     Discharge Statement:  I spent 60 minutes discharging the patient  This time was spent on the day of discharge  I had direct contact with the patient on the day of discharge  Greater than 50% of the total time was spent examining patient, answering all patient questions, arranging and discussing plan of care with patient as well as directly providing post-discharge instructions  Additional time then spent on discharge activities  Discharge Medications:  See after visit summary for reconciled discharge medications provided to patient and family        ** Please Note: This note has been constructed using a voice recognition system **

## 2022-12-01 NOTE — PLAN OF CARE
Problem: MOBILITY - ADULT  Goal: Maintain or return to baseline ADL function  Description: INTERVENTIONS:  -  Assess patient's ability to carry out ADLs; assess patient's baseline for ADL function and identify physical deficits which impact ability to perform ADLs (bathing, care of mouth/teeth, toileting, grooming, dressing, etc )  - Assess/evaluate cause of self-care deficits   - Assess range of motion  - Assess patient's mobility; develop plan if impaired  - Assess patient's need for assistive devices and provide as appropriate  - Encourage maximum independence but intervene and supervise when necessary  - Involve family in performance of ADLs  - Assess for home care needs following discharge   - Consider OT consult to assist with ADL evaluation and planning for discharge  - Provide patient education as appropriate  Outcome: Progressing  Goal: Maintains/Returns to pre admission functional level  Description: INTERVENTIONS:  - Perform BMAT or MOVE assessment daily    - Set and communicate daily mobility goal to care team and patient/family/caregiver  - Collaborate with rehabilitation services on mobility goals if consulted  - Perform Range of Motion multiple times a day  - Reposition patient every 2 hours    - Dangle patient multiple times a day  - Stand patient multiple times a day  - Ambulate patient multiple times a day  - Out of bed to chair 4 times a day   - Out of bed for meals 3 times a day  - Out of bed for toileting  - Record patient progress and toleration of activity level   Outcome: Progressing     Problem: Prexisting or High Potential for Compromised Skin Integrity  Goal: Skin integrity is maintained or improved  Description: INTERVENTIONS:  - Identify patients at risk for skin breakdown  - Assess and monitor skin integrity  - Assess and monitor nutrition and hydration status  - Monitor labs   - Assess for incontinence   - Turn and reposition patient  - Assist with mobility/ambulation  - Relieve pressure over bony prominences  - Avoid friction and shearing  - Provide appropriate hygiene as needed including keeping skin clean and dry  - Evaluate need for skin moisturizer/barrier cream  - Collaborate with interdisciplinary team   - Patient/family teaching  - Consider wound care consult   Outcome: Progressing     Problem: PAIN - ADULT  Goal: Verbalizes/displays adequate comfort level or baseline comfort level  Description: Interventions:  - Encourage patient to monitor pain and request assistance  - Assess pain using appropriate pain scale  - Administer analgesics based on type and severity of pain and evaluate response  - Implement non-pharmacological measures as appropriate and evaluate response  - Consider cultural and social influences on pain and pain management  - Notify physician/advanced practitioner if interventions unsuccessful or patient reports new pain  Outcome: Progressing     Problem: INFECTION - ADULT  Goal: Absence or prevention of progression during hospitalization  Description: INTERVENTIONS:  - Assess and monitor for signs and symptoms of infection  - Monitor lab/diagnostic results  - Monitor all insertion sites, i e  indwelling lines, tubes, and drains  - Monitor endotracheal if appropriate and nasal secretions for changes in amount and color  - [place] appropriate cooling/warming therapies per order  - Administer medications as ordered  - Instruct and encourage patient and family to use good hand hygiene technique  - Identify and instruct in appropriate isolation precautions for identified infection/condition  Outcome: Progressing  Goal: Absence of fever/infection during neutropenic period  Description: INTERVENTIONS:  - Monitor WBC    Outcome: Progressing     Problem: SAFETY ADULT  Goal: Maintain or return to baseline ADL function  Description: INTERVENTIONS:  -  Assess patient's ability to carry out ADLs; assess patient's baseline for ADL function and identify physical deficits which impact ability to perform ADLs (bathing, care of mouth/teeth, toileting, grooming, dressing, etc )  - Assess/evaluate cause of self-care deficits   - Assess range of motion  - Assess patient's mobility; develop plan if impaired  - Assess patient's need for assistive devices and provide as appropriate  - Encourage maximum independence but intervene and supervise when necessary  - Involve family in performance of ADLs  - Assess for home care needs following discharge   - Consider OT consult to assist with ADL evaluation and planning for discharge  - Provide patient education as appropriate  Outcome: Progressing  Goal: Maintains/Returns to pre admission functional level  Description: INTERVENTIONS:  - Perform BMAT or MOVE assessment daily    - Set and communicate daily mobility goal to care team and patient/family/caregiver  - Collaborate with rehabilitation services on mobility goals if consulted  - Perform Range of Motion multiple times a day  - Reposition patient every 2 hours    - Dangle patient multiple times a day  - Stand patient multiple times a day  - Ambulate patient multiple times a day  - Out of bed to chair 4 times a day   - Out of bed for meals 3 times a day  - Out of bed for toileting  - Record patient progress and toleration of activity level   Outcome: Progressing  Goal: Patient will remain free of falls  Description: INTERVENTIONS:  - Educate patient/family on patient safety including physical limitations  - Instruct patient to call for assistance with activity   - Consult OT/PT to assist with strengthening/mobility   - Keep Call bell within reach  - Keep bed low and locked with side rails adjusted as appropriate  - Keep care items and personal belongings within reach  - Initiate and maintain comfort rounds  - Make Fall Risk Sign visible to staff  - Apply yellow socks and bracelet for high fall risk patients  - Consider moving patient to room near nurses station  Outcome: Progressing     Problem: DISCHARGE PLANNING  Goal: Discharge to home or other facility with appropriate resources  Description: INTERVENTIONS:  - Identify barriers to discharge w/patient and caregiver  - Arrange for needed discharge resources and transportation as appropriate  - Identify discharge learning needs (meds, wound care, etc )  - Arrange for interpretive services to assist at discharge as needed  - Refer to Case Management Department for coordinating discharge planning if the patient needs post-hospital services based on physician/advanced practitioner order or complex needs related to functional status, cognitive ability, or social support system  Outcome: Progressing     Problem: Knowledge Deficit  Goal: Patient/family/caregiver demonstrates understanding of disease process, treatment plan, medications, and discharge instructions  Description: Complete learning assessment and assess knowledge base  Interventions:  - Provide teaching at level of understanding  - Provide teaching via preferred learning methods  Outcome: Progressing     Problem: Nutrition/Hydration-ADULT  Goal: Nutrient/Hydration intake appropriate for improving, restoring or maintaining nutritional needs  Description: Monitor and assess patient's nutrition/hydration status for malnutrition  Collaborate with interdisciplinary team and initiate plan and interventions as ordered  Monitor patient's weight and dietary intake as ordered or per policy  Utilize nutrition screening tool and intervene as necessary  Determine patient's food preferences and provide high-protein, high-caloric foods as appropriate       INTERVENTIONS:  - Monitor oral intake, urinary output, labs, and treatment plans  - Assess nutrition and hydration status and recommend course of action  - Evaluate amount of meals eaten  - Assist patient with eating if necessary   - Allow adequate time for meals  - Recommend/ encourage appropriate diets, oral nutritional supplements, and vitamin/mineral supplements  - Order, calculate, and assess calorie counts as needed  - Recommend, monitor, and adjust tube feedings and TPN/PPN based on assessed needs  - Assess need for intravenous fluids  - Provide specific nutrition/hydration education as appropriate  - Include patient/family/caregiver in decisions related to nutrition  Outcome: Progressing     Problem: Potential for Falls  Goal: Patient will remain free of falls  Description: INTERVENTIONS:  - Educate patient/family on patient safety including physical limitations  - Instruct patient to call for assistance with activity   - Consult OT/PT to assist with strengthening/mobility   - Keep Call bell within reach  - Keep bed low and locked with side rails adjusted as appropriate  - Keep care items and personal belongings within reach  - Initiate and maintain comfort rounds  - Make Fall Risk Sign visible to staff  - Apply yellow socks and bracelet for high fall risk patients  - Consider moving patient to room near nurses station  Outcome: Progressing

## 2022-12-01 NOTE — PLAN OF CARE
Problem: MOBILITY - ADULT  Goal: Maintain or return to baseline ADL function  Description: INTERVENTIONS:  -  Assess patient's ability to carry out ADLs; assess patient's baseline for ADL function and identify physical deficits which impact ability to perform ADLs (bathing, care of mouth/teeth, toileting, grooming, dressing, etc )  - Assess/evaluate cause of self-care deficits   - Assess range of motion  - Assess patient's mobility; develop plan if impaired  - Assess patient's need for assistive devices and provide as appropriate  - Encourage maximum independence but intervene and supervise when necessary  - Involve family in performance of ADLs  - Assess for home care needs following discharge   - Consider OT consult to assist with ADL evaluation and planning for discharge  - Provide patient education as appropriate  Outcome: Progressing  Goal: Maintains/Returns to pre admission functional level  Description: INTERVENTIONS:  - Perform BMAT or MOVE assessment daily    - Set and communicate daily mobility goal to care team and patient/family/caregiver  - Collaborate with rehabilitation services on mobility goals if consulted  - Perform Range of Motion multiple times a day  - Reposition patient every 2 hours    - Dangle patient multiple times a day  - Stand patient multiple times a day  - Ambulate patient multiple times a day  - Out of bed to chair 4 times a day   - Out of bed for meals 3 times a day  - Out of bed for toileting  - Record patient progress and toleration of activity level   Outcome: Progressing     Problem: Prexisting or High Potential for Compromised Skin Integrity  Goal: Skin integrity is maintained or improved  Description: INTERVENTIONS:  - Identify patients at risk for skin breakdown  - Assess and monitor skin integrity  - Assess and monitor nutrition and hydration status  - Monitor labs   - Assess for incontinence   - Turn and reposition patient  - Assist with mobility/ambulation  - Relieve pressure over bony prominences  - Avoid friction and shearing  - Provide appropriate hygiene as needed including keeping skin clean and dry  - Evaluate need for skin moisturizer/barrier cream  - Collaborate with interdisciplinary team   - Patient/family teaching  - Consider wound care consult   Outcome: Progressing     Problem: PAIN - ADULT  Goal: Verbalizes/displays adequate comfort level or baseline comfort level  Description: Interventions:  - Encourage patient to monitor pain and request assistance  - Assess pain using appropriate pain scale  - Administer analgesics based on type and severity of pain and evaluate response  - Implement non-pharmacological measures as appropriate and evaluate response  - Consider cultural and social influences on pain and pain management  - Notify physician/advanced practitioner if interventions unsuccessful or patient reports new pain  Outcome: Progressing     Problem: INFECTION - ADULT  Goal: Absence or prevention of progression during hospitalization  Description: INTERVENTIONS:  - Assess and monitor for signs and symptoms of infection  - Monitor lab/diagnostic results  - Monitor all insertion sites, i e  indwelling lines, tubes, and drains  - Monitor endotracheal if appropriate and nasal secretions for changes in amount and color  - [place] appropriate cooling/warming therapies per order  - Administer medications as ordered  - Instruct and encourage patient and family to use good hand hygiene technique  - Identify and instruct in appropriate isolation precautions for identified infection/condition  Outcome: Progressing  Goal: Absence of fever/infection during neutropenic period  Description: INTERVENTIONS:  - Monitor WBC    Outcome: Progressing     Problem: SAFETY ADULT  Goal: Maintain or return to baseline ADL function  Description: INTERVENTIONS:  -  Assess patient's ability to carry out ADLs; assess patient's baseline for ADL function and identify physical deficits which impact ability to perform ADLs (bathing, care of mouth/teeth, toileting, grooming, dressing, etc )  - Assess/evaluate cause of self-care deficits   - Assess range of motion  - Assess patient's mobility; develop plan if impaired  - Assess patient's need for assistive devices and provide as appropriate  - Encourage maximum independence but intervene and supervise when necessary  - Involve family in performance of ADLs  - Assess for home care needs following discharge   - Consider OT consult to assist with ADL evaluation and planning for discharge  - Provide patient education as appropriate  Outcome: Progressing  Goal: Maintains/Returns to pre admission functional level  Description: INTERVENTIONS:  - Perform BMAT or MOVE assessment daily    - Set and communicate daily mobility goal to care team and patient/family/caregiver  - Collaborate with rehabilitation services on mobility goals if consulted  - Perform Range of Motion multiple times a day  - Reposition patient every 2 hours    - Dangle patient multiple times a day  - Stand patient multiple times a day  - Ambulate patient multiple times a day  - Out of bed to chair 4 times a day   - Out of bed for meals 3 times a day  - Out of bed for toileting  - Record patient progress and toleration of activity level   Outcome: Progressing  Goal: Patient will remain free of falls  Description: INTERVENTIONS:  - Educate patient/family on patient safety including physical limitations  - Instruct patient to call for assistance with activity   - Consult OT/PT to assist with strengthening/mobility   - Keep Call bell within reach  - Keep bed low and locked with side rails adjusted as appropriate  - Keep care items and personal belongings within reach  - Initiate and maintain comfort rounds  - Make Fall Risk Sign visible to staff  - Offer Toileting every 2 Hours, in advance of need  - Initiate/Maintain bed/chair alarm  - Obtain necessary fall risk management equipment: as needed  - Apply yellow socks and bracelet for high fall risk patients  - Consider moving patient to room near nurses station  Outcome: Progressing     Problem: DISCHARGE PLANNING  Goal: Discharge to home or other facility with appropriate resources  Description: INTERVENTIONS:  - Identify barriers to discharge w/patient and caregiver  - Arrange for needed discharge resources and transportation as appropriate  - Identify discharge learning needs (meds, wound care, etc )  - Arrange for interpretive services to assist at discharge as needed  - Refer to Case Management Department for coordinating discharge planning if the patient needs post-hospital services based on physician/advanced practitioner order or complex needs related to functional status, cognitive ability, or social support system  Outcome: Progressing     Problem: Knowledge Deficit  Goal: Patient/family/caregiver demonstrates understanding of disease process, treatment plan, medications, and discharge instructions  Description: Complete learning assessment and assess knowledge base  Interventions:  - Provide teaching at level of understanding  - Provide teaching via preferred learning methods  Outcome: Progressing     Problem: Nutrition/Hydration-ADULT  Goal: Nutrient/Hydration intake appropriate for improving, restoring or maintaining nutritional needs  Description: Monitor and assess patient's nutrition/hydration status for malnutrition  Collaborate with interdisciplinary team and initiate plan and interventions as ordered  Monitor patient's weight and dietary intake as ordered or per policy  Utilize nutrition screening tool and intervene as necessary  Determine patient's food preferences and provide high-protein, high-caloric foods as appropriate       INTERVENTIONS:  - Monitor oral intake, urinary output, labs, and treatment plans  - Assess nutrition and hydration status and recommend course of action  - Evaluate amount of meals eaten  - Assist patient with eating if necessary   - Allow adequate time for meals  - Recommend/ encourage appropriate diets, oral nutritional supplements, and vitamin/mineral supplements  - Order, calculate, and assess calorie counts as needed  - Recommend, monitor, and adjust tube feedings and TPN/PPN based on assessed needs  - Assess need for intravenous fluids  - Provide specific nutrition/hydration education as appropriate  - Include patient/family/caregiver in decisions related to nutrition  Outcome: Progressing     Problem: Potential for Falls  Goal: Patient will remain free of falls  Description: INTERVENTIONS:  - Educate patient/family on patient safety including physical limitations  - Instruct patient to call for assistance with activity   - Consult OT/PT to assist with strengthening/mobility   - Keep Call bell within reach  - Keep bed low and locked with side rails adjusted as appropriate  - Keep care items and personal belongings within reach  - Initiate and maintain comfort rounds  - Make Fall Risk Sign visible to staff  - Offer Toileting every 2 Hours, in advance of need  - Initiate/Maintain bed/chair alarm  - Obtain necessary fall risk management equipment: as needed  - Apply yellow socks and bracelet for high fall risk patients  - Consider moving patient to room near nurses station  Outcome: Progressing

## 2022-12-01 NOTE — ASSESSMENT & PLAN NOTE
-fecal occult blood test 1/3 positive   -H and H trend was unremarkable   -no concerns for acute blood loss anemia at this time  Plan:  -follow-up with gastroenterology offices accordingly  -follow-up with PCP accordingly   -continue 3 month course of iron supplements accordingly

## 2022-12-01 NOTE — ASSESSMENT & PLAN NOTE
CT Ca P:  1  Bilateral pneumonia and pleural effusions  Blood cultures consistent with contaminant son admission    -completed 5 days of cefepime   -completed 9 days of Unasyn     -resolved  -follow-up with PCP accordingly

## 2022-12-01 NOTE — ASSESSMENT & PLAN NOTE
Background:  Social history as given by his close friends; daily alcohol drinker for the past 1 year at least   Smokes cigarettes I pack per day for same duration  Patient was Likely past alcohol withdrawal on admission, unknown down time at home  -IV thiamine supplement per toxicology, please see their plan of care  -IV folic acid supplement  -multivitamins ordered  Patient being discharged to nursing facility/rehab in a stable state

## 2022-12-01 NOTE — ASSESSMENT & PLAN NOTE
As noted during hospitalization  Diet modifications documented  Patient is being discharged to nursing facility/rehab  Diet recommendations documented in discharge noted

## 2022-12-01 NOTE — ASSESSMENT & PLAN NOTE
In the setting of coma and possible alcohol intoxication when was at home     -initial CT scan showing esophageal dilation  Tolerating diet  -follow-up with gastroenterology offices accordingly

## 2022-12-01 NOTE — CASE MANAGEMENT
Case Management Discharge Planning Note    Patient name Georges Marvin  Location [address] [id number]/[id number] MRN [id number]  : 1955 Date 2022       Current Admission Date: 2022  Current Admission Diagnosis:Metabolic encephalopathy   Patient Active Problem List    Diagnosis Date Noted   • Positive blood culture 2022   • Esophageal dilatation 2022   • History of GI bleed 2022   • Other dysphagia 2022   • Pneumonia 2022   • Alcohol withdrawal ([facility] [address] ) 2022   • Sacral lesion 2022   • Other specified anemias 2022   • Septic shock ([facility] [address] )    • Metabolic encephalopathy    • Pharyngeal swelling 2022   • GI bleed 2018   • NSTEMI (non-ST elevated myocardial infarction) ([facility] [address] ) 2018   • Multiple rib fractures 2018      LOS (days): 8  Geometric Mean LOS (GMLOS) (days): 5 00  Days to GMLOS:-2 8     OBJECTIVE:  Risk of Unplanned Readmission Score: 11 61         Current admission status: Inpatient   Preferred Pharmacy:   [address] [place] [id number]  Phone: [phone] Fax: [phone]    [facility]/pharmacy #[id number] [address]  Phone: [phone] Fax: [phone]    Primary Care Provider: Saira oBb DO    Primary Insurance: MEDICARE  Secondary Insurance: [employer]    DISCHARGE DETAILS:  Met with pt and he is aware of facilities who accepted him  Pt chose [facility]  Plans are for pt to be dc'd today to SNF

## 2022-12-01 NOTE — ASSESSMENT & PLAN NOTE
Incidental finding noted on CT scan and x-rays as following:    3  Sacral bony lesion has enlarged compared to the previous exam and is indeterminate  Plan:  Patient should follow-up with [facility] [address] team and/or PCP  Incidental note completed

## 2022-12-01 NOTE — ASSESSMENT & PLAN NOTE
Assessment and plan per Infectious Disease team     Positive blood culture  Blood cultures on admission with delayed growth of 1 set with lactobacillus and the other with micrococcus  I suspect that these organisms were isolated from the patient's skin and are likely contaminant given the manner in which the patient was found  2D echo without gross vegetation  No other devices appreciated on  imaging  -ID team on board; agreed on the following plan  -repeat blood cultures with no growth to date  Most likely contaminant on initial blood culture   -completed 5 day course of cefepime and 9 day course of Unasyn   -patient is being discharged in a stable state to nursing facility

## 2022-12-01 NOTE — CASE MANAGEMENT
Case Management Discharge Planning Note    Patient name Nancy Barr  Location [address] 163/137-16 MRN [id number]  : 1955 Date 2022       Current Admission Date: 2022  Current Admission Diagnosis:Pharyngeal swelling   Patient Active Problem List    Diagnosis Date Noted   • Hypertension 2022   • Esophageal dilatation 2022   • History of GI bleed 2022   • Other dysphagia 2022   • Sacral lesion 2022   • Other specified anemias 2022   • Pharyngeal swelling 2022   • GI bleed 2018   • NSTEMI (non-ST elevated myocardial infarction) ([facility] [address] ) 2018   • Multiple rib fractures 2018      LOS (days): 8  Geometric Mean LOS (GMLOS) (days): 5 00  Days to GMLOS:-2 9     OBJECTIVE:  Risk of Unplanned Readmission Score: 12 04         Current admission status: Inpatient   Preferred Pharmacy:   [address] [place] [address] [address], [address]  [place] [address]  Phone: [phone] Fax: [phone]    [facility]/pharmacy #[id number] [address]  Phone: [phone] Fax: [phone]    Primary Care Provider: Jeffrey Chnig DO    Primary Insurance: MEDICARE  Secondary Insurance: [facility]    DISCHARGE DETAILS:          Discharge Destination Plan[de-identified] SNF, Short Term Rehab ([address])  Transport at Discharge : [facility] and Unit #):  Jared  ETA of Transport (Date): 22  ETA of Transport (Time): 1400

## 2022-12-01 NOTE — ASSESSMENT & PLAN NOTE
CT soft tissue: There is marked improvement in the previously seen retropharyngeal effusion compared to the study performed 5 days ago     -no associated respiratory symptoms  Oxygen saturation within normal limits at room air   -no neck stiffness or tenderness   -no cervical lymphadenopathy  Plan:  -resolving

## 2022-12-01 NOTE — INCIDENTAL FINDINGS
The following findings require follow up:  Radiographic finding   Finding: Sacral bony lesion has enlarged compared to the previous exam and is indeterminate    Recommend correlation with bone scan or MRI pelvis , Sclerotic lesion at the left pedicle at T11 is unchanged     Follow up required: MRI    Follow up should be done within 3 month(s)    Please notify the following clinician to assist with the follow up:   Dr Иван Taylor, DO

## 2022-12-01 NOTE — ASSESSMENT & PLAN NOTE
Restart home medication at discharge time      -continue hctz/valsartan at same dose    -f/u with PCP accordingly

## 2022-12-02 ENCOUNTER — TELEPHONE (OUTPATIENT)
Dept: GASTROENTEROLOGY | Facility: CLINIC | Age: 67
End: 2022-12-02

## 2022-12-02 LAB
HEMOCCULT STL QL: ABNORMAL
HEMOCCULT STL QL: NEGATIVE
HEMOCCULT STL QL: NEGATIVE
HEMOCCULT STL QL: NORMAL
HEMOCCULT STL QL: NORMAL
HEMOCCULT STL QL: POSITIVE

## 2022-12-03 LAB
BACTERIA BLD CULT: NORMAL
BACTERIA BLD CULT: NORMAL

## 2022-12-15 ENCOUNTER — TELEPHONE (OUTPATIENT)
Dept: GASTROENTEROLOGY | Facility: CLINIC | Age: 67
End: 2022-12-15

## 2022-12-15 NOTE — TELEPHONE ENCOUNTER
Attempted to contact patient to arrange appointment as per referral  No answer, unable to leave message

## 2023-01-12 ENCOUNTER — RA CDI HCC (OUTPATIENT)
Dept: OTHER | Facility: HOSPITAL | Age: 68
End: 2023-01-12

## 2023-01-12 NOTE — PROGRESS NOTES
[address]  coding opportunities       Chart reviewed, no opportunity found: CHART REVIEWED, NO OPPORTUNITY FOUND        Patients Insurance     Medicare Insurance: Medicare

## 2023-01-16 ENCOUNTER — TELEPHONE (OUTPATIENT)
Dept: OTHER | Facility: OTHER | Age: 68
End: 2023-01-16

## 2023-01-16 NOTE — TELEPHONE ENCOUNTER
Patient is calling regarding cancelling an appointment      Date/Time: 1/19/2023 @ 0900    Patient was rescheduled: YES [] NO [x]    Patient requesting call back to reschedule: YES [] NO [x]

## 2023-04-18 PROBLEM — F10.231 ALCOHOL DEPENDENCE WITH WITHDRAWAL DELIRIUM (HCC): Status: ACTIVE | Noted: 2023-04-18

## 2023-05-01 DIAGNOSIS — F10.21 ALCOHOLISM IN REMISSION (HCC): ICD-10-CM

## 2023-05-01 DIAGNOSIS — I10 HYPERTENSION, UNSPECIFIED TYPE: ICD-10-CM

## 2023-05-01 RX ORDER — LOSARTAN POTASSIUM 50 MG/1
50 TABLET ORAL DAILY
Qty: 90 TABLET | Refills: 1 | Status: SHIPPED | OUTPATIENT
Start: 2023-05-01

## 2023-05-01 RX ORDER — MIRTAZAPINE 15 MG/1
15 TABLET, FILM COATED ORAL
Qty: 90 TABLET | Refills: 1 | Status: SHIPPED | OUTPATIENT
Start: 2023-05-01

## 2023-05-16 DIAGNOSIS — R12 HEARTBURN: ICD-10-CM

## 2023-05-16 RX ORDER — PANTOPRAZOLE SODIUM 20 MG/1
20 TABLET, DELAYED RELEASE ORAL DAILY
Qty: 90 TABLET | Refills: 1 | Status: SHIPPED | OUTPATIENT
Start: 2023-05-16

## 2023-09-21 ENCOUNTER — OFFICE VISIT (OUTPATIENT)
Dept: FAMILY MEDICINE CLINIC | Facility: CLINIC | Age: 68
End: 2023-09-21
Payer: MEDICARE

## 2023-09-21 VITALS
TEMPERATURE: 97.2 F | DIASTOLIC BLOOD PRESSURE: 70 MMHG | BODY MASS INDEX: 22.22 KG/M2 | WEIGHT: 150 LBS | SYSTOLIC BLOOD PRESSURE: 132 MMHG | HEIGHT: 69 IN | RESPIRATION RATE: 16 BRPM | HEART RATE: 76 BPM

## 2023-09-21 DIAGNOSIS — I10 HYPERTENSION, UNSPECIFIED TYPE: ICD-10-CM

## 2023-09-21 DIAGNOSIS — G62.9 NEUROPATHY: Primary | ICD-10-CM

## 2023-09-21 DIAGNOSIS — I21.4 NSTEMI (NON-ST ELEVATED MYOCARDIAL INFARCTION) (HCC): ICD-10-CM

## 2023-09-21 DIAGNOSIS — M53.3 DISORDER OF SACRUM: ICD-10-CM

## 2023-09-21 DIAGNOSIS — F10.21 ALCOHOLISM IN REMISSION (HCC): ICD-10-CM

## 2023-09-21 DIAGNOSIS — R12 HEARTBURN: ICD-10-CM

## 2023-09-21 DIAGNOSIS — E55.9 VITAMIN D DEFICIENCY: ICD-10-CM

## 2023-09-21 PROCEDURE — 99214 OFFICE O/P EST MOD 30 MIN: CPT | Performed by: FAMILY MEDICINE

## 2023-09-21 RX ORDER — LOSARTAN POTASSIUM 50 MG/1
50 TABLET ORAL DAILY
Qty: 90 TABLET | Refills: 1 | Status: SHIPPED | OUTPATIENT
Start: 2023-09-21

## 2023-09-21 RX ORDER — PYRIDOXINE HCL (VITAMIN B6) 50 MG
50 TABLET ORAL DAILY
Qty: 90 TABLET | Refills: 1 | Status: SHIPPED | OUTPATIENT
Start: 2023-09-21

## 2023-09-21 RX ORDER — OMEGA-3 FATTY ACIDS/FISH OIL 300-1000MG
CAPSULE ORAL
COMMUNITY

## 2023-09-21 RX ORDER — PANTOPRAZOLE SODIUM 20 MG/1
20 TABLET, DELAYED RELEASE ORAL DAILY
Qty: 90 TABLET | Refills: 1 | Status: SHIPPED | OUTPATIENT
Start: 2023-09-21

## 2023-09-21 NOTE — PROGRESS NOTES
Assessment/Plan: Patient complains of burning of the dorsum of his feet worsens upon sitting we will begin a trial of B6 50 mg daily    November 2022 x-ray of the sacrum shows a sclerotic lesion we will follow through with the bone scan    GERD Will renew Protonix    Hypertensive cardiovascular disease with blood pressure control on the current regimen    Alcoholism in remission and depression being treated with Remeron 15 mg at bedtime    History of a non-ST MI currently without symptoms    Sclerotic lesion of the sacrum seen on x-ray November 2022 will follow through with a bone scan    Problem List Items Addressed This Visit        Cardiovascular and Mediastinum    NSTEMI (non-ST elevated myocardial infarction) ([address])    Hypertension    Relevant Medications    losartan (COZAAR) 50 mg tablet    Other Relevant Orders    CBC and differential    Comprehensive metabolic panel    Lipid Panel with Direct LDL reflex   Other Visit Diagnoses     Neuropathy    -  Primary    Relevant Medications    Pyridoxine HCl (vitamin B-6) 50 MG TABS    Heartburn        Relevant Medications    pantoprazole (PROTONIX) 20 mg tablet    Alcoholism in remission ([address])        Vitamin D deficiency        Relevant Orders    Vitamin D 25 hydroxy    Disorder of sacrum        Relevant Orders    XR sacrum and coccyx    NM bone scan 3 phase           Diagnoses and all orders for this visit:    Neuropathy  -     Pyridoxine HCl (vitamin B-6) 50 MG TABS; Take 1 tablet (50 mg total) by mouth daily    Heartburn  -     pantoprazole (PROTONIX) 20 mg tablet; Take 1 tablet (20 mg total) by mouth daily    Hypertension, unspecified type  -     losartan (COZAAR) 50 mg tablet; Take 1 tablet (50 mg total) by mouth in the morning  -     CBC and differential; Future  -     Comprehensive metabolic panel; Future  -     Lipid Panel with Direct LDL reflex;  Future    Alcoholism in remission [facility])    NSTEMI (non-ST elevated myocardial infarction) ([address])    Vitamin D deficiency  -     Vitamin D 25 hydroxy; Future    Disorder of sacrum  -     XR sacrum and coccyx; Future  -     NM bone scan 3 phase; Future    Other orders  -     Ibuprofen (Advil Liqui-Gels minis) 200 MG CAPS; Take by mouth        No problem-specific Assessment & Plan notes found for this encounter. PHQ-2/9 Depression Screening            Body mass index is 22.15 kg/m². BMI Counseling: Body mass index is 22.15 kg/m². The BMI   Subjective:      Patient ID: Holly Macario is a 80 y.o. male. HPI    The following portions of the patient's history were reviewed and updated as appropriate:   He has a past medical history of Acute respiratory failure ([address]) (11/24/2022), Alcohol withdrawal ([address]) (11/25/2022), Hyperlipidemia, Hypertension, Hyponatremia (11/23/2022), Hypothermia (11/23/2022), Hypoxia (86/15/9843), Metabolic acidosis with increased anion gap and accumulation of organic acids (17/76/5401), Metabolic encephalopathy (28/70/0948), Pneumonia (11/25/2022), Positive blood culture (11/28/2022), and Septic shock ([address]) (11/24/2022). ,  does not have any pertinent problems on file. ,   has a past surgical history that includes No past surgeries. ,  family history includes Diabetes in his father; Hypertension in his father; Stroke in his mother. ,   reports that he has never smoked. He has never used smokeless tobacco. He reports that he does not currently use alcohol. He reports that he does not use drugs. ,  has No Known Allergies. .  Current Outpatient Medications   Medication Sig Dispense Refill   • Ibuprofen (Advil Liqui-Gels minis) 200 MG CAPS Take by mouth     • losartan (COZAAR) 50 mg tablet Take 1 tablet (50 mg total) by mouth in the morning 90 tablet 1   • mirtazapine (REMERON) 15 mg tablet Take 1 tablet (15 mg total) by mouth daily at bedtime 90 tablet 1   • Multiple Vitamin (MULTIVITAMIN) tablet Take 1 tablet by mouth daily     • Omega-3 Fatty Acids (FISH OIL PO) Take by mouth     • pantoprazole (PROTONIX) 20 mg tablet Take 1 tablet (20 mg total) by mouth daily 90 tablet 1   • Pyridoxine HCl (vitamin B-6) 50 MG TABS Take 1 tablet (50 mg total) by mouth daily 90 tablet 1   • Acetaminophen 325 MG CAPS Take 2 tablets by mouth every 6 (six) hours (Patient not taking: Reported on 9/21/2023)     • Diclofenac Sodium (VOLTAREN) 1 % Apply 2 g topically every 8 (eight) hours as needed for pain (Patient not taking: Reported on 9/21/2023)       No current facility-administered medications for this visit. Review of Systems   Constitutional: Negative for chills and fever. HENT: Negative for ear pain and sore throat. Eyes: Negative for pain and visual disturbance. Respiratory: Negative for cough and shortness of breath. Cardiovascular: Negative for chest pain and palpitations. Gastrointestinal: Negative for abdominal pain and vomiting. Genitourinary: Negative for dysuria and hematuria. Musculoskeletal: Negative for arthralgias and back pain. Skin: Negative for color change and rash. Neurological: Negative for seizures and syncope. All other systems reviewed and are negative. Objective:    /70   Pulse 76   Temp (!) 97.2 °F (36.2 °C)   Resp 16   Ht 5' 9" (1.753 m)   Wt 68 kg (150 lb)   BMI 22.15 kg/m²   Body mass index is 22.15 kg/m². Physical Exam  Constitutional:       Appearance: Normal appearance. He is well-developed. HENT:      Head: Normocephalic and atraumatic. Right Ear: Tympanic membrane, ear canal and external ear normal.      Left Ear: Tympanic membrane, ear canal and external ear normal.      Nose: Nose normal.      Mouth/Throat:      Mouth: Mucous membranes are moist.      Pharynx: Oropharynx is clear. Eyes:      Extraocular Movements: Extraocular movements intact. Conjunctiva/sclera: Conjunctivae normal.      Pupils: Pupils are equal, round, and reactive to light. Cardiovascular:      Rate and Rhythm: Normal rate and regular rhythm. Pulses: Normal pulses. Heart sounds: Normal heart sounds. Pulmonary:      Effort: Pulmonary effort is normal.      Breath sounds: Normal breath sounds. Abdominal:      General: Abdomen is flat. Bowel sounds are normal.      Palpations: Abdomen is soft. Tenderness: There is no abdominal tenderness. Musculoskeletal:         General: Normal range of motion. Cervical back: Normal range of motion and neck supple. Skin:     General: Skin is warm and dry. Capillary Refill: Capillary refill takes less than 2 seconds. Neurological:      General: No focal deficit present. Mental Status: He is alert and oriented to person, place, and time. Psychiatric:         Mood and Affect: Mood normal.         Behavior: Behavior normal.         Thought Content:  Thought content normal.         Judgment: Judgment normal.

## 2023-10-09 ENCOUNTER — HOSPITAL ENCOUNTER (OUTPATIENT)
Dept: NUCLEAR MEDICINE | Facility: HOSPITAL | Age: 68
Discharge: HOME | End: 2023-10-09
Attending: FAMILY MEDICINE
Payer: MEDICARE

## 2023-10-09 ENCOUNTER — TELEPHONE (OUTPATIENT)
Dept: FAMILY MEDICINE CLINIC | Facility: CLINIC | Age: 68
End: 2023-10-09

## 2023-10-09 ENCOUNTER — HOSPITAL ENCOUNTER (OUTPATIENT)
Dept: RADIOLOGY | Facility: HOSPITAL | Age: 68
Discharge: HOME | End: 2023-10-09
Attending: FAMILY MEDICINE
Payer: MEDICARE

## 2023-10-09 ENCOUNTER — APPOINTMENT (OUTPATIENT)
Dept: LAB | Facility: HOSPITAL | Age: 68
End: 2023-10-09
Payer: MEDICARE

## 2023-10-09 DIAGNOSIS — M53.3 DISORDER OF SACRUM: ICD-10-CM

## 2023-10-09 DIAGNOSIS — R93.7 ABNORMAL FINDINGS ON DIAGNOSTIC IMAGING OF OTHER PARTS OF MUSCULOSKELETAL SYSTEM: ICD-10-CM

## 2023-10-09 DIAGNOSIS — I10 HYPERTENSION, UNSPECIFIED TYPE: ICD-10-CM

## 2023-10-09 DIAGNOSIS — M89.9 BONE LESION: Primary | ICD-10-CM

## 2023-10-09 DIAGNOSIS — E55.9 VITAMIN D DEFICIENCY: ICD-10-CM

## 2023-10-09 LAB
25(OH)D3 SERPL-MCNC: 57.6 NG/ML (ref 30–100)
ALBUMIN SERPL BCP-MCNC: 4.8 G/DL (ref 3.5–5)
ALP SERPL-CCNC: 81 U/L (ref 34–104)
ALT SERPL W P-5'-P-CCNC: 9 U/L (ref 7–52)
ANION GAP SERPL CALCULATED.3IONS-SCNC: 8 MMOL/L
AST SERPL W P-5'-P-CCNC: 12 U/L (ref 13–39)
BASOPHILS # BLD AUTO: 0.06 THOUSANDS/ÂΜL (ref 0–0.1)
BASOPHILS NFR BLD AUTO: 1 % (ref 0–1)
BILIRUB SERPL-MCNC: 0.77 MG/DL (ref 0.2–1)
BUN SERPL-MCNC: 11 MG/DL (ref 5–25)
CALCIUM SERPL-MCNC: 10.5 MG/DL (ref 8.4–10.2)
CHLORIDE SERPL-SCNC: 98 MMOL/L (ref 96–108)
CHOLEST SERPL-MCNC: 156 MG/DL
CO2 SERPL-SCNC: 27 MMOL/L (ref 21–32)
CREAT SERPL-MCNC: 0.89 MG/DL (ref 0.6–1.3)
EOSINOPHIL # BLD AUTO: 0.14 THOUSAND/ÂΜL (ref 0–0.61)
EOSINOPHIL NFR BLD AUTO: 2 % (ref 0–6)
ERYTHROCYTE [DISTWIDTH] IN BLOOD BY AUTOMATED COUNT: 13.2 % (ref 11.6–15.1)
GFR SERPL CREATININE-BSD FRML MDRD: 88 ML/MIN/1.73SQ M
GLUCOSE P FAST SERPL-MCNC: 108 MG/DL (ref 65–99)
HCT VFR BLD AUTO: 38.5 % (ref 36.5–49.3)
HDLC SERPL-MCNC: 51 MG/DL
HGB BLD-MCNC: 12.8 G/DL (ref 12–17)
IMM GRANULOCYTES # BLD AUTO: 0.02 THOUSAND/UL (ref 0–0.2)
IMM GRANULOCYTES NFR BLD AUTO: 0 % (ref 0–2)
LDLC SERPL CALC-MCNC: 72 MG/DL (ref 0–100)
LYMPHOCYTES # BLD AUTO: 1.31 THOUSANDS/ÂΜL (ref 0.6–4.47)
LYMPHOCYTES NFR BLD AUTO: 22 % (ref 14–44)
MCH RBC QN AUTO: 30.1 PG (ref 26.8–34.3)
MCHC RBC AUTO-ENTMCNC: 33.2 G/DL (ref 31.4–37.4)
MCV RBC AUTO: 91 FL (ref 82–98)
MONOCYTES # BLD AUTO: 0.44 THOUSAND/ÂΜL (ref 0.17–1.22)
MONOCYTES NFR BLD AUTO: 7 % (ref 4–12)
NEUTROPHILS # BLD AUTO: 4.13 THOUSANDS/ÂΜL (ref 1.85–7.62)
NEUTS SEG NFR BLD AUTO: 68 % (ref 43–75)
NRBC BLD AUTO-RTO: 0 /100 WBCS
PLATELET # BLD AUTO: 404 THOUSANDS/UL (ref 149–390)
PMV BLD AUTO: 9.7 FL (ref 8.9–12.7)
POTASSIUM SERPL-SCNC: 4 MMOL/L (ref 3.5–5.3)
PROT SERPL-MCNC: 8.1 G/DL (ref 6.4–8.4)
RBC # BLD AUTO: 4.25 MILLION/UL (ref 3.88–5.62)
SODIUM SERPL-SCNC: 133 MMOL/L (ref 135–147)
TRIGL SERPL-MCNC: 164 MG/DL
WBC # BLD AUTO: 6.1 THOUSAND/UL (ref 4.31–10.16)

## 2023-10-09 PROCEDURE — A9503 TC99M MEDRONATE: HCPCS

## 2023-10-09 PROCEDURE — 85025 COMPLETE CBC W/AUTO DIFF WBC: CPT

## 2023-10-09 PROCEDURE — 78315 BONE IMAGING 3 PHASE: CPT

## 2023-10-09 PROCEDURE — 80061 LIPID PANEL: CPT

## 2023-10-09 PROCEDURE — 36415 COLL VENOUS BLD VENIPUNCTURE: CPT

## 2023-10-09 PROCEDURE — 72220 X-RAY EXAM SACRUM TAILBONE: CPT

## 2023-10-09 PROCEDURE — 82306 VITAMIN D 25 HYDROXY: CPT

## 2023-10-09 PROCEDURE — 80053 COMPREHEN METABOLIC PANEL: CPT

## 2023-10-09 PROCEDURE — G1004 CDSM NDSC: HCPCS

## 2023-10-10 ENCOUNTER — APPOINTMENT (OUTPATIENT)
Dept: LAB | Facility: HOSPITAL | Age: 68
End: 2023-10-10
Payer: MEDICARE

## 2023-10-10 DIAGNOSIS — M89.9 BONE LESION: ICD-10-CM

## 2023-10-10 DIAGNOSIS — R93.7 ABNORMAL FINDINGS ON DIAGNOSTIC IMAGING OF OTHER PARTS OF MUSCULOSKELETAL SYSTEM: ICD-10-CM

## 2023-10-10 LAB — PSA SERPL-MCNC: 0.69 NG/ML (ref 0–4)

## 2023-10-10 PROCEDURE — 36415 COLL VENOUS BLD VENIPUNCTURE: CPT

## 2023-10-10 PROCEDURE — 84153 ASSAY OF PSA TOTAL: CPT

## 2023-10-13 ENCOUNTER — OFFICE VISIT (OUTPATIENT)
Dept: FAMILY MEDICINE CLINIC | Facility: CLINIC | Age: 68
End: 2023-10-13
Payer: MEDICARE

## 2023-10-13 VITALS — DIASTOLIC BLOOD PRESSURE: 80 MMHG | SYSTOLIC BLOOD PRESSURE: 124 MMHG | HEART RATE: 68 BPM

## 2023-10-13 DIAGNOSIS — M53.3 SACRAL LESION: Primary | ICD-10-CM

## 2023-10-13 PROCEDURE — 99213 OFFICE O/P EST LOW 20 MIN: CPT | Performed by: FAMILY MEDICINE

## 2023-10-13 NOTE — PROGRESS NOTES
Assessment/Plan: Sclerotic enlarging sacral lesion with a normal PSA will refer to orthopedics for consideration of a biopsy    Problem List Items Addressed This Visit        Musculoskeletal and Integument    Sacral lesion - Primary    Relevant Orders    Ambulatory Referral to Orthopedic Surgery          Diagnoses and all orders for this visit:    Sacral lesion  -     Ambulatory Referral to Orthopedic Surgery; Future        No problem-specific Assessment & Plan notes found for this encounter. PHQ-2/9 Depression Screening            There is no height or weight on file to calculate BMI. BMI Counseling: There is no height or weight on file to calculate BMI. The BMI     Subjective:      Patient ID: Lanny Obregon is a 80 y.o. male. Patient presents to go over her lab and x-ray findings        The following portions of the patient's history were reviewed and updated as appropriate:   He has a past medical history of Acute respiratory failure ([address]) (11/24/2022), Alcohol withdrawal ([address]) (11/25/2022), Hyperlipidemia, Hypertension, Hyponatremia (11/23/2022), Hypothermia (11/23/2022), Hypoxia (66/57/3498), Metabolic acidosis with increased anion gap and accumulation of organic acids (78/48/9187), Metabolic encephalopathy (11/98/1466), Pneumonia (11/25/2022), Positive blood culture (11/28/2022), and Septic shock ([address]) (11/24/2022). ,  does not have any pertinent problems on file. ,   has a past surgical history that includes No past surgeries. ,  family history includes Diabetes in his father; Hypertension in his father; Stroke in his mother. ,   reports that he has never smoked. He has never used smokeless tobacco. He reports that he does not currently use alcohol. He reports that he does not use drugs. ,  has No Known Allergies. .  Current Outpatient Medications   Medication Sig Dispense Refill   • Acetaminophen 325 MG CAPS Take 2 tablets by mouth every 6 (six) hours (Patient not taking: Reported on 9/21/2023) • Diclofenac Sodium (VOLTAREN) 1 % Apply 2 g topically every 8 (eight) hours as needed for pain (Patient not taking: Reported on 9/21/2023)     • Ibuprofen (Advil Liqui-Gels minis) 200 MG CAPS Take by mouth     • losartan (COZAAR) 50 mg tablet Take 1 tablet (50 mg total) by mouth in the morning 90 tablet 1   • mirtazapine (REMERON) 15 mg tablet Take 1 tablet (15 mg total) by mouth daily at bedtime 90 tablet 1   • Multiple Vitamin (MULTIVITAMIN) tablet Take 1 tablet by mouth daily     • Omega-3 Fatty Acids (FISH OIL PO) Take by mouth     • pantoprazole (PROTONIX) 20 mg tablet Take 1 tablet (20 mg total) by mouth daily 90 tablet 1   • Pyridoxine HCl (vitamin B-6) 50 MG TABS Take 1 tablet (50 mg total) by mouth daily 90 tablet 1     No current facility-administered medications for this visit. Review of Systems   Constitutional:  Negative for chills and fever. HENT:  Negative for ear pain and sore throat. Eyes:  Negative for pain and visual disturbance. Respiratory:  Negative for cough and shortness of breath. Cardiovascular:  Negative for chest pain and palpitations. Gastrointestinal:  Negative for abdominal pain and vomiting. Genitourinary:  Negative for dysuria and hematuria. Musculoskeletal:  Negative for arthralgias and back pain. Skin:  Negative for color change and rash. Neurological:  Negative for seizures and syncope. All other systems reviewed and are negative. Objective:    /80 (BP Location: Left arm, Patient Position: Sitting, Cuff Size: Standard)   Pulse 68 Comment: with 2 pauses in 15 seconds  There is no height or weight on file to calculate BMI. Physical Exam  Constitutional:       Appearance: Normal appearance. He is well-developed. HENT:      Head: Normocephalic and atraumatic.       Right Ear: Tympanic membrane, ear canal and external ear normal.      Left Ear: Tympanic membrane, ear canal and external ear normal.      Nose: Nose normal. Mouth/Throat:      Mouth: Mucous membranes are moist.      Pharynx: Oropharynx is clear. Eyes:      Extraocular Movements: Extraocular movements intact. Conjunctiva/sclera: Conjunctivae normal.      Pupils: Pupils are equal, round, and reactive to light. Cardiovascular:      Rate and Rhythm: Normal rate and regular rhythm. Pulses: Normal pulses. Heart sounds: Normal heart sounds. Pulmonary:      Effort: Pulmonary effort is normal.      Breath sounds: Normal breath sounds. Abdominal:      General: Abdomen is flat. Bowel sounds are normal.      Palpations: Abdomen is soft. Tenderness: There is no abdominal tenderness. Musculoskeletal:         General: Normal range of motion. Cervical back: Normal range of motion and neck supple. Skin:     General: Skin is warm and dry. Capillary Refill: Capillary refill takes less than 2 seconds. Neurological:      General: No focal deficit present. Mental Status: He is alert and oriented to person, place, and time. Psychiatric:         Mood and Affect: Mood normal.         Behavior: Behavior normal.         Thought Content:  Thought content normal.         Judgment: Judgment normal.

## 2023-10-24 ENCOUNTER — APPOINTMENT (OUTPATIENT)
Dept: LAB | Facility: MEDICAL CENTER | Age: 68
End: 2023-10-24
Payer: MEDICARE

## 2023-10-24 ENCOUNTER — OFFICE VISIT (OUTPATIENT)
Dept: OBGYN CLINIC | Facility: CLINIC | Age: 68
End: 2023-10-24
Payer: MEDICARE

## 2023-10-24 ENCOUNTER — APPOINTMENT (OUTPATIENT)
Dept: RADIOLOGY | Facility: MEDICAL CENTER | Age: 68
End: 2023-10-24
Payer: MEDICARE

## 2023-10-24 VITALS
DIASTOLIC BLOOD PRESSURE: 78 MMHG | SYSTOLIC BLOOD PRESSURE: 145 MMHG | HEIGHT: 69 IN | BODY MASS INDEX: 23.61 KG/M2 | WEIGHT: 159.4 LBS | HEART RATE: 85 BPM | RESPIRATION RATE: 15 BRPM

## 2023-10-24 DIAGNOSIS — D48.0 NEOPLASM OF UNCERTAIN BEHAVIOR OF BONE AND ARTICULAR CARTILAGE: ICD-10-CM

## 2023-10-24 DIAGNOSIS — E55.9 VITAMIN D DEFICIENCY: ICD-10-CM

## 2023-10-24 DIAGNOSIS — I10 HYPERTENSION, UNSPECIFIED TYPE: ICD-10-CM

## 2023-10-24 DIAGNOSIS — M53.3 SACRAL LESION: ICD-10-CM

## 2023-10-24 DIAGNOSIS — D48.0 NEOPLASM OF UNCERTAIN BEHAVIOR OF BONE AND ARTICULAR CARTILAGE: Primary | ICD-10-CM

## 2023-10-24 LAB
25(OH)D3 SERPL-MCNC: 53.1 NG/ML (ref 30–100)
ALBUMIN SERPL BCP-MCNC: 4.6 G/DL (ref 3.5–5)
ALP SERPL-CCNC: 80 U/L (ref 34–104)
ALT SERPL W P-5'-P-CCNC: 11 U/L (ref 7–52)
ANION GAP SERPL CALCULATED.3IONS-SCNC: 7 MMOL/L
AST SERPL W P-5'-P-CCNC: 13 U/L (ref 13–39)
BASOPHILS # BLD AUTO: 0.07 THOUSANDS/ÂΜL (ref 0–0.1)
BASOPHILS NFR BLD AUTO: 1 % (ref 0–1)
BILIRUB SERPL-MCNC: 0.45 MG/DL (ref 0.2–1)
BUN SERPL-MCNC: 12 MG/DL (ref 5–25)
CALCIUM SERPL-MCNC: 9.8 MG/DL (ref 8.4–10.2)
CHLORIDE SERPL-SCNC: 99 MMOL/L (ref 96–108)
CHOLEST SERPL-MCNC: 140 MG/DL
CO2 SERPL-SCNC: 28 MMOL/L (ref 21–32)
CREAT SERPL-MCNC: 0.87 MG/DL (ref 0.6–1.3)
CRP SERPL QL: <1 MG/L
EOSINOPHIL # BLD AUTO: 0.22 THOUSAND/ÂΜL (ref 0–0.61)
EOSINOPHIL NFR BLD AUTO: 3 % (ref 0–6)
ERYTHROCYTE [DISTWIDTH] IN BLOOD BY AUTOMATED COUNT: 14 % (ref 11.6–15.1)
ERYTHROCYTE [SEDIMENTATION RATE] IN BLOOD: 15 MM/HOUR (ref 0–19)
GFR SERPL CREATININE-BSD FRML MDRD: 89 ML/MIN/1.73SQ M
GLUCOSE SERPL-MCNC: 60 MG/DL (ref 65–140)
HCT VFR BLD AUTO: 36.7 % (ref 36.5–49.3)
HDLC SERPL-MCNC: 49 MG/DL
HGB BLD-MCNC: 12.3 G/DL (ref 12–17)
IMM GRANULOCYTES # BLD AUTO: 0.02 THOUSAND/UL (ref 0–0.2)
IMM GRANULOCYTES NFR BLD AUTO: 0 % (ref 0–2)
LDLC SERPL CALC-MCNC: 66 MG/DL (ref 0–100)
LYMPHOCYTES # BLD AUTO: 1.45 THOUSANDS/ÂΜL (ref 0.6–4.47)
LYMPHOCYTES NFR BLD AUTO: 22 % (ref 14–44)
MCH RBC QN AUTO: 31 PG (ref 26.8–34.3)
MCHC RBC AUTO-ENTMCNC: 33.5 G/DL (ref 31.4–37.4)
MCV RBC AUTO: 92 FL (ref 82–98)
MONOCYTES # BLD AUTO: 0.72 THOUSAND/ÂΜL (ref 0.17–1.22)
MONOCYTES NFR BLD AUTO: 11 % (ref 4–12)
NEUTROPHILS # BLD AUTO: 4.22 THOUSANDS/ÂΜL (ref 1.85–7.62)
NEUTS SEG NFR BLD AUTO: 63 % (ref 43–75)
NRBC BLD AUTO-RTO: 0 /100 WBCS
PLATELET # BLD AUTO: 395 THOUSANDS/UL (ref 149–390)
PMV BLD AUTO: 10.4 FL (ref 8.9–12.7)
POTASSIUM SERPL-SCNC: 4.2 MMOL/L (ref 3.5–5.3)
PROT SERPL-MCNC: 7.3 G/DL (ref 6.4–8.4)
RBC # BLD AUTO: 3.97 MILLION/UL (ref 3.88–5.62)
SODIUM SERPL-SCNC: 134 MMOL/L (ref 135–147)
TRIGL SERPL-MCNC: 124 MG/DL
WBC # BLD AUTO: 6.7 THOUSAND/UL (ref 4.31–10.16)

## 2023-10-24 PROCEDURE — 86334 IMMUNOFIX E-PHORESIS SERUM: CPT

## 2023-10-24 PROCEDURE — 99205 OFFICE O/P NEW HI 60 MIN: CPT | Performed by: STUDENT IN AN ORGANIZED HEALTH CARE EDUCATION/TRAINING PROGRAM

## 2023-10-24 PROCEDURE — 73610 X-RAY EXAM OF ANKLE: CPT

## 2023-10-24 PROCEDURE — 85652 RBC SED RATE AUTOMATED: CPT

## 2023-10-24 PROCEDURE — 80061 LIPID PANEL: CPT

## 2023-10-24 PROCEDURE — 85025 COMPLETE CBC W/AUTO DIFF WBC: CPT

## 2023-10-24 PROCEDURE — 36415 COLL VENOUS BLD VENIPUNCTURE: CPT

## 2023-10-24 PROCEDURE — 86140 C-REACTIVE PROTEIN: CPT

## 2023-10-24 PROCEDURE — 84165 PROTEIN E-PHORESIS SERUM: CPT

## 2023-10-24 PROCEDURE — 82306 VITAMIN D 25 HYDROXY: CPT

## 2023-10-24 PROCEDURE — 80053 COMPREHEN METABOLIC PANEL: CPT

## 2023-10-24 NOTE — PROGRESS NOTES
Orthopedic Surgery Office Note  Ashley Lagunas (9 y.o. male)  : 1955 Encounter Date: 10/24/2023  Dr. Moris Vargas DO, Orthopedic Surgeon  Orthopedic Oncology [facility]   Phone:[phone] QJD:[phone]    Assessment and Plan: Ashley Lagunas is a 80 y.o. male with:    1.  Bony lesion of sacrum, sclerotic  - possibly related to malignancy, possibly related to time in the hospital nonambulatory  - unable to determine what it is or what caused it without further evaluation, would need biopsy for cell type confirmation  - based on imaging, may plan for biopsy to be done in the future  Obtain MRI w and wo contrast sacrum    2. Uptake of left ankle on NM bone scan, due to arthritis  - XR imaging today showing significant arthritic changes to the ankle    3. Comorbidity, including: HTN  - continue current management         Procedure:  No procedures performed    Surgical Planning:   No surgery planned at this time    Follow up: No follow-ups on file.   __________________________________________________________________    History of Present Illness:     Ashley Lagunas is a 80 y.o. male with history of HTN,  who presents for consultation at the request of Iwona Almendarez DO regarding sclerotic sacral lesion on whole-body bone scan CT chest on pelvis. WBBS 2 weeks prior, ordered by PCP due to findings on CT from , approximately year ago. His PCP also ordered a PSA that returned within normal limits    CT CAP in , finding sacral lesion incidentally, growing from previous CT in 2018. In 2022, he was found unconscious and taken to the hospital where he was found to have pulmonary and septic findings, leading him not ambulatory and in rehab, recently discharged home in March of this year where he is made full recovery and is walking without any assistive devices. He is now following up on the incidental findings from that time.      Describes occasional tingling to the dorsum of feet when sitting, resolved with walking     Denies prior trauma or injury to the area, unknown if he had a sacral ulcer to the area, but denies any prior surgery to sacral area. Denies personal or familial history of cancer rating any lesions removed previously. No significant past medical history other than hypertension    At baseline patient gaits without assistance. Denies constitutional symptoms such as fever, chills, night sweats, fatigue, weight gains/losses. Denies  chest pain/shortness of breath. Patient Denies  personal history of cancer    Oncology History    No history exists. Review of Systems:   Allergies, medications, past medical/surgical/family/social history have been reviewed. Complete 12 system review performed and found to be negative except: except as per mentioned in HPI. Physical Examination:   Height: 5' 9" (175.3 cm)  Weight - Scale: 72.3 kg (159 lb 6.4 oz)  BMI (Calculated): 23.5  BSA (Calculated - m2): 1.88 sq meters     Vitals:    10/24/23 0915   BP: 145/78   Pulse: 85   Resp: 15     Body mass index is 23.54 kg/m². General: alert and oriented; well nourished/well developed; no apparent distress. Present with family member  Psychiatric: normal mood and affect  HEENT: NCAT. Head/neck - full range of motion. Lungs: breathing comfortably; equal symmetric chest expansion. Abdomen: soft, non-tender, non-distended. Skin: warm; dry; no lesions, rashes, petechiae or purpura; no clubbing, no cyanosis, no edema,  palpable masses. Extremity: Sacrum   Inspection: no edema, skin abnormalities throughout   Range of motion of joints: WFL range of motion all extremities. Motor strength: WNL all extremities. Dorsal/Plantar flexion: intact. Sensation: grossly intact to all extremities. Pulses: intact distalyl   Lymphatics: no obvious lymphadenopathy  Gait: normal gait.     IMAGING RESULTS, All images personally review today by Dr. Alvin Carrillo  Study: Left ankle x-ray  Date: 10/24/23  Report: No radiologist report was available at this time. My impression is as follows: ankle arthritis, vascular calcification and pesplanus    Study: WBBS  Date: 10/9/23  Report: I have read and agree with the radiologist report. My impression is as follows: Moderately increased blood flow, blood pool, and delayed activity of the upper sacrum, corresponding to the known sclerotic enlarging sacral lesion. As described on recent prior imaging studies, this is most suspicious for neoplasm and further evaluation   is warranted. As prostate carcinoma is in the differential, correlation with PSA is recommended. Whole body imaging demonstrates no other suspicious areas of abnormal radiopharmaceutical activity. Asymmetrically increased activity is seen within the left ankle and foot compared to the right, nonspecific, possibly related to injury or arthritis. Correlate for any patient symptomatology. Study: XR sacrum   Date: 10/9/23  Report: I have read and agree with the radiologist report. My impression is as follows: No acute osseous abnormality. Sacral sclerosis as previously characterized and again concerning for possible metastatic disease. Study: XR  hip/pelvis  Date: 11/25/23  Report: I have read and agree with the radiologist report. My impression is as follows: No acute osseous abnormality. Again seen is a large sclerotic bone lesion in the upper sacrum. This has enlarged comparing the 11/24/2022 CT and a 1/1/2018 CT. This is suspicious for malignancy, and bone scan is recommended for further evaluation. Study: CT chest on pelvis with contrast  Date: 11/24/23  Report: I have read and agree with the radiologist report. My impression is as follows:   OSSEOUS STRUCTURES:  Old healed rib fractures. Large predominantly sclerotic bony lesion is demonstrated at the level of the central sacrum.   It has enlarged compared to the previous exam and now measures approximately 7.9 x 5.3 cm compared to 5 x 3.8 cm. Sclerotic bony lesion in the left pedicle at T11 is unchanged. IMPRESSION:  1. Bilateral pneumonia and pleural effusions. 2.  Distended esophagus containing fluid. Nonurgent evaluation with swallowing study is recommended. 3.  Sacral bony lesion has enlarged compared to the previous exam and is indeterminate. Recommend correlation with bone scan or MRI pelvis. Sclerotic lesion at the left pedicle at T11 is unchanged. Pertinent laboratory findings:  PSA WNL at 0.69  CBC.  CMP, lipids, vitamin D without concern     Pathology:   N/A        Patient Care team:   Patient Care Team:  Susan Garcia DO as PCP - General (Family Medicine)     Past Medical History:   Diagnosis Date    Acute respiratory failure ([address]) 11/24/2022    Alcohol withdrawal ([address]) 11/25/2022    Hyperlipidemia     Hypertension     Hyponatremia 11/23/2022    Hypothermia 11/23/2022    Hypoxia 63/38/9958    Metabolic acidosis with increased anion gap and accumulation of organic acids 63/65/4785    Metabolic encephalopathy 22/11/4444    Pneumonia 11/25/2022    Positive blood culture 11/28/2022    Septic shock ([address]) 11/24/2022     Past Surgical History:   Procedure Laterality Date    NO PAST SURGERIES         Current Outpatient Medications:     Ibuprofen (Advil Liqui-Gels minis) 200 MG CAPS, Take by mouth, Disp: , Rfl:     losartan (COZAAR) 50 mg tablet, Take 1 tablet (50 mg total) by mouth in the morning, Disp: 90 tablet, Rfl: 1    mirtazapine (REMERON) 15 mg tablet, Take 1 tablet (15 mg total) by mouth daily at bedtime, Disp: 90 tablet, Rfl: 1    Multiple Vitamin (MULTIVITAMIN) tablet, Take 1 tablet by mouth daily, Disp: , Rfl:     Omega-3 Fatty Acids (FISH OIL PO), Take by mouth, Disp: , Rfl:     pantoprazole (PROTONIX) 20 mg tablet, Take 1 tablet (20 mg total) by mouth daily, Disp: 90 tablet, Rfl: 1    Pyridoxine HCl (vitamin B-6) 50 MG TABS, Take 1 tablet (50 mg total) by mouth daily, Disp: 90 tablet, Rfl: 1    Acetaminophen 325 MG CAPS, Take 2 tablets by mouth every 6 (six) hours (Patient not taking: Reported on 9/21/2023), Disp: , Rfl:     Diclofenac Sodium (VOLTAREN) 1 %, Apply 2 g topically every 8 (eight) hours as needed for pain (Patient not taking: Reported on 9/21/2023), Disp: , Rfl:   No Known Allergies  Family History   Problem Relation Age of Onset    Stroke Mother     Hypertension Father     Diabetes Father      Social History     Socioeconomic History    Marital status: Single     Spouse name: Not on file    Number of children: Not on file    Years of education: Not on file    Highest education level: Not on file   Occupational History    Not on file   Tobacco Use    Smoking status: Never    Smokeless tobacco: Never   Vaping Use    Vaping Use: Never used   Substance and Sexual Activity    Alcohol use: Not Currently     Comment: d/c 11/2022    Drug use: No    Sexual activity: Not on file   Other Topics Concern    Not on file   Social History Narrative    Not on file     Social Determinants of Health     Financial Resource Strain: Not on file   Food Insecurity: No Food Insecurity (11/26/2022)    Hunger Vital Sign     Worried About Running Out of Food in the Last Year: Never true     Ran Out of Food in the Last Year: Never true   Transportation Needs: No Transportation Needs (11/26/2022)    PRAPARE - Transportation     Lack of Transportation (Medical): No     Lack of Transportation (Non-Medical):  No   Physical Activity: Not on file   Stress: Not on file   Social Connections: Not on file   Intimate Partner Violence: Not on file   Housing Stability: Unknown (11/26/2022)    Housing Stability Vital Sign     Unable to Pay for Housing in the Last Year: Not on file     Number of Places Lived in the Last Year: 1     Unstable Housing in the Last Year: No       60 minutes was spent in the coordination of care, reviewing of imaging and with the patient on the date of service    Scribe Attestation      I,:   am acting as a scribe while in the presence of the attending physician.:       I,:   personally performed the services described in this documentation    as scribed in my presence.:              Igor Dick PA-C   10/24/2023 9:34 AM     Cory Almanzar was seen today for pain. Diagnoses and all orders for this visit:    Neoplasm of uncertain behavior of bone and articular cartilage  -     XR ankle 3+ vw left; Future  -     MRI pelvis sacrum,coccyx, si jts wo and w contrast; Future  -     CT chest abdomen pelvis w contrast; Future  -     C-reactive protein; Future  -     Sedimentation rate, automated; Future  -     Protein electrophoresis, serum; Future    Sacral lesion  -     Ambulatory Referral to Orthopedic Surgery  -     XR ankle 3+ vw left; Future  -     MRI pelvis sacrum,coccyx, si jts wo and w contrast; Future  -     CT chest abdomen pelvis w contrast; Future  -     C-reactive protein; Future  -     Sedimentation rate, automated; Future  -     Protein electrophoresis, serum;  Future

## 2023-10-25 DIAGNOSIS — F10.21 ALCOHOLISM IN REMISSION (HCC): ICD-10-CM

## 2023-10-25 LAB
ALBUMIN SERPL ELPH-MCNC: 4.43 G/DL (ref 3.2–5.1)
ALBUMIN SERPL ELPH-MCNC: 63.3 % (ref 48–70)
ALPHA1 GLOB SERPL ELPH-MCNC: 0.25 G/DL (ref 0.15–0.47)
ALPHA1 GLOB SERPL ELPH-MCNC: 3.6 % (ref 1.8–7)
ALPHA2 GLOB SERPL ELPH-MCNC: 0.62 G/DL (ref 0.42–1.04)
ALPHA2 GLOB SERPL ELPH-MCNC: 8.9 % (ref 5.9–14.9)
BETA GLOB ABNORMAL SERPL ELPH-MCNC: 0.42 G/DL (ref 0.31–0.57)
BETA1 GLOB SERPL ELPH-MCNC: 6 % (ref 4.7–7.7)
BETA2 GLOB SERPL ELPH-MCNC: 5 % (ref 3.1–7.9)
BETA2+GAMMA GLOB SERPL ELPH-MCNC: 0.35 G/DL (ref 0.2–0.58)
GAMMA GLOB ABNORMAL SERPL ELPH-MCNC: 0.92 G/DL (ref 0.4–1.66)
GAMMA GLOB SERPL ELPH-MCNC: 13.2 % (ref 6.9–22.3)
IGG/ALB SER: 1.72 {RATIO} (ref 1.1–1.8)
INTERPRETATION UR IFE-IMP: NORMAL
M PROTEIN 1 MFR SERPL ELPH: 2 %
M PROTEIN 1 SERPL ELPH-MCNC: 0.14 G/DL
PROT PATTERN SERPL ELPH-IMP: NORMAL
PROT SERPL-MCNC: 7 G/DL (ref 6.4–8.2)

## 2023-10-25 PROCEDURE — 84165 PROTEIN E-PHORESIS SERUM: CPT | Performed by: PATHOLOGY

## 2023-10-25 PROCEDURE — 86334 IMMUNOFIX E-PHORESIS SERUM: CPT | Performed by: PATHOLOGY

## 2023-10-25 RX ORDER — MIRTAZAPINE 15 MG/1
15 TABLET, FILM COATED ORAL
Qty: 90 TABLET | Refills: 1 | Status: SHIPPED | OUTPATIENT
Start: 2023-10-25

## 2023-10-31 ENCOUNTER — HOSPITAL ENCOUNTER (OUTPATIENT)
Dept: CT IMAGING | Facility: HOSPITAL | Age: 68
Discharge: HOME | End: 2023-10-31
Payer: MEDICARE

## 2023-10-31 DIAGNOSIS — D48.0 NEOPLASM OF UNCERTAIN BEHAVIOR OF BONE AND ARTICULAR CARTILAGE: ICD-10-CM

## 2023-10-31 DIAGNOSIS — M53.3 SACRAL LESION: ICD-10-CM

## 2023-10-31 PROCEDURE — 74177 CT ABD & PELVIS W/CONTRAST: CPT

## 2023-10-31 PROCEDURE — 71260 CT THORAX DX C+: CPT

## 2023-10-31 PROCEDURE — G1004 CDSM NDSC: HCPCS

## 2023-10-31 RX ADMIN — IOHEXOL 100 ML: 350 INJECTION, SOLUTION INTRAVENOUS at 08:16

## 2023-11-01 ENCOUNTER — HOSPITAL ENCOUNTER (OUTPATIENT)
Dept: MRI IMAGING | Facility: HOSPITAL | Age: 68
Discharge: HOME | End: 2023-11-01
Payer: MEDICARE

## 2023-11-01 DIAGNOSIS — D48.0 NEOPLASM OF UNCERTAIN BEHAVIOR OF BONE AND ARTICULAR CARTILAGE: ICD-10-CM

## 2023-11-01 DIAGNOSIS — M53.3 SACRAL LESION: ICD-10-CM

## 2023-11-01 PROCEDURE — G1004 CDSM NDSC: HCPCS

## 2023-11-01 PROCEDURE — 72197 MRI PELVIS W/O & W/DYE: CPT

## 2023-11-01 PROCEDURE — A9585 GADOBUTROL INJECTION: HCPCS

## 2023-11-01 RX ORDER — GADOBUTROL 604.72 MG/ML
7 INJECTION INTRAVENOUS
Status: COMPLETED | OUTPATIENT
Start: 2023-11-01 | End: 2023-11-01

## 2023-11-01 RX ADMIN — GADOBUTROL 7 ML: 604.72 INJECTION INTRAVENOUS at 12:28

## 2023-11-02 ENCOUNTER — TELEPHONE (OUTPATIENT)
Dept: FAMILY MEDICINE CLINIC | Facility: CLINIC | Age: 68
End: 2023-11-02

## 2023-11-02 DIAGNOSIS — R52 PAIN: Primary | ICD-10-CM

## 2023-11-02 NOTE — TELEPHONE ENCOUNTER
Patient has been using icy hot on the tops of his feet and he was wondering if he could use something else that you recommend

## 2023-11-06 ENCOUNTER — TELEPHONE (OUTPATIENT)
Dept: OBGYN CLINIC | Facility: MEDICAL CENTER | Age: 68
End: 2023-11-06

## 2023-11-06 NOTE — TELEPHONE ENCOUNTER
Caller: [facility]     Doctor: Dr Kedric Siemens     Reason for call: Sydney Wen is calling to let you know that the reports for the patient's MRI -11/1 and CT 10/31 are in .   Thank you     Call back#: [phone]

## 2023-11-14 ENCOUNTER — OFFICE VISIT (OUTPATIENT)
Dept: OBGYN CLINIC | Facility: CLINIC | Age: 68
End: 2023-11-14
Payer: MEDICARE

## 2023-11-14 VITALS
SYSTOLIC BLOOD PRESSURE: 119 MMHG | DIASTOLIC BLOOD PRESSURE: 70 MMHG | HEART RATE: 83 BPM | BODY MASS INDEX: 23.55 KG/M2 | RESPIRATION RATE: 16 BRPM | HEIGHT: 69 IN | WEIGHT: 159 LBS

## 2023-11-14 DIAGNOSIS — M53.3 SACRAL LESION: Primary | ICD-10-CM

## 2023-11-14 DIAGNOSIS — D48.0 NEOPLASM OF UNCERTAIN BEHAVIOR OF BONE AND ARTICULAR CARTILAGE: ICD-10-CM

## 2023-11-14 PROCEDURE — 99215 OFFICE O/P EST HI 40 MIN: CPT | Performed by: STUDENT IN AN ORGANIZED HEALTH CARE EDUCATION/TRAINING PROGRAM

## 2023-11-14 NOTE — PROGRESS NOTES
[facility] Orthopedic Oncology Surgery  Follow-Up Office Note  Man Eason (54 y.o. male)  : 1955 Encounter Date: 2023  Dr. Tammy Solomon DO, Orthopedic Surgeon  Orthopedic Oncology & Sarcoma Surgery        Impression/Plan: Man Eason is a 80 y.o. male with:    1. Bony lesion of sacrum, sclerotic  - possibly myeloma, patient has a positive SPEP with monoclonal bands in the gamma region.  -Imaging of the sacrum including MRI is not typical of myelomatous lesions that are lytic. -MRI of the sacrum looks to be cartilaginous in nature, so the positive SPEP could not be related at all to the mass within the sacrum  -Discussed in depth the results of both of these tests and what they mean. Discussed in depth plan moving forward including referrals as discussed below   - unable to determine what it is or what caused it without further evaluation, would need biopsy for cell type confirmation. Referral placed for interventional radiology biopsy  - referral placed for hematology oncology for further evaluation and treatment regarding positive SPEP. 2. Comorbidity, including HTN  - continue current management     Follow up: Return after Biopsy. Procedure:   No procedures performed    Surgical Planning:   No surgery planned at this time    ___________________________________________________________________    Subjective:     Man Eason is a 80 y.o. male with hx of HTN. They present today for MRI review of sacral lesion. Patient is doing well overall. denies pain/issue in his lower back or hip. He states that the pain resolved months ago. Patient has been able to tolerate daily normal activities without any issues or complaints since the last office visit. Patient Denies fevers, chills, numbness/tingling, injury/trauma, night sweats since the last office visit.     Patient is currently following with:  referral placed to hematology oncology  Medication changes: None      Oncology History    No history exists. Review of Systems:   Allergies, medications, past medical/surgical/family/social history have been reviewed, with the following changes: none  Complete 12 system review performed and found to be negative except: except as per mentioned in HPI. Physical Examination:   Height: 5' 9" (175.3 cm)  Weight - Scale: 72.1 kg (159 lb)  BMI (Calculated): 23.5  BSA (Calculated - m2): 1.87 sq meters     Vitals:    11/14/23 0953   BP: 119/70   Pulse: 83   Resp: 16     Body mass index is 23.48 kg/m². Present today with  friend    General: alert and oriented; well nourished/well developed; no apparent distress. Psychiatric: normal mood and affect  HEENT: NCAT. Head/neck:full range of motion. Lungs: breathing comfortably ; equal symmetric chest expansion. Abdomen: soft, non-tender, non-distended. Skin: warm; dry; without  lesions, rashes, petechiae or purpura; without  clubbing, cyanosis, edema  Extremity:  low back/ hip -   Inspection: without edema, without skin abnormalities throughout   Palpation:  without  TTP   Range of motion of joints: full range of motion all extremities. Motor strength:  WNL all extremities 5/5grips, 5/5 dorsal/pedal flexion   Sensation: grossly intact to all extremities. Pulses: 2+ radial, pedal, and DP/PT pulses. Lymphatics: no obvious lymphadenopathy  Gait: steady gait. IMAGING RESULTS, All images personally reviewed today by Dr. Teto Garcia. Study: Left ankle x-ray  Date: 10/24/23  Report: No radiologist report was available at this time. My impression is as follows: ankle arthritis, vascular calcification and pesplanus     Study: WBBS  Date: 10/9/23  Report: I have read and agree with the radiologist report. My impression is as follows: Moderately increased blood flow, blood pool, and delayed activity of the upper sacrum, corresponding to the known sclerotic enlarging sacral lesion.  As described on recent prior imaging studies, this is most suspicious for neoplasm and further evaluation   is warranted. As prostate carcinoma is in the differential, correlation with PSA is recommended. Whole body imaging demonstrates no other suspicious areas of abnormal radiopharmaceutical activity. Asymmetrically increased activity is seen within the left ankle and foot compared to the right, nonspecific, possibly related to injury or arthritis. Correlate for any patient symptomatology. Study: XR sacrum         Date: 10/9/23  Report: I have read and agree with the radiologist report. My impression is as follows: No acute osseous abnormality. Sacral sclerosis as previously characterized and again concerning for possible metastatic disease. Study: XR  hip/pelvis  Date: 11/25/23  Report: I have read and agree with the radiologist report. My impression is as follows: No acute osseous abnormality. Again seen is a large sclerotic bone lesion in the upper sacrum. This has enlarged comparing the 11/24/2022 CT and a 1/1/2018 CT. This is suspicious for malignancy, and bone scan is recommended for further evaluation. Study: CT chest on pelvis with contrast  Date: 11/24/23  Report: I have read and agree with the radiologist report. My impression is as follows:   OSSEOUS STRUCTURES:  Old healed rib fractures. Large predominantly sclerotic bony lesion is demonstrated at the level of the central sacrum. It has enlarged compared to the previous exam and now measures approximately 7.9 x 5.3 cm compared to 5 x 3.8 cm. Sclerotic bony lesion in the left pedicle at T11 is unchanged. IMPRESSION:  1. Bilateral pneumonia and pleural effusions. 2.  Distended esophagus containing fluid. Nonurgent evaluation with swallowing study is recommended. 3.  Sacral bony lesion has enlarged compared to the previous exam and is indeterminate. Recommend correlation with bone scan or MRI pelvis. Sclerotic lesion at the left pedicle at T11 is unchanged.     Study: MRI Pelvis Sacrum, Coccyx, SI Jt wo and w contrast   Date: 11/1/23   Report: I have read and agree with the radiologist report. My impression is as follows:   Again seen is a densely sclerotic bone lesion involving first through third sacral segments, currently 8.0 x 8.3 x 7.2 cm, enlarged compared to the 2018 CT, where it measured 4.9 x 3.7 x 2.5 cm. Lesion demonstrates cortical breakthrough anteriorly   The lesion has also extended into the left ilium     Laboratory:  PSA WNL at 0.69  CBC. CMP, lipids, vitamin D without concern  The SPEP shows a monoclonal peak in the gamma region. Pathology: Pending  N/A    Microbiology:  Cultures: N/A    Referring provider: Michi Smith DO  Patient Care Team:  Michi Smith DO as PCP - General (Family Medicine)     Diagnoses and all orders for this visit:    Sacral lesion  -     Ambulatory Referral to Hematology / Oncology; Future  -     Ambulatory Referral to Interventional Radiology; Future    Neoplasm of uncertain behavior of bone and articular cartilage  -     Ambulatory Referral to Hematology / Oncology; Future  -     Ambulatory Referral to Interventional Radiology;  Future      ____________________________________________________________________    45 minutes was spent in the coordination of care, reviewing of imaging and with the patient on the date of service    Scribe Attestation      I,:  Cristiano Marvin am acting as a scribe while in the presence of the attending physician.:       I,:  [place] Apley, DO personally performed the services described in this documentation    as scribed in my presence.:                Dr. Soheila Huff DO, Orthopedic Surgeon  Orthopedic Oncology & Sarcoma Surgery   Phone:[phone] XZT:[phone]

## 2023-11-15 DIAGNOSIS — M53.3 SACRAL LESION: Primary | ICD-10-CM

## 2023-11-15 RX ORDER — SODIUM CHLORIDE 9 MG/ML
75 INJECTION, SOLUTION INTRAVENOUS CONTINUOUS
OUTPATIENT
Start: 2023-11-15

## 2023-11-20 ENCOUNTER — TELEPHONE (OUTPATIENT)
Dept: HEMATOLOGY ONCOLOGY | Facility: CLINIC | Age: 68
End: 2023-11-20

## 2023-11-20 NOTE — TELEPHONE ENCOUNTER
I called Glenda Cooper in response to a referral that was received for patient to establish care with Hematology. Outreach was made to schedule a consultation. I left a voicemail explaining the reason for my call and advised patient to call [facility] at [phone]. Another attempt will be made to contact patient.

## 2023-11-24 ENCOUNTER — DOCUMENTATION (OUTPATIENT)
Dept: HEMATOLOGY ONCOLOGY | Facility: CLINIC | Age: 68
End: 2023-11-24

## 2023-11-24 NOTE — PROGRESS NOTES
Intake received/ Chart reviewed for services completed outside of [facility]    Pathology completed: 12/19/23    Imaging completed: 11/1/23    All records needed are in patients chart. No records retrieval needed at this time.

## 2023-12-12 NOTE — PROGRESS NOTES
Left message for Alexander with information for his upcoming appointment at the [facility] on 12/19. Instructions left to arrive at 0930, have nothing to eat or drink after midnight and to have a ride to and from. Call back number left for questions.

## 2023-12-19 ENCOUNTER — HOSPITAL ENCOUNTER (OUTPATIENT)
Dept: CT IMAGING | Facility: HOSPITAL | Age: 68
Discharge: HOME | End: 2023-12-19
Attending: RADIOLOGY
Payer: MEDICARE

## 2023-12-19 ENCOUNTER — TELEPHONE (OUTPATIENT)
Dept: FAMILY MEDICINE CLINIC | Facility: CLINIC | Age: 68
End: 2023-12-19

## 2023-12-19 VITALS
HEART RATE: 60 BPM | TEMPERATURE: 97.5 F | RESPIRATION RATE: 20 BRPM | WEIGHT: 159 LBS | BODY MASS INDEX: 23.55 KG/M2 | OXYGEN SATURATION: 100 % | SYSTOLIC BLOOD PRESSURE: 131 MMHG | HEIGHT: 69 IN | DIASTOLIC BLOOD PRESSURE: 84 MMHG

## 2023-12-19 DIAGNOSIS — G62.9 NEUROPATHY: Primary | ICD-10-CM

## 2023-12-19 DIAGNOSIS — M53.3 SACRAL LESION: ICD-10-CM

## 2023-12-19 LAB
INR PPP: 0.97 (ref 0.84–1.19)
PROTHROMBIN TIME: 12.8 SECONDS (ref 11.6–14.5)

## 2023-12-19 PROCEDURE — 99152 MOD SED SAME PHYS/QHP 5/>YRS: CPT

## 2023-12-19 PROCEDURE — 77012 CT SCAN FOR NEEDLE BIOPSY: CPT

## 2023-12-19 PROCEDURE — 99153 MOD SED SAME PHYS/QHP EA: CPT

## 2023-12-19 PROCEDURE — C1830 POWER BONE MARROW BX NEEDLE: HCPCS

## 2023-12-19 PROCEDURE — 20225 BONE BIOPSY TROCAR/NDL DEEP: CPT

## 2023-12-19 PROCEDURE — 85610 PROTHROMBIN TIME: CPT | Performed by: RADIOLOGY

## 2023-12-19 RX ORDER — MIDAZOLAM HYDROCHLORIDE 2 MG/2ML
INJECTION, SOLUTION INTRAMUSCULAR; INTRAVENOUS AS NEEDED
Status: COMPLETED | OUTPATIENT
Start: 2023-12-19 | End: 2023-12-19

## 2023-12-19 RX ORDER — LIDOCAINE WITH 8.4% SOD BICARB 0.9%(10ML)
SYRINGE (ML) INJECTION AS NEEDED
Status: COMPLETED | OUTPATIENT
Start: 2023-12-19 | End: 2023-12-19

## 2023-12-19 RX ORDER — SODIUM CHLORIDE 9 MG/ML
75 INJECTION, SOLUTION INTRAVENOUS CONTINUOUS
Status: DISCONTINUED | OUTPATIENT
Start: 2023-12-19 | End: 2023-12-20 | Stop reason: HOSPADM

## 2023-12-19 RX ORDER — GABAPENTIN 100 MG/1
100 CAPSULE ORAL 3 TIMES DAILY
Qty: 270 CAPSULE | Refills: 1 | Status: SHIPPED | OUTPATIENT
Start: 2023-12-19

## 2023-12-19 RX ORDER — FENTANYL CITRATE 50 UG/ML
INJECTION, SOLUTION INTRAMUSCULAR; INTRAVENOUS AS NEEDED
Status: COMPLETED | OUTPATIENT
Start: 2023-12-19 | End: 2023-12-19

## 2023-12-19 RX ADMIN — FENTANYL CITRATE 50 MCG: 50 INJECTION, SOLUTION INTRAMUSCULAR; INTRAVENOUS at 11:19

## 2023-12-19 RX ADMIN — Medication 10 ML: at 11:23

## 2023-12-19 RX ADMIN — MIDAZOLAM HYDROCHLORIDE 1 MG: 1 INJECTION, SOLUTION INTRAMUSCULAR; INTRAVENOUS at 11:19

## 2023-12-19 RX ADMIN — FENTANYL CITRATE 50 MCG: 50 INJECTION, SOLUTION INTRAMUSCULAR; INTRAVENOUS at 11:11

## 2023-12-19 RX ADMIN — MIDAZOLAM HYDROCHLORIDE 1 MG: 1 INJECTION, SOLUTION INTRAMUSCULAR; INTRAVENOUS at 11:11

## 2023-12-19 NOTE — TELEPHONE ENCOUNTER
Patient was inquiring if he could be prescribed gal[place]on for his neuropathy in his feet? Also he states he had his bone biopsy done this morning , he just wanted to make you aware as well

## 2023-12-19 NOTE — H&P
Interventional Radiology  History and Physical 12/19/2023     Alexander Olmstead   1955   [id number]    Assessment/Plan:  Large primary neoplasm of bone, involving the body of the sacrum  Plan is core biopsy with sample for RPMI and formalin    Problem List Items Addressed This Visit          Musculoskeletal and Integument    Sacral lesion    Relevant Orders    IR biopsy bone          Subjective:     Patient ID: Alexander Olmstead is a 68 y.o. male.    History of Present Illness  Seen previously.  Not addressed.  Now for biopsy    Review of Systems      Past Medical History:   Diagnosis Date    Acute respiratory failure (HCC) 11/24/2022    Alcohol withdrawal (HCC) 11/25/2022    Hyperlipidemia     Hypertension     Hyponatremia 11/23/2022    Hypothermia 11/23/2022    Hypoxia 11/23/2022    Metabolic acidosis with increased anion gap and accumulation of organic acids 11/23/2022    Metabolic encephalopathy 11/23/2022    Pneumonia 11/25/2022    Positive blood culture 11/28/2022    Septic shock (HCC) 11/24/2022        Past Surgical History:   Procedure Laterality Date    NO PAST SURGERIES          Social History     Tobacco Use   Smoking Status Never   Smokeless Tobacco Never        Social History     Substance and Sexual Activity   Alcohol Use Not Currently    Alcohol/week: 6.0 standard drinks of alcohol    Types: 6 Cans of beer per week    Comment: d/c 11/2022        Social History     Substance and Sexual Activity   Drug Use No        No Known Allergies    Current Outpatient Medications   Medication Sig Dispense Refill    Diclofenac Sodium (VOLTAREN) 1 % Apply 2 g topically 4 (four) times a day 100 g 5    Ibuprofen (Advil Liqui-Gels minis) 200 MG CAPS Take by mouth      losartan (COZAAR) 50 mg tablet Take 1 tablet (50 mg total) by mouth in the morning 90 tablet 1    mirtazapine (REMERON) 15 mg tablet TAKE 1 TABLET BY MOUTH DAILY AT BEDTIME 90 tablet 1    Multiple Vitamin (MULTIVITAMIN) tablet Take 1 tablet by  "mouth daily      Omega-3 Fatty Acids (FISH OIL PO) Take by mouth      pantoprazole (PROTONIX) 20 mg tablet Take 1 tablet (20 mg total) by mouth daily 90 tablet 1    Pyridoxine HCl (vitamin B-6) 50 MG TABS Take 1 tablet (50 mg total) by mouth daily 90 tablet 1    Acetaminophen 325 MG CAPS Take 2 tablets by mouth every 6 (six) hours       Current Facility-Administered Medications   Medication Dose Route Frequency Provider Last Rate Last Admin    sodium chloride 0.9 % infusion  75 mL/hr Intravenous Continuous Ross Fierro MD              Objective:    Vitals:    12/19/23 1155 12/19/23 1210 12/19/23 1230 12/19/23 1241   BP: 128/77 119/76 141/86 131/84   Pulse: 61 (!) 52 60 60   Resp: 22 16 20 20   Temp:       TempSrc:       SpO2: 100% 99% 100%    Weight:       Height:            Physical Exam      Shuffling gait.  I wonder if this is related?  Regular rate and rhythm  The skin over the lower back is clean, dry, and intact  Minimal respiratory excursion    No results found for: \"BNP\"   Lab Results   Component Value Date    WBC 6.70 10/24/2023    HGB 12.3 10/24/2023    HCT 36.7 10/24/2023    MCV 92 10/24/2023     (H) 10/24/2023     Lab Results   Component Value Date    INR 0.97 12/19/2023    INR 1.14 11/27/2022    INR 1.21 (H) 11/24/2022    PROTIME 12.8 12/19/2023    PROTIME 14.8 (H) 11/27/2022    PROTIME 15.6 (H) 11/24/2022     Lab Results   Component Value Date    PTT 36 11/23/2022         I have personally reviewed pertinent imaging and laboratory results.     Code Status: Prior  Advance Directive and Living Will: Yes    Power of : Yes  POLST:      This text is generated with voice recognition software. There may be translation, syntax,  or grammatical errors. If you have any questions, please contact the dictating provider.   "

## 2023-12-19 NOTE — PROCEDURES
Interventional Radiology Procedure Note    PATIENT NAME: Alexander Olmstead  : 1955  MRN: [id number]     Pre-op Diagnosis:   1. Sacral lesion      2.        Post-op Diagnosis:   1. Sacral lesion      2.   Same    Procedure: CT Guided Biopsy of the sacral lesion  Surgeon: Danny Saenz MD  Assistants: No qualified resident was available, Resident is only observing  Estimated Blood Loss: 10 cc  Findings: Please see full report in PACS  Specimens: Please see full report in PACS  Complications: None  Anesthesia: conscious sedation and local  Prep: 2% Chlorhexidine and alcohol, allowed to dry prior to sterile draping  Timeout: Performed  Fluoro time: Please see full report in PACS  Radiation dose: Please see full report in PACS  Contrast dose: Please see full report in PACS  Contrast type: Please see full report in PACS  Contrast strength: Please see full report in PACS  Contrast route of administration: Please see full report in PACS  Antibiotics: None        Danny Saenz MD     Date: 2023  Time: 1:36 PM

## 2023-12-22 ENCOUNTER — OFFICE VISIT (OUTPATIENT)
Dept: HEMATOLOGY ONCOLOGY | Facility: CLINIC | Age: 68
End: 2023-12-22
Payer: MEDICARE

## 2023-12-22 VITALS
WEIGHT: 160 LBS | HEIGHT: 69 IN | HEART RATE: 93 BPM | DIASTOLIC BLOOD PRESSURE: 76 MMHG | SYSTOLIC BLOOD PRESSURE: 136 MMHG | TEMPERATURE: 97.5 F | BODY MASS INDEX: 23.7 KG/M2 | OXYGEN SATURATION: 95 %

## 2023-12-22 DIAGNOSIS — C80.1 CARCINOMA OF UNKNOWN PRIMARY (HCC): Primary | ICD-10-CM

## 2023-12-22 DIAGNOSIS — C79.51 METASTASIS TO BONE (HCC): Primary | ICD-10-CM

## 2023-12-22 PROCEDURE — 99205 OFFICE O/P NEW HI 60 MIN: CPT | Performed by: INTERNAL MEDICINE

## 2023-12-22 RX ORDER — IBUPROFEN 200 MG
TABLET ORAL EVERY 6 HOURS PRN
COMMUNITY

## 2023-12-22 NOTE — PROGRESS NOTES
Alexander Ptaszkowski  1955  [facility] [place]  [address] [facility] [address] [phone]    No chief complaint on file.          Oncology History    No history exists.       History of Present Illness:  November 24, 2022 patient had CT chest ab pelvis investigating sepsis.  Bilateral pneumonia.  Sclerotic bony lesions central sacrum 7.9 cm, previously 5 cm in January 2018.  October 31, 2023 CT chest ab pelvis showed increase in sclerotic bony lesion, mixed lytic/sclerotic lesion T12 vertebra left pedicle, 2.5 cm unchanged since 2018.  WBC 6.1, hemoglobin 12.8, MCV 91, platelet count 404, normal differential.  CMP showed sodium 133, calcium 10.5, otherwise normal.  PSA 0.6.  CRP and sed rate normal.  SPEP showed peak 1 0.14 g/dL, IgG lambda.  December 19, 2023 biopsy of sacral mass showed epithelial neoplasm.    Review of Systems   Constitutional:  Negative for chills and fever.   HENT:  Negative for nosebleeds.    Eyes:  Negative for discharge.   Respiratory:  Negative for cough and shortness of breath.    Cardiovascular:  Negative for chest pain.   Gastrointestinal:  Negative for abdominal pain, constipation and diarrhea.   Endocrine: Negative for polydipsia.   Genitourinary:  Negative for hematuria.   Musculoskeletal:  Negative for arthralgias.   Skin:  Negative for color change.   Allergic/Immunologic: Negative for immunocompromised state.   Neurological:  Negative for dizziness and headaches.   Hematological:  Negative for adenopathy.   Psychiatric/Behavioral:  Negative for agitation.        Patient Active Problem List   Diagnosis    GI bleed    NSTEMI (non-ST elevated myocardial infarction) (HCC)    Multiple rib fractures    Pharyngeal swelling    Acute respiratory failure with hypoxia (HCC)    Sacral lesion    Other specified anemias    Other dysphagia    Esophageal dilatation    History of GI bleed    Hypertension    Alcohol dependence with  withdrawal delirium (HCC)     Past Medical History:   Diagnosis Date    Acute respiratory failure (HCC) 11/24/2022    Alcohol withdrawal (HCC) 11/25/2022    Hyperlipidemia     Hypertension     Hyponatremia 11/23/2022    Hypothermia 11/23/2022    Hypoxia 11/23/2022    Metabolic acidosis with increased anion gap and accumulation of organic acids 11/23/2022    Metabolic encephalopathy 11/23/2022    Pneumonia 11/25/2022    Positive blood culture 11/28/2022    Septic shock (HCC) 11/24/2022     Past Surgical History:   Procedure Laterality Date    NO PAST SURGERIES       Family History   Problem Relation Age of Onset    Stroke Mother     Hypertension Father     Diabetes Father      Social History     Socioeconomic History    Marital status: Single     Spouse name: Not on file    Number of children: Not on file    Years of education: Not on file    Highest education level: Not on file   Occupational History    Not on file   Tobacco Use    Smoking status: Never    Smokeless tobacco: Never   Vaping Use    Vaping status: Never Used   Substance and Sexual Activity    Alcohol use: Not Currently     Alcohol/week: 6.0 standard drinks of alcohol     Types: 6 Cans of beer per week     Comment: d/c 11/2022    Drug use: No    Sexual activity: Not Currently   Other Topics Concern    Not on file   Social History Narrative    Not on file     Social Determinants of Health     Financial Resource Strain: Not on file   Food Insecurity: No Food Insecurity (11/26/2022)    Hunger Vital Sign     Worried About Running Out of Food in the Last Year: Never true     Ran Out of Food in the Last Year: Never true   Transportation Needs: No Transportation Needs (11/26/2022)    PRAPARE - Transportation     Lack of Transportation (Medical): No     Lack of Transportation (Non-Medical): No   Physical Activity: Not on file   Stress: Not on file   Social Connections: Not on file   Intimate Partner Violence: Not on file   Housing Stability: Unknown  (11/26/2022)    Housing Stability Vital Sign     Unable to Pay for Housing in the Last Year: Not on file     Number of Places Lived in the Last Year: 1     Unstable Housing in the Last Year: No       Current Outpatient Medications:     Acetaminophen 325 MG CAPS, Take 2 tablets by mouth every 6 (six) hours, Disp: , Rfl:     Diclofenac Sodium (VOLTAREN) 1 %, Apply 2 g topically 4 (four) times a day, Disp: 100 g, Rfl: 5    gabapentin (Neurontin) 100 mg capsule, Take 1 capsule (100 mg total) by mouth 3 (three) times a day, Disp: 270 capsule, Rfl: 1    Ibuprofen (Advil Liqui-Gels minis) 200 MG CAPS, Take by mouth, Disp: , Rfl:     ibuprofen (MOTRIN) 200 mg tablet, Take by mouth every 6 (six) hours as needed for mild pain, Disp: , Rfl:     losartan (COZAAR) 50 mg tablet, Take 1 tablet (50 mg total) by mouth in the morning, Disp: 90 tablet, Rfl: 1    mirtazapine (REMERON) 15 mg tablet, TAKE 1 TABLET BY MOUTH DAILY AT BEDTIME, Disp: 90 tablet, Rfl: 1    Multiple Vitamin (MULTIVITAMIN) tablet, Take 1 tablet by mouth daily, Disp: , Rfl:     Omega-3 Fatty Acids (FISH OIL PO), Take by mouth, Disp: , Rfl:     pantoprazole (PROTONIX) 20 mg tablet, Take 1 tablet (20 mg total) by mouth daily, Disp: 90 tablet, Rfl: 1    Pyridoxine HCl (vitamin B-6) 50 MG TABS, Take 1 tablet (50 mg total) by mouth daily, Disp: 90 tablet, Rfl: 1  No Known Allergies  Vitals:    12/22/23 0849   BP: 136/76   Pulse: 93   Temp: 97.5 °F (36.4 °C)   SpO2: 95%       Physical Exam  Constitutional:       Appearance: He is well-developed.   HENT:      Head: Normocephalic and atraumatic.      Mouth/Throat:      Mouth: Mucous membranes are moist.   Eyes:      Pupils: Pupils are equal, round, and reactive to light.   Cardiovascular:      Rate and Rhythm: Normal rate and regular rhythm.      Heart sounds: No murmur heard.  Pulmonary:      Breath sounds: Normal breath sounds. No wheezing or rales.   Abdominal:      Palpations: Abdomen is soft.      Tenderness: There  is no abdominal tenderness.   Musculoskeletal:         General: No tenderness. Normal range of motion.      Cervical back: Neck supple.   Lymphadenopathy:      Cervical: No cervical adenopathy.   Skin:     Findings: No erythema or rash.   Neurological:      Mental Status: He is alert and oriented to person, place, and time.      Cranial Nerves: No cranial nerve deficit.      Deep Tendon Reflexes: Reflexes are normal and symmetric.   Psychiatric:         Behavior: Behavior normal.           Labs:  CBC, Coags, BMP, Mg, Phos     Imaging  No results found.  I reviewed the above laboratory and imaging data.    Discussion/Summary: In summary, this is a 68-year-old male with a history of recently diagnosed malignancy as outlined.  Biopsy is diagnostic of malignancy though inconclusive as to primary.  Pathology second opinion is pending.  Guardant is requested for next generation sequencing.  Biopsy specimen is not useful secondary to decalcification.  The T12 lesion has not changed over 5 years.  Uncertain whether this is related or not.  The sacral lesion has increased, albeit gradually over almost 6 years.  Patient had some pain in the sacral area in January - March 2023 at which time he spent much time in a recliner chair at the nursing home, recovering from pneumonia in December 2022.  Since then pain has resolved and not recurred.  He had lost about 30 pounds during his illness in December but weight has stabilized over the past 9+ months.  His performance status is good, functions without restriction.  My plan at present is as above.  We reviewed that treatment would be reasonable to consider but given uncertainty about diagnosis, asymptomatic state, shallow trajectory of disease, treatment deferral is reasonable at this time.  Reviewed the above with the patient and his friend.  They voiced understanding and agreement.

## 2023-12-28 ENCOUNTER — APPOINTMENT (OUTPATIENT)
Dept: LAB | Facility: HOSPITAL | Age: 68
End: 2023-12-28
Payer: MEDICARE

## 2023-12-28 DIAGNOSIS — C80.1 CARCINOMA OF UNKNOWN PRIMARY (HCC): ICD-10-CM

## 2023-12-28 LAB — MISCELLANEOUS LAB TEST RESULT: NORMAL

## 2023-12-28 PROCEDURE — 36415 COLL VENOUS BLD VENIPUNCTURE: CPT

## 2024-01-03 ENCOUNTER — TELEPHONE (OUTPATIENT)
Dept: HEMATOLOGY ONCOLOGY | Facility: CLINIC | Age: 69
End: 2024-01-03

## 2024-01-03 NOTE — TELEPHONE ENCOUNTER
Patient Call    Who are you speaking with? [facility]     If it is not the patient, are they listed on an active communication consent form? N/A   What is the reason for this call? He asked if specimen was taken at outpatient lab. Advised it was done at lab at [facility]   Does this require a call back? N/A   If a call back is required, please list best call back number N/a   If a call back is required, advise that a message will be forwarded to their care team and someone will return their call as soon as possible.   Did you relay this information to the patient? N/A

## 2024-01-09 ENCOUNTER — TELEPHONE (OUTPATIENT)
Dept: OBGYN CLINIC | Facility: CLINIC | Age: 69
End: 2024-01-09

## 2024-01-09 NOTE — TELEPHONE ENCOUNTER
----- Message from Ricky Perez DO sent at 1/8/2024  1:45 PM EST -----  Please haave this patient make an appt with me to go over results and plan. Thank you.

## 2024-01-12 ENCOUNTER — OFFICE VISIT (OUTPATIENT)
Dept: OBGYN CLINIC | Facility: CLINIC | Age: 69
End: 2024-01-12
Payer: MEDICARE

## 2024-01-12 ENCOUNTER — TELEPHONE (OUTPATIENT)
Dept: HEMATOLOGY ONCOLOGY | Facility: CLINIC | Age: 69
End: 2024-01-12

## 2024-01-12 VITALS
BODY MASS INDEX: 23.99 KG/M2 | RESPIRATION RATE: 16 BRPM | DIASTOLIC BLOOD PRESSURE: 77 MMHG | HEIGHT: 69 IN | WEIGHT: 162 LBS | SYSTOLIC BLOOD PRESSURE: 132 MMHG | HEART RATE: 88 BPM

## 2024-01-12 DIAGNOSIS — C90.30 MALIGNANT PLASMACYTOMA (HCC): Primary | ICD-10-CM

## 2024-01-12 PROCEDURE — 99215 OFFICE O/P EST HI 40 MIN: CPT | Performed by: STUDENT IN AN ORGANIZED HEALTH CARE EDUCATION/TRAINING PROGRAM

## 2024-01-12 NOTE — TELEPHONE ENCOUNTER
Appointment Change  Cancel, Reschedule, Change to Virtual      Who are you speaking with? friend   If it is not the patient, is the caller listed on the communication consent form? Yes   Which provider is the appointment scheduled with? Dr. Mckenna   When was the original appointment scheduled?    Please list date and time 2/2   At which location is the appointment scheduled to take place? [place]   Was the appointment rescheduled?     Was the appointment changed from an in person visit to a virtual visit?    If so, please list the details of the change. 1/26 3:40pm   What is the reason for the appointment change? Lab results       Was STAR transport scheduled? No   Does STAR transport need to be scheduled for the new visit (if applicable) No   Does the patient need an infusion appointment rescheduled? No   Does the patient have an upcoming infusion appointment scheduled? If so, when? No   Is the patient undergoing chemotherapy? No   For appointments cancelled with less than 24 hours:  Was the no-show policy reviewed? Yes

## 2024-01-12 NOTE — PROGRESS NOTES
Orthopedic Oncology Surgery  Follow-Up Office Note  Alexander Olmstead (69 y.o. male)  : 1955 Encounter Date: 2024  Dr. Ricky Perez, , Orthopedic Surgeon  Orthopedic Oncology & Sarcoma Surgery        Impression/Plan: Alexander Olmstead is a 69 y.o. male with:    1. 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 Mckenna. Discussed anticipated findings based on features ( our concern for sarcoma, radiology read suggesting it) and relief   - discussed its malignant nature, but one that can be addressed through medication and radiation rather than a complicated surgery   - Rad onc referral placed    2. Multiple Myeloma  - continue follow up with Dr. Mckenna, upcoming appointment 24  - to discuss treatment center location; discussed possibility for [facility] vs [facility], to be determined by Dr. Mckenna, who will manage recommended treatment     Follow up: Return in about 3 months (around 2024). For discussion of interval treatment     Procedure:   No procedures performed    Surgical Planning:   No surgery planned at this time    ___________________________________________________________________    Subjective:     Alexander Olmstead is a 69 y.o. male with hx of HTN. They present today for pathology review of sacral lesion.    Today, he presents to discuss the pathology review after biopsying the lesion.     Heme Onc: Dr. Mckenna   Discussion/Summary: In summary, this is a 68-year-old male with a history of recently diagnosed malignancy as outlined.  Biopsy is diagnostic of malignancy though inconclusive as to primary.  Pathology second opinion is pending.  Guardant is requested for next generation sequencing.  Biopsy specimen is not useful secondary to decalcification.  The T12 lesion has not changed over 5 years.  Uncertain  "whether this is related or not.  The sacral lesion has increased, albeit gradually over almost 6 years.  Patient had some pain in the sacral area in January - March 2023 at which time he spent much time in a recliner chair at the nursing home, recovering from pneumonia in December 2022.  Since then pain has resolved and not recurred.  He had lost about 30 pounds during his illness in December but weight has stabilized over the past 9+ months.  His performance status is good, functions without restriction.  My plan at present is as above.  We reviewed that treatment would be reasonable to consider but given uncertainty about diagnosis, asymptomatic state, shallow trajectory of disease, treatment deferral is reasonable at this time.  Reviewed the above with the patient and his friend.  They voiced understanding and agreement.    Review of Systems:   Allergies, medications, past medical/surgical/family/social history have been reviewed, with the following changes: none  Complete 12 system review performed and found to be negative except: except as per mentioned in HPI.    Physical Examination:   Height: 5' 9\" (175.3 cm)  Weight - Scale: 73.5 kg (162 lb)  BMI (Calculated): 23.9  BSA (Calculated - m2): 1.89 sq meters     Vitals:    01/12/24 0847   BP: 132/77   Pulse: 88   Resp: 16     Body mass index is 23.92 kg/m².    Present today with  friend  General: alert and oriented; well nourished/well developed; no apparent distress.   Psychiatric: normal mood and affect  HEENT: NCAT. Head/neck:full range of motion.   Lungs: breathing comfortably ; equal symmetric chest expansion.   Abdomen: soft, non-tender, non-distended.   Skin: warm; dry; without  lesions, rashes, petechiae or purpura; without  clubbing, cyanosis, edema  Extremity:  low back/ hip -   Inspection: without edema, without skin abnormalities throughout   Palpation:  without  TTP   Range of motion of joints: full range of motion all extremities.   Motor strength: "  WNL all extremities 5/5grips, 5/5 dorsal/pedal flexion   Sensation: grossly intact to all extremities.    Pulses: 2+ radial, pedal, and DP/PT pulses.  Lymphatics: no obvious lymphadenopathy  Gait: steady gait.    Unchanged from previous     IMAGING RESULTS, All images personally reviewed today by Dr. Perez.     Study: MRI sacrum  Date: 11/1/24  Report: I have read and reviewed radiology report.   My impression is:   Again seen is a densely sclerotic bone lesion involving first through third sacral segments, currently 8.0 x 8.3 x 7.2 cm, enlarged compared to the 2018 CT, where it measured 4.9 x 3.7 x 2.5 cm   Lesion demonstrates cortical breakthrough anteriorly   The lesion has also extended into the left ilium     Study: Left ankle x-ray  Date: 10/24/23  Report: No radiologist report was available at this time.   My impression is as follows: ankle arthritis, vascular calcification and pesplanus     Study: WBBS  Date: 10/9/23  Report: I have read and agree with the radiologist report.  My impression is as follows:   Moderately increased blood flow, blood pool, and delayed activity of the upper sacrum, corresponding to the known sclerotic enlarging sacral lesion. As described on recent prior imaging studies, this is most suspicious for neoplasm and further evaluation   is warranted. As prostate carcinoma is in the differential, correlation with PSA is recommended.  Whole body imaging demonstrates no other suspicious areas of abnormal radiopharmaceutical activity. Asymmetrically increased activity is seen within the left ankle and foot compared to the right, nonspecific, possibly related to injury or arthritis.   Correlate for any patient symptomatology.     Study: XR sacrum         Date: 10/9/23  Report: I have read and agree with the radiologist report.  My impression is as follows: No acute osseous abnormality.  Sacral sclerosis as previously characterized and again concerning for possible metastatic disease.    "  Study: XR  hip/pelvis  Date: 11/25/23  Report: I have read and agree with the radiologist report.  My impression is as follows: No acute osseous abnormality.  Again seen is a large sclerotic bone lesion in the upper sacrum.  This has enlarged comparing the 11/24/2022 CT and a 1/1/2018 CT.  This is suspicious for malignancy, and bone scan is recommended for further evaluation.     Study: CT chest on pelvis with contrast  Date: 11/24/23  Report: I have read and agree with the radiologist report.  My impression is as follows:   OSSEOUS STRUCTURES:  Old healed rib fractures.  Large predominantly sclerotic bony lesion is demonstrated at the level of the central sacrum.  It has enlarged compared to the previous exam and now measures approximately 7.9 x 5.3 cm compared to 5 x 3.8 cm.  Sclerotic bony lesion in the left pedicle at T11 is unchanged.  IMPRESSION:  1.  Bilateral pneumonia and pleural effusions.  2.  Distended esophagus containing fluid.  Nonurgent evaluation with swallowing study is recommended.  3.  Sacral bony lesion has enlarged compared to the previous exam and is indeterminate.  Recommend correlation with bone scan or MRI pelvis.  Sclerotic lesion at the left pedicle at T11 is unchanged.    Study: MRI Pelvis Sacrum, Coccyx, SI Jt wo and w contrast   Date: 11/1/23   Report: I have read and agree with the radiologist report.  My impression is as follows:   Again seen is a densely sclerotic bone lesion involving first through third sacral segments, currently 8.0 x 8.3 x 7.2 cm, enlarged compared to the 2018 CT, where it measured 4.9 x 3.7 x 2.5 cm.   Lesion demonstrates cortical breakthrough anteriorly   The lesion has also extended into the left ilium     Laboratory:  The SPEP shows a monoclonal peak in the gamma region.     Pathology: FINAL  A. Bone, \"Sacral mass,\" Biopsy:  - Epithelial neoplasm with sclerotic bone consistent with lambda-restricted plasma cell myeloma    Pathology Note:   Histologic " sections show a malignant plasmacytoid/epithelioid neoplasm in the background of densely sclerotic bone.  Immunohistochemistry performed at [facility] is positive for  (strong and diffuse) and lambda  whereas kappa and MUM1 are negative.  I have also evaluated the immunohistochemistry stains you have provided.    Overall, it is my opinion that we are dealing with a plasma cell myeloma, which is lambda-restricted.    Referring provider: Blade Walton DO  Patient Care Team:  Blade Walton DO as PCP - General (Family Medicine)     Diagnoses and all orders for this visit:    Malignant plasmacytoma (HCC)  -     Ambulatory Referral to Radiation Oncology; Future      ____________________________________________________________________    30 minutes was spent in the coordination of care, reviewing of imaging and with the patient on the date of service    IKapil PA-C, scribed this note in conjunction with and in the presence of Dr. Ricky Perez DO, who performed parts of the services as described in this documentation      Dr. Ricky Perez DO, Orthopedic Surgeon  Orthopedic Oncology & Sarcoma Surgery   Phone:[phone] Fax:[phone]

## 2024-01-26 ENCOUNTER — OFFICE VISIT (OUTPATIENT)
Dept: HEMATOLOGY ONCOLOGY | Facility: CLINIC | Age: 69
End: 2024-01-26
Payer: MEDICARE

## 2024-01-26 ENCOUNTER — TELEPHONE (OUTPATIENT)
Dept: FAMILY MEDICINE CLINIC | Facility: CLINIC | Age: 69
End: 2024-01-26

## 2024-01-26 VITALS
TEMPERATURE: 97.8 F | OXYGEN SATURATION: 90 % | BODY MASS INDEX: 24.44 KG/M2 | DIASTOLIC BLOOD PRESSURE: 70 MMHG | WEIGHT: 165 LBS | SYSTOLIC BLOOD PRESSURE: 142 MMHG | HEART RATE: 74 BPM | HEIGHT: 69 IN

## 2024-01-26 DIAGNOSIS — G62.9 NEUROPATHY: Primary | ICD-10-CM

## 2024-01-26 DIAGNOSIS — C90.00 MULTIPLE MYELOMA NOT HAVING ACHIEVED REMISSION (HCC): Primary | ICD-10-CM

## 2024-01-26 PROCEDURE — 99215 OFFICE O/P EST HI 40 MIN: CPT | Performed by: INTERNAL MEDICINE

## 2024-01-26 RX ORDER — GABAPENTIN 300 MG/1
300 CAPSULE ORAL 3 TIMES DAILY
Qty: 90 CAPSULE | Refills: 5 | Status: SHIPPED | OUTPATIENT
Start: 2024-01-26

## 2024-01-26 RX ORDER — GABAPENTIN 300 MG/1
CAPSULE ORAL 3 TIMES DAILY
Qty: 90 CAPSULE | Refills: 5 | Status: SHIPPED | OUTPATIENT
Start: 2024-01-26 | End: 2024-01-26

## 2024-01-26 NOTE — PROGRESS NOTES
[facility] [place]  [address] [facility] [address]  [phone]  [phone]    Alexander Olmstead,1955, [id number]  01/26/24    Discussion:   In summary, this is a 69-year-old male with a history of bone lesion as previously outlined.  Guardant test showed BRCA1 mutation, MSI stable.  I do not believe this is related to his malignancy.  Will confer with oncology genetics regarding further steps, probably to arrange for germline mutation testing.  Pathology second opinion showed strong  and lambda staining.  SPEP had previously shown IgG lambda monoclonal gammopathy, 0.14 g/dL.  CBC showed mild thrombocytosis.  No anemia.  CMP showed normal calcium, creatinine 0.87, total protein 7.3, albumin 4.6.  This is certainly a very strange case.  The sacral lesion has increased moderately in size though over long period of time, 5 years.  Separate T12 lesion is noted not significantly different than 2018.  In short, I do not see indication of aggressive plasma cell neoplasm and, in my opinion, systemic therapy and potential toxicities of this are not warranted at this time.  The alternative would be close observation with quarterly serologies, perhaps every 6 month imaging, deferred treatment initiation upon disease progression.  He has bilateral lower extremity neuropathy, possibly related to sacral lesion or prior alcohol use. ?MGNS?  He has radiation therapy consultation in a few days regarding his sacral lesion.  I would review that as reasonable given the increase in the sacral lesion, albeit slowly and asymptomatic.  I reviewed the above with the patient and his friend.  They voiced understanding and agreement.    ______________________________________________________________________    No chief complaint on file.      HPI:  Oncology History Overview Note   Referred by Dr. Perez for sacral mass, pathology consistent with lambda-restricted plasma cell myeloma. He presents  today for initial consult.     11/24/22 patient had CT chest ab pelvis investigating sepsis. Sclerotic bony lesions central sacrum 7.9 cm, previously 5 cm in January 2018.  10/31/23  CT chest ab pelvis showed increase in sclerotic bony lesion, mixed lytic/sclerotic lesion T12 vertebra left pedicle, 2.5 cm unchanged since 2018. 12/19/23 biopsy of sacral mass showed epithelial neoplasm.   Seen by Dr. Perez who stated that malignant nature could be addressed through medication/radiation, rather than a complicated surgery.     11/24/22 CT chest abdomen pelvis w contrast  IMPRESSION:  1.  Bilateral pneumonia and pleural effusions.  2.  Distended esophagus containing fluid.  Nonurgent evaluation with swallowing study is recommended.  3.  Sacral bony lesion has enlarged compared to the previous exam and is indeterminate.  Recommend correlation with bone scan or MRI pelvis.  Sclerotic lesion at the left pedicle at T11 is unchanged.    10/31/23 CT chest abdomen pelvis w contrast  IMPRESSION:  Again seen is a densely sclerotic bone lesion involving first through third sacral segments, currently 8.0 x 8.3 x 7.2 cm, enlarged compared to the 2018 CT, where it measured 4.9 x 3.7 x 2.5 cm. (Series 2 images 177-194.)  Lesion demonstrates cortical breakthrough anteriorly (series 602 images .)  The lesion has also extended into the left ilium (series 2 image 182.)  Because of these aggressive features, tissue diagnosis is recommended. 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  Mixed lytic sclerotic lesion in the T12 vertebral body, centered in the left pedicle, measuring 2.5 x 1.0 cm. This is not significantly changed in size since 2018 therefore likely benign (series 2 image 97.)    11/1/23 MRI pelvis sacrum, coccyx, si jts wo and w contrast  IMPRESSION:  Again seen is a densely sclerotic bone lesion involving first through third sacral segments, currently 8.0 x 8.3 x 7.2 cm, enlarged compared to the 2018 CT, where it measured  "4.9 x 3.7 x 2.5 cm. (Series 6 images 5-15.)  Lesion demonstrates cortical breakthrough anteriorly (series 10 images 11-15.)  The lesion has also extended into the left ilium (series 6 images 6-7.)  Because of these aggressive features, tissue diagnosis is recommended. 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    23 IR biopsy bone  Final Diagnosis   A. Bone, \"Sacral mass,\" Biopsy:  - Epithelial neoplasm with sclerotic bone consistent with lambda-restricted plasma cell myeloma (see note and consultation letter)     23 Hematology Oncology - Dr. Mckenna  Biopsy is diagnostic of malignancy though inconclusive as to primary.  Pathology second opinion is pending.  Guardant is requested for next generation sequencing.   The T12 lesion has not changed over 5 years.  Uncertain whether this is related or not.  The sacral lesion has increased, albeit gradually over almost 6 years.  We reviewed that treatment would be reasonable to consider but given uncertainty about diagnosis, asymptomatic state, shallow trajectory of disease, treatment deferral is reasonable at this time.     24 Orthopedics - Dr. Perez  Pathology consistent with lambda-restricted plasma cell myeloma  Discussed its malignant nature, but one that can be addressed through medication and radiation rather than a complicated surgery   Rad onc referral placed  F/u in 3 months    24 Hematology Oncology - Dr. Mckenna    [facility] Ortho - Dr. Perez       Metastasis to bone (HCC)   2023 Biopsy    Final Diagnosis   A. Bone, \"Sacral mass,\" Biopsy:  - Epithelial neoplasm with sclerotic bone consistent with lambda-restricted plasma cell myeloma (see note and consultation letter)        2023 Initial Diagnosis    Metastasis to bone (HCC)         Interval History: Clinically stable.  Lower extremity neuropathy.  ECOG-  1 - Symptomatic but completely ambulatory    Review of Systems   Constitutional:  Negative for chills and fever.   HENT: "  Negative for nosebleeds.    Eyes:  Negative for discharge.   Respiratory:  Negative for cough and shortness of breath.    Cardiovascular:  Negative for chest pain.   Gastrointestinal:  Negative for abdominal pain, constipation and diarrhea.   Endocrine: Negative for polydipsia.   Genitourinary:  Negative for hematuria.   Musculoskeletal:  Negative for arthralgias.   Skin:  Negative for color change.   Allergic/Immunologic: Negative for immunocompromised state.   Neurological:  Positive for numbness. Negative for dizziness and headaches.   Hematological:  Negative for adenopathy.   Psychiatric/Behavioral:  Negative for agitation.        Past Medical History:   Diagnosis Date    Acute respiratory failure ([facility]) 11/24/2022    Alcohol withdrawal ([facility]) 11/25/2022    Hyperlipidemia     Hypertension     Hyponatremia 11/23/2022    Hypothermia 11/23/2022    Hypoxia 11/23/2022    Metabolic acidosis with increased anion gap and accumulation of organic acids 11/23/2022    Metabolic encephalopathy 11/23/2022    Pneumonia 11/25/2022    Positive blood culture 11/28/2022    Septic shock ([facility]) 11/24/2022     Patient Active Problem List   Diagnosis    GI bleed    NSTEMI (non-ST elevated myocardial infarction) ([facility])    Multiple rib fractures    Pharyngeal swelling    Acute respiratory failure with hypoxia ([facility])    Sacral lesion    Other specified anemias    Other dysphagia    Esophageal dilatation    History of GI bleed    Hypertension    Alcohol dependence with withdrawal delirium ([facility])    Metastasis to bone ([facility])       Current Outpatient Medications:     Acetaminophen 325 MG CAPS, Take 2 tablets by mouth every 6 (six) hours, Disp: , Rfl:     Diclofenac Sodium (VOLTAREN) 1 %, Apply 2 g topically 4 (four) times a day, Disp: 100 g, Rfl: 5    gabapentin (Neurontin) 100 mg capsule, Take 1 capsule (100 mg total) by mouth 3 (three) times a day, Disp: 270 capsule, Rfl: 1    Ibuprofen (Advil Liqui-Gels minis) 200 MG CAPS, Take by mouth,  "Disp: , Rfl:     ibuprofen (MOTRIN) 200 mg tablet, Take by mouth every 6 (six) hours as needed for mild pain, Disp: , Rfl:     losartan (COZAAR) 50 mg tablet, Take 1 tablet (50 mg total) by mouth in the morning, Disp: 90 tablet, Rfl: 1    mirtazapine (REMERON) 15 mg tablet, TAKE 1 TABLET BY MOUTH DAILY AT BEDTIME, Disp: 90 tablet, Rfl: 1    Multiple Vitamin (MULTIVITAMIN) tablet, Take 1 tablet by mouth daily, Disp: , Rfl:     Omega-3 Fatty Acids (FISH OIL PO), Take by mouth, Disp: , Rfl:     pantoprazole (PROTONIX) 20 mg tablet, Take 1 tablet (20 mg total) by mouth daily, Disp: 90 tablet, Rfl: 1    Pyridoxine HCl (vitamin B-6) 50 MG TABS, Take 1 tablet (50 mg total) by mouth daily, Disp: 90 tablet, Rfl: 1  No Known Allergies  Past Surgical History:   Procedure Laterality Date    IR BIOPSY BONE  12/19/2023    NO PAST SURGERIES       Social History     Objective:  Vitals:    01/26/24 1532   BP: 142/70   BP Location: Left arm   Patient Position: Sitting   Cuff Size: Standard   Pulse: 74   Temp: 97.8 °F (36.6 °C)   TempSrc: Temporal   SpO2: 90%   Weight: 74.8 kg (165 lb)   Height: 5' 9\" (1.753 m)     Physical Exam  Constitutional:       Appearance: He is well-developed.   HENT:      Head: Normocephalic and atraumatic.      Mouth/Throat:      Mouth: Mucous membranes are moist.   Eyes:      Pupils: Pupils are equal, round, and reactive to light.   Cardiovascular:      Rate and Rhythm: Normal rate and regular rhythm.      Heart sounds: No murmur heard.  Pulmonary:      Breath sounds: Normal breath sounds. No wheezing or rales.   Abdominal:      Palpations: Abdomen is soft.      Tenderness: There is no abdominal tenderness.   Musculoskeletal:         General: No tenderness. Normal range of motion.      Cervical back: Neck supple.   Lymphadenopathy:      Cervical: No cervical adenopathy.   Skin:     Findings: No erythema or rash.   Neurological:      Mental Status: He is alert and oriented to person, place, and time.      " Cranial Nerves: No cranial nerve deficit.      Deep Tendon Reflexes: Reflexes are normal and symmetric.   Psychiatric:         Behavior: Behavior normal.           Labs:  I personally reviewed the labs and imaging pertinent to this patient care.

## 2024-01-26 NOTE — TELEPHONE ENCOUNTER
Patient still having some issues with his feet - looking to see if his gabapentin can be increase at all

## 2024-01-29 ENCOUNTER — CLINICAL SUPPORT (OUTPATIENT)
Dept: RADIATION ONCOLOGY | Facility: CLINIC | Age: 69
End: 2024-01-29
Attending: RADIOLOGY
Payer: MEDICARE

## 2024-01-29 ENCOUNTER — RADIATION ONCOLOGY CONSULT (OUTPATIENT)
Dept: RADIATION ONCOLOGY | Facility: CLINIC | Age: 69
End: 2024-01-29
Payer: MEDICARE

## 2024-01-29 ENCOUNTER — DOCUMENTATION (OUTPATIENT)
Dept: HEMATOLOGY ONCOLOGY | Facility: CLINIC | Age: 69
End: 2024-01-29

## 2024-01-29 VITALS
DIASTOLIC BLOOD PRESSURE: 81 MMHG | HEART RATE: 83 BPM | SYSTOLIC BLOOD PRESSURE: 134 MMHG | WEIGHT: 160.5 LBS | OXYGEN SATURATION: 99 % | HEIGHT: 69 IN | BODY MASS INDEX: 23.77 KG/M2 | TEMPERATURE: 98 F

## 2024-01-29 DIAGNOSIS — C79.51 METASTASIS TO BONE (HCC): Primary | ICD-10-CM

## 2024-01-29 DIAGNOSIS — C90.30 MALIGNANT PLASMACYTOMA (HCC): ICD-10-CM

## 2024-01-29 PROCEDURE — 99205 OFFICE O/P NEW HI 60 MIN: CPT | Performed by: RADIOLOGY

## 2024-01-29 PROCEDURE — 99211 OFF/OP EST MAY X REQ PHY/QHP: CPT | Performed by: RADIOLOGY

## 2024-01-29 NOTE — PROGRESS NOTES
In-basket message received from Dr. Scruggs to add patient to the neuro [facility] on 1/31/2024. Chart reviewed and prep completed.

## 2024-01-29 NOTE — PROGRESS NOTES
Consultation - Radiation Oncology      MRN:[id number] : 1955  Encounter: [id number]  Patient Information: Alexander Olmstead      CHIEF COMPLAINT  Chief Complaint   Patient presents with    Consult     Cancer Staging   No matching staging information was found for the patient.           History of Present Illness     Alexander Olmstead 1955 is a 69 y.o. male Referred by Dr. Perez for sacral mass, pathology consistent with lambda-restricted plasma cell myeloma. He presents today for initial consult.         22 CT chest abdomen pelvis w contrast  IMPRESSION:  1.  Bilateral pneumonia and pleural effusions.  2.  Distended esophagus containing fluid.  Nonurgent evaluation with swallowing study is recommended.  3.  Sacral bony lesion has enlarged compared to the previous exam and is indeterminate.  Recommend correlation with bone scan or MRI pelvis.  Sclerotic lesion at the left pedicle at T11 is unchanged.     10/31/23 CT chest abdomen pelvis w contrast  IMPRESSION:  Again seen is a densely sclerotic bone lesion involving first through third sacral segments, currently 8.0 x 8.3 x 7.2 cm, enlarged compared to the 2018 CT, where it measured 4.9 x 3.7 x 2.5 cm. (Series 2 images 177-194.)  Lesion demonstrates cortical breakthrough anteriorly (series 602 images .)  The lesion has also extended into the left ilium (series 2 image 182.)  Because of these aggressive features, tissue diagnosis is recommended. 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  Mixed lytic sclerotic lesion in the T12 vertebral body, centered in the left pedicle, measuring 2.5 x 1.0 cm. This is not significantly changed in size since 2018 therefore likely benign (series 2 image 97.)     23 MRI pelvis sacrum, coccyx, si jts wo and w contrast  IMPRESSION:  Again seen is a densely sclerotic bone lesion involving first through third sacral segments, currently 8.0 x 8.3 x 7.2 cm, enlarged compared to the 2018 CT, where it measured 4.9  "x 3.7 x 2.5 cm. (Series 6 images 5-15.)  Lesion demonstrates cortical breakthrough anteriorly (series 10 images 11-15.)  The lesion has also extended into the left ilium (series 6 images 6-7.)  Because of these aggressive features, tissue diagnosis is recommended. 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     23 IR biopsy bone  Final Diagnosis   A. Bone, \"Sacral mass,\" Biopsy:  - Epithelial neoplasm with sclerotic bone consistent with lambda-restricted plasma cell myeloma (see note and consultation letter)      23 Hematology Oncology - Dr. Mckenna  Biopsy is diagnostic of malignancy though inconclusive as to primary.  Pathology second opinion is pending.  Guardant is requested for next generation sequencing.   The T12 lesion has not changed over 5 years.  Uncertain whether this is related or not.  The sacral lesion has increased, albeit gradually over almost 6 years.  We reviewed that treatment would be reasonable to consider but given uncertainty about diagnosis, asymptomatic state, shallow trajectory of disease, treatment deferral is reasonable at this time.      24 Orthopedics - Dr. Perez  Pathology consistent with lambda-restricted plasma cell myeloma  Discussed its malignant nature, but one that can be addressed through medication and radiation rather than a complicated surgery   Rad onc referral placed  F/u in 3 months     24 Hematology Oncology - Dr. Mckenna  No indication of aggressive plasma cell neoplasm.  Systemic therapy not warranted  Alternative of close observation with quarterly labs and defer treatment initiation upon disease progression.  Bilateral lower extremity neuropathy possibly r/t sacral lesion or prior ETOH use.    Has upcoming radiation oncology consult for sacral mass.       [facility] - Dr. Perez  24  Dr. Mckenna            Historical Information   Oncology History   Metastasis to bone (HCC)   2023 Biopsy    Final Diagnosis   A. Bone, \"Sacral " "mass,\" Biopsy:  - Epithelial neoplasm with sclerotic bone consistent with lambda-restricted plasma cell myeloma (see note and consultation letter)        12/22/2023 Initial Diagnosis    Metastasis to bone (HCC)           Past Medical History:   Diagnosis Date    Acute respiratory failure (HCC) 11/24/2022    Alcohol withdrawal (HCC) 11/25/2022    Hyperlipidemia     Hypertension     Hyponatremia 11/23/2022    Hypothermia 11/23/2022    Hypoxia 11/23/2022    Metabolic acidosis with increased anion gap and accumulation of organic acids 11/23/2022    Metabolic encephalopathy 11/23/2022    Pneumonia 11/25/2022    Positive blood culture 11/28/2022    Septic shock (HCC) 11/24/2022     Past Surgical History:   Procedure Laterality Date    IR BIOPSY BONE  12/19/2023    NO PAST SURGERIES         Family History   Problem Relation Age of Onset    Stroke Mother     Hypertension Father     Diabetes Father     Cancer Father        Social History   Social History     Substance and Sexual Activity   Alcohol Use Not Currently    Alcohol/week: 6.0 standard drinks of alcohol    Types: 6 Cans of beer per week    Comment: d/c 11/2022     Social History     Substance and Sexual Activity   Drug Use Never     Social History     Tobacco Use   Smoking Status Never   Smokeless Tobacco Never         Meds/Allergies     Current Outpatient Medications:     Cyanocobalamin (VITAMIN B-12 CR PO), Take 2 tablets by mouth in the morning, Disp: , Rfl:     gabapentin (Neurontin) 300 mg capsule, Take 1 capsule (300 mg total) by mouth 3 (three) times a day, Disp: 90 capsule, Rfl: 5    ibuprofen (MOTRIN) 200 mg tablet, Take 400 mg by mouth every 6 (six) hours as needed for mild pain, Disp: , Rfl:     losartan (COZAAR) 50 mg tablet, Take 1 tablet (50 mg total) by mouth in the morning, Disp: 90 tablet, Rfl: 1    mirtazapine (REMERON) 15 mg tablet, TAKE 1 TABLET BY MOUTH DAILY AT BEDTIME, Disp: 90 tablet, Rfl: 1    Multiple Vitamin (MULTIVITAMIN) tablet, Take 1 " "tablet by mouth daily, Disp: , Rfl:     Omega-3 Fatty Acids (FISH OIL PO), Take by mouth, Disp: , Rfl:     pantoprazole (PROTONIX) 20 mg tablet, Take 1 tablet (20 mg total) by mouth daily, Disp: 90 tablet, Rfl: 1    Pyridoxine HCl (vitamin B-6) 50 MG TABS, Take 1 tablet (50 mg total) by mouth daily, Disp: 90 tablet, Rfl: 1    Acetaminophen 325 MG CAPS, Take 2 tablets by mouth every 6 (six) hours (Patient not taking: Reported on 1/29/2024), Disp: , Rfl:     Diclofenac Sodium (VOLTAREN) 1 %, Apply 2 g topically 4 (four) times a day (Patient not taking: Reported on 1/29/2024), Disp: 100 g, Rfl: 5    Ibuprofen (Advil Liqui-Gels minis) 200 MG CAPS, Take by mouth, Disp: , Rfl:   No Known Allergies      Review of Systems  Constitutional: Negative.  Negative for activity change, appetite change, fatigue, fever and unexpected weight change.   HENT: Negative.     Eyes: Negative.    Respiratory: Negative.     Cardiovascular: Negative.    Gastrointestinal: Negative.         Uses metamucil   Genitourinary:  Negative for dysuria and hematuria.        Nocturia 1-2x   Musculoskeletal:  Positive for gait problem (r/t neuropathy). Negative for back pain.   Skin: Negative.    Allergic/Immunologic: Negative.    Neurological:  Positive for numbness (bilateral feet neuropathy). Negative for tremors and weakness.   Hematological:  Does not bruise/bleed easily.   Psychiatric/Behavioral: Negative.         OBJECTIVE:   /81 (BP Location: Left arm, Patient Position: Sitting, Cuff Size: Standard)   Pulse 83   Temp 98 °F (36.7 °C) (Temporal)   Ht 5' 9\" (1.753 m)   Wt 72.8 kg (160 lb 7.9 oz)   SpO2 99%   BMI 23.70 kg/m²   Pain Assessment:  0  Performance Status: ECOG/Zubrod/WHO: 1 - Symptomatic but completely ambulatory    Physical Exam   He is conversing appropriately.  Ambulating independently without assistive device.  There is no spinal tenderness including at the sacral area.  His muscle strength is symmetric " bilaterally.      RESULTS  Lab Results    Chemistry        Component Value Date/Time    K 4.2 10/24/2023 1033    CL 99 10/24/2023 1033    CO2 28 10/24/2023 1033    BUN 12 10/24/2023 1033    CREATININE 0.87 10/24/2023 1033        Component Value Date/Time    CALCIUM 9.8 10/24/2023 1033    ALKPHOS 80 10/24/2023 1033    AST 13 10/24/2023 1033    ALT 11 10/24/2023 1033            Lab Results   Component Value Date    WBC 6.70 10/24/2023    HGB 12.3 10/24/2023    HCT 36.7 10/24/2023    MCV 92 10/24/2023     (H) 10/24/2023         Imaging Studies  No results found.            ASSESSMENT  1. Metastasis to bone (HCC)        2. Malignant plasmacytoma (HCC)  Ambulatory Referral to Radiation Oncology        Cancer Staging   No matching staging information was found for the patient.        PLAN/DISCUSSION  No orders of the defined types were placed in this encounter.         Alexander Olmstead is a 69 y.o. year old male with what appears to be plasma cell myeloma.  He has had a sacral lesion since 2018.  In 2022 he had significant medical problems with extended hospitalization/rehab and during which time imaging revealed increased size of his sacral lesion from approximately 5 to 8 cm.  He had follow-up imaging this year again showing lesion measuring close to 8 cm.  He is asymptomatic.  He underwent biopsy which was consistent with plasma cell myeloma.  This was also sent for second opinion confirming plasma cell myeloma.  He has an abnormal SPEP as well.  There is a question of lesion at T12.  I do not see bone marrow biopsy.  He was seen by medical oncology however no systemic therapy is recommended.  His as his case is quite unusual I will present at neuro-oncology clinic.  We discussed that if this is myeloma that this would be a systemic process and the plan will be to treat the sacrum locally to a dose of 3000 cGy in 10 treatment fractions.  For solitary plasma cell tumors a higher dose would be recommended. He  "lives [place] away and has a friend bring him for appointments and his simulation is scheduled accordingly.           Fadia Scruggs MD  1/29/2024,11:48 AM      Portions of the record may have been created with voice recognition software.  Occasional wrong word or \"sound a like\" substitutions may have occurred due to the inherent limitations of voice recognition software.  Read the chart carefully and recognize, using context, where substitutions have occurred.          "

## 2024-01-31 ENCOUNTER — DOCUMENTATION (OUTPATIENT)
Dept: RADIATION ONCOLOGY | Facility: HOSPITAL | Age: 69
End: 2024-01-31

## 2024-01-31 DIAGNOSIS — C90.30 MALIGNANT PLASMACYTOMA (HCC): Primary | ICD-10-CM

## 2024-01-31 NOTE — PROGRESS NOTES
NEURO ONCOLOGY CASE REVIEW     DATE: 1/31/2024  PRESENTING DOCTOR: Dr Fadia Scruggs     DIAGNOSIS:  Plasma cell myeloma        Alexander Olmstead is a 69 y.o. male who was presented at Neuro Oncology Case Review today. History of sacral lesion since 2018. Repeat imaging in 2022 revealed increased size of the sacral lesion from approximately 5 to 8 cm.  He had follow-up imaging again in 2023 showing the sacral lesion measuring close to 8 cm. Mixed lytic sclerotic lesion in the T12 vertebral body, centered in the left pedicle, showed no significant change in size since 2018. Biopsy of the sacral mass was consistent with plasma cell myeloma. No systemic therapy was recommended by medical oncology.       PHYSICIAN RECOMMENDED PLAN:   - Obtain skeletal survey  - Consider bone marrow biopsy    Upcoming Oncology Appointments:   2/15/2024 Radiation oncology simulation  4/30/24 Hematology oncology, Dr Mckenna    Imaging Reviewed:   11/1/2023 MRI pelvis sacrum: Again seen is a densely sclerotic bone lesion involving first through third sacral segments, currently 8.0 x 8.3 x 7.2 cm, enlarged compared to the 2018 CT, where it measured 4.9 x 3.7 x 2.5 cm. (Series 6 images 5-15.)   Lesion demonstrates cortical breakthrough anteriorly (series 10 images 11-15.)   The lesion has also extended into the left ilium (series 6 images 6-7.)   Because of these aggressive features, tissue diagnosis is recommended. 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      10/31/2023 CT CAP: Again seen is a densely sclerotic bone lesion involving first through third sacral segments, currently 8.0 x 8.3 x 7.2 cm, enlarged compared to the 2018 CT, where it measured 4.9 x 3.7 x 2.5 cm. (Series 2 images 177-194.)   Lesion demonstrates cortical breakthrough anteriorly (series 602 images .)   The lesion has also extended into the left ilium (series 2 image 182.)   Because of these aggressive features, tissue diagnosis  is recommended. 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   Mixed lytic sclerotic lesion in the T12 vertebral body, centered in the left pedicle, measuring 2.5 x 1.0 cm. This is not significantly changed in size since 2018 therefore likely benign (series 2 image 97.)      1/1/2018 CT AP    Team agreed to plan.    NCCN guidelines were readily available for review at this discussion.    The final treatment plan will be left at the discretion of the patient and the treating physician.     DISCLAIMERS:  TO THE TREATING PHYSICIAN:  This conference is a meeting of clinicians from various specialty areas who evaluate and discuss patients for whom a multidisciplinary treatment approach is being considered. Please note that the above opinion was a consensus of the conference attendees and is intended only to assist in quality care of the discussed patient.  The responsibility for follow up on the input given during the conference, along with any final decisions regarding plan of care, is that of the patient and the patient's provider. Accordingly, appointments have only been recommended based on this information; and have NOT been scheduled unless otherwise noted.      TO THE PATIENT:  This summary is a brief record of major aspects of your cancer treatment. You may choose to can share a your copy with any of your doctors or nurses. However, this is not a detailed or comprehensive record of your care.

## 2024-02-13 ENCOUNTER — TELEPHONE (OUTPATIENT)
Dept: FAMILY MEDICINE CLINIC | Facility: CLINIC | Age: 69
End: 2024-02-13

## 2024-02-13 NOTE — TELEPHONE ENCOUNTER
Patient was interested in trying a supplement otc-N[employer]-advanced nerve and life support- he was wondering if it would interfere with his gabapentin

## 2024-02-15 ENCOUNTER — HOSPITAL ENCOUNTER (OUTPATIENT)
Dept: RADIOLOGY | Facility: HOSPITAL | Age: 69
Discharge: HOME | End: 2024-02-15
Payer: MEDICARE

## 2024-02-15 DIAGNOSIS — C90.30 MALIGNANT PLASMACYTOMA (HCC): ICD-10-CM

## 2024-02-15 PROCEDURE — 77075 RADEX OSSEOUS SURVEY COMPL: CPT

## 2024-02-20 ENCOUNTER — HOSPITAL ENCOUNTER (EMERGENCY)
Facility: HOSPITAL | Age: 69
Discharge: HOME | End: 2024-02-20
Attending: EMERGENCY MEDICINE
Payer: MEDICARE

## 2024-02-20 VITALS
RESPIRATION RATE: 18 BRPM | DIASTOLIC BLOOD PRESSURE: 88 MMHG | TEMPERATURE: 98.6 F | HEART RATE: 82 BPM | OXYGEN SATURATION: 99 % | SYSTOLIC BLOOD PRESSURE: 155 MMHG

## 2024-02-20 DIAGNOSIS — R04.0 EPISTAXIS: Primary | ICD-10-CM

## 2024-02-20 LAB
ANION GAP SERPL CALCULATED.3IONS-SCNC: 9 MMOL/L
APTT PPP: 27 SECONDS (ref 23–37)
BASOPHILS # BLD AUTO: 0.07 THOUSANDS/ÂΜL (ref 0–0.1)
BASOPHILS NFR BLD AUTO: 1 % (ref 0–1)
BUN SERPL-MCNC: 18 MG/DL (ref 5–25)
CALCIUM SERPL-MCNC: 9.6 MG/DL (ref 8.4–10.2)
CHLORIDE SERPL-SCNC: 98 MMOL/L (ref 96–108)
CO2 SERPL-SCNC: 23 MMOL/L (ref 21–32)
CREAT SERPL-MCNC: 0.96 MG/DL (ref 0.6–1.3)
EOSINOPHIL # BLD AUTO: 0.29 THOUSAND/ÂΜL (ref 0–0.61)
EOSINOPHIL NFR BLD AUTO: 3 % (ref 0–6)
ERYTHROCYTE [DISTWIDTH] IN BLOOD BY AUTOMATED COUNT: 13.7 % (ref 11.6–15.1)
GFR SERPL CREATININE-BSD FRML MDRD: 80 ML/MIN/1.73SQ M
GLUCOSE SERPL-MCNC: 105 MG/DL (ref 65–140)
HCT VFR BLD AUTO: 31.5 % (ref 36.5–49.3)
HGB BLD-MCNC: 10.4 G/DL (ref 12–17)
IMM GRANULOCYTES # BLD AUTO: 0.02 THOUSAND/UL (ref 0–0.2)
IMM GRANULOCYTES NFR BLD AUTO: 0 % (ref 0–2)
INR PPP: 1.07 (ref 0.84–1.19)
LYMPHOCYTES # BLD AUTO: 1.7 THOUSANDS/ÂΜL (ref 0.6–4.47)
LYMPHOCYTES NFR BLD AUTO: 20 % (ref 14–44)
MCH RBC QN AUTO: 31 PG (ref 26.8–34.3)
MCHC RBC AUTO-ENTMCNC: 33 G/DL (ref 31.4–37.4)
MCV RBC AUTO: 94 FL (ref 82–98)
MONOCYTES # BLD AUTO: 0.71 THOUSAND/ÂΜL (ref 0.17–1.22)
MONOCYTES NFR BLD AUTO: 8 % (ref 4–12)
NEUTROPHILS # BLD AUTO: 5.68 THOUSANDS/ÂΜL (ref 1.85–7.62)
NEUTS SEG NFR BLD AUTO: 68 % (ref 43–75)
NRBC BLD AUTO-RTO: 0 /100 WBCS
PLATELET # BLD AUTO: 279 THOUSANDS/UL (ref 149–390)
PMV BLD AUTO: 10.4 FL (ref 8.9–12.7)
POTASSIUM SERPL-SCNC: 3.9 MMOL/L (ref 3.5–5.3)
PROTHROMBIN TIME: 13.8 SECONDS (ref 11.6–14.5)
RBC # BLD AUTO: 3.35 MILLION/UL (ref 3.88–5.62)
SODIUM SERPL-SCNC: 130 MMOL/L (ref 135–147)
WBC # BLD AUTO: 8.47 THOUSAND/UL (ref 4.31–10.16)

## 2024-02-20 PROCEDURE — 85730 THROMBOPLASTIN TIME PARTIAL: CPT | Performed by: PHYSICIAN ASSISTANT

## 2024-02-20 PROCEDURE — 99284 EMERGENCY DEPT VISIT MOD MDM: CPT | Performed by: PHYSICIAN ASSISTANT

## 2024-02-20 PROCEDURE — 85610 PROTHROMBIN TIME: CPT | Performed by: PHYSICIAN ASSISTANT

## 2024-02-20 PROCEDURE — 36415 COLL VENOUS BLD VENIPUNCTURE: CPT | Performed by: PHYSICIAN ASSISTANT

## 2024-02-20 PROCEDURE — 80048 BASIC METABOLIC PNL TOTAL CA: CPT | Performed by: PHYSICIAN ASSISTANT

## 2024-02-20 PROCEDURE — 99283 EMERGENCY DEPT VISIT LOW MDM: CPT

## 2024-02-20 PROCEDURE — 85025 COMPLETE CBC W/AUTO DIFF WBC: CPT | Performed by: PHYSICIAN ASSISTANT

## 2024-02-20 RX ORDER — CEPHALEXIN 250 MG/1
500 CAPSULE ORAL ONCE
Status: COMPLETED | OUTPATIENT
Start: 2024-02-20 | End: 2024-02-20

## 2024-02-20 RX ORDER — CEPHALEXIN 500 MG/1
500 CAPSULE ORAL EVERY 8 HOURS SCHEDULED
Qty: 9 CAPSULE | Refills: 0 | Status: SHIPPED | OUTPATIENT
Start: 2024-02-20 | End: 2024-02-23

## 2024-02-20 RX ORDER — TRANEXAMIC ACID 100 MG/ML
1000 INJECTION, SOLUTION INTRAVENOUS ONCE
Status: COMPLETED | OUTPATIENT
Start: 2024-02-20 | End: 2024-02-20

## 2024-02-20 RX ADMIN — CEPHALEXIN 500 MG: 250 CAPSULE ORAL at 18:55

## 2024-02-20 RX ADMIN — TRANEXAMIC ACID 1000 MG: 100 INJECTION, SOLUTION INTRAVENOUS at 18:34

## 2024-02-20 NOTE — ED PROVIDER NOTES
History  Chief Complaint   Patient presents with    Nose Bleed     Nose bleed on and off since noon today. Denies taking any thinners      This is a 69-year-old male presenting to the emergency department today for evaluation of a left-sided nosebleed started around noon today.  That is approximately 5 hours prior to arrival.  He states it was intermittent he was able to get it to stop and then it started again he presents with a tissue of the left side of his nose.  He denies any trauma denies any nose picking he unfortunately is being treated for cancer.  He is not on blood thinners.    Well-appearing at time of evaluation no hypotension tachycardia or fever.        Prior to Admission Medications   Prescriptions Last Dose Informant Patient Reported? Taking?   Acetaminophen 325 MG CAPS  Self Yes No   Sig: Take 2 tablets by mouth every 6 (six) hours   Patient not taking: Reported on 1/29/2024   Cyanocobalamin (VITAMIN B-12 CR PO)  Self Yes No   Sig: Take 2 tablets by mouth in the morning   Diclofenac Sodium (VOLTAREN) 1 %  Self No No   Sig: Apply 2 g topically 4 (four) times a day   Patient not taking: Reported on 1/29/2024   Ibuprofen (Advil Liqui-Gels minis) 200 MG CAPS  Self Yes No   Sig: Take by mouth   Multiple Vitamin (MULTIVITAMIN) tablet  Other (Specify), Self Yes No   Sig: Take 1 tablet by mouth daily   Omega-3 Fatty Acids (FISH OIL PO)  Self Yes No   Sig: Take by mouth   Pyridoxine HCl (vitamin B-6) 50 MG TABS  Self No No   Sig: Take 1 tablet (50 mg total) by mouth daily   gabapentin (Neurontin) 300 mg capsule   No No   Sig: Take 1 capsule (300 mg total) by mouth 3 (three) times a day   ibuprofen (MOTRIN) 200 mg tablet   Yes No   Sig: Take 400 mg by mouth every 6 (six) hours as needed for mild pain   losartan (COZAAR) 50 mg tablet  Self No No   Sig: Take 1 tablet (50 mg total) by mouth in the morning   mirtazapine (REMERON) 15 mg tablet  Self No No   Sig: TAKE 1 TABLET BY MOUTH DAILY AT BEDTIME    pantoprazole (PROTONIX) 20 mg tablet  Self No No   Sig: Take 1 tablet (20 mg total) by mouth daily      Facility-Administered Medications: None       Past Medical History:   Diagnosis Date    Acute respiratory failure (HCC) 11/24/2022    Alcohol withdrawal (HCC) 11/25/2022    Hyperlipidemia     Hypertension     Hyponatremia 11/23/2022    Hypothermia 11/23/2022    Hypoxia 11/23/2022    Metabolic acidosis with increased anion gap and accumulation of organic acids 11/23/2022    Metabolic encephalopathy 11/23/2022    Pneumonia 11/25/2022    Positive blood culture 11/28/2022    Septic shock (HCC) 11/24/2022       Past Surgical History:   Procedure Laterality Date    IR BIOPSY BONE  12/19/2023    NO PAST SURGERIES         Family History   Problem Relation Age of Onset    Stroke Mother     Hypertension Father     Diabetes Father     Cancer Father      I have reviewed and agree with the history as documented.    E-Cigarette/Vaping    E-Cigarette Use Never User      E-Cigarette/Vaping Substances    Nicotine No     THC No     CBD No     Flavoring No     Other No     Unknown No      Social History     Tobacco Use    Smoking status: Never    Smokeless tobacco: Never   Vaping Use    Vaping status: Never Used   Substance Use Topics    Alcohol use: Not Currently     Alcohol/week: 6.0 standard drinks of alcohol     Types: 6 Cans of beer per week     Comment: d/c 11/2022    Drug use: Never       Review of Systems   Constitutional:  Negative for chills and fever.   HENT:  Positive for nosebleeds. Negative for ear pain and sore throat.    Eyes:  Negative for pain and visual disturbance.   Respiratory:  Negative for cough and shortness of breath.    Cardiovascular:  Negative for chest pain and palpitations.   Gastrointestinal:  Negative for abdominal pain and vomiting.   Genitourinary:  Negative for dysuria and hematuria.   Musculoskeletal:  Negative for arthralgias and back pain.   Skin:  Negative for color change and rash.    Neurological:  Negative for seizures and syncope.   All other systems reviewed and are negative.      Physical Exam  Physical Exam  Vitals reviewed.   Constitutional:       General: He is not in acute distress.     Appearance: Normal appearance. He is not ill-appearing, toxic-appearing or diaphoretic.   HENT:      Head: Normocephalic and atraumatic.      Right Ear: External ear normal.      Left Ear: External ear normal.      Nose:      Right Nostril: No foreign body, epistaxis, septal hematoma or occlusion.      Left Nostril: Epistaxis present. No foreign body, septal hematoma or occlusion.   Eyes:      General: No scleral icterus.        Right eye: No discharge.         Left eye: No discharge.      Extraocular Movements: Extraocular movements intact.      Conjunctiva/sclera: Conjunctivae normal.   Cardiovascular:      Rate and Rhythm: Normal rate.      Pulses: Normal pulses.   Pulmonary:      Effort: Pulmonary effort is normal. No respiratory distress.      Breath sounds: No stridor.   Musculoskeletal:         General: No deformity or signs of injury.      Cervical back: Normal range of motion. No rigidity.   Skin:     General: Skin is warm.      Coloration: Skin is not jaundiced.      Findings: No lesion or rash.   Neurological:      General: No focal deficit present.      Mental Status: He is alert and oriented to person, place, and time. Mental status is at baseline.      Gait: Gait normal.   Psychiatric:         Mood and Affect: Mood normal.         Thought Content: Thought content normal.         Judgment: Judgment normal.         Vital Signs  ED Triage Vitals   Temperature Pulse Respirations Blood Pressure SpO2   02/20/24 1743 02/20/24 1743 02/20/24 1743 02/20/24 1746 02/20/24 1743   98.6 °F (37 °C) 82 18 155/88 99 %      Temp Source Heart Rate Source Patient Position - Orthostatic VS BP Location FiO2 (%)   02/20/24 1743 02/20/24 1743 02/20/24 1746 02/20/24 1746 --   Temporal Monitor Sitting Left arm        Pain Score       02/20/24 1743       No Pain           Vitals:    02/20/24 1743 02/20/24 1746   BP:  155/88   Pulse: 82    Patient Position - Orthostatic VS:  Sitting         Visual Acuity      ED Medications  Medications   cephalexin (KEFLEX) capsule 500 mg (has no administration in time range)   tranexamic acid 100mg/mL (for epistaxis) 1,000 mg (1,000 mg Nasal Given by Other 2/20/24 1834)       Diagnostic Studies  Results Reviewed       Procedure Component Value Units Date/Time    Basic metabolic panel [[id number]]  (Abnormal) Collected: 02/20/24 1803    Lab Status: Final result Specimen: Blood from Arm, Right Updated: 02/20/24 1830     Sodium 130 mmol/L      Potassium 3.9 mmol/L      Chloride 98 mmol/L      CO2 23 mmol/L      ANION GAP 9 mmol/L      BUN 18 mg/dL      Creatinine 0.96 mg/dL      Glucose 105 mg/dL      Calcium 9.6 mg/dL      eGFR 80 ml/min/1.73sq m     Narrative:      National Kidney Disease Foundation guidelines for Chronic Kidney Disease (CKD):     Stage 1 with normal or high GFR (GFR > 90 mL/min/1.73 square meters)    Stage 2 Mild CKD (GFR = 60-89 mL/min/1.73 square meters)    Stage 3A Moderate CKD (GFR = 45-59 mL/min/1.73 square meters)    Stage 3B Moderate CKD (GFR = 30-44 mL/min/1.73 square meters)    Stage 4 Severe CKD (GFR = 15-29 mL/min/1.73 square meters)    Stage 5 End Stage CKD (GFR <15 mL/min/1.73 square meters)  Note: GFR calculation is accurate only with a steady state creatinine    Protime-INR [[id number]]  (Normal) Collected: 02/20/24 1803    Lab Status: Final result Specimen: Blood from Arm, Right Updated: 02/20/24 1825     Protime 13.8 seconds      INR 1.07    APTT [[id number]]  (Normal) Collected: 02/20/24 1803    Lab Status: Final result Specimen: Blood from Arm, Right Updated: 02/20/24 1825     PTT 27 seconds     CBC and differential [[id number]]  (Abnormal) Collected: 02/20/24 1803    Lab Status: Final result Specimen: Blood from Arm, Right Updated: 02/20/24 1815     WBC 8.47  Thousand/uL      RBC 3.35 Million/uL      Hemoglobin 10.4 g/dL      Hematocrit 31.5 %      MCV 94 fL      MCH 31.0 pg      MCHC 33.0 g/dL      RDW 13.7 %      MPV 10.4 fL      Platelets 279 Thousands/uL      nRBC 0 /100 WBCs      Neutrophils Relative 68 %      Immat GRANS % 0 %      Lymphocytes Relative 20 %      Monocytes Relative 8 %      Eosinophils Relative 3 %      Basophils Relative 1 %      Neutrophils Absolute 5.68 Thousands/µL      Immature Grans Absolute 0.02 Thousand/uL      Lymphocytes Absolute 1.70 Thousands/µL      Monocytes Absolute 0.71 Thousand/µL      Eosinophils Absolute 0.29 Thousand/µL      Basophils Absolute 0.07 Thousands/µL                    No orders to display              Procedures  Procedures     I personally placed TXA infused half-inch sterile gauze into the left side of the nose and a packing fashion, will reevaluate.    ED Course  ED Course as of 02/20/24 1853   Tue Feb 20, 2024   1801 Blood Pressure: 155/88   1801 Temperature: 98.6 °F (37 °C)   1801 Pulse: 82   1801 Respirations: 18   1801 SpO2: 99 %  Vital signs reviewed within normal limits.   1823 CBC reviewed no leukocytosis or abdominal cytopenia, there is mild anemia when compared to 3 months ago however stable when compared to 1 year ago.   1833 Basic metabolic panel reviewed mild hyponatremia, history of mildly low sodium in the past.                               SBIRT 22yo+      Flowsheet Row Most Recent Value   Initial Alcohol Screen: US AUDIT-C     1. How often do you have a drink containing alcohol? 0 Filed at: 02/20/2024 1745   2. How many drinks containing alcohol do you have on a typical day you are drinking?  0 Filed at: 02/20/2024 1745   3a. Male UNDER 65: How often do you have five or more drinks on one occasion? 0 Filed at: 02/20/2024 1745   Audit-C Score 0 Filed at: 02/20/2024 1745   KINJAL: How many times in the past year have you...    Used an illegal drug or used a prescription medication for non-medical  reasons? Never Filed at: 02/20/2024 1745                      Medical Decision Making  69-year-old male cancer patient not on blood thinners here for evaluation of nontraumatic left-sided nosebleed for about 5 hours that has been intermittent.  Well-appearing at bedside no tachycardia hypotension nontoxic-appearing active bleeding noted on the left side.    Will evaluate his platelets.    Platelets within reasonable limits very mild anemia mild hyponatremia however does not require specific treatment at this point.  Repeat evaluation after the procedure notes that he has no bleeding from the nose or on the posterior pharyngeal examination.  He is stable for discharge home given dose of Keflex here in the ED as prophylaxis 3 days of Keflex sent to his pharmacy return in 2 days for packing removal.    Amount and/or Complexity of Data Reviewed  Labs: ordered.    Risk  Prescription drug management.             Disposition  Final diagnoses:   Epistaxis     Time reflects when diagnosis was documented in both MDM as applicable and the Disposition within this note       Time User Action Codes Description Comment    2/20/2024  6:23 PM Hair Zuleta Add [R04.0] Epistaxis           ED Disposition       ED Disposition   Discharge    Condition   Stable    Date/Time   Tue Feb 20, 2024 1843    Comment   Alexander Olmstead discharge to home/self care.                   Follow-up Information       Follow up With Specialties Details Why Contact Info Additional Information    [facility] Emergency Department Emergency Medicine Go in 2 days For packing removal [address] [facility] [address]  [phone] [facility] Emergency Department, [address], [place], [address]            Patient's Medications   Discharge Prescriptions    CEPHALEXIN (KEFLEX) 500 MG CAPSULE    Take 1 capsule (500 mg total) by mouth every 8 (eight) hours for 3 days       Start Date: 2/20/2024  End Date: 2/23/2024       Order Dose: 500 mg       Quantity: 9 capsule    Refills: 0       No discharge procedures on file.    PDMP Review       None            ED Provider  Electronically Signed by             Hair Zuleta PA-C  02/20/24 2303

## 2024-03-11 DIAGNOSIS — G62.9 NEUROPATHY: ICD-10-CM

## 2024-03-11 RX ORDER — LANOLIN ALCOHOL/MO/W.PET/CERES
50 CREAM (GRAM) TOPICAL DAILY
Qty: 90 TABLET | Refills: 1 | Status: SHIPPED | OUTPATIENT
Start: 2024-03-11

## 2024-03-21 ENCOUNTER — OFFICE VISIT (OUTPATIENT)
Dept: FAMILY MEDICINE CLINIC | Facility: CLINIC | Age: 69
End: 2024-03-21
Payer: MEDICARE

## 2024-03-21 VITALS
HEART RATE: 84 BPM | BODY MASS INDEX: 23.55 KG/M2 | RESPIRATION RATE: 20 BRPM | SYSTOLIC BLOOD PRESSURE: 130 MMHG | WEIGHT: 159 LBS | TEMPERATURE: 97.5 F | DIASTOLIC BLOOD PRESSURE: 66 MMHG | HEIGHT: 69 IN

## 2024-03-21 DIAGNOSIS — I10 HYPERTENSION, UNSPECIFIED TYPE: ICD-10-CM

## 2024-03-21 DIAGNOSIS — Z12.11 COLON CANCER SCREENING: Primary | ICD-10-CM

## 2024-03-21 DIAGNOSIS — E55.9 VITAMIN D DEFICIENCY: ICD-10-CM

## 2024-03-21 PROCEDURE — 99213 OFFICE O/P EST LOW 20 MIN: CPT | Performed by: FAMILY MEDICINE

## 2024-03-21 PROCEDURE — G0439 PPPS, SUBSEQ VISIT: HCPCS | Performed by: FAMILY MEDICINE

## 2024-03-21 RX ORDER — LOSARTAN POTASSIUM 50 MG/1
50 TABLET ORAL DAILY
Qty: 90 TABLET | Refills: 1 | Status: SHIPPED | OUTPATIENT
Start: 2024-03-21

## 2024-03-21 NOTE — PROGRESS NOTES
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 oncology notes of January 26, 2024 reviewed and appreciated    Patient has been free of heartburn on Protonix 40 mg daily    Hypertension controlled on the current regimen    Acute blood loss anemia with a hemoglobin of 10.4 after a significant episode of epistaxis requiring emergency room care    History of non-ST elevated myocardial infarction secondary to sepsis hypotension and blood loss January 2, 2018 cardiology notes reviewed and appreciated currently angina free    The patient is agreeable to colonoscopy for colon cancer screening     Problem List Items Addressed This Visit     Hypertension    Relevant Medications    losartan (COZAAR) 50 mg tablet    Other Relevant Orders    CBC and differential    Comprehensive metabolic panel    Lipid Panel with Direct LDL reflex   Other Visit Diagnoses     Colon cancer screening    -  Primary    Relevant Orders    Ambulatory Referral to Gastroenterology    Vitamin D deficiency        Relevant Orders    Vitamin D 25 hydroxy          Depression Screening and Follow-up Plan: Patient was screened for depression during today's encounter. They screened negative with a PHQ-2 score of 0.      Preventive health issues were discussed with patient, and age appropriate screening tests were ordered as noted in patient's After Visit Summary.  Personalized health advice and appropriate referrals for health education or preventive services given if needed, as noted in patient's After Visit Summary.     History of Present Illness:     Patient presents for a Medicare Wellness Visit    Patient presents for annual Medicare wellness exam       Patient Care Team:  Blade Walton DO as PCP - General (Family Medicine)  Kapil Goyal PA-C as Physician Assistant (Physician Assistant)  Tay Mckenna DO as Medical Oncologist (Hematology and Oncology)  Tony Britt MD (Radiation  Oncology)     Review of Systems:     Review of Systems   Constitutional:  Negative for chills and fever.   HENT:  Negative for ear pain and sore throat.    Eyes:  Negative for pain and visual disturbance.   Respiratory:  Negative for cough and shortness of breath.    Cardiovascular:  Negative for chest pain and palpitations.   Gastrointestinal:  Negative for abdominal pain and vomiting.   Genitourinary:  Negative for dysuria and hematuria.   Musculoskeletal:  Negative for arthralgias and back pain.   Skin:  Negative for color change and rash.   Neurological:  Negative for seizures and syncope.   All other systems reviewed and are negative.       Problem List:     Patient Active Problem List   Diagnosis   • GI bleed   • NSTEMI (non-ST elevated myocardial infarction) (HCC)   • Multiple rib fractures   • Pharyngeal swelling   • Acute respiratory failure with hypoxia (HCC)   • Sacral lesion   • Other specified anemias   • Other dysphagia   • Esophageal dilatation   • History of GI bleed   • Hypertension   • Alcohol dependence with withdrawal delirium (HCC)   • Metastasis to bone (HCC)      Past Medical and Surgical History:     Past Medical History:   Diagnosis Date   • Acute respiratory failure (HCC) 11/24/2022   • Alcohol withdrawal (HCC) 11/25/2022   • Hyperlipidemia    • Hypertension    • Hyponatremia 11/23/2022   • Hypothermia 11/23/2022   • Hypoxia 11/23/2022   • Metabolic acidosis with increased anion gap and accumulation of organic acids 11/23/2022   • Metabolic encephalopathy 11/23/2022   • Pneumonia 11/25/2022   • Positive blood culture 11/28/2022   • Septic shock (HCC) 11/24/2022     Past Surgical History:   Procedure Laterality Date   • IR BIOPSY BONE  12/19/2023   • NO PAST SURGERIES        Family History:     Family History   Problem Relation Age of Onset   • Stroke Mother    • Hypertension Father    • Diabetes Father    • Cancer Father       Social History:     Social History     Socioeconomic History   •  Marital status: Single     Spouse name: None   • Number of children: None   • Years of education: None   • Highest education level: None   Occupational History   • None   Tobacco Use   • Smoking status: Never   • Smokeless tobacco: Never   Vaping Use   • Vaping status: Never Used   Substance and Sexual Activity   • Alcohol use: Not Currently     Alcohol/week: 6.0 standard drinks of alcohol     Types: 6 Cans of beer per week     Comment: d/c 11/2022   • Drug use: Never   • Sexual activity: Not Currently   Other Topics Concern   • None   Social History Narrative   • None     Social Determinants of Health     Financial Resource Strain: Not on file   Food Insecurity: No Food Insecurity (3/14/2024)    Hunger Vital Sign    • Worried About Running Out of Food in the Last Year: Never true    • Ran Out of Food in the Last Year: Never true   Transportation Needs: No Transportation Needs (3/14/2024)    PRAPARE - Transportation    • Lack of Transportation (Medical): No    • Lack of Transportation (Non-Medical): No   Physical Activity: Not on file   Stress: Not on file   Social Connections: Not on file   Intimate Partner Violence: Not on file   Housing Stability: Low Risk  (3/14/2024)    Housing Stability Vital Sign    • Unable to Pay for Housing in the Last Year: No    • Number of Places Lived in the Last Year: 1    • Unstable Housing in the Last Year: No      Medications and Allergies:     Current Outpatient Medications   Medication Sig Dispense Refill   • losartan (COZAAR) 50 mg tablet Take 1 tablet (50 mg total) by mouth in the morning 90 tablet 1   • SALINE NASAL MIST NA 2 sprays into each nostril 2 (two) times a day     • Acetaminophen 325 MG CAPS Take 2 tablets by mouth every 6 (six) hours (Patient not taking: Reported on 1/29/2024)     • Cyanocobalamin (VITAMIN B-12 CR PO) Take 2 tablets by mouth in the morning     • Diclofenac Sodium (VOLTAREN) 1 % Apply 2 g topically 4 (four) times a day (Patient not taking: Reported  on 1/29/2024) 100 g 5   • gabapentin (Neurontin) 300 mg capsule Take 1 capsule (300 mg total) by mouth 3 (three) times a day 90 capsule 5   • ibuprofen (MOTRIN) 200 mg tablet Take 400 mg by mouth every 6 (six) hours as needed for mild pain     • mirtazapine (REMERON) 15 mg tablet TAKE 1 TABLET BY MOUTH DAILY AT BEDTIME 90 tablet 1   • Multiple Vitamin (MULTIVITAMIN) tablet Take 1 tablet by mouth daily     • Omega-3 Fatty Acids (FISH OIL PO) Take by mouth     • pantoprazole (PROTONIX) 20 mg tablet Take 1 tablet (20 mg total) by mouth daily 90 tablet 1   • pyridoxine (VITAMIN B6) 50 mg tablet TAKE 1 TABLET BY MOUTH EVERY DAY 90 tablet 1     No current facility-administered medications for this visit.     No Known Allergies   Immunizations:     Immunization History   Administered Date(s) Administered   • COVID-19 MODERNA VACC 0.5 ML IM 03/24/2021, 04/21/2021, 10/22/2021, 04/06/2022   • COVID-19 Moderna Vac BIVALENT 12 Yr+ IM 0.5 ML 10/12/2022   • INFLUENZA 11/05/2017, 10/19/2018, 09/14/2021   • Influenza Injectable, MDCK, Preservative Free, Quadrivalent, 0.5 mL 10/18/2019   • Pneumococcal Conjugate 13-Valent 10/14/2021   • Tdap 01/01/2018   • Zoster Vaccine Recombinant 09/27/2021      Health Maintenance:         Topic Date Due   • Colorectal Cancer Screening  11/28/2023   • Hepatitis C Screening  Completed         Topic Date Due   • Pneumococcal Vaccine: 65+ Years (2 of 2 - PPSV23 or PCV20) 12/09/2021      Medicare Screening Tests and Risk Assessments:         Health Risk Assessment:   Patient rates overall health as very good. Patient feels that their physical health rating is slightly better. Patient is very satisfied with their life. Eyesight was rated as same. Hearing was rated as same. Patient feels that their emotional and mental health rating is much better. Patients states they are never, rarely angry. Patient states they are sometimes unusually tired/fatigued. Pain experienced in the last 7 days has been none.  Patient states that he has experienced no weight loss or gain in last 6 months.     Depression Screening:   PHQ-2 Score: 0      Fall Risk Screening:   In the past year, patient has experienced: no history of falling in past year      Home Safety:  Patient does not have trouble with stairs inside or outside of their home. Patient has working smoke alarms and has working carbon monoxide detector. Home safety hazards include: none.     Nutrition:   Current diet is Regular and Limited junk food.     Medications:   Patient is not currently taking any over-the-counter supplements. Patient is able to manage medications.     Activities of Daily Living (ADLs)/Instrumental Activities of Daily Living (IADLs):   Walk and transfer into and out of bed and chair?: Yes  Dress and groom yourself?: Yes    Bathe or shower yourself?: Yes    Feed yourself? Yes  Do your laundry/housekeeping?: Yes  Manage your money, pay your bills and track your expenses?: Yes  Make your own meals?: Yes    Do your own shopping?: Yes    Previous Hospitalizations:   Any hospitalizations or ED visits within the last 12 months?: No      Advance Care Planning:   Living will: No    Durable POA for healthcare: No    Advanced directive: No      PREVENTIVE SCREENINGS      Cardiovascular Screening:    General: Screening Current      Diabetes Screening:     General: Screening Current      Prostate Cancer Screening:    General: Screening Current      Abdominal Aortic Aneurysm (AAA) Screening:    Risk factors include: age between 65-76 yo        Lung Cancer Screening:     General: Screening Not Indicated      Hepatitis C Screening:    General: Screening Current    Screening, Brief Intervention, and Referral to Treatment (SBIRT)    Screening  Typical number of drinks in a day: 0  Typical number of drinks in a week: 0  Interpretation: Low risk drinking behavior.    AUDIT-C Screenin) How often did you have a drink containing alcohol in the past year? never  2) How  "many drinks did you have on a typical day when you were drinking in the past year? 0  3) How often did you have 6 or more drinks on one occasion in the past year? never    AUDIT-C Score: 0  Interpretation: Score 0-3 (male): Negative screen for alcohol misuse    Single Item Drug Screening:  How often have you used an illegal drug (including marijuana) or a prescription medication for non-medical reasons in the past year? never    Single Item Drug Screen Score: 0  Interpretation: Negative screen for possible drug use disorder    No results found.     Physical Exam:     /66   Pulse 84   Temp 97.5 °F (36.4 °C)   Resp 20   Ht 5' 9\" (1.753 m)   Wt 72.1 kg (159 lb)   BMI 23.48 kg/m²     Physical Exam  Vitals and nursing note reviewed.   Constitutional:       General: He is not in acute distress.     Appearance: He is well-developed.   HENT:      Head: Normocephalic and atraumatic.      Right Ear: Tympanic membrane, ear canal and external ear normal.      Left Ear: Tympanic membrane, ear canal and external ear normal.      Nose: Nose normal.      Mouth/Throat:      Mouth: Mucous membranes are moist.      Pharynx: Oropharynx is clear.   Eyes:      Extraocular Movements: Extraocular movements intact.      Conjunctiva/sclera: Conjunctivae normal.      Pupils: Pupils are equal, round, and reactive to light.   Cardiovascular:      Rate and Rhythm: Normal rate and regular rhythm.      Heart sounds: No murmur heard.  Pulmonary:      Effort: Pulmonary effort is normal. No respiratory distress.      Breath sounds: Normal breath sounds.   Abdominal:      Palpations: Abdomen is soft.      Tenderness: There is no abdominal tenderness.   Musculoskeletal:         General: No swelling.      Cervical back: Neck supple.   Skin:     General: Skin is warm and dry.      Capillary Refill: Capillary refill takes less than 2 seconds.   Neurological:      Mental Status: He is alert.   Psychiatric:         Mood and Affect: Mood normal. "          Blade Walton DO

## 2024-03-26 ENCOUNTER — PREP FOR PROCEDURE (OUTPATIENT)
Dept: GASTROENTEROLOGY | Facility: CLINIC | Age: 69
End: 2024-03-26

## 2024-03-26 ENCOUNTER — TELEPHONE (OUTPATIENT)
Dept: GASTROENTEROLOGY | Facility: CLINIC | Age: 69
End: 2024-03-26

## 2024-03-26 DIAGNOSIS — Z12.11 SCREENING FOR COLON CANCER: Primary | ICD-10-CM

## 2024-03-26 NOTE — TELEPHONE ENCOUNTER
Scheduled date of colonoscopy (as of today):05/31/24  Physician performing colonoscopy: gopal  Location of colonoscopy:[facility]  Bowel prep reviewed with patient: m/d  Instructions reviewed with patient by: florencia  Clearances: none

## 2024-03-26 NOTE — TELEPHONE ENCOUNTER
03/26/24  Screened by: Jeanna Rodrigues MA    Referring Provider oa    Pre- Screening:     There is no height or weight on file to calculate BMI.  Has patient been referred for a routine screening Colonoscopy? yes  Is the patient between 45-75 years old? yes      Previous Colonoscopy yes   If yes:    Date: unknown    Facility:     Reason:       SCHEDULING STAFF: If the patient is between 45yrs-49yrs, please advise patient to confirm benefits/coverage with their insurance company for a routine screening colonoscopy, some insurance carriers will only cover at 50yrs or older. If the patient is over 75years old, please schedule an office visit.     Does the patient want to see a Gastroenterologist prior to their procedure OR are they having any GI symptoms? no    Has the patient been hospitalized or had abdominal surgery in the past 6 months? no    Does the patient use supplemental oxygen? no    Does the patient take Coumadin, Lovenox, Plavix, Elliquis, Xarelto, or other blood thinning medication? no    Has the patient had a stroke, cardiac event, or stent placed in the past year? no    SCHEDULING STAFF: If patient answers NO to above questions, then schedule procedure. If patient answers YES to above questions, then schedule office appointment.     If patient is between 45yrs - 49yrs, please advise patient that we will have to confirm benefits & coverage with their insurance company for a routine screening colonoscopy.

## 2024-04-12 ENCOUNTER — PATIENT OUTREACH (OUTPATIENT)
Dept: HEMATOLOGY ONCOLOGY | Facility: CLINIC | Age: 69
End: 2024-04-12

## 2024-04-12 ENCOUNTER — OFFICE VISIT (OUTPATIENT)
Dept: OBGYN CLINIC | Facility: CLINIC | Age: 69
End: 2024-04-12

## 2024-04-12 ENCOUNTER — APPOINTMENT (OUTPATIENT)
Dept: LAB | Facility: MEDICAL CENTER | Age: 69
End: 2024-04-12
Payer: MEDICARE

## 2024-04-12 VITALS
DIASTOLIC BLOOD PRESSURE: 73 MMHG | WEIGHT: 163.4 LBS | HEART RATE: 74 BPM | SYSTOLIC BLOOD PRESSURE: 144 MMHG | BODY MASS INDEX: 24.2 KG/M2 | HEIGHT: 69 IN | RESPIRATION RATE: 16 BRPM

## 2024-04-12 DIAGNOSIS — E55.9 VITAMIN D DEFICIENCY: ICD-10-CM

## 2024-04-12 DIAGNOSIS — C90.30 MALIGNANT PLASMACYTOMA (HCC): Primary | ICD-10-CM

## 2024-04-12 DIAGNOSIS — I10 HYPERTENSION, UNSPECIFIED TYPE: ICD-10-CM

## 2024-04-12 DIAGNOSIS — C90.00 MULTIPLE MYELOMA NOT HAVING ACHIEVED REMISSION (HCC): ICD-10-CM

## 2024-04-12 LAB
25(OH)D3 SERPL-MCNC: 59 NG/ML (ref 30–100)
ALBUMIN SERPL BCP-MCNC: 4.8 G/DL (ref 3.5–5)
ALP SERPL-CCNC: 79 U/L (ref 34–104)
ALT SERPL W P-5'-P-CCNC: 11 U/L (ref 7–52)
ANION GAP SERPL CALCULATED.3IONS-SCNC: 11 MMOL/L (ref 4–13)
AST SERPL W P-5'-P-CCNC: 16 U/L (ref 13–39)
BASOPHILS # BLD AUTO: 0.06 THOUSANDS/ÂΜL (ref 0–0.1)
BASOPHILS NFR BLD AUTO: 1 % (ref 0–1)
BILIRUB SERPL-MCNC: 0.68 MG/DL (ref 0.2–1)
BUN SERPL-MCNC: 10 MG/DL (ref 5–25)
CALCIUM SERPL-MCNC: 10.1 MG/DL (ref 8.4–10.2)
CHLORIDE SERPL-SCNC: 97 MMOL/L (ref 96–108)
CHOLEST SERPL-MCNC: 153 MG/DL
CO2 SERPL-SCNC: 24 MMOL/L (ref 21–32)
CREAT SERPL-MCNC: 0.99 MG/DL (ref 0.6–1.3)
EOSINOPHIL # BLD AUTO: 0.24 THOUSAND/ÂΜL (ref 0–0.61)
EOSINOPHIL NFR BLD AUTO: 5 % (ref 0–6)
ERYTHROCYTE [DISTWIDTH] IN BLOOD BY AUTOMATED COUNT: 12.8 % (ref 11.6–15.1)
GFR SERPL CREATININE-BSD FRML MDRD: 77 ML/MIN/1.73SQ M
GLUCOSE P FAST SERPL-MCNC: 104 MG/DL (ref 65–99)
HCT VFR BLD AUTO: 38.3 % (ref 36.5–49.3)
HDLC SERPL-MCNC: 49 MG/DL
HGB BLD-MCNC: 12.7 G/DL (ref 12–17)
IMM GRANULOCYTES # BLD AUTO: 0.02 THOUSAND/UL (ref 0–0.2)
IMM GRANULOCYTES NFR BLD AUTO: 0 % (ref 0–2)
LDLC SERPL CALC-MCNC: 78 MG/DL (ref 0–100)
LYMPHOCYTES # BLD AUTO: 1.09 THOUSANDS/ÂΜL (ref 0.6–4.47)
LYMPHOCYTES NFR BLD AUTO: 21 % (ref 14–44)
MCH RBC QN AUTO: 30.9 PG (ref 26.8–34.3)
MCHC RBC AUTO-ENTMCNC: 33.2 G/DL (ref 31.4–37.4)
MCV RBC AUTO: 93 FL (ref 82–98)
MONOCYTES # BLD AUTO: 0.61 THOUSAND/ÂΜL (ref 0.17–1.22)
MONOCYTES NFR BLD AUTO: 12 % (ref 4–12)
NEUTROPHILS # BLD AUTO: 3.07 THOUSANDS/ÂΜL (ref 1.85–7.62)
NEUTS SEG NFR BLD AUTO: 61 % (ref 43–75)
NRBC BLD AUTO-RTO: 0 /100 WBCS
PLATELET # BLD AUTO: 301 THOUSANDS/UL (ref 149–390)
PMV BLD AUTO: 11.1 FL (ref 8.9–12.7)
POTASSIUM SERPL-SCNC: 4 MMOL/L (ref 3.5–5.3)
PROT SERPL-MCNC: 7.6 G/DL (ref 6.4–8.4)
RBC # BLD AUTO: 4.11 MILLION/UL (ref 3.88–5.62)
SODIUM SERPL-SCNC: 132 MMOL/L (ref 135–147)
TRIGL SERPL-MCNC: 129 MG/DL
WBC # BLD AUTO: 5.09 THOUSAND/UL (ref 4.31–10.16)

## 2024-04-12 PROCEDURE — 36415 COLL VENOUS BLD VENIPUNCTURE: CPT

## 2024-04-12 PROCEDURE — 82306 VITAMIN D 25 HYDROXY: CPT

## 2024-04-12 PROCEDURE — 84165 PROTEIN E-PHORESIS SERUM: CPT

## 2024-04-12 PROCEDURE — 82784 ASSAY IGA/IGD/IGG/IGM EACH: CPT

## 2024-04-12 PROCEDURE — 85025 COMPLETE CBC W/AUTO DIFF WBC: CPT

## 2024-04-12 PROCEDURE — 80061 LIPID PANEL: CPT

## 2024-04-12 PROCEDURE — 83521 IG LIGHT CHAINS FREE EACH: CPT

## 2024-04-12 PROCEDURE — 80053 COMPREHEN METABOLIC PANEL: CPT

## 2024-04-12 NOTE — PROGRESS NOTES
Received in-basket message back from radiation department and patient is scheduled for SIM on 4/15/2024 at 1130 am. Patient navigation outreach will be made after RT SIM.

## 2024-04-12 NOTE — PROGRESS NOTES
Message received from Dr. Perez inquiring about patient's radiation SIM appt on 2/15 that was canceled and not rescheduled. Patient navigation will address this issue with radiation oncology and have them reach out to patient to schedule SIM and treatment asap.

## 2024-04-12 NOTE — PROGRESS NOTES
Orthopedic Oncology Surgery  Follow-Up Office Note  Alexander Olmstead (69 y.o. male)  : 1955 Encounter Date: 2024  Dr. Ricky Perez DO, Orthopedic Surgeon  Orthopedic Oncology & Sarcoma Surgery        Impression/Plan: Alexander Olmstead is a 69 y.o. male with:    1. 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 Fadia Scruggs MD  - patient was anticipated to see Dr. Scruggs on 2/15/24 for mapping and radiation plan however visit was cancelled for unknown reasons  - Patient has been reassigned to Dr. Britt - appointment was scheduled for 2/15/24 however it was not communicated with patient  - Nurse navigator was notified at visit today and will be working to establish new Rad Onc appointment, new appointment planned for upcoming week  - patient does not need to follow-up due to there being no need for surgical intervention at this point  - patient will be monitored through visits with Heme onc and Rad onc    2. Multiple Myeloma  - continue follow up with Dr. Mckenna, upcoming appointment 24  - to discuss treatment center location; discussed possibility for local [facility] vs [place], to be determined by Dr. Mckenna, who will manage recommended treatment      Follow up: Return if symptoms worsen or fail to improve.    Procedure:   No procedures performed    Surgical Planning:   No surgery planned at this time    ___________________________________________________________________    Subjective:     Alexander Olmstead is a 69 y.o. male with hx of HTN. They present today for pathology review of sacral lesion.    Today, he presents to discuss the plan moving forward following appointment with radiation oncology. However, patient states that the appointment was canceled and was not made aware if there was a new appointment made. Patient is unsure of next step in  the process.    Heme Onc: Dr. Mckenna 1/26  Discussion:   In summary, this is a 69-year-old male with a history of bone lesion as previously outlined.  Guardant test showed BRCA1 mutation, MSI stable.  I do not believe this is related to his malignancy.  Will confer with oncology genetics regarding further steps, probably to arrange for germline mutation testing.  Pathology second opinion showed strong  and lambda staining.   SPEP had previously shown IgG lambda monoclonal gammopathy, 0.14 g/dL.  CBC showed mild thrombocytosis.  No anemia.  CMP showed normal calcium, creatinine 0.87, total protein 7.3, albumin 4.6.  This is certainly a very strange case.  The sacral lesion has increased moderately in size though over long period of time, 5 years.  Separate T12 lesion is noted not significantly different than 2018.  In short, I do not see indication of aggressive plasma cell neoplasm and, in my opinion, systemic therapy and potential toxicities of this are not warranted at this time.  The alternative would be close observation with quarterly serologies, perhaps every 6 month imaging, deferred treatment initiation upon disease progression.  He has bilateral lower extremity neuropathy, possibly related to sacral lesion or prior alcohol use. ?MGNS?  He has radiation therapy consultation in a few days regarding his sacral lesion.  I would review that as reasonable given the increase in the sacral lesion, albeit slowly and asymptomatic.  I reviewed the above with the patient and his friend.  They voiced understanding and agreement.    Rad Onc:  DATE: 1/31/2024  PRESENTING DOCTOR: Dr Fadia Scruggs      DIAGNOSIS:  Plasma cell myeloma         Alexander Olmstead is a 69 y.o. male who was presented at Neuro Oncology Case Review today. History of sacral lesion since 2018. Repeat imaging in 2022 revealed increased size of the sacral lesion from approximately 5 to 8 cm.  He had follow-up imaging again in 2023 showing the  sacral lesion measuring close to 8 cm. Mixed lytic sclerotic lesion in the T12 vertebral body, centered in the left pedicle, showed no significant change in size since 2018. Biopsy of the sacral mass was consistent with plasma cell myeloma. No systemic therapy was recommended by medical oncology.        PHYSICIAN RECOMMENDED PLAN:   - Obtain skeletal survey  - Consider bone marrow biopsy     Upcoming Oncology Appointments:   2/15/2024 Radiation oncology simulation  4/30/24 Hematology oncology, Dr Mckenna     Imaging Reviewed:   11/1/2023 MRI pelvis sacrum: Again seen is a densely sclerotic bone lesion involving first through third sacral segments, currently 8.0 x 8.3 x 7.2 cm, enlarged compared to the 2018 CT, where it measured 4.9 x 3.7 x 2.5 cm. (Series 6 images 5-15.)   Lesion demonstrates cortical breakthrough anteriorly (series 10 images 11-15.)   The lesion has also extended into the left ilium (series 6 images 6-7.)   Because of these aggressive features, tissue diagnosis is recommended. 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        10/31/2023 CT CAP: Again seen is a densely sclerotic bone lesion involving first through third sacral segments, currently 8.0 x 8.3 x 7.2 cm, enlarged compared to the 2018 CT, where it measured 4.9 x 3.7 x 2.5 cm. (Series 2 images 177-194.)   Lesion demonstrates cortical breakthrough anteriorly (series 602 images .)   The lesion has also extended into the left ilium (series 2 image 182.)   Because of these aggressive features, tissue diagnosis is recommended. 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   Mixed lytic sclerotic lesion in the T12 vertebral body, centered in the left pedicle, measuring 2.5 x 1.0 cm. This is not significantly changed in size since 2018 therefore likely benign (series 2 image 97.)      Review of Systems:   Allergies, medications, past medical/surgical/family/social history have been reviewed, with the following changes: none  Complete 12 system review  "performed and found to be negative except: except as per mentioned in HPI.    Physical Examination:   Height: 5' 9\" (175.3 cm)  Weight - Scale: 74.1 kg (163 lb 6.4 oz)  BMI (Calculated): 24.1  BSA (Calculated - m2): 1.9 sq meters     Vitals:    04/12/24 0850   BP: 144/73   Pulse: 74   Resp: 16     Body mass index is 24.13 kg/m².    Present today with  friend  General: alert and oriented; well nourished/well developed; no apparent distress.   Psychiatric: normal mood and affect  HEENT: NCAT. Head/neck:full range of motion.   Lungs: breathing comfortably ; equal symmetric chest expansion.   Abdomen: soft, non-tender, non-distended.   Skin: warm; dry; without  lesions, rashes, petechiae or purpura; without  clubbing, cyanosis, edema  Extremity:  low back/ hip -   Inspection: without edema, without skin abnormalities throughout   Palpation:  without  TTP   Range of motion of joints: full range of motion all extremities.   Motor strength:  WNL all extremities 5/5grips, 5/5 dorsal/pedal flexion   Sensation: grossly intact to all extremities.    Pulses: 2+ radial, pedal, and DP/PT pulses.  Lymphatics: no obvious lymphadenopathy  Gait: steady gait.    Unchanged from previous       IMAGING RESULTS, All images personally reviewed today by Dr. Perez.     Study: XR bone survey >12 months  Date: 2/5/23  Report: I have read and agree with the radiologist report.  I have personally reviewed imaging and my impression is as follows:   IMPRESSION:     Redemonstration of sclerotic lesion in the sacrum, appears similar to the prior. No new osseous lesion seen. No pathologic fracture.        Study: MRI sacrum  Date: 11/1/24  Report: I have read and reviewed radiology report.   My impression is:   Again seen is a densely sclerotic bone lesion involving first through third sacral segments, currently 8.0 x 8.3 x 7.2 cm, enlarged compared to the 2018 CT, where it measured 4.9 x 3.7 x 2.5 cm   Lesion demonstrates cortical breakthrough " anteriorly   The lesion has also extended into the left ilium     Study: Left ankle x-ray  Date: 10/24/23  Report: No radiologist report was available at this time.   My impression is as follows: ankle arthritis, vascular calcification and pesplanus     Study: WBBS  Date: 10/9/23  Report: I have read and agree with the radiologist report.  My impression is as follows:   Moderately increased blood flow, blood pool, and delayed activity of the upper sacrum, corresponding to the known sclerotic enlarging sacral lesion. As described on recent prior imaging studies, this is most suspicious for neoplasm and further evaluation   is warranted. As prostate carcinoma is in the differential, correlation with PSA is recommended.  Whole body imaging demonstrates no other suspicious areas of abnormal radiopharmaceutical activity. Asymmetrically increased activity is seen within the left ankle and foot compared to the right, nonspecific, possibly related to injury or arthritis.   Correlate for any patient symptomatology.     Study: XR sacrum         Date: 10/9/23  Report: I have read and agree with the radiologist report.  My impression is as follows: No acute osseous abnormality.  Sacral sclerosis as previously characterized and again concerning for possible metastatic disease.     Study: XR  hip/pelvis  Date: 11/25/23  Report: I have read and agree with the radiologist report.  My impression is as follows: No acute osseous abnormality.  Again seen is a large sclerotic bone lesion in the upper sacrum.  This has enlarged comparing the 11/24/2022 CT and a 1/1/2018 CT.  This is suspicious for malignancy, and bone scan is recommended for further evaluation.     Study: CT chest on pelvis with contrast  Date: 11/24/23  Report: I have read and agree with the radiologist report.  My impression is as follows:   OSSEOUS STRUCTURES:  Old healed rib fractures.  Large predominantly sclerotic bony lesion is demonstrated at the level of the  "central sacrum.  It has enlarged compared to the previous exam and now measures approximately 7.9 x 5.3 cm compared to 5 x 3.8 cm.  Sclerotic bony lesion in the left pedicle at T11 is unchanged.  IMPRESSION:  1.  Bilateral pneumonia and pleural effusions.  2.  Distended esophagus containing fluid.  Nonurgent evaluation with swallowing study is recommended.  3.  Sacral bony lesion has enlarged compared to the previous exam and is indeterminate.  Recommend correlation with bone scan or MRI pelvis.  Sclerotic lesion at the left pedicle at T11 is unchanged.    Study: MRI Pelvis Sacrum, Coccyx, SI Jt wo and w contrast   Date: 11/1/23   Report: I have read and agree with the radiologist report.  My impression is as follows:   Again seen is a densely sclerotic bone lesion involving first through third sacral segments, currently 8.0 x 8.3 x 7.2 cm, enlarged compared to the 2018 CT, where it measured 4.9 x 3.7 x 2.5 cm.   Lesion demonstrates cortical breakthrough anteriorly   The lesion has also extended into the left ilium     Laboratory:  The SPEP shows a monoclonal peak in the gamma region.     Pathology: FINAL  A. Bone, \"Sacral mass,\" Biopsy:  - Epithelial neoplasm with sclerotic bone consistent with lambda-restricted plasma cell myeloma    Pathology Note:   Histologic sections show a malignant plasmacytoid/epithelioid neoplasm in the background of densely sclerotic bone.  Immunohistochemistry performed at [facility] is positive for  (strong and diffuse) and lambda  whereas kappa and MUM1 are negative.  I have also evaluated the immunohistochemistry stains you have provided.    Overall, it is my opinion that we are dealing with a plasma cell myeloma, which is lambda-restricted.    Referring provider: No ref. provider found  Patient Care Team:  Blade Walton DO as PCP - General (Family Medicine)  Kapil Goyal PA-C as Physician Assistant (Physician Assistant)  Tay Mckenna DO as Medical " Oncologist (Hematology and Oncology)  Tony Britt MD (Radiation Oncology)     There are no diagnoses linked to this encounter.    ____________________________________________________________________    30 minutes was spent in the coordination of care, reviewing of imaging and with the patient on the date of service          Dr. Ricky Perez DO, Orthopedic Surgeon  Orthopedic Oncology & Sarcoma Surgery   Phone:[phone] Fax:[phone]

## 2024-04-14 DIAGNOSIS — R12 HEARTBURN: ICD-10-CM

## 2024-04-14 DIAGNOSIS — F10.21 ALCOHOLISM IN REMISSION (HCC): ICD-10-CM

## 2024-04-14 LAB
IGA SERPL-MCNC: 395 MG/DL (ref 66–433)
IGG SERPL-MCNC: 965 MG/DL (ref 635–1741)
IGM SERPL-MCNC: 75 MG/DL (ref 45–281)

## 2024-04-15 ENCOUNTER — APPOINTMENT (OUTPATIENT)
Dept: RADIATION ONCOLOGY | Facility: CLINIC | Age: 69
End: 2024-04-15
Attending: RADIOLOGY
Payer: MEDICARE

## 2024-04-15 PROBLEM — C90.30 SOLITARY PLASMACYTOMA NOT HAVING ACHIEVED REMISSION (HCC): Status: ACTIVE | Noted: 2023-12-22

## 2024-04-15 LAB
ALBUMIN SERPL ELPH-MCNC: 4.56 G/DL (ref 3.2–5.1)
ALBUMIN SERPL ELPH-MCNC: 63.4 % (ref 48–70)
ALPHA1 GLOB SERPL ELPH-MCNC: 0.24 G/DL (ref 0.15–0.47)
ALPHA1 GLOB SERPL ELPH-MCNC: 3.3 % (ref 1.8–7)
ALPHA2 GLOB SERPL ELPH-MCNC: 0.64 G/DL (ref 0.42–1.04)
ALPHA2 GLOB SERPL ELPH-MCNC: 8.9 % (ref 5.9–14.9)
BETA GLOB ABNORMAL SERPL ELPH-MCNC: 0.51 G/DL (ref 0.31–0.57)
BETA1 GLOB SERPL ELPH-MCNC: 7.1 % (ref 4.7–7.7)
BETA2 GLOB SERPL ELPH-MCNC: 5.1 % (ref 3.1–7.9)
BETA2+GAMMA GLOB SERPL ELPH-MCNC: 0.37 G/DL (ref 0.2–0.58)
GAMMA GLOB ABNORMAL SERPL ELPH-MCNC: 0.88 G/DL (ref 0.4–1.66)
GAMMA GLOB SERPL ELPH-MCNC: 12.2 % (ref 6.9–22.3)
IGG/ALB SER: 1.73 {RATIO} (ref 1.1–1.8)
M PROTEIN 1 MFR SERPL ELPH: 1 %
M PROTEIN 1 SERPL ELPH-MCNC: 0.07 G/DL
PROT PATTERN SERPL ELPH-IMP: NORMAL
PROT SERPL-MCNC: 7.2 G/DL (ref 6.4–8.2)

## 2024-04-15 PROCEDURE — 77263 THER RADIOLOGY TX PLNG CPLX: CPT | Performed by: RADIOLOGY

## 2024-04-15 PROCEDURE — 77290 THER RAD SIMULAJ FIELD CPLX: CPT | Performed by: RADIOLOGY

## 2024-04-15 PROCEDURE — 84165 PROTEIN E-PHORESIS SERUM: CPT | Performed by: PATHOLOGY

## 2024-04-15 PROCEDURE — 77334 RADIATION TREATMENT AID(S): CPT | Performed by: RADIOLOGY

## 2024-04-15 RX ORDER — PANTOPRAZOLE SODIUM 20 MG/1
20 TABLET, DELAYED RELEASE ORAL DAILY
Qty: 90 TABLET | Refills: 1 | Status: SHIPPED | OUTPATIENT
Start: 2024-04-15

## 2024-04-15 RX ORDER — MIRTAZAPINE 15 MG/1
15 TABLET, FILM COATED ORAL
Qty: 90 TABLET | Refills: 1 | Status: SHIPPED | OUTPATIENT
Start: 2024-04-15

## 2024-04-16 LAB
KAPPA LC FREE SER-MCNC: 31.1 MG/L (ref 3.3–19.4)
KAPPA LC FREE/LAMBDA FREE SER: 1.24 {RATIO} (ref 0.26–1.65)
LAMBDA LC FREE SERPL-MCNC: 25.1 MG/L (ref 5.7–26.3)

## 2024-04-18 ENCOUNTER — PATIENT OUTREACH (OUTPATIENT)
Dept: HEMATOLOGY ONCOLOGY | Facility: CLINIC | Age: 69
End: 2024-04-18

## 2024-04-18 DIAGNOSIS — C90.30 SOLITARY PLASMACYTOMA NOT HAVING ACHIEVED REMISSION (HCC): Primary | ICD-10-CM

## 2024-04-18 NOTE — PROGRESS NOTES
Oncology patient navigator called patient to introduce myself and to assess for barriers to his oncology care. No answer at this time and voicemail was left requesting a call back at patient's earliest convenience.     Future Appointments   Date Time Provider Department Center   4/29/2024 12:00 PM Tony Britt MD [facility] Rad Onc [facility]   4/30/2024  9:00 AM AL RADONC [facility] Rad Onc [facility]   4/30/2024 10:20 AM Tay Mckenna DO HEM ONC [facility]   5/1/2024  9:00 AM AL RADONC RES[facility] Rad Onc [facility]   5/2/2024  9:00 AM AL RADONC RESOURCE [facility] Rad Onc [facility]   5/3/2024  9:00 AM AL RADONC RESOURCE [facility] Rad On[facility]   5/6/2024  9:00 AM AL RADONC RESOURCE [facility] Rad Onc [facility]   5/6/2024 12:00 PM Tony Britt MD AL Rad Onc [facility]   5/7/2024  9:00 AM AL RADONC RESOURCE [facility] Rad Onc [facility]   5/8/2024  9:00 AM AL RADONC RESOURCE [facility] Rad Onc [facility]   5/9/2024  9:00 AM AL RADONC RESOURCE [facility] Rad Onc [facility]   5/10/2024  9:00 AM AL RADONC RESOURCE [facility] Rad Onc [facility]   5/13/2024  9:00 AM AL RADONC RESOURCE [facility] Rad Onc [facility]   5/13/2024 12:00 PM Tony Britt MD AL Rad On[facility]   5/14/2024  9:00 AM AL RADONC RESOURCE [facility] Rad Onc [facility]   5/15/2024  9:00 AM AL RADONC RESOURCE AL Rad Onc [facility]   5/16/2024  9:00 AM AL RADONC RESOURCE AL Rad Onc [facility]   5/17/2024  9:00 AM AL RADONC RESOURCE AL Rad Onc [facility]   5/20/2024  9:00 AM AL RADONC RESOURCE AL Rad Onc [facility]   5/21/2024  9:00 AM AL RADONC RESOURCE AL Rad Onc [facility]   5/22/2024  9:00 AM AL RADONC RESOURCE AL Rad Onc [facility]   5/23/2024  9:00 AM AL RADONC RESOURCE AL Rad Onc [facility]   5/24/2024  9:00 AM AL RADONC RESOURCE AL Rad Onc [facility]   5/28/2024  9:00 AM AL RADONC RESOURCE AL Rad Onc [facility]   5/29/2024  9:00 AM AL RADONC RESOURCE AL Rad Onc [facility]   5/30/2024  9:00 AM AL RADONC RESOURCE AL Rad Onc [facility]    5/31/2024  8:30 AM Giorgi Dillon DO [facility] OR [facility]   5/31/2024  9:00 AM AL RADONC RESOURCE AL Rad Onc [facility]   6/3/2024  9:00 AM AL RADONC RESOURCE AL Rad Onc [facility]   9/20/2024 10:00 AM DO Adam Ojeda  [facility]

## 2024-04-19 NOTE — PROGRESS NOTES
I spoke with Shiraz now that his radiation SIM has been completed to review for any barriers to care and offer supportive services as needed. I reviewed and updated the members assigned to the care team in [facility].     General assessment completed with patient at this time. He is aware a referral will be placed for the OSW team.     He knows the members of the care team as well as how and when to contact them with any needs. He verbalizes managing the schedules well. Shiraz has already completed the [employer] transportation application and has been approved.I will confirm with [employer] that new schedule has been received. He denies any uncontrolled symptoms. Discussed role of Palliative Care in symptom and side effect management. Declined referral at this time.Patients states that they are eating and drinking as per usual with no unintentional weight loss.   He is aware of our Onc RD program if this changes. Patient does not smoke. Patient states he is supported by his friend Ruel only.  Community support information provided via [employer], including [facility] Support [facility] of the [facility].Patient feels they have adequate insurance coverage and deny any financial concerns at this time.  Advised to patient that if this becomes a barrier to care I can connect him with our Onc financial advocacy team. Patient verbalized understanding     Discussed the plan for follow-up with the patient. He is scheduled to start radiation on 4/29/2024 and I will follow up with him on 5/10/2024 to reassess needs. I have provided my direct contact information to the patient and welcome them to contact me if their needs as discussed above change. They were appreciative for the call.

## 2024-04-23 ENCOUNTER — PATIENT OUTREACH (OUTPATIENT)
Dept: CASE MANAGEMENT | Facility: HOSPITAL | Age: 69
End: 2024-04-23

## 2024-04-25 ENCOUNTER — DOCUMENTATION (OUTPATIENT)
Dept: HEMATOLOGY ONCOLOGY | Facility: CLINIC | Age: 69
End: 2024-04-25

## 2024-04-25 PROCEDURE — 77295 3-D RADIOTHERAPY PLAN: CPT | Performed by: RADIOLOGY

## 2024-04-25 PROCEDURE — 77300 RADIATION THERAPY DOSE PLAN: CPT | Performed by: RADIOLOGY

## 2024-04-25 PROCEDURE — 77334 RADIATION TREATMENT AID(S): CPT | Performed by: RADIOLOGY

## 2024-04-25 NOTE — PROGRESS NOTES
Patient navigator sent message to Dr. Mckenna's team requesting virtual or phone visit on 4/30/2024 instead of in office. Patient is starting daily RT on 4/29 and is using [employer] transport for these appts. [employer] cannot transport patient for office visit on same day as RT. I will await a response.

## 2024-04-25 NOTE — PROGRESS NOTES
Response received that appt with Dr. Mckenna was changed to virtual and patient was notified by office.

## 2024-04-29 PROCEDURE — 77387 GUIDANCE FOR RADJ TX DLVR: CPT | Performed by: RADIOLOGY

## 2024-04-29 PROCEDURE — 77280 THER RAD SIMULAJ FIELD SMPL: CPT | Performed by: RADIOLOGY

## 2024-04-29 PROCEDURE — 77412 RADIATION TX DELIVERY LVL 3: CPT | Performed by: RADIOLOGY

## 2024-04-29 PROCEDURE — 77427 RADIATION TX MANAGEMENT X5: CPT | Performed by: RADIOLOGY

## 2024-04-30 ENCOUNTER — TELEMEDICINE (OUTPATIENT)
Dept: HEMATOLOGY ONCOLOGY | Facility: CLINIC | Age: 69
End: 2024-04-30
Payer: MEDICARE

## 2024-04-30 DIAGNOSIS — G63 NEUROPATHY ASSOCIATED WITH CANCER (HCC): ICD-10-CM

## 2024-04-30 DIAGNOSIS — C80.1 NEUROPATHY ASSOCIATED WITH CANCER (HCC): ICD-10-CM

## 2024-04-30 DIAGNOSIS — C90.30 SOLITARY PLASMACYTOMA NOT HAVING ACHIEVED REMISSION (HCC): Primary | ICD-10-CM

## 2024-04-30 PROCEDURE — 77387 GUIDANCE FOR RADJ TX DLVR: CPT | Performed by: RADIOLOGY

## 2024-04-30 PROCEDURE — 77331 SPECIAL RADIATION DOSIMETRY: CPT | Performed by: RADIOLOGY

## 2024-04-30 PROCEDURE — G2211 COMPLEX E/M VISIT ADD ON: HCPCS | Performed by: INTERNAL MEDICINE

## 2024-04-30 PROCEDURE — G6002 STEREOSCOPIC X-RAY GUIDANCE: HCPCS | Performed by: RADIOLOGY

## 2024-04-30 PROCEDURE — 99214 OFFICE O/P EST MOD 30 MIN: CPT | Performed by: INTERNAL MEDICINE

## 2024-04-30 PROCEDURE — 77412 RADIATION TX DELIVERY LVL 3: CPT | Performed by: RADIOLOGY

## 2024-04-30 NOTE — PROGRESS NOTES
Virtual Regular Visit    Verification of patient location:    Patient is located at Home in the following state in which I hold an active license [place]      Assessment/Plan: In summary, this is a 69-year-old male with a history of what appears to be solitary plasmacytoma of the sacrum.  Yesterday he started radiation therapy to these lesions.  His only complaint is that of bilateral foot neuropathy.  Possibly related to radicular symptoms.  Recent CBC and differential are normal.  CMP shows sodium 132, otherwise normal.  Quantitative immunoglobulins normal.  Serum free kappa 31.1, lambda 25.1, ratio 1.2.  SPEP shows peak 1 concentration 0.07 g/dL.  He has been on gabapentin for his neuropathy for 3 months without benefit.  I suggested discontinuation.  I suggested he consider alpha lipoic acid, approximately 1000 mg/day for a month.    If beneficial, continue.  If not, discontinue..  I will make arrangements to see him back in 4 months with repeat imaging and blood work just prior to that.  I reviewed the above with the patient and his friend.  They voiced understanding and agreement.  I reviewed his medical conditions, medications, laboratory studies.    Problem List Items Addressed This Visit    None           Reason for visit is   Chief Complaint   Patient presents with    Virtual Regular Visit          Encounter provider Tay Mckenna DO      Recent Visits  No visits were found meeting these conditions.  Showing recent visits within past 7 days and meeting all other requirements  Today's Visits  Date Type Provider Dept   04/30/24 Telemedicine Tay Mckenna DO [facility]   Showing today's visits and meeting all other requirements  Future Appointments  No visits were found meeting these conditions.  Showing future appointments within next 150 days and meeting all other requirements       The patient was identified by name and date of birth. Alexander Olmstead was informed that this is a  telemedicine visit and that the visit is being conducted through the Epic Embedded platform. He agrees to proceed..  My office door was closed. No one else was in the room.  He acknowledged consent and understanding of privacy and security of the video platform. The patient has agreed to participate and understands they can discontinue the visit at any time.    Patient is aware this is a billable service.     Subjective  Alexander Olmstead is a 69 y.o. male with a history of lesions in the sacrum and T12 vertebra.  These have changed very slowly over years.  Biopsy showed epithelial neoplasm, ultimately felt to represent myeloma, however.      HPI     Past Medical History:   Diagnosis Date    Acute respiratory failure (HCC) 11/24/2022    Alcohol withdrawal (HCC) 11/25/2022    Hyperlipidemia     Hypertension     Hyponatremia 11/23/2022    Hypothermia 11/23/2022    Hypoxia 11/23/2022    Metabolic acidosis with increased anion gap and accumulation of organic acids 11/23/2022    Metabolic encephalopathy 11/23/2022    Pneumonia 11/25/2022    Positive blood culture 11/28/2022    Septic shock (HCC) 11/24/2022       Past Surgical History:   Procedure Laterality Date    IR BIOPSY BONE  12/19/2023    NO PAST SURGERIES         Current Outpatient Medications   Medication Sig Dispense Refill    Acetaminophen 325 MG CAPS Take 2 tablets by mouth every 6 (six) hours (Patient not taking: Reported on 1/29/2024)      Cyanocobalamin (VITAMIN B-12 CR PO) Take 2 tablets by mouth in the morning      Diclofenac Sodium (VOLTAREN) 1 % Apply 2 g topically 4 (four) times a day (Patient not taking: Reported on 1/29/2024) 100 g 5    gabapentin (Neurontin) 300 mg capsule Take 1 capsule (300 mg total) by mouth 3 (three) times a day 90 capsule 5    ibuprofen (MOTRIN) 200 mg tablet Take 400 mg by mouth every 6 (six) hours as needed for mild pain      losartan (COZAAR) 50 mg tablet Take 1 tablet (50 mg total) by mouth in the morning 90 tablet 1     mirtazapine (REMERON) 15 mg tablet TAKE 1 TABLET BY MOUTH EVERYDAY AT BEDTIME 90 tablet 1    Multiple Vitamin (MULTIVITAMIN) tablet Take 1 tablet by mouth daily      Omega-3 Fatty Acids (FISH OIL PO) Take by mouth      pantoprazole (PROTONIX) 20 mg tablet TAKE 1 TABLET BY MOUTH EVERY DAY 90 tablet 1    pyridoxine (VITAMIN B6) 50 mg tablet TAKE 1 TABLET BY MOUTH EVERY DAY 90 tablet 1    SALINE NASAL MIST NA 2 sprays into each nostril 2 (two) times a day       No current facility-administered medications for this visit.        No Known Allergies    Review of Systems   Constitutional:  Negative for chills and fever.   HENT:  Negative for nosebleeds.    Eyes:  Negative for discharge.   Respiratory:  Negative for cough and shortness of breath.    Cardiovascular:  Negative for chest pain.   Gastrointestinal:  Negative for abdominal pain, constipation and diarrhea.   Endocrine: Negative for polydipsia.   Genitourinary:  Negative for hematuria.   Musculoskeletal:  Negative for arthralgias.   Skin:  Negative for color change.   Allergic/Immunologic: Negative for immunocompromised state.   Neurological:  Positive for numbness (BL feet). Negative for dizziness and headaches.   Hematological:  Negative for adenopathy.   Psychiatric/Behavioral:  Negative for agitation.        Video Exam    There were no vitals filed for this visit.    Physical Exam  Constitutional:       Appearance: He is well-developed.   HENT:      Head: Atraumatic.      Nose: Nose normal.      Mouth/Throat:      Mouth: Mucous membranes are moist.   Eyes:      Conjunctiva/sclera: Conjunctivae normal.   Pulmonary:      Effort: Pulmonary effort is normal.   Musculoskeletal:      Cervical back: Normal range of motion.   Skin:     General: Skin is dry.      Findings: No rash.   Neurological:      Mental Status: He is alert and oriented to person, place, and time.   Psychiatric:         Mood and Affect: Mood normal.          Visit Time  Total Visit Duration: 20  min.

## 2024-04-30 NOTE — PROGRESS NOTES
"[facility] [place]  [address] [facility] [address]  [phone]  [phone]    Alexander Olmstead,1955, [id number]  04/30/24    Discussion:   In summary, this is a 69-year-old male with a history of bone lesion.  Biopsy showed epithelial neoplasm felt to represent myeloma.    I discussed the above with the patient.  The patient *** voiced understanding and agreement.  ______________________________________________________________________    No chief complaint on file.      HPI:  Oncology History   Solitary plasmacytoma not having achieved remission (HCC)   12/19/2023 Biopsy    Final Diagnosis   A. Bone, \"Sacral mass,\" Biopsy:  - Epithelial neoplasm with sclerotic bone consistent with lambda-restricted plasma cell myeloma (see note and consultation letter)        12/22/2023 Initial Diagnosis    Metastasis to bone (HCC)         Interval History:  ***  {performance status:49620}    Review of Systems   Constitutional:  Negative for chills and fever.   HENT:  Negative for nosebleeds.    Eyes:  Negative for discharge.   Respiratory:  Negative for cough and shortness of breath.    Cardiovascular:  Negative for chest pain.   Gastrointestinal:  Negative for abdominal pain, constipation and diarrhea.   Endocrine: Negative for polydipsia.   Genitourinary:  Negative for hematuria.   Musculoskeletal:  Negative for arthralgias.   Skin:  Negative for color change.   Allergic/Immunologic: Negative for immunocompromised state.   Neurological:  Negative for dizziness and headaches.   Hematological:  Negative for adenopathy.   Psychiatric/Behavioral:  Negative for agitation.        Past Medical History:   Diagnosis Date    Acute respiratory failure (HCC) 11/24/2022    Alcohol withdrawal (HCC) 11/25/2022    Hyperlipidemia     Hypertension     Hyponatremia 11/23/2022    Hypothermia 11/23/2022    Hypoxia 11/23/2022    Metabolic acidosis with increased anion gap and accumulation of organic acids " 11/23/2022    Metabolic encephalopathy 11/23/2022    Pneumonia 11/25/2022    Positive blood culture 11/28/2022    Septic shock (HCC) 11/24/2022     Patient Active Problem List   Diagnosis    GI bleed    NSTEMI (non-ST elevated myocardial infarction) (HCC)    Multiple rib fractures    Pharyngeal swelling    Acute respiratory failure with hypoxia (HCC)    Sacral lesion    Other specified anemias    Other dysphagia    Esophageal dilatation    History of GI bleed    Hypertension    Alcohol dependence with withdrawal delirium (HCC)    Solitary plasmacytoma not having achieved remission ([facility])       Current Outpatient Medications:     Acetaminophen 325 MG CAPS, Take 2 tablets by mouth every 6 (six) hours (Patient not taking: Reported on 1/29/2024), Disp: , Rfl:     Cyanocobalamin (VITAMIN B-12 CR PO), Take 2 tablets by mouth in the morning, Disp: , Rfl:     Diclofenac Sodium (VOLTAREN) 1 %, Apply 2 g topically 4 (four) times a day (Patient not taking: Reported on 1/29/2024), Disp: 100 g, Rfl: 5    gabapentin (Neurontin) 300 mg capsule, Take 1 capsule (300 mg total) by mouth 3 (three) times a day, Disp: 90 capsule, Rfl: 5    ibuprofen (MOTRIN) 200 mg tablet, Take 400 mg by mouth every 6 (six) hours as needed for mild pain, Disp: , Rfl:     losartan (COZAAR) 50 mg tablet, Take 1 tablet (50 mg total) by mouth in the morning, Disp: 90 tablet, Rfl: 1    mirtazapine (REMERON) 15 mg tablet, TAKE 1 TABLET BY MOUTH EVERYDAY AT BEDTIME, Disp: 90 tablet, Rfl: 1    Multiple Vitamin (MULTIVITAMIN) tablet, Take 1 tablet by mouth daily, Disp: , Rfl:     Omega-3 Fatty Acids (FISH OIL PO), Take by mouth, Disp: , Rfl:     pantoprazole (PROTONIX) 20 mg tablet, TAKE 1 TABLET BY MOUTH EVERY DAY, Disp: 90 tablet, Rfl: 1    pyridoxine (VITAMIN B6) 50 mg tablet, TAKE 1 TABLET BY MOUTH EVERY DAY, Disp: 90 tablet, Rfl: 1    SALINE NASAL MIST NA, 2 sprays into each nostril 2 (two) times a day, Disp: , Rfl:   No Known Allergies  Past Surgical  History:   Procedure Laterality Date    IR BIOPSY BONE  12/19/2023    NO PAST SURGERIES       Social History     Objective:  There were no vitals filed for this visit.  Physical Exam  Constitutional:       Appearance: He is well-developed.   HENT:      Head: Normocephalic and atraumatic.      Mouth/Throat:      Mouth: Mucous membranes are moist.   Eyes:      Pupils: Pupils are equal, round, and reactive to light.   Cardiovascular:      Rate and Rhythm: Normal rate and regular rhythm.      Heart sounds: No murmur heard.  Pulmonary:      Breath sounds: Normal breath sounds. No wheezing or rales.   Abdominal:      Palpations: Abdomen is soft.      Tenderness: There is no abdominal tenderness.   Musculoskeletal:         General: No tenderness. Normal range of motion.      Cervical back: Neck supple.   Lymphadenopathy:      Cervical: No cervical adenopathy.   Skin:     Findings: No erythema or rash.   Neurological:      Mental Status: He is alert and oriented to person, place, and time.      Cranial Nerves: No cranial nerve deficit.      Deep Tendon Reflexes: Reflexes are normal and symmetric.   Psychiatric:         Behavior: Behavior normal.           Labs:  I personally reviewed the labs and imaging pertinent to this patient care.

## 2024-05-01 ENCOUNTER — APPOINTMENT (OUTPATIENT)
Dept: RADIATION ONCOLOGY | Facility: CLINIC | Age: 69
End: 2024-05-01
Attending: RADIOLOGY
Payer: MEDICARE

## 2024-05-01 PROCEDURE — G6002 STEREOSCOPIC X-RAY GUIDANCE: HCPCS | Performed by: RADIOLOGY

## 2024-05-01 PROCEDURE — 77387 GUIDANCE FOR RADJ TX DLVR: CPT | Performed by: RADIOLOGY

## 2024-05-01 PROCEDURE — 77412 RADIATION TX DELIVERY LVL 3: CPT | Performed by: RADIOLOGY

## 2024-05-02 ENCOUNTER — APPOINTMENT (OUTPATIENT)
Dept: RADIATION ONCOLOGY | Facility: CLINIC | Age: 69
End: 2024-05-02
Attending: RADIOLOGY
Payer: MEDICARE

## 2024-05-02 PROCEDURE — G6002 STEREOSCOPIC X-RAY GUIDANCE: HCPCS | Performed by: RADIOLOGY

## 2024-05-02 PROCEDURE — 77412 RADIATION TX DELIVERY LVL 3: CPT | Performed by: RADIOLOGY

## 2024-05-02 PROCEDURE — 77387 GUIDANCE FOR RADJ TX DLVR: CPT | Performed by: RADIOLOGY

## 2024-05-03 ENCOUNTER — APPOINTMENT (OUTPATIENT)
Dept: RADIATION ONCOLOGY | Facility: CLINIC | Age: 69
End: 2024-05-03
Attending: RADIOLOGY
Payer: MEDICARE

## 2024-05-03 PROCEDURE — 77412 RADIATION TX DELIVERY LVL 3: CPT | Performed by: RADIOLOGY

## 2024-05-03 PROCEDURE — 77387 GUIDANCE FOR RADJ TX DLVR: CPT | Performed by: RADIOLOGY

## 2024-05-03 PROCEDURE — 77336 RADIATION PHYSICS CONSULT: CPT | Performed by: RADIOLOGY

## 2024-05-03 PROCEDURE — G6002 STEREOSCOPIC X-RAY GUIDANCE: HCPCS | Performed by: RADIOLOGY

## 2024-05-06 ENCOUNTER — APPOINTMENT (OUTPATIENT)
Dept: RADIATION ONCOLOGY | Facility: CLINIC | Age: 69
End: 2024-05-06
Attending: RADIOLOGY
Payer: MEDICARE

## 2024-05-06 PROCEDURE — 77387 GUIDANCE FOR RADJ TX DLVR: CPT | Performed by: RADIOLOGY

## 2024-05-06 PROCEDURE — 77412 RADIATION TX DELIVERY LVL 3: CPT | Performed by: RADIOLOGY

## 2024-05-06 PROCEDURE — 77427 RADIATION TX MANAGEMENT X5: CPT | Performed by: RADIOLOGY

## 2024-05-06 PROCEDURE — G6002 STEREOSCOPIC X-RAY GUIDANCE: HCPCS | Performed by: RADIOLOGY

## 2024-05-07 ENCOUNTER — APPOINTMENT (OUTPATIENT)
Dept: RADIATION ONCOLOGY | Facility: CLINIC | Age: 69
End: 2024-05-07
Attending: RADIOLOGY
Payer: MEDICARE

## 2024-05-07 PROCEDURE — 77387 GUIDANCE FOR RADJ TX DLVR: CPT | Performed by: RADIOLOGY

## 2024-05-07 PROCEDURE — 77412 RADIATION TX DELIVERY LVL 3: CPT | Performed by: RADIOLOGY

## 2024-05-07 PROCEDURE — G6002 STEREOSCOPIC X-RAY GUIDANCE: HCPCS | Performed by: RADIOLOGY

## 2024-05-08 ENCOUNTER — APPOINTMENT (OUTPATIENT)
Dept: RADIATION ONCOLOGY | Facility: CLINIC | Age: 69
End: 2024-05-08
Attending: RADIOLOGY
Payer: MEDICARE

## 2024-05-08 PROCEDURE — 77412 RADIATION TX DELIVERY LVL 3: CPT | Performed by: INTERNAL MEDICINE

## 2024-05-08 PROCEDURE — 77387 GUIDANCE FOR RADJ TX DLVR: CPT | Performed by: INTERNAL MEDICINE

## 2024-05-08 PROCEDURE — G6002 STEREOSCOPIC X-RAY GUIDANCE: HCPCS | Performed by: INTERNAL MEDICINE

## 2024-05-09 ENCOUNTER — APPOINTMENT (OUTPATIENT)
Dept: RADIATION ONCOLOGY | Facility: CLINIC | Age: 69
End: 2024-05-09
Attending: RADIOLOGY
Payer: MEDICARE

## 2024-05-09 PROCEDURE — G6002 STEREOSCOPIC X-RAY GUIDANCE: HCPCS | Performed by: RADIOLOGY

## 2024-05-09 PROCEDURE — 77387 GUIDANCE FOR RADJ TX DLVR: CPT | Performed by: RADIOLOGY

## 2024-05-09 PROCEDURE — 77412 RADIATION TX DELIVERY LVL 3: CPT | Performed by: RADIOLOGY

## 2024-05-10 ENCOUNTER — PATIENT OUTREACH (OUTPATIENT)
Dept: HEMATOLOGY ONCOLOGY | Facility: CLINIC | Age: 69
End: 2024-05-10

## 2024-05-10 ENCOUNTER — APPOINTMENT (OUTPATIENT)
Dept: RADIATION ONCOLOGY | Facility: CLINIC | Age: 69
End: 2024-05-10
Attending: RADIOLOGY
Payer: MEDICARE

## 2024-05-10 PROCEDURE — 77336 RADIATION PHYSICS CONSULT: CPT | Performed by: RADIOLOGY

## 2024-05-10 PROCEDURE — G6002 STEREOSCOPIC X-RAY GUIDANCE: HCPCS | Performed by: RADIOLOGY

## 2024-05-10 PROCEDURE — 77412 RADIATION TX DELIVERY LVL 3: CPT | Performed by: RADIOLOGY

## 2024-05-10 PROCEDURE — 77387 GUIDANCE FOR RADJ TX DLVR: CPT | Performed by: RADIOLOGY

## 2024-05-10 NOTE — PROGRESS NOTES
Radiation Oncology Treatment     Are you having any side effects from your treatment?  -had diarrhea X 1. Used Imodium which resolved symptom.          Are you having any skin reaction?  -No      Are you experiencing any fatigue?   -No    Are you having any pain?  -No      Do you know when your upcoming appointments are?  -Yes       Do you have any questions or concerns regarding your treatment plan? None at this time.

## 2024-05-10 NOTE — PROGRESS NOTES
I called Alexander to follow up with him during his radiation treatments and to assess for any changes in barriers to care. Patient is doing well with RT treatments and denies any side effects. He did have diarrhea X 1 and took imodium while helped resolve this. He is still managing his schedule well and knows all of the members of his care team and when to contact them with needs. He is still utilizing [employer] transportation services. He confirmed he is very grateful and everything is going great!     Discussed the plan for follow-up with the patient. I will follow up with him at the end of his radiation treatments in 4 weeks.   Alexander confirmed he has my direct contact information and know he is welcomed to contact me if his needs as discussed above change. He was appreciative for my call.

## 2024-05-13 ENCOUNTER — APPOINTMENT (OUTPATIENT)
Dept: RADIATION ONCOLOGY | Facility: CLINIC | Age: 69
End: 2024-05-13
Attending: RADIOLOGY
Payer: MEDICARE

## 2024-05-13 PROCEDURE — 77387 GUIDANCE FOR RADJ TX DLVR: CPT | Performed by: RADIOLOGY

## 2024-05-13 PROCEDURE — 77427 RADIATION TX MANAGEMENT X5: CPT | Performed by: RADIOLOGY

## 2024-05-13 PROCEDURE — G6002 STEREOSCOPIC X-RAY GUIDANCE: HCPCS | Performed by: RADIOLOGY

## 2024-05-13 PROCEDURE — 77412 RADIATION TX DELIVERY LVL 3: CPT | Performed by: RADIOLOGY

## 2024-05-14 ENCOUNTER — APPOINTMENT (OUTPATIENT)
Dept: RADIATION ONCOLOGY | Facility: CLINIC | Age: 69
End: 2024-05-14
Attending: RADIOLOGY
Payer: MEDICARE

## 2024-05-14 PROCEDURE — 77412 RADIATION TX DELIVERY LVL 3: CPT | Performed by: RADIOLOGY

## 2024-05-14 PROCEDURE — G6002 STEREOSCOPIC X-RAY GUIDANCE: HCPCS | Performed by: RADIOLOGY

## 2024-05-14 PROCEDURE — 77387 GUIDANCE FOR RADJ TX DLVR: CPT | Performed by: RADIOLOGY

## 2024-05-15 ENCOUNTER — APPOINTMENT (OUTPATIENT)
Dept: RADIATION ONCOLOGY | Facility: CLINIC | Age: 69
End: 2024-05-15
Attending: RADIOLOGY
Payer: MEDICARE

## 2024-05-15 PROCEDURE — 77412 RADIATION TX DELIVERY LVL 3: CPT | Performed by: INTERNAL MEDICINE

## 2024-05-15 PROCEDURE — 77387 GUIDANCE FOR RADJ TX DLVR: CPT | Performed by: INTERNAL MEDICINE

## 2024-05-15 PROCEDURE — G6002 STEREOSCOPIC X-RAY GUIDANCE: HCPCS | Performed by: INTERNAL MEDICINE

## 2024-05-16 ENCOUNTER — APPOINTMENT (OUTPATIENT)
Dept: RADIATION ONCOLOGY | Facility: CLINIC | Age: 69
End: 2024-05-16
Attending: RADIOLOGY
Payer: MEDICARE

## 2024-05-16 PROCEDURE — G6002 STEREOSCOPIC X-RAY GUIDANCE: HCPCS | Performed by: RADIOLOGY

## 2024-05-16 PROCEDURE — 77412 RADIATION TX DELIVERY LVL 3: CPT | Performed by: RADIOLOGY

## 2024-05-16 PROCEDURE — 77387 GUIDANCE FOR RADJ TX DLVR: CPT | Performed by: RADIOLOGY

## 2024-05-17 ENCOUNTER — APPOINTMENT (OUTPATIENT)
Dept: RADIATION ONCOLOGY | Facility: CLINIC | Age: 69
End: 2024-05-17
Attending: RADIOLOGY
Payer: MEDICARE

## 2024-05-17 PROCEDURE — 77387 GUIDANCE FOR RADJ TX DLVR: CPT | Performed by: INTERNAL MEDICINE

## 2024-05-17 PROCEDURE — 77412 RADIATION TX DELIVERY LVL 3: CPT | Performed by: INTERNAL MEDICINE

## 2024-05-17 PROCEDURE — G6002 STEREOSCOPIC X-RAY GUIDANCE: HCPCS | Performed by: INTERNAL MEDICINE

## 2024-05-17 PROCEDURE — 77336 RADIATION PHYSICS CONSULT: CPT | Performed by: RADIOLOGY

## 2024-05-20 ENCOUNTER — APPOINTMENT (OUTPATIENT)
Dept: RADIATION ONCOLOGY | Facility: CLINIC | Age: 69
End: 2024-05-20
Attending: RADIOLOGY
Payer: MEDICARE

## 2024-05-20 PROCEDURE — 77412 RADIATION TX DELIVERY LVL 3: CPT | Performed by: RADIOLOGY

## 2024-05-20 PROCEDURE — 77387 GUIDANCE FOR RADJ TX DLVR: CPT | Performed by: RADIOLOGY

## 2024-05-20 PROCEDURE — G6002 STEREOSCOPIC X-RAY GUIDANCE: HCPCS | Performed by: RADIOLOGY

## 2024-05-20 PROCEDURE — 77427 RADIATION TX MANAGEMENT X5: CPT | Performed by: RADIOLOGY

## 2024-05-21 ENCOUNTER — TELEPHONE (OUTPATIENT)
Dept: HEMATOLOGY ONCOLOGY | Facility: CLINIC | Age: 69
End: 2024-05-21

## 2024-05-21 ENCOUNTER — APPOINTMENT (OUTPATIENT)
Dept: RADIATION ONCOLOGY | Facility: CLINIC | Age: 69
End: 2024-05-21
Attending: RADIOLOGY
Payer: MEDICARE

## 2024-05-21 PROCEDURE — G6002 STEREOSCOPIC X-RAY GUIDANCE: HCPCS | Performed by: RADIOLOGY

## 2024-05-21 PROCEDURE — 77412 RADIATION TX DELIVERY LVL 3: CPT | Performed by: RADIOLOGY

## 2024-05-21 PROCEDURE — 77387 GUIDANCE FOR RADJ TX DLVR: CPT | Performed by: RADIOLOGY

## 2024-05-21 NOTE — TELEPHONE ENCOUNTER
Phoned patient to review information in my chart message.  Per patient, he is taking alpha lipoic acid 600mg BID.  Patient states his neuropathy is a little better than when he was on gabapentin, but still has the pins and needles feeling in his feet frequently.  Will review with Dr. Mckenna and return call to patient with any additional recommendations.

## 2024-05-21 NOTE — TELEPHONE ENCOUNTER
Returned call to patient with Dr. Mckenna recommendations to increase to 900mg BID.  Patient aware and agreeable.

## 2024-05-22 ENCOUNTER — APPOINTMENT (OUTPATIENT)
Dept: RADIATION ONCOLOGY | Facility: CLINIC | Age: 69
End: 2024-05-22
Attending: RADIOLOGY
Payer: MEDICARE

## 2024-05-22 PROCEDURE — G6002 STEREOSCOPIC X-RAY GUIDANCE: HCPCS | Performed by: INTERNAL MEDICINE

## 2024-05-22 PROCEDURE — 77412 RADIATION TX DELIVERY LVL 3: CPT | Performed by: INTERNAL MEDICINE

## 2024-05-22 PROCEDURE — 77387 GUIDANCE FOR RADJ TX DLVR: CPT | Performed by: INTERNAL MEDICINE

## 2024-05-23 ENCOUNTER — APPOINTMENT (OUTPATIENT)
Dept: RADIATION ONCOLOGY | Facility: CLINIC | Age: 69
End: 2024-05-23
Attending: RADIOLOGY
Payer: MEDICARE

## 2024-05-24 ENCOUNTER — APPOINTMENT (OUTPATIENT)
Dept: RADIATION ONCOLOGY | Facility: CLINIC | Age: 69
End: 2024-05-24
Attending: RADIOLOGY
Payer: MEDICARE

## 2024-05-24 PROCEDURE — 77412 RADIATION TX DELIVERY LVL 3: CPT | Performed by: INTERNAL MEDICINE

## 2024-05-24 PROCEDURE — G6002 STEREOSCOPIC X-RAY GUIDANCE: HCPCS | Performed by: INTERNAL MEDICINE

## 2024-05-24 PROCEDURE — 77387 GUIDANCE FOR RADJ TX DLVR: CPT | Performed by: INTERNAL MEDICINE

## 2024-05-28 ENCOUNTER — APPOINTMENT (OUTPATIENT)
Dept: RADIATION ONCOLOGY | Facility: CLINIC | Age: 69
End: 2024-05-28
Attending: RADIOLOGY
Payer: MEDICARE

## 2024-05-28 PROCEDURE — G6002 STEREOSCOPIC X-RAY GUIDANCE: HCPCS | Performed by: RADIOLOGY

## 2024-05-28 PROCEDURE — 77412 RADIATION TX DELIVERY LVL 3: CPT | Performed by: RADIOLOGY

## 2024-05-28 PROCEDURE — 77387 GUIDANCE FOR RADJ TX DLVR: CPT | Performed by: RADIOLOGY

## 2024-05-28 PROCEDURE — 77336 RADIATION PHYSICS CONSULT: CPT | Performed by: RADIOLOGY

## 2024-05-29 ENCOUNTER — APPOINTMENT (OUTPATIENT)
Dept: RADIATION ONCOLOGY | Facility: CLINIC | Age: 69
End: 2024-05-29
Attending: RADIOLOGY
Payer: MEDICARE

## 2024-05-29 PROCEDURE — 77412 RADIATION TX DELIVERY LVL 3: CPT | Performed by: RADIOLOGY

## 2024-05-29 PROCEDURE — 77427 RADIATION TX MANAGEMENT X5: CPT | Performed by: RADIOLOGY

## 2024-05-29 PROCEDURE — 77387 GUIDANCE FOR RADJ TX DLVR: CPT | Performed by: RADIOLOGY

## 2024-05-29 PROCEDURE — G6002 STEREOSCOPIC X-RAY GUIDANCE: HCPCS | Performed by: RADIOLOGY

## 2024-05-30 ENCOUNTER — APPOINTMENT (OUTPATIENT)
Dept: RADIATION ONCOLOGY | Facility: CLINIC | Age: 69
End: 2024-05-30
Attending: RADIOLOGY
Payer: MEDICARE

## 2024-05-30 PROCEDURE — 77412 RADIATION TX DELIVERY LVL 3: CPT | Performed by: RADIOLOGY

## 2024-05-30 PROCEDURE — G6002 STEREOSCOPIC X-RAY GUIDANCE: HCPCS | Performed by: RADIOLOGY

## 2024-05-30 PROCEDURE — 77387 GUIDANCE FOR RADJ TX DLVR: CPT | Performed by: RADIOLOGY

## 2024-05-31 ENCOUNTER — APPOINTMENT (OUTPATIENT)
Dept: RADIATION ONCOLOGY | Facility: CLINIC | Age: 69
End: 2024-05-31
Attending: RADIOLOGY
Payer: MEDICARE

## 2024-05-31 PROCEDURE — 77412 RADIATION TX DELIVERY LVL 3: CPT | Performed by: RADIOLOGY

## 2024-05-31 PROCEDURE — G6002 STEREOSCOPIC X-RAY GUIDANCE: HCPCS | Performed by: RADIOLOGY

## 2024-05-31 PROCEDURE — 77387 GUIDANCE FOR RADJ TX DLVR: CPT | Performed by: RADIOLOGY

## 2024-06-03 ENCOUNTER — APPOINTMENT (OUTPATIENT)
Dept: RADIATION ONCOLOGY | Facility: CLINIC | Age: 69
End: 2024-06-03
Attending: RADIOLOGY
Payer: MEDICARE

## 2024-06-03 ENCOUNTER — APPOINTMENT (OUTPATIENT)
Dept: RADIATION ONCOLOGY | Facility: CLINIC | Age: 69
End: 2024-06-03
Payer: MEDICARE

## 2024-06-03 PROCEDURE — 77387 GUIDANCE FOR RADJ TX DLVR: CPT | Performed by: RADIOLOGY

## 2024-06-03 PROCEDURE — G6002 STEREOSCOPIC X-RAY GUIDANCE: HCPCS | Performed by: RADIOLOGY

## 2024-06-03 PROCEDURE — 77412 RADIATION TX DELIVERY LVL 3: CPT | Performed by: RADIOLOGY

## 2024-06-04 ENCOUNTER — TELEPHONE (OUTPATIENT)
Dept: HEMATOLOGY ONCOLOGY | Facility: CLINIC | Age: 69
End: 2024-06-04

## 2024-06-04 ENCOUNTER — APPOINTMENT (OUTPATIENT)
Dept: RADIATION ONCOLOGY | Facility: CLINIC | Age: 69
End: 2024-06-04
Attending: RADIOLOGY
Payer: MEDICARE

## 2024-06-04 DIAGNOSIS — G62.9 NEUROPATHY: ICD-10-CM

## 2024-06-04 PROCEDURE — G6002 STEREOSCOPIC X-RAY GUIDANCE: HCPCS | Performed by: RADIOLOGY

## 2024-06-04 PROCEDURE — 77412 RADIATION TX DELIVERY LVL 3: CPT | Performed by: RADIOLOGY

## 2024-06-04 PROCEDURE — 77336 RADIATION PHYSICS CONSULT: CPT | Performed by: RADIOLOGY

## 2024-06-04 PROCEDURE — 77387 GUIDANCE FOR RADJ TX DLVR: CPT | Performed by: RADIOLOGY

## 2024-06-04 NOTE — TELEPHONE ENCOUNTER
Phoned patient in response to My Chart Message.  Patient is currently not taking any gabapentin.  He is taking 1800mg daily of alpha lipoic acid.  Patient states his neuropathy becomes worse when he is sitting and at night.  Patient is currently using a topical lidocaine cream, that is not relieving symptoms.  Will review with Dr. Mckenna and return call to patient.

## 2024-06-04 NOTE — TELEPHONE ENCOUNTER
Returned call to patient with Dr. Mckenna recommendations to start Gabapentin 300mg Q12.  Patient aware and agreeable.  Patient states he does have gabapentin at home and will let us know if he needs a refill.

## 2024-06-10 ENCOUNTER — PATIENT OUTREACH (OUTPATIENT)
Dept: HEMATOLOGY ONCOLOGY | Facility: CLINIC | Age: 69
End: 2024-06-10

## 2024-06-10 NOTE — PROGRESS NOTES
Oncology patient navigator outreach attempted to check in after completion of radiation. A voicemail was left stating the reason for my call. I requested a return call at patient's earliest convenience.     Future Appointments   Date Time Provider Department Center   7/9/2024 11:00 AM Diana M Jaiyeola, MD [facility] [place]   7/19/2024  3:00 PM Tony Britt MD [facility]   8/19/2024 11:30 AM [address]   8/30/2024  9:40 AM Tay Mckenna DO HEM ONC [facility]   9/20/2024 10:00 AM DO Adam Ojeda  [facility]

## 2024-06-11 ENCOUNTER — TELEPHONE (OUTPATIENT)
Dept: HEMATOLOGY ONCOLOGY | Facility: CLINIC | Age: 69
End: 2024-06-11

## 2024-06-11 NOTE — TELEPHONE ENCOUNTER
Patient Call    Who are you speaking with? Patient    If it is not the patient, are they listed on an active communication consent form? N/A   What is the reason for this call? Alexander is returning a call from [place].   Does this require a call back? Yes   If a call back is required, please list best call back number [phone]    If a call back is required, advise that a message will be forwarded to their care team and someone will return their call as soon as possible.   Did you relay this information to the patient? Yes

## 2024-06-11 NOTE — PROGRESS NOTES
Completion of  Treatment Assessment    When was your last radiation treatment? 6/4/2024    Do you have your FU appointments scheduled with your Oncology team?   Yes, Virtual visit with Dr. Britt on 7/19/2024 and Dr. Mckenna 8/20/2024    Do you know when you need to go for FU imaging?  Yes, MRI 8/19/2024    Do you know if/when you need to go for FU labs?  yes    Do you need assistance with scheduling any of these items? no    Will you require transportation assistance for these appointments?  no  Comment discussed need for [employer] but patient will not require.     Do you know who to call if you need any assistance in the future?  Yes      Discussed the plan for follow-up with the patient. I will follow up with them after his next appt with Dr. Mckenna on 8/30/2024. He knows everyone on his care team and how to contact them if he needs. Patient has confirmed he has my direct contact information as well if he would require [employer] for his appts. Alexander was appreciative for my call.

## 2024-07-09 ENCOUNTER — HOSPITAL ENCOUNTER (OUTPATIENT)
Dept: PERIOP | Facility: HOSPITAL | Age: 69
Setting detail: OUTPATIENT SURGERY
Discharge: HOME | End: 2024-07-09
Attending: STUDENT IN AN ORGANIZED HEALTH CARE EDUCATION/TRAINING PROGRAM | Admitting: INTERNAL MEDICINE
Payer: MEDICARE

## 2024-07-09 ENCOUNTER — ANESTHESIA EVENT (OUTPATIENT)
Dept: PERIOP | Facility: HOSPITAL | Age: 69
End: 2024-07-09

## 2024-07-09 ENCOUNTER — ANESTHESIA (OUTPATIENT)
Dept: PERIOP | Facility: HOSPITAL | Age: 69
End: 2024-07-09

## 2024-07-09 VITALS
TEMPERATURE: 96.5 F | HEART RATE: 63 BPM | SYSTOLIC BLOOD PRESSURE: 126 MMHG | RESPIRATION RATE: 18 BRPM | OXYGEN SATURATION: 99 % | DIASTOLIC BLOOD PRESSURE: 63 MMHG

## 2024-07-09 DIAGNOSIS — Z12.11 SCREENING FOR COLON CANCER: ICD-10-CM

## 2024-07-09 PROCEDURE — 88305 TISSUE EXAM BY PATHOLOGIST: CPT | Performed by: PATHOLOGY

## 2024-07-09 PROCEDURE — 45385 COLONOSCOPY W/LESION REMOVAL: CPT | Performed by: INTERNAL MEDICINE

## 2024-07-09 PROCEDURE — 88341 IMHCHEM/IMCYTCHM EA ADD ANTB: CPT | Performed by: PATHOLOGY

## 2024-07-09 PROCEDURE — 88342 IMHCHEM/IMCYTCHM 1ST ANTB: CPT | Performed by: PATHOLOGY

## 2024-07-09 PROCEDURE — 88365 INSITU HYBRIDIZATION (FISH): CPT | Performed by: PATHOLOGY

## 2024-07-09 PROCEDURE — 88364 INSITU HYBRIDIZATION (FISH): CPT | Performed by: PATHOLOGY

## 2024-07-09 RX ORDER — SODIUM CHLORIDE, SODIUM LACTATE, POTASSIUM CHLORIDE, CALCIUM CHLORIDE 600; 310; 30; 20 MG/100ML; MG/100ML; MG/100ML; MG/100ML
20 INJECTION, SOLUTION INTRAVENOUS CONTINUOUS
Status: CANCELLED | OUTPATIENT
Start: 2024-07-09

## 2024-07-09 RX ORDER — PROPOFOL 10 MG/ML
INJECTION, EMULSION INTRAVENOUS AS NEEDED
Status: DISCONTINUED | OUTPATIENT
Start: 2024-07-09 | End: 2024-07-09

## 2024-07-09 RX ORDER — PROPOFOL 10 MG/ML
INJECTION, EMULSION INTRAVENOUS CONTINUOUS PRN
Status: DISCONTINUED | OUTPATIENT
Start: 2024-07-09 | End: 2024-07-09

## 2024-07-09 RX ORDER — EPHEDRINE SULFATE 50 MG/ML
INJECTION INTRAVENOUS AS NEEDED
Status: DISCONTINUED | OUTPATIENT
Start: 2024-07-09 | End: 2024-07-09

## 2024-07-09 RX ORDER — SODIUM CHLORIDE, SODIUM LACTATE, POTASSIUM CHLORIDE, CALCIUM CHLORIDE 600; 310; 30; 20 MG/100ML; MG/100ML; MG/100ML; MG/100ML
INJECTION, SOLUTION INTRAVENOUS CONTINUOUS PRN
Status: DISCONTINUED | OUTPATIENT
Start: 2024-07-09 | End: 2024-07-09

## 2024-07-09 RX ADMIN — PROPOFOL 100 MG: 10 INJECTION, EMULSION INTRAVENOUS at 11:31

## 2024-07-09 RX ADMIN — PROPOFOL 100 MCG/KG/MIN: 10 INJECTION, EMULSION INTRAVENOUS at 11:33

## 2024-07-09 RX ADMIN — EPHEDRINE SULFATE 10 MG: 50 INJECTION, SOLUTION INTRAVENOUS at 11:43

## 2024-07-09 RX ADMIN — SODIUM CHLORIDE, SODIUM LACTATE, POTASSIUM CHLORIDE, AND CALCIUM CHLORIDE: .6; .31; .03; .02 INJECTION, SOLUTION INTRAVENOUS at 11:30

## 2024-07-09 NOTE — ANESTHESIA POSTPROCEDURE EVALUATION
Post-Op Assessment Note    CV Status:  Stable    Pain management: adequate       Mental Status:  Awake   Hydration Status:  Euvolemic   PONV Controlled:  Controlled   Airway Patency:  Patent     Post Op Vitals Reviewed: Yes    No anethesia notable event occurred.    Staff: Anesthesiologist               BP      Temp      Pulse 55 (07/09/24 1144)   Resp      SpO2 96 % (07/09/24 1144)

## 2024-07-09 NOTE — ANESTHESIA PREPROCEDURE EVALUATION
Procedure:  COLONOSCOPY    Relevant Problems   CARDIO   (+) Hypertension   (+) NSTEMI (non-ST elevated myocardial infarction) (HCC)      GI/HEPATIC   (+) GI bleed   (+) Other dysphagia      HEMATOLOGY   (+) Other specified anemias   (+) Solitary plasmacytoma not having achieved remission (HCC)      PULMONARY   (+) Acute respiratory failure with hypoxia (HCC)     TTE (11/2022):  Findings    Left Ventricle Left ventricular cavity size is normal. Wall thickness is normal. The left ventricular ejection fraction is 65%. Systolic function is normal.  Wall motion is normal.   Right Ventricle Right ventricular cavity size is normal. Systolic function is normal. Wall thickness is normal.   Left Atrium The atrium is normal in size.   Right Atrium The atrium is normal in size.   Aortic Valve The aortic valve is trileaflet. The leaflets are not thickened. The leaflets are not calcified. The leaflets exhibit normal mobility. There is no evidence of regurgitation. The aortic valve has no significant stenosis.   Mitral Valve Mitral valve structure is normal. There is trace regurgitation. There is no evidence of stenosis.   Tricuspid Valve Tricuspid valve structure is normal. There is trace regurgitation. There is no evidence of stenosis. The estimated right ventricular systolic pressure is 41.00 mmHg.   Pulmonic Valve Pulmonic valve structure is normal. There is no evidence of regurgitation. There is no evidence of stenosis.   Ascending Aorta The aortic root is normal in size.   IVC/SVC The right atrial pressure is estimated at 8.0 mmHg. The inferior vena cava is normal in size.   Pericardium There is no pericardial effusion. The pericardium is normal in appearance.        Physical Exam    Airway    Mallampati score: III  TM Distance: >3 FB  Neck ROM: full     Dental        Cardiovascular  Rhythm: regular, Rate: normal    Pulmonary   Breath sounds clear to auscultation    Other Findings  Intercisor Distance >  3cm          Anesthesia Plan  ASA Score- 3     Anesthesia Type- IV sedation with anesthesia with ASA Monitors.         Additional Monitors:     Airway Plan:     Comment: NPO appropriate. Discussed benefits/risks of monitored anesthetic care which involves providing a dynamic level of mild to deep sedation. Complications include awareness and/or airway obstruction/aspiration which may necessitate conversion to general anesthesia. All questions answered. Patient understands and wishes to proceed. .       Plan Factors-Exercise tolerance (METS): >4 METS.    Chart reviewed. EKG reviewed.  Existing labs reviewed.                   Induction-     Postoperative Plan- Plan for postoperative opioid use.         Informed Consent- Anesthetic plan and risks discussed with patient.  I personally reviewed this patient with the CRNA. Discussed and agreed on the Anesthesia Plan with the CRNA..

## 2024-07-09 NOTE — H&P
H&P EXAM - Outpatient Endoscopy   Alexander Olmstead 69 y.o. male MRN: [id number]    General acute hospital [facility]   Encounter: [id number]        History and Physical -  [facility]  Alexander Olmstead 69 y.o. male MRN: [id number]                  HPI: Alexander Olmstead is a 69 y.o. year old male who presents for colon cancer screening      REVIEW OF SYSTEMS: Per the HPI, and otherwise unremarkable.    Historical Information   Past Medical History:   Diagnosis Date    Acute respiratory failure (HCC) 11/24/2022    Alcohol withdrawal (HCC) 11/25/2022    Hyperlipidemia     Hypertension     Hyponatremia 11/23/2022    Hypothermia 11/23/2022    Hypoxia 11/23/2022    Metabolic acidosis with increased anion gap and accumulation of organic acids 11/23/2022    Metabolic encephalopathy 11/23/2022    Pneumonia 11/25/2022    Positive blood culture 11/28/2022    Septic shock (HCC) 11/24/2022     Past Surgical History:   Procedure Laterality Date    COLONOSCOPY      IR BIOPSY BONE  12/19/2023     Social History   Social History     Substance and Sexual Activity   Alcohol Use Not Currently    Alcohol/week: 6.0 standard drinks of alcohol    Types: 6 Cans of beer per week    Comment: d/c 11/2022     Social History     Substance and Sexual Activity   Drug Use Never     Social History     Tobacco Use   Smoking Status Never   Smokeless Tobacco Never     Family History   Problem Relation Age of Onset    Stroke Mother     Hypertension Father     Diabetes Father     Cancer Father        Meds/Allergies     Not in a hospital admission.    No Known Allergies    Objective     /75   Pulse 71   Temp (!) 96.8 °F (36 °C) (Tympanic)   Resp 18   SpO2 100%       PHYSICAL EXAM    Gen: NAD  CV: RRR  CHEST: Clear  ABD: soft, NT/ND  EXT: no edema      ASSESSMENT/PLAN:  This is a 69 y.o. year old male here for colonoscopy, and he is stable and optimized for his procedure.

## 2024-07-11 PROCEDURE — 88365 INSITU HYBRIDIZATION (FISH): CPT | Performed by: PATHOLOGY

## 2024-07-11 PROCEDURE — 88342 IMHCHEM/IMCYTCHM 1ST ANTB: CPT | Performed by: PATHOLOGY

## 2024-07-11 PROCEDURE — 88305 TISSUE EXAM BY PATHOLOGIST: CPT | Performed by: PATHOLOGY

## 2024-07-11 PROCEDURE — 88341 IMHCHEM/IMCYTCHM EA ADD ANTB: CPT | Performed by: PATHOLOGY

## 2024-07-11 PROCEDURE — 88364 INSITU HYBRIDIZATION (FISH): CPT | Performed by: PATHOLOGY

## 2024-07-12 DIAGNOSIS — G62.9 NEUROPATHY: ICD-10-CM

## 2024-07-12 RX ORDER — LANOLIN ALCOHOL/MO/W.PET/CERES
50 CREAM (GRAM) TOPICAL DAILY
Qty: 90 TABLET | Refills: 1 | Status: SHIPPED | OUTPATIENT
Start: 2024-07-12

## 2024-07-12 RX ORDER — GABAPENTIN 300 MG/1
300 CAPSULE ORAL 3 TIMES DAILY
Qty: 270 CAPSULE | Refills: 1 | Status: SHIPPED | OUTPATIENT
Start: 2024-07-12

## 2024-07-19 ENCOUNTER — TELEPHONE (OUTPATIENT)
Age: 69
End: 2024-07-19

## 2024-07-19 NOTE — TELEPHONE ENCOUNTER
Patient called and is waiting for his phone visit with Dr. Britt. He is okay with waiting, unable to reach out to office and informed patient provider may be behind. Sending message for patient

## 2024-08-12 ENCOUNTER — TELEPHONE (OUTPATIENT)
Age: 69
End: 2024-08-12

## 2024-08-12 NOTE — TELEPHONE ENCOUNTER
"Patient called because he sees on his chart he was marked a \"No Show\" for his 7/19/24 visit with Dr. Britt. States he had called that day and Dr. Britt was running late but he did have this phone appointment with him and would like the \"No Show\" removed from his chart if possible. States he was told he does not need to follow up with the office unless he has any worsening symptoms or concerns.   "

## 2024-08-19 ENCOUNTER — HOSPITAL ENCOUNTER (OUTPATIENT)
Dept: MRI IMAGING | Facility: HOSPITAL | Age: 69
Discharge: HOME | End: 2024-08-19
Attending: INTERNAL MEDICINE
Payer: MEDICARE

## 2024-08-19 ENCOUNTER — APPOINTMENT (OUTPATIENT)
Dept: LAB | Facility: HOSPITAL | Age: 69
End: 2024-08-19
Payer: MEDICARE

## 2024-08-19 DIAGNOSIS — C90.30 SOLITARY PLASMACYTOMA NOT HAVING ACHIEVED REMISSION (HCC): ICD-10-CM

## 2024-08-19 LAB
ALBUMIN SERPL BCG-MCNC: 4.2 G/DL (ref 3.5–5)
ALP SERPL-CCNC: 73 U/L (ref 34–104)
ALT SERPL W P-5'-P-CCNC: 9 U/L (ref 7–52)
ANION GAP SERPL CALCULATED.3IONS-SCNC: 9 MMOL/L (ref 4–13)
AST SERPL W P-5'-P-CCNC: 12 U/L (ref 13–39)
BASOPHILS # BLD AUTO: 0.05 THOUSANDS/ÂΜL (ref 0–0.1)
BASOPHILS NFR BLD AUTO: 1 % (ref 0–1)
BILIRUB SERPL-MCNC: 0.59 MG/DL (ref 0.2–1)
BUN SERPL-MCNC: 11 MG/DL (ref 5–25)
CALCIUM SERPL-MCNC: 9.6 MG/DL (ref 8.4–10.2)
CHLORIDE SERPL-SCNC: 103 MMOL/L (ref 96–108)
CO2 SERPL-SCNC: 25 MMOL/L (ref 21–32)
CREAT SERPL-MCNC: 1.13 MG/DL (ref 0.6–1.3)
EOSINOPHIL # BLD AUTO: 0.18 THOUSAND/ÂΜL (ref 0–0.61)
EOSINOPHIL NFR BLD AUTO: 5 % (ref 0–6)
ERYTHROCYTE [DISTWIDTH] IN BLOOD BY AUTOMATED COUNT: 12.9 % (ref 11.6–15.1)
GFR SERPL CREATININE-BSD FRML MDRD: 66 ML/MIN/1.73SQ M
GLUCOSE P FAST SERPL-MCNC: 104 MG/DL (ref 65–99)
HCT VFR BLD AUTO: 27.1 % (ref 36.5–49.3)
HGB BLD-MCNC: 8.4 G/DL (ref 12–17)
IGA SERPL-MCNC: 368 MG/DL (ref 66–433)
IGG SERPL-MCNC: 1028 MG/DL (ref 635–1741)
IGM SERPL-MCNC: 69 MG/DL (ref 45–281)
IMM GRANULOCYTES # BLD AUTO: 0.01 THOUSAND/UL (ref 0–0.2)
IMM GRANULOCYTES NFR BLD AUTO: 0 % (ref 0–2)
LYMPHOCYTES # BLD AUTO: 0.48 THOUSANDS/ÂΜL (ref 0.6–4.47)
LYMPHOCYTES NFR BLD AUTO: 12 % (ref 14–44)
MCH RBC QN AUTO: 28.8 PG (ref 26.8–34.3)
MCHC RBC AUTO-ENTMCNC: 31 G/DL (ref 31.4–37.4)
MCV RBC AUTO: 93 FL (ref 82–98)
MONOCYTES # BLD AUTO: 0.37 THOUSAND/ÂΜL (ref 0.17–1.22)
MONOCYTES NFR BLD AUTO: 9 % (ref 4–12)
NEUTROPHILS # BLD AUTO: 2.95 THOUSANDS/ÂΜL (ref 1.85–7.62)
NEUTS SEG NFR BLD AUTO: 73 % (ref 43–75)
NRBC BLD AUTO-RTO: 0 /100 WBCS
PLATELET # BLD AUTO: 304 THOUSANDS/UL (ref 149–390)
PMV BLD AUTO: 10.8 FL (ref 8.9–12.7)
POTASSIUM SERPL-SCNC: 3.9 MMOL/L (ref 3.5–5.3)
PROT SERPL-MCNC: 7.3 G/DL (ref 6.4–8.4)
RBC # BLD AUTO: 2.92 MILLION/UL (ref 3.88–5.62)
SODIUM SERPL-SCNC: 137 MMOL/L (ref 135–147)
WBC # BLD AUTO: 4.04 THOUSAND/UL (ref 4.31–10.16)

## 2024-08-19 PROCEDURE — 83521 IG LIGHT CHAINS FREE EACH: CPT

## 2024-08-19 PROCEDURE — 36415 COLL VENOUS BLD VENIPUNCTURE: CPT

## 2024-08-19 PROCEDURE — 72197 MRI PELVIS W/O & W/DYE: CPT

## 2024-08-19 PROCEDURE — 84165 PROTEIN E-PHORESIS SERUM: CPT

## 2024-08-19 PROCEDURE — 86334 IMMUNOFIX E-PHORESIS SERUM: CPT

## 2024-08-19 PROCEDURE — A9585 GADOBUTROL INJECTION: HCPCS | Performed by: INTERNAL MEDICINE

## 2024-08-19 PROCEDURE — 82784 ASSAY IGA/IGD/IGG/IGM EACH: CPT

## 2024-08-19 PROCEDURE — 85025 COMPLETE CBC W/AUTO DIFF WBC: CPT

## 2024-08-19 PROCEDURE — 80053 COMPREHEN METABOLIC PANEL: CPT

## 2024-08-19 RX ORDER — GADOBUTROL 604.72 MG/ML
7 INJECTION INTRAVENOUS
Status: COMPLETED | OUTPATIENT
Start: 2024-08-19 | End: 2024-08-19

## 2024-08-19 RX ADMIN — GADOBUTROL 7 ML: 604.72 INJECTION INTRAVENOUS at 11:44

## 2024-08-20 LAB
ALBUMIN SERPL ELPH-MCNC: 4.03 G/DL (ref 3.2–5.1)
ALBUMIN SERPL ELPH-MCNC: 58.4 % (ref 48–70)
ALPHA1 GLOB SERPL ELPH-MCNC: 0.31 G/DL (ref 0.15–0.47)
ALPHA1 GLOB SERPL ELPH-MCNC: 4.5 % (ref 1.8–7)
ALPHA2 GLOB SERPL ELPH-MCNC: 0.7 G/DL (ref 0.42–1.04)
ALPHA2 GLOB SERPL ELPH-MCNC: 10.2 % (ref 5.9–14.9)
BETA GLOB ABNORMAL SERPL ELPH-MCNC: 0.5 G/DL (ref 0.31–0.57)
BETA1 GLOB SERPL ELPH-MCNC: 7.2 % (ref 4.7–7.7)
BETA2 GLOB SERPL ELPH-MCNC: 5.9 % (ref 3.1–7.9)
BETA2+GAMMA GLOB SERPL ELPH-MCNC: 0.41 G/DL (ref 0.2–0.58)
GAMMA GLOB ABNORMAL SERPL ELPH-MCNC: 0.95 G/DL (ref 0.4–1.66)
GAMMA GLOB SERPL ELPH-MCNC: 13.8 % (ref 6.9–22.3)
IGG/ALB SER: 1.4 {RATIO} (ref 1.1–1.8)
INTERPRETATION UR IFE-IMP: NORMAL
KAPPA LC FREE SER-MCNC: 32.3 MG/L (ref 3.3–19.4)
KAPPA LC FREE/LAMBDA FREE SER: 1.43 {RATIO} (ref 0.26–1.65)
LAMBDA LC FREE SERPL-MCNC: 22.6 MG/L (ref 5.7–26.3)
PROT PATTERN SERPL ELPH-IMP: NORMAL
PROT SERPL-MCNC: 6.9 G/DL (ref 6.4–8.2)

## 2024-08-20 PROCEDURE — 84165 PROTEIN E-PHORESIS SERUM: CPT | Performed by: PATHOLOGY

## 2024-08-20 PROCEDURE — 86334 IMMUNOFIX E-PHORESIS SERUM: CPT | Performed by: PATHOLOGY

## 2024-08-30 ENCOUNTER — OFFICE VISIT (OUTPATIENT)
Dept: HEMATOLOGY ONCOLOGY | Facility: CLINIC | Age: 69
End: 2024-08-30
Payer: MEDICARE

## 2024-08-30 VITALS
SYSTOLIC BLOOD PRESSURE: 133 MMHG | WEIGHT: 161 LBS | TEMPERATURE: 98.5 F | BODY MASS INDEX: 23.85 KG/M2 | HEIGHT: 69 IN | HEART RATE: 76 BPM | OXYGEN SATURATION: 96 % | DIASTOLIC BLOOD PRESSURE: 71 MMHG

## 2024-08-30 DIAGNOSIS — C90.30 SOLITARY PLASMACYTOMA NOT HAVING ACHIEVED REMISSION (HCC): Primary | ICD-10-CM

## 2024-08-30 DIAGNOSIS — D63.8 ANEMIA IN OTHER CHRONIC DISEASES CLASSIFIED ELSEWHERE: ICD-10-CM

## 2024-08-30 PROCEDURE — G2211 COMPLEX E/M VISIT ADD ON: HCPCS | Performed by: INTERNAL MEDICINE

## 2024-08-30 PROCEDURE — 99215 OFFICE O/P EST HI 40 MIN: CPT | Performed by: INTERNAL MEDICINE

## 2024-08-30 NOTE — PROGRESS NOTES
"[facility] [place]  [address] [facility] [address]  [phone]  [phone]    Alexander Olmstead,1955, [id number]  24    Discussion:   In summary, this is a 69-year-old male with a history of solitary plasmacytoma of the sacrum.  He had radiation therapy to this lesion April -2024.  Did not experience any complications.  Recent WBC 4.0, hemoglobin 8.4, platelet count 304, normal differential.  CMP normal.  Quantitative immunoglobulins normal.  SPEP showed no monoclonal bands.  Serum free kappa 32.3, lambda 22.6, ratio 1.4.  Anemia is most likely secondary to radiation.  Asymptomatic.  Monitor.  I note that he has had intermittent anemia going back at least 2 years.  If anemia does not improve over the next 2 months further investigation would be applicable.  Leukopenia is mild, asymptomatic, monitor.    I discussed the above with the patient.  The patient and his friend voiced understanding and agreement.  ______________________________________________________________________    No chief complaint on file.      HPI:  Oncology History Overview Note   With h/o plasma cell myeloma of sacrum.  Completed RT on 2024.  Today's visit is an EOT phone follow-up    Upcomin24  MRI pelvis sacrum coccyx  24  Dr. 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 Bone, \"Sacral mass,\" Biopsy:  - Epithelial neoplasm with sclerotic bone consistent with lambda-restricted plasma cell myeloma (see note and consultation letter)        2023 Initial Diagnosis    2022 patient had CT chest ab pelvis investigating sepsis.  Bilateral pneumonia.  Sclerotic bony lesions central sacrum 7.9 cm, previously 5 cm in 2018.  2023 CT chest ab pelvis showed increase in sclerotic bony lesion, mixed lytic/sclerotic lesion T12 vertebra left pedicle, 2.5 cm unchanged since 2018.  WBC " 6.1, hemoglobin 12.8, MCV 91, platelet count 404, normal differential.  CMP showed sodium 133, calcium 10.5, otherwise normal.  PSA 0.6.  CRP and sed rate normal.  SPEP showed peak 1 0.14 g/dL, IgG lambda.  December 19, 2023 biopsy of sacral mass showed epithelial neoplasm.     4/29/2024 - 6/4/2024 Radiation      Plan ID Energy Fractions Dose per Fraction (cGy) Dose Correction (cGy) Total Dose Delivered (cGy) Elapsed Days   Sacrum # 10X/6X 25 / 25 200 0 5,000 36      Treatment dates:  C1: 4/29/2024 - 6/4/2024 4/30/2024 -  Cancer Staged    Staging form: Plasma Cell Myeloma and Disorders, AJCC 8th Edition  - Clinical: High-risk cytogenetics: Unknown - Signed by Tay Mckenna DO on 4/30/2024  Stage prefix: Initial diagnosis  Cytogenetics: Unknown           Interval History: Clinically stable.  ECOG-  0 - Asymptomatic    Review of Systems   Constitutional:  Negative for chills and fever.   HENT:  Negative for nosebleeds.    Eyes:  Negative for discharge.   Respiratory:  Negative for cough and shortness of breath.    Cardiovascular:  Negative for chest pain.   Gastrointestinal:  Negative for abdominal pain, constipation and diarrhea.   Endocrine: Negative for polydipsia.   Genitourinary:  Negative for hematuria.   Musculoskeletal:  Negative for arthralgias.   Skin:  Negative for color change.   Allergic/Immunologic: Negative for immunocompromised state.   Neurological:  Negative for dizziness and headaches.   Hematological:  Negative for adenopathy.   Psychiatric/Behavioral:  Negative for agitation.        Past Medical History:   Diagnosis Date    Acute respiratory failure (HCC) 11/24/2022    Alcohol withdrawal (HCC) 11/25/2022    Hyperlipidemia     Hypertension     Hyponatremia 11/23/2022    Hypothermia 11/23/2022    Hypoxia 11/23/2022    Metabolic acidosis with increased anion gap and accumulation of organic acids 11/23/2022    Metabolic encephalopathy 11/23/2022    Pneumonia 11/25/2022    Positive blood  culture 11/28/2022    Septic shock (HCC) 11/24/2022     Patient Active Problem List   Diagnosis    GI bleed    NSTEMI (non-ST elevated myocardial infarction) (HCC)    Multiple rib fractures    Pharyngeal swelling    Acute respiratory failure with hypoxia (HCC)    Sacral lesion    Other specified anemias    Other dysphagia    Esophageal dilatation    History of GI bleed    Hypertension    Alcohol dependence with withdrawal delirium (HCC)    Solitary plasmacytoma not having achieved remission (HCC)    Neuropathy associated with cancer (HCC)       Current Outpatient Medications:     Acetaminophen 325 MG CAPS, Take 2 tablets by mouth every 6 (six) hours (Patient not taking: Reported on 1/29/2024), Disp: , Rfl:     Cyanocobalamin (VITAMIN B-12 CR PO), Take 2 tablets by mouth in the morning, Disp: , Rfl:     Diclofenac Sodium (VOLTAREN) 1 %, Apply 2 g topically 4 (four) times a day (Patient not taking: Reported on 1/29/2024), Disp: 100 g, Rfl: 5    gabapentin (Neurontin) 300 mg capsule, Take 1 capsule (300 mg total) by mouth 3 (three) times a day, Disp: 270 capsule, Rfl: 1    ibuprofen (MOTRIN) 200 mg tablet, Take 400 mg by mouth every 6 (six) hours as needed for mild pain, Disp: , Rfl:     losartan (COZAAR) 50 mg tablet, Take 1 tablet (50 mg total) by mouth in the morning, Disp: 90 tablet, Rfl: 1    mirtazapine (REMERON) 15 mg tablet, TAKE 1 TABLET BY MOUTH EVERYDAY AT BEDTIME, Disp: 90 tablet, Rfl: 1    Multiple Vitamin (MULTIVITAMIN) tablet, Take 1 tablet by mouth daily, Disp: , Rfl:     Omega-3 Fatty Acids (FISH OIL PO), Take by mouth, Disp: , Rfl:     pantoprazole (PROTONIX) 20 mg tablet, TAKE 1 TABLET BY MOUTH EVERY DAY, Disp: 90 tablet, Rfl: 1    pyridoxine (VITAMIN B6) 50 mg tablet, TAKE 1 TABLET BY MOUTH EVERY DAY, Disp: 90 tablet, Rfl: 1    SALINE NASAL MIST NA, 2 sprays into each nostril 2 (two) times a day, Disp: , Rfl:   No Known Allergies  Past Surgical History:   Procedure Laterality Date    COLONOSCOPY    "   IR BIOPSY BONE  12/19/2023     Social History     Objective:  Vitals:    08/30/24 0919   BP: 133/71   BP Location: Left arm   Patient Position: Sitting   Cuff Size: Standard   Pulse: 76   Temp: 98.5 °F (36.9 °C)   TempSrc: Temporal   SpO2: 96%   Weight: 73 kg (161 lb)   Height: 5' 9\" (1.753 m)     Physical Exam  Constitutional:       Appearance: He is well-developed.   HENT:      Head: Normocephalic and atraumatic.      Mouth/Throat:      Mouth: Mucous membranes are moist.   Eyes:      Pupils: Pupils are equal, round, and reactive to light.   Cardiovascular:      Rate and Rhythm: Normal rate and regular rhythm.      Heart sounds: No murmur heard.  Pulmonary:      Breath sounds: Normal breath sounds. No wheezing or rales.   Abdominal:      Palpations: Abdomen is soft.      Tenderness: There is no abdominal tenderness.   Musculoskeletal:         General: No tenderness. Normal range of motion.      Cervical back: Neck supple.   Lymphadenopathy:      Cervical: No cervical adenopathy.   Skin:     Findings: No erythema or rash.   Neurological:      Mental Status: He is alert and oriented to person, place, and time.      Cranial Nerves: No cranial nerve deficit.      Deep Tendon Reflexes: Reflexes are normal and symmetric.   Psychiatric:         Behavior: Behavior normal.           Labs:  I personally reviewed the labs and imaging pertinent to this patient care.  "

## 2024-09-09 ENCOUNTER — PATIENT OUTREACH (OUTPATIENT)
Dept: CASE MANAGEMENT | Facility: HOSPITAL | Age: 69
End: 2024-09-09

## 2024-09-09 NOTE — PROGRESS NOTES
"Biopsychosocial and Barriers Assessment    Type of Cancer: Solitary plasmacytoma of the sacrum  Treatment plan: patient had RT  Noted barriers to care: none  Cultural/Anglican concerns: none  Hair Loss/ Wig resources needed: did not discuss    DT completed: yes  DT score: 4/10  Issues noted: pain (neuropathy in feet) and no longer able to drive    Marital status/Lives with: single, resides alone  Pt's support system: patient reported that he does have a good support system. His best friend takes him wherever he needs to go  Mental Health history: none  Substance Abuse: none    Employment/income source: Retired   Concerns with bills (treatment vs household): none  Noted issues with home: no concerns    Narrative note:  Patient reported that he is doing well. Patient reported that \"the care he has had, has been out of this world?. Patient reported that \"Dr. Britt whole team has been fantastic\"     Patient denies needs, will close.   "

## 2024-09-13 ENCOUNTER — RA CDI HCC (OUTPATIENT)
Dept: OTHER | Facility: HOSPITAL | Age: 69
End: 2024-09-13

## 2024-09-17 ENCOUNTER — PATIENT OUTREACH (OUTPATIENT)
Dept: HEMATOLOGY ONCOLOGY | Facility: CLINIC | Age: 69
End: 2024-09-17

## 2024-09-17 NOTE — PROGRESS NOTES
Patient return my call and was thankful for my continue support. Patient has been doing well since he has finished treatment. Only has neuropathy in feet that bothers him when he sits too long. Does not bother him if he is up and moving. Does not drive but is supported by friends. He is also approved for [employer]. He confirmed he will reach out if he needs transportation in the future. Based on his individual needs I will schedule my next outreach after his follow up with Dr. Mckenna in November. Patient is aware of everyone on his care team and how to get a hold of them. He knows I will remain available to him if his needs change in between now and my next outreach.

## 2024-09-17 NOTE — PROGRESS NOTES
Outreach attempted to Shiraz to check in and to review for any changes to barriers to care and to offer any supportive services that may be needed. A voicemail was left stating a reason for my call and my direct contact information. I requested a return call at his earliest convenience.

## 2024-09-20 ENCOUNTER — OFFICE VISIT (OUTPATIENT)
Dept: FAMILY MEDICINE CLINIC | Facility: CLINIC | Age: 69
End: 2024-09-20
Payer: MEDICARE

## 2024-09-20 VITALS
HEIGHT: 69 IN | WEIGHT: 161 LBS | TEMPERATURE: 98.1 F | DIASTOLIC BLOOD PRESSURE: 70 MMHG | OXYGEN SATURATION: 91 % | SYSTOLIC BLOOD PRESSURE: 118 MMHG | BODY MASS INDEX: 23.85 KG/M2 | HEART RATE: 43 BPM

## 2024-09-20 DIAGNOSIS — Z23 NEED FOR COVID-19 VACCINE: ICD-10-CM

## 2024-09-20 DIAGNOSIS — G63 NEUROPATHY ASSOCIATED WITH CANCER (HCC): ICD-10-CM

## 2024-09-20 DIAGNOSIS — C90.30 SOLITARY PLASMACYTOMA NOT HAVING ACHIEVED REMISSION (HCC): ICD-10-CM

## 2024-09-20 DIAGNOSIS — I10 PRIMARY HYPERTENSION: Primary | ICD-10-CM

## 2024-09-20 DIAGNOSIS — Z23 ENCOUNTER FOR IMMUNIZATION: ICD-10-CM

## 2024-09-20 DIAGNOSIS — I21.4 NSTEMI (NON-ST ELEVATED MYOCARDIAL INFARCTION) (HCC): ICD-10-CM

## 2024-09-20 DIAGNOSIS — R12 HEARTBURN: ICD-10-CM

## 2024-09-20 DIAGNOSIS — D63.8 ANEMIA IN OTHER CHRONIC DISEASES CLASSIFIED ELSEWHERE: ICD-10-CM

## 2024-09-20 DIAGNOSIS — C80.1 NEUROPATHY ASSOCIATED WITH CANCER (HCC): ICD-10-CM

## 2024-09-20 DIAGNOSIS — F10.21 ALCOHOLISM IN REMISSION (HCC): ICD-10-CM

## 2024-09-20 PROBLEM — F10.231 ALCOHOL DEPENDENCE WITH WITHDRAWAL DELIRIUM (HCC): Status: RESOLVED | Noted: 2023-04-18 | Resolved: 2024-09-20

## 2024-09-20 PROBLEM — R13.19 OTHER DYSPHAGIA: Status: RESOLVED | Noted: 2022-11-26 | Resolved: 2024-09-20

## 2024-09-20 PROBLEM — K92.2 GI BLEED: Status: RESOLVED | Noted: 2018-01-02 | Resolved: 2024-09-20

## 2024-09-20 PROCEDURE — G0008 ADMIN INFLUENZA VIRUS VAC: HCPCS

## 2024-09-20 PROCEDURE — 90662 IIV NO PRSV INCREASED AG IM: CPT

## 2024-09-20 PROCEDURE — 99214 OFFICE O/P EST MOD 30 MIN: CPT | Performed by: FAMILY MEDICINE

## 2024-09-20 PROCEDURE — 91320 SARSCV2 VAC 30MCG TRS-SUC IM: CPT

## 2024-09-20 PROCEDURE — 90480 ADMN SARSCOV2 VAC 1/ONLY CMP: CPT

## 2024-09-20 RX ORDER — DULOXETIN HYDROCHLORIDE 30 MG/1
30 CAPSULE, DELAYED RELEASE ORAL DAILY
Qty: 90 CAPSULE | Refills: 3 | Status: SHIPPED | OUTPATIENT
Start: 2024-09-20

## 2024-09-20 RX ORDER — MIRTAZAPINE 15 MG/1
15 TABLET, FILM COATED ORAL
Qty: 90 TABLET | Refills: 1 | Status: SHIPPED | OUTPATIENT
Start: 2024-09-20

## 2024-09-20 RX ORDER — LOSARTAN POTASSIUM 50 MG/1
50 TABLET ORAL DAILY
Qty: 90 TABLET | Refills: 1 | Status: SHIPPED | OUTPATIENT
Start: 2024-09-20

## 2024-09-20 RX ORDER — PANTOPRAZOLE SODIUM 20 MG/1
20 TABLET, DELAYED RELEASE ORAL DAILY
Qty: 90 TABLET | Refills: 1 | Status: SHIPPED | OUTPATIENT
Start: 2024-09-20

## 2024-09-20 RX ORDER — LOSARTAN POTASSIUM 50 MG/1
50 TABLET ORAL DAILY
Qty: 90 TABLET | Refills: 1 | Status: CANCELLED | OUTPATIENT
Start: 2024-09-20

## 2024-09-20 NOTE — ASSESSMENT & PLAN NOTE
With a blood pressure controlled on the current regimen of Cozaar  Orders:    losartan (COZAAR) 50 mg tablet; Take 1 tablet (50 mg total) by mouth in the morning

## 2024-09-20 NOTE — PROGRESS NOTES
Ambulatory Visit  Name: Alexander Olmstead      : 1955      MRN: [id number]  Encounter Provider: Blade Walton DO  Encounter Date: 2024   Encounter department: [facility]    Assessment & Plan  Primary hypertension  With a blood pressure controlled on the current regimen of Cozaar  Orders:    losartan (COZAAR) 50 mg tablet; Take 1 tablet (50 mg total) by mouth in the morning    NSTEMI (non-ST elevated myocardial infarction) (HCC)  Currently without angina       Neuropathy associated with cancer (HCC)  The patient finds neuropathy is most debilitating disability causing him difficulty to ambulate.  He currently is also unable to get in and out of a car without assistance.    Neuropathy currently treated with Neurontin 300 mg 3 times a day.  We will add Cymbalta 30 mg daily the patient will continue Remeron 15 mg at bedtime  Orders:    DULoxetine (CYMBALTA) 30 mg delayed release capsule; Take 1 capsule (30 mg total) by mouth daily    Solitary plasmacytoma not having achieved remission (HCC)  Currently asymptomatic clinically stable followed by hematology and oncology       Anemia in other chronic diseases classified elsewhere  Thought to be secondary to radiation treatment       Encounter for immunization    Orders:    influenza vaccine, high-dose, PF 0.5 mL (Fluzone High Dose)    Need for COVID-19 vaccine    Orders:    COVID-19 Pfizer mRNA vaccine 12 yr and older (Comirnaty pre-filled syringe)       History of Present Illness     Patient presents for 6-month checkup on hypertension, GERD, and neuropathy          Review of Systems   Constitutional:  Negative for chills and fever.   HENT:  Negative for ear pain and sore throat.    Eyes:  Negative for pain and visual disturbance.   Respiratory:  Negative for cough and shortness of breath.    Cardiovascular:  Negative for chest pain and palpitations.   Gastrointestinal:  Negative for abdominal pain and vomiting.  "  Genitourinary:  Negative for dysuria and hematuria.   Musculoskeletal:  Negative for arthralgias and back pain.   Skin:  Negative for color change and rash.   Neurological:  Negative for seizures and syncope.   All other systems reviewed and are negative.          Objective     /70 (BP Location: Left arm, Patient Position: Sitting)   Pulse (!) 43   Temp 98.1 °F (36.7 °C) (Tympanic)   Ht 5' 9\" (1.753 m)   Wt 73 kg (161 lb)   SpO2 91%   BMI 23.78 kg/m²     Physical Exam  Constitutional:       Appearance: Normal appearance.   HENT:      Head: Normocephalic and atraumatic.      Right Ear: Tympanic membrane, ear canal and external ear normal.      Left Ear: Tympanic membrane, ear canal and external ear normal.      Nose: Nose normal.      Mouth/Throat:      Mouth: Mucous membranes are moist.      Pharynx: Oropharynx is clear.   Eyes:      Extraocular Movements: Extraocular movements intact.      Conjunctiva/sclera: Conjunctivae normal.      Pupils: Pupils are equal, round, and reactive to light.   Cardiovascular:      Rate and Rhythm: Normal rate and regular rhythm.      Pulses: Normal pulses.      Heart sounds: Normal heart sounds.   Pulmonary:      Effort: Pulmonary effort is normal.      Breath sounds: Normal breath sounds.   Abdominal:      General: Abdomen is flat. Bowel sounds are normal.      Palpations: Abdomen is soft.   Musculoskeletal:         General: Normal range of motion.      Cervical back: Normal range of motion.   Skin:     General: Skin is warm and dry.      Capillary Refill: Capillary refill takes less than 2 seconds.   Neurological:      General: No focal deficit present.      Mental Status: He is alert and oriented to person, place, and time.   Psychiatric:         Mood and Affect: Mood normal.         Behavior: Behavior normal.         "

## 2024-09-20 NOTE — ASSESSMENT & PLAN NOTE
The patient finds neuropathy is most debilitating disability causing him difficulty to ambulate.  He currently is also unable to get in and out of a car without assistance.    Neuropathy currently treated with Neurontin 300 mg 3 times a day.  We will add Cymbalta 30 mg daily the patient will continue Remeron 15 mg at bedtime  Orders:    DULoxetine (CYMBALTA) 30 mg delayed release capsule; Take 1 capsule (30 mg total) by mouth daily

## 2024-10-21 ENCOUNTER — APPOINTMENT (OUTPATIENT)
Dept: LAB | Facility: HOSPITAL | Age: 69
End: 2024-10-21
Payer: MEDICARE

## 2024-10-21 DIAGNOSIS — C90.30 SOLITARY PLASMACYTOMA NOT HAVING ACHIEVED REMISSION (HCC): ICD-10-CM

## 2024-10-21 LAB
ALBUMIN SERPL BCG-MCNC: 4.8 G/DL (ref 3.5–5)
ALP SERPL-CCNC: 84 U/L (ref 34–104)
ALT SERPL W P-5'-P-CCNC: 12 U/L (ref 7–52)
ANION GAP SERPL CALCULATED.3IONS-SCNC: 9 MMOL/L (ref 4–13)
AST SERPL W P-5'-P-CCNC: 14 U/L (ref 13–39)
BASOPHILS # BLD AUTO: 0.06 THOUSANDS/ΜL (ref 0–0.1)
BASOPHILS NFR BLD AUTO: 2 % (ref 0–1)
BILIRUB SERPL-MCNC: 0.49 MG/DL (ref 0.2–1)
BUN SERPL-MCNC: 12 MG/DL (ref 5–25)
CALCIUM SERPL-MCNC: 10 MG/DL (ref 8.4–10.2)
CHLORIDE SERPL-SCNC: 101 MMOL/L (ref 96–108)
CO2 SERPL-SCNC: 27 MMOL/L (ref 21–32)
CREAT SERPL-MCNC: 1.08 MG/DL (ref 0.6–1.3)
EOSINOPHIL # BLD AUTO: 0.24 THOUSAND/ΜL (ref 0–0.61)
EOSINOPHIL NFR BLD AUTO: 6 % (ref 0–6)
ERYTHROCYTE [DISTWIDTH] IN BLOOD BY AUTOMATED COUNT: 15.3 % (ref 11.6–15.1)
GFR SERPL CREATININE-BSD FRML MDRD: 70 ML/MIN/1.73SQ M
GLUCOSE P FAST SERPL-MCNC: 104 MG/DL (ref 65–99)
HCT VFR BLD AUTO: 30.5 % (ref 36.5–49.3)
HGB BLD-MCNC: 9.3 G/DL (ref 12–17)
IGA SERPL-MCNC: 374 MG/DL (ref 66–433)
IGG SERPL-MCNC: 1017 MG/DL (ref 635–1741)
IGM SERPL-MCNC: 61 MG/DL (ref 45–281)
IMM GRANULOCYTES # BLD AUTO: 0.02 THOUSAND/UL (ref 0–0.2)
IMM GRANULOCYTES NFR BLD AUTO: 1 % (ref 0–2)
LYMPHOCYTES # BLD AUTO: 0.5 THOUSANDS/ΜL (ref 0.6–4.47)
LYMPHOCYTES NFR BLD AUTO: 13 % (ref 14–44)
MCH RBC QN AUTO: 27 PG (ref 26.8–34.3)
MCHC RBC AUTO-ENTMCNC: 30.5 G/DL (ref 31.4–37.4)
MCV RBC AUTO: 89 FL (ref 82–98)
MONOCYTES # BLD AUTO: 0.46 THOUSAND/ΜL (ref 0.17–1.22)
MONOCYTES NFR BLD AUTO: 12 % (ref 4–12)
NEUTROPHILS # BLD AUTO: 2.59 THOUSANDS/ΜL (ref 1.85–7.62)
NEUTS SEG NFR BLD AUTO: 66 % (ref 43–75)
NRBC BLD AUTO-RTO: 0 /100 WBCS
PLATELET # BLD AUTO: 271 THOUSANDS/UL (ref 149–390)
PMV BLD AUTO: 10.7 FL (ref 8.9–12.7)
POTASSIUM SERPL-SCNC: 3.9 MMOL/L (ref 3.5–5.3)
PROT SERPL-MCNC: 7.9 G/DL (ref 6.4–8.4)
RBC # BLD AUTO: 3.44 MILLION/UL (ref 3.88–5.62)
SODIUM SERPL-SCNC: 137 MMOL/L (ref 135–147)
WBC # BLD AUTO: 3.87 THOUSAND/UL (ref 4.31–10.16)

## 2024-10-21 PROCEDURE — 80053 COMPREHEN METABOLIC PANEL: CPT

## 2024-10-21 PROCEDURE — 83521 IG LIGHT CHAINS FREE EACH: CPT

## 2024-10-21 PROCEDURE — 36415 COLL VENOUS BLD VENIPUNCTURE: CPT

## 2024-10-21 PROCEDURE — 84165 PROTEIN E-PHORESIS SERUM: CPT

## 2024-10-21 PROCEDURE — 82784 ASSAY IGA/IGD/IGG/IGM EACH: CPT

## 2024-10-21 PROCEDURE — 85025 COMPLETE CBC W/AUTO DIFF WBC: CPT

## 2024-10-22 LAB
ALBUMIN SERPL ELPH-MCNC: 4.45 G/DL (ref 3.2–5.1)
ALBUMIN SERPL ELPH-MCNC: 60.1 % (ref 48–70)
ALPHA1 GLOB SERPL ELPH-MCNC: 0.27 G/DL (ref 0.15–0.47)
ALPHA1 GLOB SERPL ELPH-MCNC: 3.7 % (ref 1.8–7)
ALPHA2 GLOB SERPL ELPH-MCNC: 0.7 G/DL (ref 0.42–1.04)
ALPHA2 GLOB SERPL ELPH-MCNC: 9.5 % (ref 5.9–14.9)
BETA GLOB ABNORMAL SERPL ELPH-MCNC: 0.52 G/DL (ref 0.31–0.57)
BETA1 GLOB SERPL ELPH-MCNC: 7 % (ref 4.7–7.7)
BETA2 GLOB SERPL ELPH-MCNC: 5.7 % (ref 3.1–7.9)
BETA2+GAMMA GLOB SERPL ELPH-MCNC: 0.42 G/DL (ref 0.2–0.58)
GAMMA GLOB ABNORMAL SERPL ELPH-MCNC: 1.04 G/DL (ref 0.4–1.66)
GAMMA GLOB SERPL ELPH-MCNC: 14 % (ref 6.9–22.3)
IGG/ALB SER: 1.51 {RATIO} (ref 1.1–1.8)
KAPPA LC FREE SER-MCNC: 31.1 MG/L (ref 3.3–19.4)
KAPPA LC FREE/LAMBDA FREE SER: 1.58 {RATIO} (ref 0.26–1.65)
LAMBDA LC FREE SERPL-MCNC: 19.7 MG/L (ref 5.7–26.3)
PROT PATTERN SERPL ELPH-IMP: NORMAL
PROT SERPL-MCNC: 7.4 G/DL (ref 6.4–8.2)

## 2024-10-22 PROCEDURE — 84165 PROTEIN E-PHORESIS SERUM: CPT | Performed by: PATHOLOGY

## 2024-11-08 ENCOUNTER — OFFICE VISIT (OUTPATIENT)
Dept: HEMATOLOGY ONCOLOGY | Facility: CLINIC | Age: 69
End: 2024-11-08
Payer: MEDICARE

## 2024-11-08 VITALS
HEART RATE: 76 BPM | SYSTOLIC BLOOD PRESSURE: 134 MMHG | TEMPERATURE: 98.5 F | WEIGHT: 163 LBS | DIASTOLIC BLOOD PRESSURE: 65 MMHG | OXYGEN SATURATION: 97 % | HEIGHT: 69 IN | BODY MASS INDEX: 24.14 KG/M2

## 2024-11-08 DIAGNOSIS — C80.1 NEUROPATHY ASSOCIATED WITH CANCER (HCC): ICD-10-CM

## 2024-11-08 DIAGNOSIS — C90.30 SOLITARY PLASMACYTOMA NOT HAVING ACHIEVED REMISSION (HCC): Primary | ICD-10-CM

## 2024-11-08 DIAGNOSIS — D53.9 MACROCYTIC ANEMIA: ICD-10-CM

## 2024-11-08 DIAGNOSIS — D63.8 ANEMIA IN OTHER CHRONIC DISEASES CLASSIFIED ELSEWHERE: ICD-10-CM

## 2024-11-08 DIAGNOSIS — G63 NEUROPATHY ASSOCIATED WITH CANCER (HCC): ICD-10-CM

## 2024-11-08 PROCEDURE — G2211 COMPLEX E/M VISIT ADD ON: HCPCS | Performed by: INTERNAL MEDICINE

## 2024-11-08 PROCEDURE — 99214 OFFICE O/P EST MOD 30 MIN: CPT | Performed by: INTERNAL MEDICINE

## 2024-11-08 NOTE — PROGRESS NOTES
"[facility] [place]  [address] [facility] [address]  [phone]  [phone]    Alexander Olmstead,1955, [id number]  24    Discussion:   In summary, this is a 69-year-old male history of solitary plasmacytoma of the sacrum, IgG lambda.  Status post RT May 2024.  Medically, he is doing fairly well.  He has persistent bilateral foot neuropathy, possibly related.  Benefits from gabapentin and OTC neuropathy cream.  Recent WBC 3.8, hemoglobin 9.3, platelet count 270.  Differential near normal.  CMP is normal.  Quantitative immunoglobulins normal.  SPEP shows no monoclonal bands.  Serum free kappa 31.1, lambda 19.7, ratio 1.5.  Most recent imaging was 3 months ago showing no change in sacral lesion.  Follow-up in 3 months with repeat blood work and imaging.  Leukopenia, unclear etiology.  Asymptomatic.  Monitor.  Anemia, prior iron studies sufficient. Hemoccult negative.  Colonoscopy in 2024 showed sub-5 mm polyp and diverticulosis.  Asymptomatic.  Repeat substrates.    I discussed the above with the patient.  The patient and his friend voiced understanding and agreement.  ______________________________________________________________________    No chief complaint on file.      HPI:  Oncology History Overview Note   With h/o plasma cell myeloma of sacrum.  Completed RT on 2024.  Today's visit is an EOT phone follow-up    Upcomin24  MRI pelvis sacrum coccyx  24  Dr. 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 Bone, \"Sacral mass,\" Biopsy:  - Epithelial neoplasm with sclerotic bone consistent with lambda-restricted plasma cell myeloma (see note and consultation letter)        2023 Initial Diagnosis    2022 patient had CT chest ab pelvis investigating sepsis.  Bilateral pneumonia.  Sclerotic bony lesions central sacrum 7.9 cm, previously 5 cm in January " 2018.  October 31, 2023 CT chest ab pelvis showed increase in sclerotic bony lesion, mixed lytic/sclerotic lesion T12 vertebra left pedicle, 2.5 cm unchanged since 2018.  WBC 6.1, hemoglobin 12.8, MCV 91, platelet count 404, normal differential.  CMP showed sodium 133, calcium 10.5, otherwise normal.  PSA 0.6.  CRP and sed rate normal.  SPEP showed peak 1 0.14 g/dL, IgG lambda.  December 19, 2023 biopsy of sacral mass showed epithelial neoplasm.     4/29/2024 - 6/4/2024 Radiation      Plan ID Energy Fractions Dose per Fraction (cGy) Dose Correction (cGy) Total Dose Delivered (cGy) Elapsed Days   Sacrum # 10X/6X 25 / 25 200 0 5,000 36      Treatment dates:  C1: 4/29/2024 - 6/4/2024 4/30/2024 -  Cancer Staged    Staging form: Plasma Cell Myeloma and Disorders, AJCC 8th Edition  - Clinical: High-risk cytogenetics: Unknown - Signed by Tay Mckenna DO on 4/30/2024  Stage prefix: Initial diagnosis  Cytogenetics: Unknown           Interval History: Clinically stable.  ECOG-  1 - Symptomatic but completely ambulatory    Review of Systems   Constitutional:  Negative for chills and fever.   HENT:  Negative for nosebleeds.    Eyes:  Negative for discharge.   Respiratory:  Negative for cough and shortness of breath.    Cardiovascular:  Negative for chest pain.   Gastrointestinal:  Negative for abdominal pain, constipation and diarrhea.   Endocrine: Negative for polydipsia.   Genitourinary:  Negative for hematuria.   Musculoskeletal:  Negative for arthralgias.   Skin:  Negative for color change.   Allergic/Immunologic: Negative for immunocompromised state.   Neurological:  Positive for numbness. Negative for dizziness and headaches.   Hematological:  Negative for adenopathy.   Psychiatric/Behavioral:  Negative for agitation.        Past Medical History:   Diagnosis Date    Acute respiratory failure (HCC) 11/24/2022    Acute respiratory failure with hypoxia (HCC)     Alcohol dependence with withdrawal delirium (HCC)  04/18/2023    Alcohol withdrawal (HCC) 11/25/2022    Hyperlipidemia     Hypertension     Hyponatremia 11/23/2022    Hypothermia 11/23/2022    Hypoxia 11/23/2022    Metabolic acidosis with increased anion gap and accumulation of organic acids 11/23/2022    Metabolic encephalopathy 11/23/2022    Pneumonia 11/25/2022    Positive blood culture 11/28/2022    Septic shock (HCC) 11/24/2022     Patient Active Problem List   Diagnosis    NSTEMI (non-ST elevated myocardial infarction) ([facility])    Multiple rib fractures    Pharyngeal swelling    Sacral lesion    Other specified anemias    Esophageal dilatation    History of GI bleed    Hypertension    Solitary plasmacytoma not having achieved remission (HCC)    Neuropathy associated with cancer ([facility])       Current Outpatient Medications:     Acetaminophen 325 MG CAPS, Take 2 tablets by mouth every 6 (six) hours (Patient not taking: Reported on 1/29/2024), Disp: , Rfl:     Cyanocobalamin (VITAMIN B-12 CR PO), Take 2 tablets by mouth in the morning, Disp: , Rfl:     Diclofenac Sodium (VOLTAREN) 1 %, Apply 2 g topically 4 (four) times a day (Patient not taking: Reported on 1/29/2024), Disp: 100 g, Rfl: 5    DULoxetine (CYMBALTA) 30 mg delayed release capsule, Take 1 capsule (30 mg total) by mouth daily, Disp: 90 capsule, Rfl: 3    gabapentin (Neurontin) 300 mg capsule, Take 1 capsule (300 mg total) by mouth 3 (three) times a day, Disp: 270 capsule, Rfl: 1    ibuprofen (MOTRIN) 200 mg tablet, Take 400 mg by mouth every 6 (six) hours as needed for mild pain, Disp: , Rfl:     losartan (COZAAR) 50 mg tablet, Take 1 tablet (50 mg total) by mouth in the morning, Disp: 90 tablet, Rfl: 1    mirtazapine (REMERON) 15 mg tablet, TAKE 1 TABLET BY MOUTH EVERYDAY AT BEDTIME, Disp: 90 tablet, Rfl: 1    Multiple Vitamin (MULTIVITAMIN) tablet, Take 1 tablet by mouth daily, Disp: , Rfl:     Omega-3 Fatty Acids (FISH OIL PO), Take by mouth, Disp: , Rfl:     pantoprazole (PROTONIX) 20 mg tablet, TAKE 1  "TABLET BY MOUTH EVERY DAY, Disp: 90 tablet, Rfl: 1    pyridoxine (VITAMIN B6) 50 mg tablet, TAKE 1 TABLET BY MOUTH EVERY DAY, Disp: 90 tablet, Rfl: 1    SALINE NASAL MIST NA, 2 sprays into each nostril 2 (two) times a day, Disp: , Rfl:   No Known Allergies  Past Surgical History:   Procedure Laterality Date    COLONOSCOPY      IR BIOPSY BONE  12/19/2023     Social History     Objective:  Vitals:    11/08/24 0841   BP: 134/65   BP Location: Left arm   Patient Position: Sitting   Cuff Size: Standard   Pulse: 76   Temp: 98.5 °F (36.9 °C)   TempSrc: Temporal   SpO2: 97%   Weight: 73.9 kg (163 lb)   Height: 5' 9\" (1.753 m)     Physical Exam  Constitutional:       Appearance: He is well-developed.   HENT:      Head: Normocephalic and atraumatic.      Mouth/Throat:      Mouth: Mucous membranes are moist.   Eyes:      Pupils: Pupils are equal, round, and reactive to light.   Cardiovascular:      Rate and Rhythm: Normal rate and regular rhythm.      Heart sounds: No murmur heard.  Pulmonary:      Breath sounds: Normal breath sounds. No wheezing or rales.   Abdominal:      Palpations: Abdomen is soft.      Tenderness: There is no abdominal tenderness.   Musculoskeletal:         General: No tenderness. Normal range of motion.      Cervical back: Neck supple.   Lymphadenopathy:      Cervical: No cervical adenopathy.   Skin:     Findings: No erythema or rash.   Neurological:      Mental Status: He is alert and oriented to person, place, and time.      Cranial Nerves: No cranial nerve deficit.      Deep Tendon Reflexes: Reflexes are normal and symmetric.   Psychiatric:         Behavior: Behavior normal.           Labs:  I personally reviewed the labs and imaging pertinent to this patient care.  "

## 2024-11-10 DIAGNOSIS — R12 HEARTBURN: ICD-10-CM

## 2024-11-12 RX ORDER — PANTOPRAZOLE SODIUM 20 MG/1
20 TABLET, DELAYED RELEASE ORAL DAILY
Qty: 90 TABLET | Refills: 1 | Status: SHIPPED | OUTPATIENT
Start: 2024-11-12

## 2024-11-15 ENCOUNTER — PATIENT OUTREACH (OUTPATIENT)
Dept: HEMATOLOGY ONCOLOGY | Facility: CLINIC | Age: 69
End: 2024-11-15

## 2024-11-15 NOTE — PROGRESS NOTES
I reached out and spoke with Shiraz to follow up and to review for any changes in barriers to care and offer supportive services as needed.    Barriers noted previously; Neuropathy pain in is feet.     Current barriers and interventions provided: kathy pain in feet. Uses gabapentin and started using an all natural neuropathy cream which is helping.     Bases on individual needs I will follow up with them as needed. I have provided my direct contact information and welcome them to contact me if their needs as discussed above change. They were appreciative for the call.

## 2025-01-04 DIAGNOSIS — G62.9 NEUROPATHY: ICD-10-CM

## 2025-01-06 DIAGNOSIS — G62.9 NEUROPATHY: ICD-10-CM

## 2025-01-06 RX ORDER — GABAPENTIN 300 MG/1
300 CAPSULE ORAL 3 TIMES DAILY
Qty: 270 CAPSULE | Refills: 0 | Status: SHIPPED | OUTPATIENT
Start: 2025-01-06

## 2025-01-08 RX ORDER — LANOLIN ALCOHOL/MO/W.PET/CERES
50 CREAM (GRAM) TOPICAL DAILY
Qty: 90 TABLET | Refills: 0 | Status: SHIPPED | OUTPATIENT
Start: 2025-01-08

## 2025-01-10 ENCOUNTER — TELEPHONE (OUTPATIENT)
Dept: HEMATOLOGY ONCOLOGY | Facility: CLINIC | Age: 70
End: 2025-01-10

## 2025-01-28 ENCOUNTER — HOSPITAL ENCOUNTER (OUTPATIENT)
Dept: MRI IMAGING | Facility: HOSPITAL | Age: 70
Discharge: HOME | End: 2025-01-28
Attending: INTERNAL MEDICINE
Payer: MEDICARE

## 2025-01-28 DIAGNOSIS — C90.30 SOLITARY PLASMACYTOMA NOT HAVING ACHIEVED REMISSION (HCC): ICD-10-CM

## 2025-01-28 PROCEDURE — A9585 GADOBUTROL INJECTION: HCPCS | Performed by: INTERNAL MEDICINE

## 2025-01-28 PROCEDURE — 72197 MRI PELVIS W/O & W/DYE: CPT

## 2025-01-28 RX ORDER — GADOBUTROL 604.72 MG/ML
7 INJECTION INTRAVENOUS
Status: COMPLETED | OUTPATIENT
Start: 2025-01-28 | End: 2025-01-28

## 2025-01-28 RX ADMIN — GADOBUTROL 7 ML: 604.72 INJECTION INTRAVENOUS at 09:15

## 2025-03-07 DIAGNOSIS — G62.9 NEUROPATHY: ICD-10-CM

## 2025-03-07 RX ORDER — LANOLIN ALCOHOL/MO/W.PET/CERES
50 CREAM (GRAM) TOPICAL DAILY
Qty: 90 TABLET | Refills: 0 | Status: CANCELLED | OUTPATIENT
Start: 2025-03-07

## 2025-03-07 NOTE — TELEPHONE ENCOUNTER
Spoke to [facility]; patient has a B-6 prescription on file from 01/08/2025. They will process and prepare for patient.

## 2025-03-14 DIAGNOSIS — G63 NEUROPATHY ASSOCIATED WITH CANCER (HCC): ICD-10-CM

## 2025-03-14 DIAGNOSIS — C80.1 NEUROPATHY ASSOCIATED WITH CANCER (HCC): ICD-10-CM

## 2025-03-14 RX ORDER — DULOXETIN HYDROCHLORIDE 30 MG/1
30 CAPSULE, DELAYED RELEASE ORAL DAILY
Qty: 90 CAPSULE | Refills: 1 | Status: SHIPPED | OUTPATIENT
Start: 2025-03-14

## 2025-03-16 DIAGNOSIS — I10 PRIMARY HYPERTENSION: ICD-10-CM

## 2025-03-17 ENCOUNTER — RA CDI HCC (OUTPATIENT)
Dept: OTHER | Facility: HOSPITAL | Age: 70
End: 2025-03-17

## 2025-03-17 DIAGNOSIS — I25.2 HISTORY OF NON-ST ELEVATION MYOCARDIAL INFARCTION (NSTEMI): Primary | ICD-10-CM

## 2025-03-17 PROBLEM — I21.4 NSTEMI (NON-ST ELEVATED MYOCARDIAL INFARCTION) (HCC): Status: RESOLVED | Noted: 2018-01-02 | Resolved: 2025-03-17

## 2025-03-17 RX ORDER — LOSARTAN POTASSIUM 50 MG/1
50 TABLET ORAL DAILY
Qty: 90 TABLET | Refills: 1 | Status: SHIPPED | OUTPATIENT
Start: 2025-03-17

## 2025-03-24 ENCOUNTER — OFFICE VISIT (OUTPATIENT)
Dept: FAMILY MEDICINE CLINIC | Facility: CLINIC | Age: 70
End: 2025-03-24
Payer: MEDICARE

## 2025-03-24 VITALS
SYSTOLIC BLOOD PRESSURE: 136 MMHG | RESPIRATION RATE: 16 BRPM | WEIGHT: 183 LBS | TEMPERATURE: 97.3 F | DIASTOLIC BLOOD PRESSURE: 68 MMHG | HEIGHT: 69 IN | BODY MASS INDEX: 27.11 KG/M2 | HEART RATE: 76 BPM

## 2025-03-24 DIAGNOSIS — E55.9 VITAMIN D DEFICIENCY: ICD-10-CM

## 2025-03-24 DIAGNOSIS — C90.30 SOLITARY PLASMACYTOMA NOT HAVING ACHIEVED REMISSION (HCC): ICD-10-CM

## 2025-03-24 DIAGNOSIS — I10 PRIMARY HYPERTENSION: ICD-10-CM

## 2025-03-24 DIAGNOSIS — C80.1 NEUROPATHY ASSOCIATED WITH CANCER (HCC): ICD-10-CM

## 2025-03-24 DIAGNOSIS — G63 NEUROPATHY ASSOCIATED WITH CANCER (HCC): ICD-10-CM

## 2025-03-24 DIAGNOSIS — Z00.00 MEDICARE ANNUAL WELLNESS VISIT, SUBSEQUENT: Primary | ICD-10-CM

## 2025-03-24 DIAGNOSIS — R60.9 DEPENDENT EDEMA: ICD-10-CM

## 2025-03-24 DIAGNOSIS — I25.2 HISTORY OF NON-ST ELEVATION MYOCARDIAL INFARCTION (NSTEMI): ICD-10-CM

## 2025-03-24 PROCEDURE — G2211 COMPLEX E/M VISIT ADD ON: HCPCS | Performed by: FAMILY MEDICINE

## 2025-03-24 PROCEDURE — 99214 OFFICE O/P EST MOD 30 MIN: CPT | Performed by: FAMILY MEDICINE

## 2025-03-24 PROCEDURE — G0439 PPPS, SUBSEQ VISIT: HCPCS | Performed by: FAMILY MEDICINE

## 2025-03-24 NOTE — PROGRESS NOTES
Name: Alexander Olmstead      : 1955      MRN: [id number]  Encounter Provider: Blade Walton DO  Encounter Date: 3/24/2025   Encounter department: [facility] PRIMARY CARE    Assessment & Plan  Medicare annual wellness visit, subsequent         Dependent edema  Patient states that his legs swell during the day but the edema is down in the morning.  The patient denies excessive salt intake.  Plan is to check an EKG chest x-ray a BNP complete metabolic panel and a count.  Will begin Lasix 20 mg daily as needed  Orders:  •  XR chest pa and lateral; Future  •  B-Type Natriuretic Peptide(BNP); Future  •  Comprehensive metabolic panel; Future  •  UA w Reflex to Microscopic w Reflex to Culture; Future  •  ECG 12 lead; Future  •  TSH, 3rd generation; Future    History of non-ST elevation myocardial infarction (NSTEMI)  Currently without angina       Solitary plasmacytoma not having achieved remission (HCC)  Under care of hematology oncology       Neuropathy associated with cancer (HCC)  Currently on Neurontin 300 mg 3 times daily       Primary hypertension  With a blood pressure controlled on Cozaar 50 mg daily       Vitamin D deficiency    Orders:  •  Vitamin D 25 hydroxy; Future      Depression Screening and Follow-up Plan: Patient was screened for depression during today's encounter. They screened negative with a PHQ-2 score of 0.        Preventive health issues were discussed with patient, and age appropriate screening tests were ordered as noted in patient's After Visit Summary. Personalized health advice and appropriate referrals for health education or preventive services given if needed, as noted in patient's After Visit Summary.    History of Present Illness     Patient presents for Medicare annual wellness visit with a complaint of gaining 20 pounds and having dependent edema       Patient Care Team:  Blade Walton DO as PCP - General (Family Medicine)  Kapil Goyal PA-C as  Physician Assistant (Physician Assistant)  Tay Mckenna DO as Medical Oncologist (Hematology and Oncology)  Tony Britt MD (Radiation Oncology)  Oumou Martinez as Care Coordinator (Oncology)  Ricky Perez DO (Orthopedic Surgery)    Review of Systems   Constitutional:  Negative for chills and fever.   HENT:  Negative for ear pain and sore throat.    Eyes:  Negative for pain and visual disturbance.   Respiratory:  Negative for cough and shortness of breath.    Cardiovascular:  Positive for leg swelling. Negative for chest pain and palpitations.   Gastrointestinal:  Negative for abdominal pain and vomiting.   Genitourinary:  Negative for dysuria and hematuria.   Musculoskeletal:  Negative for arthralgias and back pain.   Skin:  Negative for color change and rash.   Neurological:  Negative for seizures and syncope.   All other systems reviewed and are negative.    Medical History Reviewed by provider this encounter:       Annual Wellness Visit Questionnaire       Health Risk Assessment:   Patient rates overall health as good. Patient feels that their physical health rating is much better. Patient is satisfied with their life. Eyesight was rated as same. Hearing was rated as same. Patient feels that their emotional and mental health rating is slightly better. Patients states they are never, rarely angry. Patient states they are never, rarely unusually tired/fatigued. Pain experienced in the last 7 days has been none. Patient states that he has experienced no weight loss or gain in last 6 months.     Depression Screening:   PHQ-2 Score: 0      Fall Risk Screening:   In the past year, patient has experienced: history of falling in past year      Home Safety:  Patient does not have trouble with stairs inside or outside of their home. Patient has working smoke alarms and has working carbon monoxide detector. Home safety hazards include: none.     Nutrition:   Current diet is Regular.     Medications:    Patient is currently taking over-the-counter supplements. OTC medications include: see medication list. Patient is able to manage medications.     Activities of Daily Living (ADLs)/Instrumental Activities of Daily Living (IADLs):   Walk and transfer into and out of bed and chair?: Yes  Dress and groom yourself?: Yes    Bathe or shower yourself?: Yes    Feed yourself? Yes  Do your laundry/housekeeping?: Yes  Manage your money, pay your bills and track your expenses?: Yes  Make your own meals?: Yes    Do your own shopping?: Yes    Previous Hospitalizations:   Any hospitalizations or ED visits within the last 12 months?: No      Advance Care Planning:   Living will: No    Durable POA for healthcare: Yes    Advanced directive: Yes      PREVENTIVE SCREENINGS      Cardiovascular Screening:    General: Screening Current      Diabetes Screening:     General: Screening Current      Colorectal Cancer Screening:     General: Screening Current      Abdominal Aortic Aneurysm (AAA) Screening:    Risk factors include: age between 65-74 yo        Lung Cancer Screening:     General: Screening Not Indicated      Hepatitis C Screening:    General: Screening Current    Screening, Brief Intervention, and Referral to Treatment (SBIRT)     Screening  Typical number of drinks in a day: 0  Typical number of drinks in a week: 0  Interpretation: Low risk drinking behavior.    AUDIT-C Screenin) How often did you have a drink containing alcohol in the past year? never  2) How many drinks did you have on a typical day when you were drinking in the past year? 0  3) How often did you have 6 or more drinks on one occasion in the past year? never    AUDIT-C Score: 0  Interpretation: Score 0-3 (male): Negative screen for alcohol misuse    Single Item Drug Screening:  How often have you used an illegal drug (including marijuana) or a prescription medication for non-medical reasons in the past year? never    Single Item Drug Screen Score:  "0  Interpretation: Negative screen for possible drug use disorder    Social Drivers of Health     Food Insecurity: No Food Insecurity (3/17/2025)    Hunger Vital Sign    • Worried About Running Out of Food in the Last Year: Never true    • Ran Out of Food in the Last Year: Never true   Transportation Needs: No Transportation Needs (3/17/2025)    PRAPARE - Transportation    • Lack of Transportation (Medical): No    • Lack of Transportation (Non-Medical): No   Housing Stability: Low Risk  (3/17/2025)    Housing Stability Vital Sign    • Unable to Pay for Housing in the Last Year: No    • Number of Times Moved in the Last Year: 0    • Homeless in the Last Year: No   Utilities: Not At Risk (3/17/2025)    [facility] Utilities    • Threatened with loss of utilities: No     No results found.    Objective   /68   Pulse 76   Temp (!) 97.3 °F (36.3 °C)   Resp 16   Ht 5' 9\" (1.753 m)   Wt 83 kg (183 lb)   BMI 27.02 kg/m²     Physical Exam  Constitutional:       Appearance: Normal appearance.   HENT:      Head: Normocephalic and atraumatic.      Right Ear: Tympanic membrane, ear canal and external ear normal.      Left Ear: Tympanic membrane, ear canal and external ear normal.      Nose: Nose normal.      Mouth/Throat:      Mouth: Mucous membranes are moist.      Pharynx: Oropharynx is clear.   Eyes:      Extraocular Movements: Extraocular movements intact.      Conjunctiva/sclera: Conjunctivae normal.      Pupils: Pupils are equal, round, and reactive to light.   Cardiovascular:      Rate and Rhythm: Normal rate and regular rhythm.      Pulses: Normal pulses.      Heart sounds: Normal heart sounds.   Pulmonary:      Effort: Pulmonary effort is normal.      Breath sounds: Normal breath sounds.   Abdominal:      General: Abdomen is flat. Bowel sounds are normal.      Palpations: Abdomen is soft.   Musculoskeletal:         General: Normal range of motion.      Cervical back: Normal range of motion.      Right lower leg: " Edema present.      Left lower leg: Edema present.   Skin:     General: Skin is warm and dry.      Capillary Refill: Capillary refill takes less than 2 seconds.   Neurological:      General: No focal deficit present.      Mental Status: He is alert and oriented to person, place, and time.   Psychiatric:         Mood and Affect: Mood normal.         Behavior: Behavior normal.

## 2025-03-24 NOTE — PATIENT INSTRUCTIONS
Medicare Preventive Visit Patient Instructions  Thank you for completing your Welcome to Medicare Visit or Medicare Annual Wellness Visit today. Your next wellness visit will be due in one year (3/25/2026).  The screening/preventive services that you may require over the next 5-10 years are detailed below. Some tests may not apply to you based off risk factors and/or age. Screening tests ordered at today's visit but not completed yet may show as past due. Also, please note that scanned in results may not display below.  Preventive Screenings:  Service Recommendations Previous Testing/Comments   Colorectal Cancer Screening  Colonoscopy    Fecal Occult Blood Test (FOBT)/Fecal Immunochemical Test (FIT)  Fecal DNA/Cologuard Test  Flexible Sigmoidoscopy Age: 45-75 years old   Colonoscopy: every 10 years (May be performed more frequently if at higher risk)  OR  FOBT/FIT: every 1 year  OR  Cologuard: every 3 years  OR  Sigmoidoscopy: every 5 years  Screening may be recommended earlier than age 45 if at higher risk for colorectal cancer. Also, an individualized decision between you and your healthcare provider will decide whether screening between the ages of 76-85 would be appropriate. Colonoscopy: 07/09/2024  FOBT/FIT: 11/28/2022  Cologuard: Not on file  Sigmoidoscopy: Not on file    Screening Current     Prostate Cancer Screening Individualized decision between patient and health care provider in men between ages of 55-69   Medicare will cover every 12 months beginning on the day after your 50th birthday PSA: 0.69 ng/mL           Hepatitis C Screening Once for adults born between 1945 and 1965  More frequently in patients at high risk for Hepatitis C Hep C Antibody: 11/16/2021    Screening Current   Diabetes Screening 1-2 times per year if you're at risk for diabetes or have pre-diabetes Fasting glucose: 104 mg/dL (10/21/2024)  A1C: 4.9 % (11/24/2022)  Screening Current   Cholesterol Screening Once every 5 years if you  don't have a lipid disorder. May order more often based on risk factors. Lipid panel: 04/12/2024  Screening Current      Other Preventive Screenings Covered by Medicare:  Abdominal Aortic Aneurysm (AAA) Screening: covered once if your at risk. You're considered to be at risk if you have a family history of AAA or a male between the age of 65-75 who smoking at least 100 cigarettes in your lifetime.  Lung Cancer Screening: covers low dose CT scan once per year if you meet all of the following conditions: (1) Age 55-77; (2) No signs or symptoms of lung cancer; (3) Current smoker or have quit smoking within the last 15 years; (4) You have a tobacco smoking history of at least 20 pack years (packs per day x number of years you smoked); (5) You get a written order from a healthcare provider.  Glaucoma Screening: covered annually if you're considered high risk: (1) You have diabetes OR (2) Family history of glaucoma OR (3)  aged 50 and older OR (4)  American aged 65 and older  Osteoporosis Screening: covered every 2 years if you meet one of the following conditions: (1) Have a vertebral abnormality; (2) On glucocorticoid therapy for more than 3 months; (3) Have primary hyperparathyroidism; (4) On osteoporosis medications and need to assess response to drug therapy.  HIV Screening: covered annually if you're between the age of 15-65. Also covered annually if you are younger than 15 and older than 65 with risk factors for HIV infection. For pregnant patients, it is covered up to 3 times per pregnancy.    Immunizations:  Immunization Recommendations   Influenza Vaccine Annual influenza vaccination during flu season is recommended for all persons aged >= 6 months who do not have contraindications   Pneumococcal Vaccine   * Pneumococcal conjugate vaccine = PCV13 (Prevnar 13), PCV15 (Vaxneuvance), PCV20 (Prevnar 20)  * Pneumococcal polysaccharide vaccine = PPSV23 (Pneumovax) Adults 19-63 yo with certain  risk factors or if 65+ yo  If never received any pneumonia vaccine: recommend Prevnar 20 (PCV20)  Give PCV20 if previously received 1 dose of PCV13 or PPSV23   Hepatitis B Vaccine 3 dose series if at intermediate or high risk (ex: diabetes, end stage renal disease, liver disease)   Respiratory syncytial virus (RSV) Vaccine - COVERED BY MEDICARE PART D  * RSVPreF3 (Arexvy) CDC recommends that adults 60 years of age and older may receive a single dose of RSV vaccine using shared clinical decision-making (SCDM)   Tetanus (Td) Vaccine - COST NOT COVERED BY MEDICARE PART B Following completion of primary series, a booster dose should be given every 10 years to maintain immunity against tetanus. Td may also be given as tetanus wound prophylaxis.   Tdap Vaccine - COST NOT COVERED BY MEDICARE PART B Recommended at least once for all adults. For pregnant patients, recommended with each pregnancy.   Shingles Vaccine (Shingrix) - COST NOT COVERED BY MEDICARE PART B  2 shot series recommended in those 19 years and older who have or will have weakened immune systems or those 50 years and older     Health Maintenance Due:      Topic Date Due   • Colorectal Cancer Screening  07/08/2031   • Hepatitis C Screening  Completed     Immunizations Due:      Topic Date Due   • Pneumococcal Vaccine: 65+ Years (2 of 2 - PPSV23) 12/09/2021   • COVID-19 Vaccine (8 - Moderna risk 2024-25 season) 03/20/2025     Advance Directives   What are advance directives?  Advance directives are legal documents that state your wishes and plans for medical care. These plans are made ahead of time in case you lose your ability to make decisions for yourself. Advance directives can apply to any medical decision, such as the treatments you want, and if you want to donate organs.   What are the types of advance directives?  There are many types of advance directives, and each state has rules about how to use them. You may choose a combination of any of the  following:  Living will:  This is a written record of the treatment you want. You can also choose which treatments you do not want, which to limit, and which to stop at a certain time. This includes surgery, medicine, IV fluid, and tube feedings.   Durable power of  for healthcare (DPAHC):  This is a written record that states who you want to make healthcare choices for you when you are unable to make them for yourself. This person, called a proxy, is usually a family member or a friend. You may choose more than 1 proxy.  Do not resuscitate (DNR) order:  A DNR order is used in case your heart stops beating or you stop breathing. It is a request not to have certain forms of treatment, such as CPR. A DNR order may be included in other types of advance directives.  Medical directive:  This covers the care that you want if you are in a coma, near death, or unable to make decisions for yourself. You can list the treatments you want for each condition. Treatment may include pain medicine, surgery, blood transfusions, dialysis, IV or tube feedings, and a ventilator (breathing machine).  Values history:  This document has questions about your views, beliefs, and how you feel and think about life. This information can help others choose the care that you would choose.  Why are advance directives important?  An advance directive helps you control your care. Although spoken wishes may be used, it is better to have your wishes written down. Spoken wishes can be misunderstood, or not followed. Treatments may be given even if you do not want them. An advance directive may make it easier for your family to make difficult choices about your care.   Fall Prevention    Fall prevention  includes ways to make your home and other areas safer. It also includes ways you can move more carefully to prevent a fall. Health conditions that cause changes in your blood pressure, vision, or muscle strength and coordination may increase  your risk for falls. Medicines may also increase your risk for falls if they make you dizzy, weak, or sleepy.   Fall prevention tips:   Stand or sit up slowly.    Use assistive devices as directed.    Wear shoes that fit well and have soles that .    Wear a personal alarm.    Stay active.    Manage your medical conditions.    Home Safety Tips:  Add items to prevent falls in the bathroom.    Keep paths clear.    Install bright lights in your home.    Keep items you use often on shelves within reach.    Paint or place reflective tape on the edges of your stairs.    Weight Management   Why it is important to manage your weight:  Being overweight increases your risk of health conditions such as heart disease, high blood pressure, type 2 diabetes, and certain types of cancer. It can also increase your risk for osteoarthritis, sleep apnea, and other respiratory problems. Aim for a slow, steady weight loss. Even a small amount of weight loss can lower your risk of health problems.  How to lose weight safely:  A safe and healthy way to lose weight is to eat fewer calories and get regular exercise. You can lose up about 1 pound a week by decreasing the number of calories you eat by 500 calories each day.   Healthy meal plan for weight management:  A healthy meal plan includes a variety of foods, contains fewer calories, and helps you stay healthy. A healthy meal plan includes the following:  Eat whole-grain foods more often.  A healthy meal plan should contain fiber. Fiber is the part of grains, fruits, and vegetables that is not broken down by your body. Whole-grain foods are healthy and provide extra fiber in your diet. Some examples of whole-grain foods are whole-wheat breads and pastas, oatmeal, brown rice, and bulgur.  Eat a variety of vegetables every day.  Include dark, leafy greens such as spinach, kale, lenora greens, and mustard greens. Eat yellow and orange vegetables such as carrots, sweet potatoes, and  winter squash.   Eat a variety of fruits every day.  Choose fresh or canned fruit (canned in its own juice or light syrup) instead of juice. Fruit juice has very little or no fiber.  Eat low-fat dairy foods.  Drink fat-free (skim) milk or 1% milk. Eat fat-free yogurt and low-fat cottage cheese. Try low-fat cheeses such as mozzarella and other reduced-fat cheeses.  Choose meat and other protein foods that are low in fat.  Choose beans or other legumes such as split peas or lentils. Choose fish, skinless poultry (chicken or turkey), or lean cuts of red meat (beef or pork). Before you cook meat or poultry, cut off any visible fat.   Use less fat and oil.  Try baking foods instead of frying them. Add less fat, such as margarine, sour cream, regular salad dressing and mayonnaise to foods. Eat fewer high-fat foods. Some examples of high-fat foods include french fries, doughnuts, ice cream, and cakes.  Eat fewer sweets.  Limit foods and drinks that are high in sugar. This includes candy, cookies, regular soda, and sweetened drinks.  Exercise:  Exercise at least 30 minutes per day on most days of the week. Some examples of exercise include walking, biking, dancing, and swimming. You can also fit in more physical activity by taking the stairs instead of the elevator or parking farther away from stores. Ask your healthcare provider about the best exercise plan for you.      © Copyright [employer] 2018 Information is for End User's use only and may not be sold, redistributed or otherwise used for commercial purposes. All illustrations and images included in CareNotes® are the copyrighted property of [employer] or [employer]

## 2025-03-27 ENCOUNTER — HOSPITAL ENCOUNTER (INPATIENT)
Facility: HOSPITAL | Age: 70
LOS: 1 days | Discharge: HOME | DRG: 812 | End: 2025-03-28
Attending: EMERGENCY MEDICINE | Admitting: FAMILY MEDICINE
Payer: MEDICARE

## 2025-03-27 ENCOUNTER — APPOINTMENT (EMERGENCY)
Dept: NON INVASIVE DIAGNOSTICS | Facility: HOSPITAL | Age: 70
DRG: 812 | End: 2025-03-27
Payer: MEDICARE

## 2025-03-27 ENCOUNTER — TELEPHONE (OUTPATIENT)
Dept: HEMATOLOGY ONCOLOGY | Facility: CLINIC | Age: 70
End: 2025-03-27

## 2025-03-27 ENCOUNTER — APPOINTMENT (EMERGENCY)
Dept: CT IMAGING | Facility: HOSPITAL | Age: 70
DRG: 812 | End: 2025-03-27
Payer: MEDICARE

## 2025-03-27 ENCOUNTER — HOSPITAL ENCOUNTER (OUTPATIENT)
Dept: RADIOLOGY | Facility: HOSPITAL | Age: 70
Discharge: HOME | DRG: 812 | End: 2025-03-27
Payer: MEDICARE

## 2025-03-27 ENCOUNTER — APPOINTMENT (OUTPATIENT)
Dept: LAB | Facility: HOSPITAL | Age: 70
DRG: 812 | End: 2025-03-27
Payer: MEDICARE

## 2025-03-27 ENCOUNTER — OFFICE VISIT (OUTPATIENT)
Dept: LAB | Facility: HOSPITAL | Age: 70
DRG: 812 | End: 2025-03-27
Attending: FAMILY MEDICINE
Payer: MEDICARE

## 2025-03-27 DIAGNOSIS — D63.8 ANEMIA IN OTHER CHRONIC DISEASES CLASSIFIED ELSEWHERE: Primary | ICD-10-CM

## 2025-03-27 DIAGNOSIS — R60.9 DEPENDENT EDEMA: ICD-10-CM

## 2025-03-27 DIAGNOSIS — D64.9 ACUTE ON CHRONIC ANEMIA: ICD-10-CM

## 2025-03-27 DIAGNOSIS — E55.9 VITAMIN D DEFICIENCY: ICD-10-CM

## 2025-03-27 DIAGNOSIS — C90.30 SOLITARY PLASMACYTOMA NOT HAVING ACHIEVED REMISSION (HCC): ICD-10-CM

## 2025-03-27 DIAGNOSIS — D53.9 MACROCYTIC ANEMIA: ICD-10-CM

## 2025-03-27 DIAGNOSIS — D64.9 ANEMIA: Primary | ICD-10-CM

## 2025-03-27 DIAGNOSIS — D63.8 ANEMIA IN OTHER CHRONIC DISEASES CLASSIFIED ELSEWHERE: ICD-10-CM

## 2025-03-27 DIAGNOSIS — K20.90 ESOPHAGITIS: ICD-10-CM

## 2025-03-27 DIAGNOSIS — K22.89 ESOPHAGEAL THICKENING: ICD-10-CM

## 2025-03-27 PROBLEM — R60.0 BILATERAL LEG EDEMA: Status: ACTIVE | Noted: 2025-03-27

## 2025-03-27 LAB
25(OH)D3 SERPL-MCNC: 96.5 NG/ML (ref 30–100)
ABO GROUP BLD: NORMAL
ALBUMIN SERPL BCG-MCNC: 4.4 G/DL (ref 3.5–5)
ALP SERPL-CCNC: 79 U/L (ref 34–104)
ALT SERPL W P-5'-P-CCNC: 9 U/L (ref 7–52)
ANION GAP SERPL CALCULATED.3IONS-SCNC: 11 MMOL/L (ref 4–13)
AST SERPL W P-5'-P-CCNC: 13 U/L (ref 13–39)
ATRIAL RATE: 76 BPM
ATRIAL RATE: 80 BPM
BASOPHILS # BLD AUTO: 0.05 THOUSANDS/ÂΜL (ref 0–0.1)
BASOPHILS # BLD AUTO: 0.05 THOUSANDS/ÂΜL (ref 0–0.1)
BASOPHILS NFR BLD AUTO: 1 % (ref 0–1)
BASOPHILS NFR BLD AUTO: 1 % (ref 0–1)
BILIRUB SERPL-MCNC: 0.38 MG/DL (ref 0.2–1)
BILIRUB UR QL STRIP: NEGATIVE
BLD GP AB SCN SERPL QL: NEGATIVE
BNP SERPL-MCNC: 187 PG/ML (ref 0–100)
BUN SERPL-MCNC: 16 MG/DL (ref 5–25)
CALCIUM SERPL-MCNC: 9.6 MG/DL (ref 8.4–10.2)
CHLORIDE SERPL-SCNC: 106 MMOL/L (ref 96–108)
CLARITY UR: CLEAR
CO2 SERPL-SCNC: 23 MMOL/L (ref 21–32)
COLOR UR: COLORLESS
CREAT SERPL-MCNC: 1.1 MG/DL (ref 0.6–1.3)
CREAT UR-MCNC: 49.4 MG/DL
EOSINOPHIL # BLD AUTO: 0.18 THOUSAND/ÂΜL (ref 0–0.61)
EOSINOPHIL # BLD AUTO: 0.18 THOUSAND/ÂΜL (ref 0–0.61)
EOSINOPHIL NFR BLD AUTO: 3 % (ref 0–6)
EOSINOPHIL NFR BLD AUTO: 3 % (ref 0–6)
ERYTHROCYTE [DISTWIDTH] IN BLOOD BY AUTOMATED COUNT: 16.3 % (ref 11.6–15.1)
ERYTHROCYTE [DISTWIDTH] IN BLOOD BY AUTOMATED COUNT: 16.4 % (ref 11.6–15.1)
ERYTHROCYTE [DISTWIDTH] IN BLOOD BY AUTOMATED COUNT: 17.4 % (ref 11.6–15.1)
FERRITIN SERPL-MCNC: 3 NG/ML (ref 24–336)
FOLATE SERPL-MCNC: >22.3 NG/ML
GFR SERPL CREATININE-BSD FRML MDRD: 68 ML/MIN/1.73SQ M
GLUCOSE P FAST SERPL-MCNC: 105 MG/DL (ref 65–99)
GLUCOSE UR STRIP-MCNC: NEGATIVE MG/DL
HCT VFR BLD AUTO: 16.6 % (ref 36.5–49.3)
HCT VFR BLD AUTO: 17.1 % (ref 36.5–49.3)
HCT VFR BLD AUTO: 25.1 % (ref 36.5–49.3)
HGB BLD-MCNC: 4.6 G/DL (ref 12–17)
HGB BLD-MCNC: 4.7 G/DL (ref 12–17)
HGB BLD-MCNC: 7.2 G/DL (ref 12–17)
HGB UR QL STRIP.AUTO: NEGATIVE
IGA SERPL-MCNC: 355 MG/DL (ref 66–433)
IGG SERPL-MCNC: 837 MG/DL (ref 635–1741)
IGM SERPL-MCNC: 64 MG/DL (ref 45–281)
IMM GRANULOCYTES # BLD AUTO: 0.01 THOUSAND/UL (ref 0–0.2)
IMM GRANULOCYTES # BLD AUTO: 0.03 THOUSAND/UL (ref 0–0.2)
IMM GRANULOCYTES NFR BLD AUTO: 0 % (ref 0–2)
IMM GRANULOCYTES NFR BLD AUTO: 1 % (ref 0–2)
IRON SATN MFR SERPL: 2 % (ref 15–50)
IRON SERPL-MCNC: 11 UG/DL (ref 50–212)
KETONES UR STRIP-MCNC: NEGATIVE MG/DL
LEUKOCYTE ESTERASE UR QL STRIP: NEGATIVE
LYMPHOCYTES # BLD AUTO: 0.51 THOUSANDS/ÂΜL (ref 0.6–4.47)
LYMPHOCYTES # BLD AUTO: 0.58 THOUSANDS/ÂΜL (ref 0.6–4.47)
LYMPHOCYTES NFR BLD AUTO: 10 % (ref 14–44)
LYMPHOCYTES NFR BLD AUTO: 9 % (ref 14–44)
MCH RBC QN AUTO: 20 PG (ref 26.8–34.3)
MCH RBC QN AUTO: 20.1 PG (ref 26.8–34.3)
MCH RBC QN AUTO: 22.1 PG (ref 26.8–34.3)
MCHC RBC AUTO-ENTMCNC: 27.5 G/DL (ref 31.4–37.4)
MCHC RBC AUTO-ENTMCNC: 27.7 G/DL (ref 31.4–37.4)
MCHC RBC AUTO-ENTMCNC: 28.7 G/DL (ref 31.4–37.4)
MCV RBC AUTO: 72 FL (ref 82–98)
MCV RBC AUTO: 73 FL (ref 82–98)
MCV RBC AUTO: 77 FL (ref 82–98)
MONOCYTES # BLD AUTO: 0.68 THOUSAND/ÂΜL (ref 0.17–1.22)
MONOCYTES # BLD AUTO: 0.92 THOUSAND/ÂΜL (ref 0.17–1.22)
MONOCYTES NFR BLD AUTO: 12 % (ref 4–12)
MONOCYTES NFR BLD AUTO: 16 % (ref 4–12)
NEUTROPHILS # BLD AUTO: 4.02 THOUSANDS/ÂΜL (ref 1.85–7.62)
NEUTROPHILS # BLD AUTO: 4.14 THOUSANDS/ÂΜL (ref 1.85–7.62)
NEUTS SEG NFR BLD AUTO: 70 % (ref 43–75)
NEUTS SEG NFR BLD AUTO: 74 % (ref 43–75)
NITRITE UR QL STRIP: NEGATIVE
NRBC BLD AUTO-RTO: 0 /100 WBCS
NRBC BLD AUTO-RTO: 0 /100 WBCS
P AXIS: 49 DEGREES
P AXIS: 54 DEGREES
PH UR STRIP.AUTO: 5.5 [PH]
PLATELET # BLD AUTO: 336 THOUSANDS/UL (ref 149–390)
PLATELET # BLD AUTO: 358 THOUSANDS/UL (ref 149–390)
PLATELET # BLD AUTO: 364 THOUSANDS/UL (ref 149–390)
PMV BLD AUTO: 10.1 FL (ref 8.9–12.7)
PMV BLD AUTO: 9.7 FL (ref 8.9–12.7)
PMV BLD AUTO: 9.9 FL (ref 8.9–12.7)
POTASSIUM SERPL-SCNC: 3.8 MMOL/L (ref 3.5–5.3)
PR INTERVAL: 142 MS
PR INTERVAL: 152 MS
PROT SERPL-MCNC: 7.3 G/DL (ref 6.4–8.4)
PROT UR STRIP-MCNC: NEGATIVE MG/DL
PROT UR-MCNC: 5.5 MG/DL
PROT/CREAT UR: 0.1 MG/G{CREAT} (ref 0–0.1)
QRS AXIS: -8 DEGREES
QRS AXIS: 4 DEGREES
QRSD INTERVAL: 92 MS
QRSD INTERVAL: 96 MS
QT INTERVAL: 388 MS
QT INTERVAL: 402 MS
QTC INTERVAL: 436 MS
QTC INTERVAL: 464 MS
RBC # BLD AUTO: 2.3 MILLION/UL (ref 3.88–5.62)
RBC # BLD AUTO: 2.34 MILLION/UL (ref 3.88–5.62)
RBC # BLD AUTO: 3.26 MILLION/UL (ref 3.88–5.62)
RH BLD: POSITIVE
SODIUM SERPL-SCNC: 140 MMOL/L (ref 135–147)
SP GR UR STRIP.AUTO: 1.01 (ref 1–1.03)
SPECIMEN EXPIRATION DATE: NORMAL
T WAVE AXIS: 44 DEGREES
T WAVE AXIS: 52 DEGREES
TIBC SERPL-MCNC: 597.8 UG/DL (ref 250–450)
TRANSFERRIN SERPL-MCNC: 427 MG/DL (ref 203–362)
TSH SERPL DL<=0.05 MIU/L-ACNC: 1.71 UIU/ML (ref 0.45–4.5)
UIBC SERPL-MCNC: 587 UG/DL (ref 155–355)
UROBILINOGEN UR STRIP-ACNC: <2 MG/DL
VENTRICULAR RATE: 76 BPM
VENTRICULAR RATE: 80 BPM
VIT B12 SERPL-MCNC: 1076 PG/ML (ref 180–914)
WBC # BLD AUTO: 5.47 THOUSAND/UL (ref 4.31–10.16)
WBC # BLD AUTO: 5.53 THOUSAND/UL (ref 4.31–10.16)
WBC # BLD AUTO: 5.88 THOUSAND/UL (ref 4.31–10.16)

## 2025-03-27 PROCEDURE — P9040 RBC LEUKOREDUCED IRRADIATED: HCPCS

## 2025-03-27 PROCEDURE — 71260 CT THORAX DX C+: CPT

## 2025-03-27 PROCEDURE — 83540 ASSAY OF IRON: CPT

## 2025-03-27 PROCEDURE — 84443 ASSAY THYROID STIM HORMONE: CPT

## 2025-03-27 PROCEDURE — 86901 BLOOD TYPING SEROLOGIC RH(D): CPT | Performed by: EMERGENCY MEDICINE

## 2025-03-27 PROCEDURE — 85025 COMPLETE CBC W/AUTO DIFF WBC: CPT | Performed by: EMERGENCY MEDICINE

## 2025-03-27 PROCEDURE — 84156 ASSAY OF PROTEIN URINE: CPT | Performed by: STUDENT IN AN ORGANIZED HEALTH CARE EDUCATION/TRAINING PROGRAM

## 2025-03-27 PROCEDURE — 99291 CRITICAL CARE FIRST HOUR: CPT | Performed by: EMERGENCY MEDICINE

## 2025-03-27 PROCEDURE — 99223 1ST HOSP IP/OBS HIGH 75: CPT | Performed by: FAMILY MEDICINE

## 2025-03-27 PROCEDURE — 93970 EXTREMITY STUDY: CPT | Performed by: SURGERY

## 2025-03-27 PROCEDURE — 82607 VITAMIN B-12: CPT

## 2025-03-27 PROCEDURE — 83521 IG LIGHT CHAINS FREE EACH: CPT

## 2025-03-27 PROCEDURE — 99284 EMERGENCY DEPT VISIT MOD MDM: CPT

## 2025-03-27 PROCEDURE — 93005 ELECTROCARDIOGRAM TRACING: CPT

## 2025-03-27 PROCEDURE — 36430 TRANSFUSION BLD/BLD COMPNT: CPT

## 2025-03-27 PROCEDURE — 86923 COMPATIBILITY TEST ELECTRIC: CPT

## 2025-03-27 PROCEDURE — 84165 PROTEIN E-PHORESIS SERUM: CPT

## 2025-03-27 PROCEDURE — 83550 IRON BINDING TEST: CPT

## 2025-03-27 PROCEDURE — 93010 ELECTROCARDIOGRAM REPORT: CPT | Performed by: INTERNAL MEDICINE

## 2025-03-27 PROCEDURE — 80053 COMPREHEN METABOLIC PANEL: CPT

## 2025-03-27 PROCEDURE — 74177 CT ABD & PELVIS W/CONTRAST: CPT

## 2025-03-27 PROCEDURE — 83880 ASSAY OF NATRIURETIC PEPTIDE: CPT

## 2025-03-27 PROCEDURE — 81003 URINALYSIS AUTO W/O SCOPE: CPT

## 2025-03-27 PROCEDURE — 82746 ASSAY OF FOLIC ACID SERUM: CPT

## 2025-03-27 PROCEDURE — 71046 X-RAY EXAM CHEST 2 VIEWS: CPT

## 2025-03-27 PROCEDURE — P9016 RBC LEUKOCYTES REDUCED: HCPCS

## 2025-03-27 PROCEDURE — 82728 ASSAY OF FERRITIN: CPT

## 2025-03-27 PROCEDURE — 36415 COLL VENOUS BLD VENIPUNCTURE: CPT

## 2025-03-27 PROCEDURE — 82570 ASSAY OF URINE CREATININE: CPT | Performed by: STUDENT IN AN ORGANIZED HEALTH CARE EDUCATION/TRAINING PROGRAM

## 2025-03-27 PROCEDURE — 82784 ASSAY IGA/IGD/IGG/IGM EACH: CPT

## 2025-03-27 PROCEDURE — 85027 COMPLETE CBC AUTOMATED: CPT | Performed by: STUDENT IN AN ORGANIZED HEALTH CARE EDUCATION/TRAINING PROGRAM

## 2025-03-27 PROCEDURE — 86850 RBC ANTIBODY SCREEN: CPT | Performed by: EMERGENCY MEDICINE

## 2025-03-27 PROCEDURE — 85025 COMPLETE CBC W/AUTO DIFF WBC: CPT

## 2025-03-27 PROCEDURE — 86900 BLOOD TYPING SEROLOGIC ABO: CPT | Performed by: EMERGENCY MEDICINE

## 2025-03-27 PROCEDURE — 82306 VITAMIN D 25 HYDROXY: CPT

## 2025-03-27 PROCEDURE — 93970 EXTREMITY STUDY: CPT

## 2025-03-27 RX ORDER — SODIUM CHLORIDE, SODIUM GLUCONATE, SODIUM ACETATE, POTASSIUM CHLORIDE, MAGNESIUM CHLORIDE, SODIUM PHOSPHATE, DIBASIC, AND POTASSIUM PHOSPHATE .53; .5; .37; .037; .03; .012; .00082 G/100ML; G/100ML; G/100ML; G/100ML; G/100ML; G/100ML; G/100ML
100 INJECTION, SOLUTION INTRAVENOUS ONCE
Status: COMPLETED | OUTPATIENT
Start: 2025-03-27 | End: 2025-03-28

## 2025-03-27 RX ORDER — LACTOSE-REDUCED FOOD
LIQUID (ML) ORAL 2 TIMES DAILY PRN
Status: DISCONTINUED | OUTPATIENT
Start: 2025-03-27 | End: 2025-03-27

## 2025-03-27 RX ORDER — PANTOPRAZOLE SODIUM 40 MG/10ML
40 INJECTION, POWDER, LYOPHILIZED, FOR SOLUTION INTRAVENOUS EVERY 12 HOURS SCHEDULED
Status: DISCONTINUED | OUTPATIENT
Start: 2025-03-27 | End: 2025-03-28 | Stop reason: HOSPADM

## 2025-03-27 RX ORDER — LOSARTAN POTASSIUM 50 MG/1
50 TABLET ORAL DAILY
Status: DISCONTINUED | OUTPATIENT
Start: 2025-03-27 | End: 2025-03-28 | Stop reason: HOSPADM

## 2025-03-27 RX ORDER — DULOXETIN HYDROCHLORIDE 30 MG/1
30 CAPSULE, DELAYED RELEASE ORAL DAILY
Status: DISCONTINUED | OUTPATIENT
Start: 2025-03-27 | End: 2025-03-28 | Stop reason: HOSPADM

## 2025-03-27 RX ORDER — GABAPENTIN 300 MG/1
300 CAPSULE ORAL 3 TIMES DAILY
Status: DISCONTINUED | OUTPATIENT
Start: 2025-03-27 | End: 2025-03-28 | Stop reason: HOSPADM

## 2025-03-27 RX ORDER — ONDANSETRON 2 MG/ML
4 INJECTION INTRAMUSCULAR; INTRAVENOUS EVERY 6 HOURS PRN
Status: DISCONTINUED | OUTPATIENT
Start: 2025-03-27 | End: 2025-03-28 | Stop reason: HOSPADM

## 2025-03-27 RX ORDER — MIRTAZAPINE 15 MG/1
15 TABLET, FILM COATED ORAL
Status: DISCONTINUED | OUTPATIENT
Start: 2025-03-27 | End: 2025-03-28 | Stop reason: HOSPADM

## 2025-03-27 RX ADMIN — PANTOPRAZOLE SODIUM 40 MG: 40 INJECTION, POWDER, FOR SOLUTION INTRAVENOUS at 22:10

## 2025-03-27 RX ADMIN — SODIUM CHLORIDE, SODIUM GLUCONATE, SODIUM ACETATE, POTASSIUM CHLORIDE, MAGNESIUM CHLORIDE, SODIUM PHOSPHATE, DIBASIC, AND POTASSIUM PHOSPHATE 100 ML/HR: .53; .5; .37; .037; .03; .012; .00082 INJECTION, SOLUTION INTRAVENOUS at 15:48

## 2025-03-27 RX ADMIN — Medication 1 TABLET: at 14:20

## 2025-03-27 RX ADMIN — GABAPENTIN 300 MG: 300 CAPSULE ORAL at 22:10

## 2025-03-27 RX ADMIN — IOHEXOL 100 ML: 350 INJECTION, SOLUTION INTRAVENOUS at 11:37

## 2025-03-27 RX ADMIN — MIRTAZAPINE 15 MG: 15 TABLET, FILM COATED ORAL at 22:10

## 2025-03-27 RX ADMIN — DULOXETINE HYDROCHLORIDE 30 MG: 30 CAPSULE, DELAYED RELEASE ORAL at 14:20

## 2025-03-27 RX ADMIN — PANTOPRAZOLE SODIUM 40 MG: 40 INJECTION, POWDER, FOR SOLUTION INTRAVENOUS at 14:20

## 2025-03-27 RX ADMIN — LOSARTAN POTASSIUM 50 MG: 50 TABLET, FILM COATED ORAL at 14:20

## 2025-03-27 RX ADMIN — GABAPENTIN 300 MG: 300 CAPSULE ORAL at 15:48

## 2025-03-27 NOTE — H&P
"H&P - Hospitalist   Name: Alexander Olmstead 70 y.o. male I MRN: [id number]  Unit/Bed#: 409-01 I Date of Admission: 3/27/2025   Date of Service: 3/27/2025 I Hospital Day: 0     Assessment & Plan  Acute on chronic anemia  Presented to ED with hemoglobin of 4 after getting routine lab work outpatient. Asymptomatic at this time.    Hgb of 4.6 with MCV of 72 on admission. Baseline appears to be around 8-9.    S/p three units of blood in ED   UA with no signs of blood   FOBT ordered    B12/folate/iron panel pending   VTE prophylaxis held   Protonix 40 mg BID ordered   Will order iron transfusions   Monitor with q8h CBCs  Transfuse for hbg less than 7    GI consulted   Heme/onc consulted   Bilateral leg edema  Pt presenting with bilateral leg edema. Unclear etiology    BNP of 187, no prior to compare   TSH WNL  CMP WNL   Urine protein/cr ordered   Echo ordered   Will continue to monitor   Esophageal dilatation  Noted on CT scan in ED as \"Small hiatal hernia with distal esophageal wall thickening consistent with esophagitis\"   Protonix 40 mg BID started   GI consulted   History of GI bleed  Hx of GI bleed in 2018 after falling down steps in the setting of ETOH use   Pt denies s/s of bleeding at this time   CT in ED with \"Small hiatal hernia with distal esophageal wall thickening consistent with esophagitis\"   UA with no blood   FIT test ordered   Protonix 40 mg BID ordered   GI consulted   Hypertension  BP stable   Continue home losartan   Monitor BP throughout admission   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  Status post RT May 2024.   CT in ED showed \"Stable sclerotic lesion involving the sacrum with cortical breakthrough anteriorly and extension into the left ilium. Stable T11 pedicle mixed lytic and sclerotic lesion\"   SPEP, immunoglobulin free light chains, IgG/IgA/IgM ordered   Heme/onc consulted   Neuropathy associated with cancer " (HCC)  Continue home gabapentin   History of non-ST elevation myocardial infarction (NSTEMI)  Occurred in 2019   Not on home ASA/statin   Monitor for symptoms during admission       VTE Pharmacologic Prophylaxis:    N/A, anemia  Code Status: Level 1 - Full Code   Discussion with family: Updated  (friend) via phone.    Anticipated Length of Stay: Patient will be admitted on an inpatient basis with an anticipated length of stay of greater than 2 midnights secondary to anemia.    History of Present Illness   Chief Complaint: Abnormal labs/leg swelling     Alexander Olmstead is a 70 y.o. male with a PMH of HTN, plasmocytoma, CAD who presents with abnormal labs. Patient states he went to get lab work ordered by outpatient provider when he was altered to return to ED due to low hemoglobin. Reports a history of nose bleeds with the last one being two weeks ago. Initially, nose bleeds occurred several times per day but slowed down and were occurring about once a day before stopping totally. Other than that, denies SOB on exertion, dizziness, syncope, tachycardia. No signs of bleeding. Denies blood in urine, stool.     Reports history of leg swelling of 2-3 weeks. Went to see PCP for same who ordered BNP and echo. Also was prescribed lasix but was not able to start it because he did not get it from the pharmacy. Noticed that swelling is gone in the AM but after a few hours of walking will swell up. Denies pain in either leg, has neuropathy at baseline.     Review of Systems   Constitutional:  Negative for fever.   Respiratory:  Negative for shortness of breath.    Cardiovascular:  Negative for chest pain.   Gastrointestinal:  Negative for blood in stool and diarrhea.   Genitourinary:  Negative for hematuria.   Skin:  Negative for rash.   Neurological:  Negative for dizziness, syncope, weakness and light-headedness.   Psychiatric/Behavioral:  Negative for confusion.        Historical Information   Past Medical  History:   Diagnosis Date    Acute respiratory failure (HCC) 11/24/2022    Acute respiratory failure with hypoxia (HCC)     Alcohol dependence with withdrawal delirium (HCC) 04/18/2023    Alcohol withdrawal (HCC) 11/25/2022    Hyperlipidemia     Hypertension     Hyponatremia 11/23/2022    Hypothermia 11/23/2022    Hypoxia 11/23/2022    Metabolic acidosis with increased anion gap and accumulation of organic acids 11/23/2022    Metabolic encephalopathy 11/23/2022    Pneumonia 11/25/2022    Positive blood culture 11/28/2022    Septic shock (HCC) 11/24/2022     Past Surgical History:   Procedure Laterality Date    COLONOSCOPY      IR BIOPSY BONE  12/19/2023     Social History     Tobacco Use    Smoking status: Never    Smokeless tobacco: Never   Vaping Use    Vaping status: Never Used   Substance and Sexual Activity    Alcohol use: Not Currently     Alcohol/week: 6.0 standard drinks of alcohol     Types: 6 Cans of beer per week     Comment: d/c 11/2022    Drug use: No    Sexual activity: Not Currently     E-Cigarette/Vaping    E-Cigarette Use Never User      E-Cigarette/Vaping Substances    Nicotine No     THC No     CBD No     Flavoring No     Other No     Unknown No      Family history non-contributory  Social History:  Marital Status: Single   Occupation: Unknown   Patient Pre-hospital Living Situation: Home  Patient Pre-hospital Level of Mobility: walks  Patient Pre-hospital Diet Restrictions: None    Meds/Allergies   I have reviewed home medications with patient personally.  Prior to Admission medications    Medication Sig Start Date End Date Taking? Authorizing Provider   Cyanocobalamin (VITAMIN B-12 CR PO) Take 2 tablets by mouth in the morning   Yes Historical Provider, MD   DULoxetine (CYMBALTA) 30 mg delayed release capsule Take 1 capsule (30 mg total) by mouth daily 3/14/25  Yes Blade Walton DO   gabapentin (Neurontin) 300 mg capsule Take 1 capsule (300 mg total) by mouth 3 (three) times a day 1/6/25   Yes Blade Walton, DO   ibuprofen (MOTRIN) 200 mg tablet Take 400 mg by mouth every 6 (six) hours as needed for mild pain   Yes Historical Provider, MD   losartan (COZAAR) 50 mg tablet TAKE 1 TABLET (50 MG TOTAL) BY MOUTH IN THE MORNING 3/17/25  Yes Blade Walton DO   mirtazapine (REMERON) 15 mg tablet TAKE 1 TABLET BY MOUTH EVERYDAY AT BEDTIME 9/20/24  Yes Blade Walton DO   Multiple Vitamin (MULTIVITAMIN) tablet Take 1 tablet by mouth daily   Yes Historical Provider, MD   Nutritional Supplements (ENSURE PO) Take by mouth   Yes Historical Provider, MD   Omega-3 Fatty Acids (FISH OIL PO) Take by mouth   Yes Historical Provider, MD   pantoprazole (PROTONIX) 20 mg tablet Take 1 tablet (20 mg total) by mouth daily 11/12/24  Yes Blade Walton DO   pyridoxine (VITAMIN B6) 50 mg tablet Take 1 tablet (50 mg total) by mouth daily 1/8/25  Yes Blade Walton DO   SALINE NASAL MIST NA 2 sprays into each nostril 2 (two) times a day   Yes Historical Provider, MD     No Known Allergies    Objective :  Temp:  [97.1 °F (36.2 °C)-98.1 °F (36.7 °C)] 98.1 °F (36.7 °C)  HR:  [66-80] 71  BP: (148-160)/(70-84) 153/83  Resp:  [16-17] 17  SpO2:  [94 %-100 %] 100 %  O2 Device: None (Room air)    Physical Exam  Vitals and nursing note reviewed.   Constitutional:       General: He is not in acute distress.     Appearance: Normal appearance. He is normal weight. He is not ill-appearing or toxic-appearing.   HENT:      Head: Normocephalic and atraumatic.      Nose: Nose normal.   Eyes:      Conjunctiva/sclera: Conjunctivae normal.   Cardiovascular:      Rate and Rhythm: Normal rate and regular rhythm.      Heart sounds: Normal heart sounds. No murmur heard.     No friction rub. No gallop.   Pulmonary:      Effort: Pulmonary effort is normal. No respiratory distress.      Breath sounds: Normal breath sounds. No wheezing, rhonchi or rales.   Musculoskeletal:      Right lower leg: Edema present.      Left lower leg: Edema (L  worse than R) present.   Skin:     General: Skin is warm and dry.      Comments: Legs bilaterally with discoloration similar to hemosiderin deposits    Neurological:      General: No focal deficit present.      Mental Status: He is alert and oriented to person, place, and time.   Psychiatric:         Mood and Affect: Mood normal.         Behavior: Behavior normal.         Thought Content: Thought content normal.          Lines/Drains:        Lab Results: I have reviewed the following results:  Results from last 7 days   Lab Units 03/27/25  0926   WBC Thousand/uL 5.88   HEMOGLOBIN g/dL 4.7*   HEMATOCRIT % 17.1*   PLATELETS Thousands/uL 358   SEGS PCT % 70   LYMPHO PCT % 10*   MONO PCT % 16*   EOS PCT % 3     Results from last 7 days   Lab Units 03/27/25  0806   SODIUM mmol/L 140   POTASSIUM mmol/L 3.8   CHLORIDE mmol/L 106   CO2 mmol/L 23   BUN mg/dL 16   CREATININE mg/dL 1.10   ANION GAP mmol/L 11   CALCIUM mg/dL 9.6   ALBUMIN g/dL 4.4   TOTAL BILIRUBIN mg/dL 0.38   ALK PHOS U/L 79   ALT U/L 9   AST U/L 13             Lab Results   Component Value Date    HGBA1C 4.9 11/24/2022    HGBA1C 5.5 01/02/2020    HGBA1C 5.5 01/02/2020           Imaging Results Review: I reviewed radiology reports from this admission including: CT abdomen/pelvis.  Other Study Results Review: No additional pertinent studies reviewed.    Angelia Maddox DO   PGY-2 [facility]   [facility]     ** Please Note: This note has been constructed using a voice recognition system. **

## 2025-03-27 NOTE — ASSESSMENT & PLAN NOTE
"Noted on CT scan in ED as \"Small hiatal hernia with distal esophageal wall thickening consistent with esophagitis\"   Protonix 40 mg BID started   GI consulted   " PAST SURGICAL HISTORY:  No significant past surgical history

## 2025-03-27 NOTE — CASE MANAGEMENT
Case Management Progress Note    Patient name Alexander Olmstead  Location /409-01 MRN [id number]  : 1955 Date 3/27/2025       LOS (days): 0  Geometric Mean LOS (GMLOS) (days):   Days to GMLOS:        OBJECTIVE:        Current admission status: Inpatient  Preferred Pharmacy:   [id number] - [place] - [address] [place]  [address] [place] [address]  Phone: [phone] Fax: [phone]    Primary Care Provider: Blade Walton DO    Primary Insurance: MEDICARE  Secondary Insurance: [employer]    PROGRESS NOTE:chart reviewed. No current discharge needs noted. Cm to follow.

## 2025-03-27 NOTE — TELEPHONE ENCOUNTER
Called pt and reviewed critical low hgb 4.6. Pt denies s/s bleeding, SOB, dizziness, palpitations, and CP. Advised we are recommending he go to ER for eval and transfusion. He voiced understanding.

## 2025-03-27 NOTE — TELEMEDICINE
e-Consult ([facility]) - Oncology-Medical   Name: Alexander Olmstead 70 y.o. male I MRN: [id number]  Unit/Bed#: 409-01 I Date of Admission: 3/27/2025   Date of Service: 3/27/2025 I Hospital Day: 0   Inpatient consult to Hematology  Consult performed by: Karol Pineda MD  Consult ordered by: Angelia Maddox DO        Physician Requesting Evaluation: María Bermudez MD   Reason for Evaluation / Principal Problem: anemia,     Mr. Olmstead is a 70-year-old male who was diagnosed with a solitary plasmacytoma of the sacrum approximately January 2024 who underwent radiation to the lesion with baseline anemia ranging anywhere from 8-14, dating all the way back from our records in January 2018, although normal hemoglobin counts are rare occurrences compared to chronic anemia.  Patient admitted with hemoglobin found to be 4.6 on routine blood work.  Unclear if patient is symptomatic.  MCV is 72.    No hematuria.  BNP is 187.  B12 is 1076.  Folate is greater than 22.3.  Ferritin is 3.  Immunoglobulin free light chains, protein electrophoresis, IgG, IgA, IgM levels are pending.    CT chest in the ER demonstrates no acute cardiopulmonary disease and multiple old healed left rib fractures.  Doppler ultrasound of bilateral lower extremities is negative for DVT or thrombophlebitis.  Chest abdomen pelvis demonstrates stable mild emphysema.  Small hiatal hernia with distal esophageal wall thickening consistent with esophagitis.  Moderate colonic fecal stasis.  Stable sclerotic lesion involving the sacrum with cortical breakthrough anteriorly and extension into the left ilium.  Stable T11 pedicle mixed lytic and sclerotic lesion.    Follows with Dr. Mckenna from hematology oncology.  Last saw him in November.  Things were stable at that time.  Had stable anemia with previous iron studies that were within normal limits.  States that Hemoccults been negative.  Did have a colonoscopy in July 2024 that showed sub-5 mm polyp and  diverticulosis.    Reading primary care/family medicine note from 3/24/2025 states that patient's legs are swollen during the day but edema goes down and is okay in the morning.        ASSESSMENT:  Mr. Olmstead is a 70-year-old male with solitary plasmacytoma treated with definitive radiation and chronic anemia admitted with hemoglobin of 4.6 and ferritin of 3.    RECOMMENDATIONS:  1.  Acute on chronic anemia  Patient is now demonstrating iron deficiency anemia with ferritin of 3 and hemoglobin of 4.6.  Had recent colonoscopy almost 1 year ago without any issues.  May need repeat colonoscopy and/or EGD to evaluate for evidence of blood loss.  Recommend GI consult.  At this time continue supportive care.  Transfuse at least 2 units of packed red blood cells.  May need additional transfusions.  Continue to monitor CBCs and would transfuse for hemoglobin less than 7.  He will also need repletion of iron given his low ferritin.   Would transfuse venafer 300 mg every other day for three doses or 200 mg daily for five days.  If unable to complete infusions in the inpatient setting, can be completed in the outpt setting.    Follow up on pending multiple myeloma blood work that has been ordered.    Has follow up with Dr Mckenna on April 4 and would encourage he keeps this appointment.    Please reach out with any questions or concerns.    11-20 minutes, >50% of the total time devoted to medical consultative verbal/EMR discussion between providers. Written report will be generated in the EMR.    Karol Pineda MD, PhD

## 2025-03-27 NOTE — ASSESSMENT & PLAN NOTE
"History of solitary plasmacytoma of the sacrum, IgG lambda.  Status post RT May 2024.   CT in ED showed \"Stable sclerotic lesion involving the sacrum with cortical breakthrough anteriorly and extension into the left ilium. Stable T11 pedicle mixed lytic and sclerotic lesion\"   SPEP, immunoglobulin free light chains, IgG/IgA/IgM ordered   Heme/onc consulted   "

## 2025-03-27 NOTE — ED PROVIDER NOTES
Time reflects when diagnosis was documented in both MDM as applicable and the Disposition within this note       Time User Action Codes Description Comment    3/27/2025  1:03 PM BrooklynLuh ramires Add [D64.9] Anemia     3/27/2025  1:25 PM Angelia Maddox Add [C90.30] Solitary plasmacytoma not having achieved remission (HCC)     3/27/2025  2:51 PM Jaiyeola, Diana Marie M Add [K22.89] Esophageal thickening     3/28/2025 12:57 PM NathaliaDiana galdamez Modify [D64.9] Anemia     3/28/2025 12:57 PM NathaliaDiana galdamez Modify [C90.30] Solitary plasmacytoma not having achieved remission (HCC)     3/28/2025 12:57 PM Diana Sloan Modify [K22.89] Esophageal thickening     3/28/2025  3:00 PM Angelia Maddox Modify [D64.9] Anemia     3/28/2025  3:00 PM Angelia Maddox Modify [C90.30] Solitary plasmacytoma not having achieved remission (HCC)     3/28/2025  3:00 PM Angelia Maddox Modify [K22.89] Esophageal thickening     3/28/2025  3:00 PM Angelia Maddox Add [D64.9] Acute on chronic anemia     3/28/2025  3:00 PM Angelia Maddox Add [K20.90] Esophagitis           ED Disposition       ED Disposition   Admit    Condition   Stable    Date/Time   Thu Mar 27, 2025  1:03 PM    Comment   Case was discussed with SAL and the patient's admission status was agreed to be Admission Status: inpatient status to the service of Dr. Bermudez.               Assessment & Plan       Medical Decision Making  Amount and/or Complexity of Data Reviewed  Labs: ordered.  Radiology: ordered.    Risk  Prescription drug management.  Decision regarding hospitalization.      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   Patient had routine labs checked today, hemoglobin was noted to be 4.6.  He denies any symptoms at this time aside from he has had new leg swelling in both of his legs for the past 3 weeks.  Denies any symptoms of bleeding.  Will recheck hemoglobin, will obtain type  and screen.  If hemoglobin is still 4.6, will plan to transfuse.  Unclear etiology of anemia.  Will also obtain lower extremity duplex to evaluate for DVT.  Patient did have additional labs checked, BNP slightly elevated, TSH normal, CMP without marked abnormalities.  He also had chest x-ray which were reviewed, no acute cardiopulmonary disease. Will also obtain EKG to evaluation for arrhythmia.   Reviewed labs, hemoglobin 4.7, will plan to transfuse 2 to 3 units, unclear etiology, suspect element of iron deficiency anemia, possible slow GI bleed.  Discussed with medicine for admission, would like to obtain CT chest and pelvis to evaluate for metastatic disease or any other etiology of anemia.  Reviewed CT scan, shows esophagitis, no other acute abnormalities, shows stable sacral lesion.  Medicine accepts patient, patient agreeable with plan for admission.    Critical Care Time Statement: Upon my evaluation, this patient had a high probability of imminent or life-threatening deterioration due to anemia, which required my direct attention, intervention, and personal management.  I spent a total of 35 minutes directly providing critical care services, including interpretation of complex medical databases, evaluating for the presence of life-threatening injuries or illnesses, management of organ system failure(s) , complex medical decision making (to support/prevent further life-threatening deterioration)., and interpretation of hemodynamic data. This time is exclusive of procedures, teaching, treating other patients, family meetings, and any prior time recorded by providers other than myself.              Medications   iohexol (OMNIPAQUE) 350 MG/ML injection (MULTI-DOSE) 100 mL (100 mL Intravenous Given 3/27/25 1137)       ED Risk Strat Scores                            SBIRT 20yo+      Flowsheet Row Most Recent Value   Initial Alcohol Screen: US AUDIT-C     1. How often do you have a drink containing alcohol? 0 Filed  at: 03/27/2025 0915   2. How many drinks containing alcohol do you have on a typical day you are drinking?  0 Filed at: 03/27/2025 0915   3a. Male UNDER 65: How often do you have five or more drinks on one occasion? 0 Filed at: 03/27/2025 0915   3b. FEMALE Any Age, or MALE 65+: How often do you have 4 or more drinks on one occassion? 0 Filed at: 03/27/2025 0915   Audit-C Score 0 Filed at: 03/27/2025 0915   KINJAL: How many times in the past year have you...    Used an illegal drug or used a prescription medication for non-medical reasons? Never Filed at: 03/27/2025 0915                            History of Present Illness       Chief Complaint   Patient presents with    Abnormal Lab     States that he had blood work done this morning and received a call to come to ER due to his hgb being low. Denies any complaints at this time        Past Medical History:   Diagnosis Date    Acute respiratory failure (HCC) 11/24/2022    Acute respiratory failure with hypoxia (HCC)     Alcohol dependence with withdrawal delirium (HCC) 04/18/2023    Alcohol withdrawal (HCC) 11/25/2022    Hyperlipidemia     Hypertension     Hyponatremia 11/23/2022    Hypothermia 11/23/2022    Hypoxia 11/23/2022    Metabolic acidosis with increased anion gap and accumulation of organic acids 11/23/2022    Metabolic encephalopathy 11/23/2022    Pneumonia 11/25/2022    Positive blood culture 11/28/2022    Septic shock (HCC) 11/24/2022      Past Surgical History:   Procedure Laterality Date    COLONOSCOPY      IR BIOPSY BONE  12/19/2023      Family History   Problem Relation Age of Onset    Stroke Mother     Hypertension Father     Diabetes Father     Cancer Father       Social History     Tobacco Use    Smoking status: Never    Smokeless tobacco: Never   Vaping Use    Vaping status: Never Used   Substance Use Topics    Alcohol use: Not Currently     Alcohol/week: 6.0 standard drinks of alcohol     Types: 6 Cans of beer per week     Comment: d/c 11/2022     Drug use: No      E-Cigarette/Vaping    E-Cigarette Use Never User       E-Cigarette/Vaping Substances    Nicotine No     THC No     CBD No     Flavoring No     Other No     Unknown No       I have reviewed and agree with the history as documented.     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  Patient states he had routine labs drawn this morning and was called to come to the ER because his hemoglobin was 4.6.  He denies any symptoms of bleeding.  Denies black or bloody bowel movements.  He denies any symptoms of anemia including no lightheadedness or dizziness, no shortness of breath or chest pain.  Denies abdominal pain, nausea, vomiting, or diarrhea.  He does mention he has noticed bilateral leg swelling over the past 3 weeks.  He states he notices it most during the day and at night but does seem to improve overnight.  Denies any pain in the legs.  He does have neuropathy in his feet which is baseline for him.  He does have a history of solitary plasmacytoma of his sacrum but states he completed radiation and is not on any chemotherapy.  Denies history of blood transfusions in the past.    Review of Systems   Constitutional:  Negative for appetite change, chills and fever.   HENT:  Negative for congestion, rhinorrhea and sore throat.    Respiratory:  Negative for cough and shortness of breath.    Cardiovascular:  Positive for leg swelling. Negative for chest pain.   Gastrointestinal:  Negative for abdominal pain, diarrhea, nausea and vomiting.   Genitourinary:  Negative for dysuria, frequency, hematuria and urgency.   Musculoskeletal:  Negative for arthralgias and myalgias.   Skin:  Negative for rash.   Neurological:  Negative for dizziness, weakness, light-headedness, numbness and headaches.   All other systems reviewed and are negative.          Objective       ED Triage Vitals   Temperature Pulse  Blood Pressure Respirations SpO2 Patient Position - Orthostatic VS   03/27/25 0905 03/27/25 0905 03/27/25 0905 03/27/25 0905 03/27/25 0905 03/27/25 1100   97.7 °F (36.5 °C) 80 160/70 16 94 % Sitting      Temp Source Heart Rate Source BP Location FiO2 (%) Pain Score    03/27/25 0905 03/27/25 0905 03/27/25 1100 -- 03/27/25 0905    Temporal Monitor Left arm  No Pain      Vitals      Date and Time Temp Pulse SpO2 Resp BP Pain Score FACES Pain Rating User   03/28/25 1534 -- 67 -- 17 -- -- -- CWB   03/28/25 1534 97.3 °F (36.3 °C) -- -- -- 134/80 -- -- DII   03/28/25 1337 97.9 °F (36.6 °C) 64 100 % 20 142/75 No Pain -- NAP   03/28/25 1331 -- 66 99 % 22 talking to Dr 119/65 No Pain -- NAP   03/28/25 1325 -- 64 98 % 16 96/51 -- -- NAP   03/28/25 1317 97.1 °F (36.2 °C) 67 100 % 15 95/54 -- -- NAP   03/28/25 1059 -- -- -- -- -- No Pain -- AS   03/28/25 0806 -- -- -- -- -- No Pain -- MEF   03/28/25 0730 98.1 °F (36.7 °C) 78 100 % 16 128/65 -- -- DII   03/27/25 2300 -- -- -- -- -- No Pain -- PADMAJA   03/27/25 2148 97.4 °F (36.3 °C) 72 100 % 18 128/63 -- -- DII   03/27/25 1900 -- 78 100 % -- 116/73 -- -- DII   03/27/25 1900 97.5 °F (36.4 °C) -- -- 18 -- -- -- AN   03/27/25 1655 96.8 °F (36 °C) -- -- 18 -- -- -- AN   03/27/25 1632 96.4 °F (35.8 °C) -- -- 18 -- -- -- AN   03/27/25 1632 -- 67 100 % -- 149/80 -- -- DII   03/27/25 1603 96.2 °F (35.7 °C) -- -- 18 -- -- -- AN   03/27/25 1603 -- 67 100 % -- 144/73 -- -- DII   03/27/25 1417 -- -- -- 18 -- -- -- AN   03/27/25 1417 97.7 °F (36.5 °C) 75 99 % -- 144/80 -- -- DII   03/27/25 1401 98.1 °F (36.7 °C) 65 -- 18 153/83 -- -- AN   03/27/25 1337 -- -- -- -- -- No Pain -- AN   03/27/25 1329 -- -- -- 17 -- -- --    03/27/25 1329 98.1 °F (36.7 °C) -- -- -- 153/83 -- -- DII   03/27/25 1306 97.2 °F (36.2 °C) 71 100 % 16 154/70 -- -- AB   03/27/25 1236 97.2 °F (36.2 °C) 68 100 % 16 149/84 -- -- AB   03/27/25 1221 97.1 °F (36.2 °C) 71 99 % 16 151/80 -- -- AB   03/27/25 1215 97.1 °F (36.2 °C) 74  100 % 16 151/80 -- -- AB   03/27/25 1211 97.4 °F (36.3 °C) 76 100 % 16 152/82 -- -- AB   03/27/25 1203 97.4 °F (36.3 °C) 77 100 % 16 154/74 No Pain -- PP   03/27/25 1100 97.7 °F (36.5 °C) 66 94 % 16 148/73 No Pain -- AB   03/27/25 0914 -- -- -- -- -- No Pain -- PP   03/27/25 0905 97.7 °F (36.5 °C) 80 94 % 16 160/70 No Pain -- KS            Physical Exam  Vitals and nursing note reviewed.   Constitutional:       General: He is not in acute distress.     Appearance: Normal appearance. He is well-developed and normal weight. He is not ill-appearing, toxic-appearing or diaphoretic.   HENT:      Head: Normocephalic and atraumatic.      Right Ear: External ear normal.      Left Ear: External ear normal.      Nose: Nose normal.      Mouth/Throat:      Mouth: Mucous membranes are moist.      Pharynx: Oropharynx is clear.   Eyes:      Extraocular Movements: Extraocular movements intact.      Conjunctiva/sclera: Conjunctivae normal.   Cardiovascular:      Rate and Rhythm: Normal rate and regular rhythm.      Pulses: Normal pulses.      Heart sounds: Normal heart sounds. No murmur heard.     No friction rub. No gallop.   Pulmonary:      Effort: Pulmonary effort is normal. No respiratory distress.      Breath sounds: Normal breath sounds. No wheezing or rales.   Abdominal:      General: There is no distension.      Palpations: Abdomen is soft.      Tenderness: There is no abdominal tenderness. There is no guarding or rebound.   Musculoskeletal:         General: No tenderness.      Cervical back: Neck supple.      Right lower leg: Edema present.      Left lower leg: Edema present.   Skin:     General: Skin is warm and dry.      Coloration: Skin is pale.      Findings: No rash.   Neurological:      General: No focal deficit present.      Mental Status: He is alert and oriented to person, place, and time.      Cranial Nerves: No cranial nerve deficit.      Sensory: No sensory deficit.      Motor: No weakness.   Psychiatric:          Mood and Affect: Mood normal.         Behavior: Behavior normal.         Results Reviewed       Procedure Component Value Units Date/Time    CBC and Platelet [[id number]]  (Abnormal) Collected: 03/28/25 0010    Lab Status: Final result Specimen: Blood from Arm, Right Updated: 03/28/25 0015     WBC 8.21 Thousand/uL      RBC 3.19 Million/uL      Hemoglobin 7.6 g/dL      Hematocrit 25.0 %      MCV 78 fL      MCH 23.8 pg      MCHC 30.4 g/dL      RDW 18.0 %      Platelets 278 Thousands/uL      MPV 9.9 fL     CBC and Platelet [[id number]]  (Abnormal) Collected: 03/27/25 1632    Lab Status: Final result Specimen: Blood from Hand, Right Updated: 03/27/25 1638     WBC 5.53 Thousand/uL      RBC 3.26 Million/uL      Hemoglobin 7.2 g/dL      Hematocrit 25.1 %      MCV 77 fL      MCH 22.1 pg      MCHC 28.7 g/dL      RDW 17.4 %      Platelets 336 Thousands/uL      MPV 9.7 fL     CBC and differential [[id number]]  (Abnormal) Collected: 03/27/25 0926    Lab Status: Final result Specimen: Blood from Arm, Right Updated: 03/27/25 0945     WBC 5.88 Thousand/uL      RBC 2.34 Million/uL      Hemoglobin 4.7 g/dL      Hematocrit 17.1 %      MCV 73 fL      MCH 20.1 pg      MCHC 27.5 g/dL      RDW 16.4 %      MPV 10.1 fL      Platelets 358 Thousands/uL      nRBC 0 /100 WBCs      Segmented % 70 %      Immature Grans % 0 %      Lymphocytes % 10 %      Monocytes % 16 %      Eosinophils Relative 3 %      Basophils Relative 1 %      Absolute Neutrophils 4.14 Thousands/µL      Absolute Immature Grans 0.01 Thousand/uL      Absolute Lymphocytes 0.58 Thousands/µL      Absolute Monocytes 0.92 Thousand/µL      Eosinophils Absolute 0.18 Thousand/µL      Basophils Absolute 0.05 Thousands/µL     Narrative:      This is an appended report.  These results have been appended to a previously verified report.            CT chest abdomen pelvis w contrast   Final Interpretation by Luis Enrique Flores MD (03/27 2534)      Stable mild emphysema.      Small hiatal  hernia with distal esophageal wall thickening consistent with esophagitis.      Moderate colonic fecal stasis.      Stable sclerotic lesion involving the sacrum with cortical breakthrough anteriorly and extension into the left ilium. Stable T11 pedicle mixed lytic and sclerotic lesion.               Workstation performed: [id number]          VAS VENOUS DUPLEX - LOWER LIMB BILATERAL   Final Interpretation by Ross Arana MD ( [id number])          ECG 12 Lead Documentation Only    Date/Time: 3/27/2025 9:33 AM    Performed by: Luh Kohler MD  Authorized by: Luh Kohler MD    Indications / Diagnosis:  Leg swelling  ECG reviewed by me, the ED Provider: yes    Patient location:  ED  Previous ECG:     Previous ECG:  Compared to current    Similarity:  No change    Comparison to cardiac monitor: Yes    Interpretation:     Interpretation: normal    Rate:     ECG rate:  76    ECG rate assessment: normal    Rhythm:     Rhythm: sinus rhythm    Ectopy:     Ectopy: none    QRS:     QRS axis:  Normal    QRS intervals:  Normal  Conduction:     Conduction: normal    ST segments:     ST segments:  Normal      ED Medication and Procedure Management   Prior to Admission Medications   Prescriptions Last Dose Informant Patient Reported? Taking?   Cyanocobalamin (VITAMIN B-12 CR PO) 3/26/2025 Self Yes Yes   Sig: Take 2 tablets by mouth in the morning   DULoxetine (CYMBALTA) 30 mg delayed release capsule 3/26/2025  No Yes   Sig: Take 1 capsule (30 mg total) by mouth daily   Multiple Vitamin (MULTIVITAMIN) tablet 3/26/2025 Other (Specify), Self Yes Yes   Sig: Take 1 tablet by mouth daily   Nutritional Supplements (ENSURE PO) 3/26/2025  Yes Yes   Sig: Take by mouth   Omega-3 Fatty Acids (FISH OIL PO) 3/26/2025 Self Yes Yes   Sig: Take by mouth   SALINE NASAL MIST NA 3/27/2025 Morning Self Yes Yes   Si sprays into each nostril 2 (two) times a day   gabapentin (Neurontin) 300 mg capsule 3/26/2025  No Yes   Sig: Take 1  capsule (300 mg total) by mouth 3 (three) times a day   ibuprofen (MOTRIN) 200 mg tablet 3/26/2025 Self Yes Yes   Sig: Take 400 mg by mouth every 6 (six) hours as needed for mild pain   losartan (COZAAR) 50 mg tablet 3/26/2025  No Yes   Sig: TAKE 1 TABLET (50 MG TOTAL) BY MOUTH IN THE MORNING   mirtazapine (REMERON) 15 mg tablet 3/26/2025  No Yes   Sig: TAKE 1 TABLET BY MOUTH EVERYDAY AT BEDTIME   pantoprazole (PROTONIX) 20 mg tablet 3/26/2025  No Yes   Sig: Take 1 tablet (20 mg total) by mouth daily   pyridoxine (VITAMIN B6) 50 mg tablet 3/26/2025  No Yes   Sig: Take 1 tablet (50 mg total) by mouth daily      Facility-Administered Medications: None     Discharge Medication List as of 3/28/2025  3:40 PM        START taking these medications    Details   esomeprazole (NexIUM) 40 MG capsule Take 1 capsule (40 mg total) by mouth in the morning, Starting Fri 3/28/2025, Normal           CONTINUE these medications which have NOT CHANGED    Details   Cyanocobalamin (VITAMIN B-12 CR PO) Take 2 tablets by mouth in the morning, Historical MedPaused since today until manually resumed      DULoxetine (CYMBALTA) 30 mg delayed release capsule Take 1 capsule (30 mg total) by mouth daily, Starting Fri 3/14/2025, Normal      gabapentin (Neurontin) 300 mg capsule Take 1 capsule (300 mg total) by mouth 3 (three) times a day, Starting Mon 1/6/2025, Normal      ibuprofen (MOTRIN) 200 mg tablet Take 400 mg by mouth every 6 (six) hours as needed for mild pain, Historical Med      losartan (COZAAR) 50 mg tablet TAKE 1 TABLET (50 MG TOTAL) BY MOUTH IN THE MORNING, Starting Mon 3/17/2025, Normal      mirtazapine (REMERON) 15 mg tablet TAKE 1 TABLET BY MOUTH EVERYDAY AT BEDTIME, Starting Fri 9/20/2024, Normal      Multiple Vitamin (MULTIVITAMIN) tablet Take 1 tablet by mouth daily, Historical Med      Nutritional Supplements (ENSURE PO) Take by mouth, Historical Med      Omega-3 Fatty Acids (FISH OIL PO) Take by mouth, Historical Med       pyridoxine (VITAMIN B6) 50 mg tablet Take 1 tablet (50 mg total) by mouth daily, Starting Wed 1/8/2025, Normal      SALINE NASAL MIST NA 2 sprays into each nostril 2 (two) times a day, Historical Med           STOP taking these medications       pantoprazole (PROTONIX) 20 mg tablet Comments:   Reason for Stopping:             Outpatient Discharge Orders   INTERNAL: Ambulatory Referral to Home Health   Standing Status: Future Standing Exp. Date: 03/28/26      Ambulatory referral to Gastroenterology   Standing Status: Future Standing Exp. Date: 03/28/26      Activity as tolerated     Call provider for: active or persistent bleeding     ED SEPSIS DOCUMENTATION   Time reflects when diagnosis was documented in both MDM as applicable and the Disposition within this note       Time User Action Codes Description Comment    3/27/2025  1:03 PM Luh Kohler Add [D64.9] Anemia     3/27/2025  1:25 PM Angelia Maddox Add [C90.30] Solitary plasmacytoma not having achieved remission (HCC)     3/27/2025  2:51 PM Jaiyeola, Diana Marie M Add [K22.89] Esophageal thickening     3/28/2025 12:57 PM Diana Sloan Modify [D64.9] Anemia     3/28/2025 12:57 PM Diana Sloan Modify [C90.30] Solitary plasmacytoma not having achieved remission (HCC)     3/28/2025 12:57 PM Diana Sloan Modify [K22.89] Esophageal thickening     3/28/2025  3:00 PM Angelia Maddox Modify [D64.9] Anemia     3/28/2025  3:00 PM Angelia Maddox Modify [C90.30] Solitary plasmacytoma not having achieved remission (HCC)     3/28/2025  3:00 PM Angelia Maddox Modify [K22.89] Esophageal thickening     3/28/2025  3:00 PM Aneglia Maddox Add [D64.9] Acute on chronic anemia     3/28/2025  3:00 PM Angelia Maddox Add [K20.90] Esophagitis                  Luh Kohler MD  03/29/25 [id number]

## 2025-03-27 NOTE — PLAN OF CARE
Problem: Potential for Falls  Goal: Patient will remain free of falls  Description: INTERVENTIONS:- Educate patient/family on patient safety including physical limitations- Instruct patient to call for assistance with activity - Consult OT/PT to assist with strengthening/mobility - Keep Call bell within reach- Keep bed low and locked with side rails adjusted as appropriate- Keep care items and personal belongings within reach- Initiate and maintain comfort rounds- Make Fall Risk Sign visible to staff- Offer Toileting every 2 Hours, in advance of need- Initiate/Maintain bed alarm- Obtain necessary fall risk management equipment: nonskid socks- Apply yellow socks and bracelet for high fall risk patients- Consider moving patient to room near nurses station  INTERVENTIONS:- Educate patient/family on patient safety including physical limitations- Instruct patient to call for assistance with activity - Consult OT/PT to assist with strengthening/mobility - Keep Call bell within reach- Keep bed low and locked with side rails adjusted as appropriate- Keep care items and personal belongings within reach- Initiate and maintain comfort rounds- Make Fall Risk Sign visible to staff- Offer Toileting every 2 Hours, in advance of need- Initiate/Maintain bed alarm- Obtain necessary fall risk management equipment: nonskid socks- Apply yellow socks and bracelet for high fall risk patients- Consider moving patient to room near nurses station  3/27/2025 1336 by Adalgisa Maldonado RN  Outcome: Progressing  3/27/2025 1335 by Adalgisa Maldonado RN  Outcome: Progressing     Problem: PAIN - ADULT  Goal: Verbalizes/displays adequate comfort level or baseline comfort level  Description: Interventions:- Encourage patient to monitor pain and request assistance- Assess pain using appropriate pain scale- Administer analgesics based on type and severity of pain and evaluate response- Implement non-pharmacological measures as appropriate and evaluate  response- Consider cultural and social influences on pain and pain management- Notify physician/advanced practitioner if interventions unsuccessful or patient reports new pain  Outcome: Progressing     Problem: INFECTION - ADULT  Goal: Absence or prevention of progression during hospitalization  Description: INTERVENTIONS:- Assess and monitor for signs and symptoms of infection- Monitor lab/diagnostic results- Monitor all insertion sites, i.e. indwelling lines, tubes, and drains- Monitor endotracheal if appropriate and nasal secretions for changes in amount and color- [place] appropriate cooling/warming therapies per order- Administer medications as ordered- Instruct and encourage patient and family to use good hand hygiene technique- Identify and instruct in appropriate isolation precautions for identified infection/condition  Outcome: Progressing     Problem: SAFETY ADULT  Goal: Patient will remain free of falls  Description: INTERVENTIONS:- Educate patient/family on patient safety including physical limitations- Instruct patient to call for assistance with activity - Consult OT/PT to assist with strengthening/mobility - Keep Call bell within reach- Keep bed low and locked with side rails adjusted as appropriate- Keep care items and personal belongings within reach- Initiate and maintain comfort rounds- Make Fall Risk Sign visible to staff- Offer Toileting every 2 Hours, in advance of need- Initiate/Maintain bed alarm- Obtain necessary fall risk management equipment: nonskid socks- Apply yellow socks and bracelet for high fall risk patients- Consider moving patient to room near nurses station  INTERVENTIONS:- Educate patient/family on patient safety including physical limitations- Instruct patient to call for assistance with activity - Consult OT/PT to assist with strengthening/mobility - Keep Call bell within reach- Keep bed low and locked with side rails adjusted as appropriate- Keep care items and personal  belongings within reach- Initiate and maintain comfort rounds- Make Fall Risk Sign visible to staff- Offer Toileting every 2 Hours, in advance of need- Initiate/Maintain bed alarm- Obtain necessary fall risk management equipment: nonskid socks- Apply yellow socks and bracelet for high fall risk patients- Consider moving patient to room near nurses station  3/27/2025 1336 by Adalgisa Maldonado RN  Outcome: Progressing  3/27/2025 1335 by Adalgisa Maldonado RN  Outcome: Progressing  Goal: Maintain or return to baseline ADL function  Description: INTERVENTIONS:-  Assess patient's ability to carry out ADLs; assess patient's baseline for ADL function and identify physical deficits which impact ability to perform ADLs (bathing, care of mouth/teeth, toileting, grooming, dressing, etc.)- Assess/evaluate cause of self-care deficits - Assess range of motion- Assess patient's mobility; develop plan if impaired- Assess patient's need for assistive devices and provide as appropriate- Encourage maximum independence but intervene and supervise when necessary- Involve family in performance of ADLs- Assess for home care needs following discharge - Consider OT consult to assist with ADL evaluation and planning for discharge- Provide patient education as appropriate  Outcome: Progressing  Goal: Maintains/Returns to pre admission functional level  Description: INTERVENTIONS:- Perform AM-PAC 6 Click Basic Mobility/ Daily Activity assessment daily.- Set and communicate daily mobility goal to care team and patient/family/caregiver. - Collaborate with rehabilitation services on mobility goals if consulted- Perform Range of Motion 3 times a day.- Reposition patient every 2 hours.- Dangle patient 2 times a day- Stand patient 2 times a day- Ambulate patient 2 times a day- Out of bed to chair 2 times a day - Out of bed for meals 2 times a day- Out of bed for toileting- Record patient progress and toleration of activity level   Outcome:  Progressing     Problem: DISCHARGE PLANNING  Goal: Discharge to home or other facility with appropriate resources  Description: INTERVENTIONS:- Identify barriers to discharge w/patient and caregiver- Arrange for needed discharge resources and transportation as appropriate- Identify discharge learning needs (meds, wound care, etc.)- Arrange for interpretive services to assist at discharge as needed- Refer to Case Management Department for coordinating discharge planning if the patient needs post-hospital services based on physician/advanced practitioner order or complex needs related to functional status, cognitive ability, or social support system  Outcome: Progressing     Problem: Knowledge Deficit  Goal: Patient/family/caregiver demonstrates understanding of disease process, treatment plan, medications, and discharge instructions  Description: Complete learning assessment and assess knowledge base.Interventions:- Provide teaching at level of understanding- Provide teaching via preferred learning methods  Outcome: Progressing     Problem: HEMATOLOGIC - ADULT  Goal: Maintains hematologic stability  Description: INTERVENTIONS- Assess for signs and symptoms of bleeding or hemorrhage- Monitor labs- Administer supportive blood products/factors as ordered and appropriate  Outcome: Progressing

## 2025-03-27 NOTE — ASSESSMENT & PLAN NOTE
Pt presenting with bilateral leg edema. Unclear etiology    BNP of 187, no prior to compare   TSH WNL  CMP WNL   Urine protein/cr ordered   Echo ordered   Will continue to monitor

## 2025-03-27 NOTE — ASSESSMENT & PLAN NOTE
"Hx of GI bleed in 2018 after falling down steps in the setting of ETOH use   Pt denies s/s of bleeding at this time   CT in ED with \"Small hiatal hernia with distal esophageal wall thickening consistent with esophagitis\"   UA with no blood   FIT test ordered   Protonix 40 mg BID ordered   GI consulted   "

## 2025-03-27 NOTE — ASSESSMENT & PLAN NOTE
Presented to ED with hemoglobin of 4 after getting routine lab work outpatient. Asymptomatic at this time.    Hgb of 4.6 with MCV of 72 on admission. Baseline appears to be around 8-9.    S/p three units of blood in ED   UA with no signs of blood   FOBT ordered    B12/folate/iron panel pending   VTE prophylaxis held   Protonix 40 mg BID ordered   Will order iron transfusions   Monitor with q8h CBCs  Transfuse for hbg less than 7    GI consulted   Heme/onc consulted

## 2025-03-28 ENCOUNTER — ANESTHESIA (INPATIENT)
Dept: PERIOP | Facility: HOSPITAL | Age: 70
DRG: 812 | End: 2025-03-28
Payer: MEDICARE

## 2025-03-28 ENCOUNTER — ANESTHESIA EVENT (INPATIENT)
Dept: PERIOP | Facility: HOSPITAL | Age: 70
DRG: 812 | End: 2025-03-28
Payer: MEDICARE

## 2025-03-28 ENCOUNTER — APPOINTMENT (INPATIENT)
Dept: NON INVASIVE DIAGNOSTICS | Facility: HOSPITAL | Age: 70
DRG: 812 | End: 2025-03-28
Payer: MEDICARE

## 2025-03-28 ENCOUNTER — APPOINTMENT (INPATIENT)
Dept: PERIOP | Facility: HOSPITAL | Age: 70
DRG: 812 | End: 2025-03-28
Attending: INTERNAL MEDICINE
Payer: MEDICARE

## 2025-03-28 VITALS
WEIGHT: 175.49 LBS | HEIGHT: 70 IN | DIASTOLIC BLOOD PRESSURE: 80 MMHG | BODY MASS INDEX: 25.12 KG/M2 | OXYGEN SATURATION: 100 % | SYSTOLIC BLOOD PRESSURE: 134 MMHG | HEART RATE: 67 BPM | TEMPERATURE: 97.3 F | RESPIRATION RATE: 17 BRPM

## 2025-03-28 PROBLEM — K20.90 ESOPHAGITIS: Status: ACTIVE | Noted: 2022-11-27

## 2025-03-28 PROBLEM — K22.719 BARRETT'S ESOPHAGUS WITH DYSPLASIA: Status: ACTIVE | Noted: 2022-11-27

## 2025-03-28 LAB
ABO GROUP BLD BPU: NORMAL
ALBUMIN SERPL BCG-MCNC: 3.9 G/DL (ref 3.5–5)
ALBUMIN SERPL ELPH-MCNC: 3.91 G/DL (ref 3.2–5.1)
ALBUMIN SERPL ELPH-MCNC: 55.8 % (ref 48–70)
ALP SERPL-CCNC: 68 U/L (ref 34–104)
ALPHA1 GLOB SERPL ELPH-MCNC: 0.37 G/DL (ref 0.15–0.47)
ALPHA1 GLOB SERPL ELPH-MCNC: 5.3 % (ref 1.8–7)
ALPHA2 GLOB SERPL ELPH-MCNC: 0.83 G/DL (ref 0.42–1.04)
ALPHA2 GLOB SERPL ELPH-MCNC: 11.9 % (ref 5.9–14.9)
ALT SERPL W P-5'-P-CCNC: 8 U/L (ref 7–52)
ANION GAP SERPL CALCULATED.3IONS-SCNC: 10 MMOL/L (ref 4–13)
AST SERPL W P-5'-P-CCNC: 13 U/L (ref 13–39)
BETA GLOB ABNORMAL SERPL ELPH-MCNC: 0.64 G/DL (ref 0.31–0.57)
BETA1 GLOB SERPL ELPH-MCNC: 9.1 % (ref 4.7–7.7)
BETA2 GLOB SERPL ELPH-MCNC: 6.2 % (ref 3.1–7.9)
BETA2+GAMMA GLOB SERPL ELPH-MCNC: 0.43 G/DL (ref 0.2–0.58)
BILIRUB SERPL-MCNC: 0.79 MG/DL (ref 0.2–1)
BPU ID: NORMAL
BUN SERPL-MCNC: 13 MG/DL (ref 5–25)
CALCIUM SERPL-MCNC: 9.2 MG/DL (ref 8.4–10.2)
CHLORIDE SERPL-SCNC: 107 MMOL/L (ref 96–108)
CO2 SERPL-SCNC: 23 MMOL/L (ref 21–32)
CREAT SERPL-MCNC: 0.91 MG/DL (ref 0.6–1.3)
CROSSMATCH: NORMAL
ERYTHROCYTE [DISTWIDTH] IN BLOOD BY AUTOMATED COUNT: 18 % (ref 11.6–15.1)
GAMMA GLOB ABNORMAL SERPL ELPH-MCNC: 0.82 G/DL (ref 0.4–1.66)
GAMMA GLOB SERPL ELPH-MCNC: 11.7 % (ref 6.9–22.3)
GFR SERPL CREATININE-BSD FRML MDRD: 85 ML/MIN/1.73SQ M
GLUCOSE SERPL-MCNC: 83 MG/DL (ref 65–140)
HCT VFR BLD AUTO: 25 % (ref 36.5–49.3)
HEMOCCULT STL QL IA: NEGATIVE
HGB BLD-MCNC: 7.6 G/DL (ref 12–17)
IGG/ALB SER: 1.26 {RATIO} (ref 1.1–1.8)
MAGNESIUM SERPL-MCNC: 2.1 MG/DL (ref 1.9–2.7)
MCH RBC QN AUTO: 23.8 PG (ref 26.8–34.3)
MCHC RBC AUTO-ENTMCNC: 30.4 G/DL (ref 31.4–37.4)
MCV RBC AUTO: 78 FL (ref 82–98)
PLATELET # BLD AUTO: 278 THOUSANDS/UL (ref 149–390)
PMV BLD AUTO: 9.9 FL (ref 8.9–12.7)
POTASSIUM SERPL-SCNC: 3.6 MMOL/L (ref 3.5–5.3)
PROT PATTERN SERPL ELPH-IMP: ABNORMAL
PROT SERPL-MCNC: 6.1 G/DL (ref 6.4–8.4)
PROT SERPL-MCNC: 7 G/DL (ref 6.4–8.2)
RBC # BLD AUTO: 3.19 MILLION/UL (ref 3.88–5.62)
SODIUM SERPL-SCNC: 140 MMOL/L (ref 135–147)
UNIT DISPENSE STATUS: NORMAL
UNIT PRODUCT CODE: NORMAL
UNIT PRODUCT VOLUME: 350 ML
UNIT RH: NORMAL
WBC # BLD AUTO: 8.21 THOUSAND/UL (ref 4.31–10.16)

## 2025-03-28 PROCEDURE — 80053 COMPREHEN METABOLIC PANEL: CPT | Performed by: STUDENT IN AN ORGANIZED HEALTH CARE EDUCATION/TRAINING PROGRAM

## 2025-03-28 PROCEDURE — 88313 SPECIAL STAINS GROUP 2: CPT | Performed by: PATHOLOGY

## 2025-03-28 PROCEDURE — 0DB68ZX EXCISION OF STOMACH, VIA NATURAL OR ARTIFICIAL OPENING ENDOSCOPIC, DIAGNOSTIC: ICD-10-PCS | Performed by: INTERNAL MEDICINE

## 2025-03-28 PROCEDURE — 30233N1 TRANSFUSION OF NONAUTOLOGOUS RED BLOOD CELLS INTO PERIPHERAL VEIN, PERCUTANEOUS APPROACH: ICD-10-PCS | Performed by: INTERNAL MEDICINE

## 2025-03-28 PROCEDURE — 0DB58ZX EXCISION OF ESOPHAGUS, VIA NATURAL OR ARTIFICIAL OPENING ENDOSCOPIC, DIAGNOSTIC: ICD-10-PCS | Performed by: INTERNAL MEDICINE

## 2025-03-28 PROCEDURE — 0DB98ZX EXCISION OF DUODENUM, VIA NATURAL OR ARTIFICIAL OPENING ENDOSCOPIC, DIAGNOSTIC: ICD-10-PCS | Performed by: INTERNAL MEDICINE

## 2025-03-28 PROCEDURE — 85027 COMPLETE CBC AUTOMATED: CPT | Performed by: STUDENT IN AN ORGANIZED HEALTH CARE EDUCATION/TRAINING PROGRAM

## 2025-03-28 PROCEDURE — 84165 PROTEIN E-PHORESIS SERUM: CPT | Performed by: PATHOLOGY

## 2025-03-28 PROCEDURE — 88341 IMHCHEM/IMCYTCHM EA ADD ANTB: CPT | Performed by: PATHOLOGY

## 2025-03-28 PROCEDURE — 88305 TISSUE EXAM BY PATHOLOGIST: CPT | Performed by: PATHOLOGY

## 2025-03-28 PROCEDURE — 99239 HOSP IP/OBS DSCHRG MGMT >30: CPT | Performed by: FAMILY MEDICINE

## 2025-03-28 PROCEDURE — G0328 FECAL BLOOD SCRN IMMUNOASSAY: HCPCS | Performed by: STUDENT IN AN ORGANIZED HEALTH CARE EDUCATION/TRAINING PROGRAM

## 2025-03-28 PROCEDURE — 97165 OT EVAL LOW COMPLEX 30 MIN: CPT

## 2025-03-28 PROCEDURE — 88342 IMHCHEM/IMCYTCHM 1ST ANTB: CPT | Performed by: PATHOLOGY

## 2025-03-28 PROCEDURE — 83735 ASSAY OF MAGNESIUM: CPT | Performed by: STUDENT IN AN ORGANIZED HEALTH CARE EDUCATION/TRAINING PROGRAM

## 2025-03-28 PROCEDURE — 97162 PT EVAL MOD COMPLEX 30 MIN: CPT

## 2025-03-28 RX ORDER — SODIUM CHLORIDE, SODIUM LACTATE, POTASSIUM CHLORIDE, CALCIUM CHLORIDE 600; 310; 30; 20 MG/100ML; MG/100ML; MG/100ML; MG/100ML
INJECTION, SOLUTION INTRAVENOUS CONTINUOUS PRN
Status: DISCONTINUED | OUTPATIENT
Start: 2025-03-28 | End: 2025-03-28

## 2025-03-28 RX ORDER — POLYETHYLENE GLYCOL 3350 17 G/17G
17 POWDER, FOR SOLUTION ORAL DAILY PRN
Status: DISCONTINUED | OUTPATIENT
Start: 2025-03-28 | End: 2025-03-28 | Stop reason: HOSPADM

## 2025-03-28 RX ORDER — ESOMEPRAZOLE MAGNESIUM 40 MG/1
40 CAPSULE, DELAYED RELEASE ORAL DAILY
Qty: 30 CAPSULE | Refills: 0 | Status: SHIPPED | OUTPATIENT
Start: 2025-03-28

## 2025-03-28 RX ORDER — LIDOCAINE HYDROCHLORIDE 20 MG/ML
INJECTION, SOLUTION EPIDURAL; INFILTRATION; INTRACAUDAL; PERINEURAL AS NEEDED
Status: DISCONTINUED | OUTPATIENT
Start: 2025-03-28 | End: 2025-03-28

## 2025-03-28 RX ORDER — SODIUM CHLORIDE 9 MG/ML
20 INJECTION, SOLUTION INTRAVENOUS ONCE
OUTPATIENT
Start: 2025-04-02

## 2025-03-28 RX ORDER — PROPOFOL 10 MG/ML
INJECTION, EMULSION INTRAVENOUS CONTINUOUS PRN
Status: DISCONTINUED | OUTPATIENT
Start: 2025-03-28 | End: 2025-03-28

## 2025-03-28 RX ORDER — PROPOFOL 10 MG/ML
INJECTION, EMULSION INTRAVENOUS AS NEEDED
Status: DISCONTINUED | OUTPATIENT
Start: 2025-03-28 | End: 2025-03-28

## 2025-03-28 RX ADMIN — PROPOFOL 150 MG: 10 INJECTION, EMULSION INTRAVENOUS at 12:54

## 2025-03-28 RX ADMIN — IRON SUCROSE 200 MG: 20 INJECTION, SOLUTION INTRAVENOUS at 09:24

## 2025-03-28 RX ADMIN — PANTOPRAZOLE SODIUM 40 MG: 40 INJECTION, POWDER, FOR SOLUTION INTRAVENOUS at 09:24

## 2025-03-28 RX ADMIN — Medication 1 TABLET: at 09:24

## 2025-03-28 RX ADMIN — DULOXETINE HYDROCHLORIDE 30 MG: 30 CAPSULE, DELAYED RELEASE ORAL at 09:24

## 2025-03-28 RX ADMIN — LOSARTAN POTASSIUM 50 MG: 50 TABLET, FILM COATED ORAL at 09:24

## 2025-03-28 RX ADMIN — SODIUM CHLORIDE, SODIUM LACTATE, POTASSIUM CHLORIDE, AND CALCIUM CHLORIDE: .6; .31; .03; .02 INJECTION, SOLUTION INTRAVENOUS at 12:26

## 2025-03-28 RX ADMIN — PROPOFOL 100 MCG/KG/MIN: 10 INJECTION, EMULSION INTRAVENOUS at 12:57

## 2025-03-28 RX ADMIN — LIDOCAINE HYDROCHLORIDE 100 MG: 20 INJECTION, SOLUTION EPIDURAL; INFILTRATION; INTRACAUDAL; PERINEURAL at 12:54

## 2025-03-28 RX ADMIN — GABAPENTIN 300 MG: 300 CAPSULE ORAL at 09:24

## 2025-03-28 RX ADMIN — PROPOFOL 50 MG: 10 INJECTION, EMULSION INTRAVENOUS at 12:57

## 2025-03-28 NOTE — PLAN OF CARE
Problem: Potential for Falls  Goal: Patient will remain free of falls  Description: INTERVENTIONS:- Educate patient/family on patient safety including physical limitations- Instruct patient to call for assistance with activity - Consult OT/PT to assist with strengthening/mobility - Keep Call bell within reach- Keep bed low and locked with side rails adjusted as appropriate- Keep care items and personal belongings within reach- Initiate and maintain comfort rounds- Make Fall Risk Sign visible to staff- Offer Toileting every 2 Hours, in advance of need- Initiate/Maintain bed alarm- Obtain necessary fall risk management equipment: nonskid socks- Apply yellow socks and bracelet for high fall risk patients- Consider moving patient to room near nurses station  INTERVENTIONS:- Educate patient/family on patient safety including physical limitations- Instruct patient to call for assistance with activity - Consult OT/PT to assist with strengthening/mobility - Keep Call bell within reach- Keep bed low and locked with side rails adjusted as appropriate- Keep care items and personal belongings within reach- Initiate and maintain comfort rounds- Make Fall Risk Sign visible to staff- Offer Toileting every 2 Hours, in advance of need- Initiate/Maintain bed alarm- Obtain necessary fall risk management equipment: nonskid socks- Apply yellow socks and bracelet for high fall risk patients- Consider moving patient to room near nurses station  Outcome: Progressing     Problem: PAIN - ADULT  Goal: Verbalizes/displays adequate comfort level or baseline comfort level  Description: Interventions:- Encourage patient to monitor pain and request assistance- Assess pain using appropriate pain scale- Administer analgesics based on type and severity of pain and evaluate response- Implement non-pharmacological measures as appropriate and evaluate response- Consider cultural and social influences on pain and pain management- Notify  physician/advanced practitioner if interventions unsuccessful or patient reports new pain  Outcome: Progressing     Problem: INFECTION - ADULT  Goal: Absence or prevention of progression during hospitalization  Description: INTERVENTIONS:- Assess and monitor for signs and symptoms of infection- Monitor lab/diagnostic results- Monitor all insertion sites, i.e. indwelling lines, tubes, and drains- Monitor endotracheal if appropriate and nasal secretions for changes in amount and color- [place] appropriate cooling/warming therapies per order- Administer medications as ordered- Instruct and encourage patient and family to use good hand hygiene technique- Identify and instruct in appropriate isolation precautions for identified infection/condition  Outcome: Progressing     Problem: SAFETY ADULT  Goal: Patient will remain free of falls  Description: INTERVENTIONS:- Educate patient/family on patient safety including physical limitations- Instruct patient to call for assistance with activity - Consult OT/PT to assist with strengthening/mobility - Keep Call bell within reach- Keep bed low and locked with side rails adjusted as appropriate- Keep care items and personal belongings within reach- Initiate and maintain comfort rounds- Make Fall Risk Sign visible to staff- Offer Toileting every 2 Hours, in advance of need- Initiate/Maintain bed alarm- Obtain necessary fall risk management equipment: nonskid socks- Apply yellow socks and bracelet for high fall risk patients- Consider moving patient to room near nurses station  INTERVENTIONS:- Educate patient/family on patient safety including physical limitations- Instruct patient to call for assistance with activity - Consult OT/PT to assist with strengthening/mobility - Keep Call bell within reach- Keep bed low and locked with side rails adjusted as appropriate- Keep care items and personal belongings within reach- Initiate and maintain comfort rounds- Make Fall Risk Sign  visible to staff- Offer Toileting every 2 Hours, in advance of need- Initiate/Maintain bed alarm- Obtain necessary fall risk management equipment: nonskid socks- Apply yellow socks and bracelet for high fall risk patients- Consider moving patient to room near nurses station  Outcome: Progressing  Goal: Maintain or return to baseline ADL function  Description: INTERVENTIONS:-  Assess patient's ability to carry out ADLs; assess patient's baseline for ADL function and identify physical deficits which impact ability to perform ADLs (bathing, care of mouth/teeth, toileting, grooming, dressing, etc.)- Assess/evaluate cause of self-care deficits - Assess range of motion- Assess patient's mobility; develop plan if impaired- Assess patient's need for assistive devices and provide as appropriate- Encourage maximum independence but intervene and supervise when necessary- Involve family in performance of ADLs- Assess for home care needs following discharge - Consider OT consult to assist with ADL evaluation and planning for discharge- Provide patient education as appropriate  Outcome: Progressing  Goal: Maintains/Returns to pre admission functional level  Description: INTERVENTIONS:- Perform AM-PAC 6 Click Basic Mobility/ Daily Activity assessment daily.- Set and communicate daily mobility goal to care team and patient/family/caregiver. - Collaborate with rehabilitation services on mobility goals if consulted- Perform Range of Motion 3 times a day.- Reposition patient every 2 hours.- Dangle patient 2 times a day- Stand patient 2 times a day- Ambulate patient 2 times a day- Out of bed to chair 2 times a day - Out of bed for meals 2 times a day- Out of bed for toileting- Record patient progress and toleration of activity level   Outcome: Progressing     Problem: DISCHARGE PLANNING  Goal: Discharge to home or other facility with appropriate resources  Description: INTERVENTIONS:- Identify barriers to discharge w/patient and  caregiver- Arrange for needed discharge resources and transportation as appropriate- Identify discharge learning needs (meds, wound care, etc.)- Arrange for interpretive services to assist at discharge as needed- Refer to Case Management Department for coordinating discharge planning if the patient needs post-hospital services based on physician/advanced practitioner order or complex needs related to functional status, cognitive ability, or social support system  Outcome: Progressing     Problem: Knowledge Deficit  Goal: Patient/family/caregiver demonstrates understanding of disease process, treatment plan, medications, and discharge instructions  Description: Complete learning assessment and assess knowledge base.Interventions:- Provide teaching at level of understanding- Provide teaching via preferred learning methods  Outcome: Progressing     Problem: HEMATOLOGIC - ADULT  Goal: Maintains hematologic stability  Description: INTERVENTIONS- Assess for signs and symptoms of bleeding or hemorrhage- Monitor labs- Administer supportive blood products/factors as ordered and appropriate  Outcome: Progressing

## 2025-03-28 NOTE — PROGRESS NOTES
Patient:    MRN:  [id number]    Azael Request ID:  [id number]    Level of care reserved:  Home Health Agency    Partner Reserved:  [facility], [place] [address] [phone]    Clinical needs requested:    Geography searched:  [id number]    Start of Service:    Request sent:  1:56pm EDT on 3/28/2025 by Laura Ann    Partner reserved:  2:07pm EDT on 3/28/2025 by Laura Ann    Choice list shared:  2:07pm EDT on 3/28/2025 by Laura Ann

## 2025-03-28 NOTE — CASE MANAGEMENT
Case Management Discharge Planning Note    Patient name Alexander Olmstead  Location /409-01 MRN [id number]  : 1955 Date 3/28/2025       Current Admission Date: 3/27/2025  Current Admission Diagnosis:Acute on chronic anemia   Patient Active Problem List    Diagnosis Date Noted Date Diagnosed    Bilateral leg edema 2025     History of non-ST elevation myocardial infarction (NSTEMI) 2025     Neuropathy associated with cancer (HCC) 2024     Solitary plasmacytoma not having achieved remission (HCC) 2023     Hypertension 2022     Esophagitis 2022     History of GI bleed 2022     Sacral lesion 2022     Acute on chronic anemia 2022     Pharyngeal swelling 2022     Multiple rib fractures 2018       LOS (days): 1  Geometric Mean LOS (GMLOS) (days): 2.8  Days to GMLOS:1.7     OBJECTIVE:  Risk of Unplanned Readmission Score: 9.38         Current admission status: Inpatient   Preferred Pharmacy:   [id number] - [place] - [address]  [address] [address]  Phone: [phone] Fax: [phone]    Primary Care Provider: Blade Walton DO    Primary Insurance: MEDICARE  Secondary Insurance: [facility]    DISCHARGE DETAILS:  Infusion center has pt scheduled for his 1st OP infusion on  at 1pm.

## 2025-03-28 NOTE — ASSESSMENT & PLAN NOTE
Pt presenting with bilateral leg edema. Unclear etiology    BNP of 187, no prior to compare   TSH WNL  CMP WNL   Urine protein/cr WNL  Echo ordered   Will continue to monitor

## 2025-03-28 NOTE — OCCUPATIONAL THERAPY NOTE
Occupational Therapy Evaluation     Patient Name: Alexander Olmstead  Today's Date: 3/28/2025  Problem List  Principal Problem:    Acute on chronic anemia  Active Problems:    Esophagitis    History of GI bleed    Hypertension    Solitary plasmacytoma not having achieved remission (HCC)    Neuropathy associated with cancer (HCC)    History of non-ST elevation myocardial infarction (NSTEMI)    Bilateral leg edema    Past Medical History  Past Medical History:   Diagnosis Date    Acute respiratory failure (HCC) 11/24/2022    Acute respiratory failure with hypoxia (HCC)     Alcohol dependence with withdrawal delirium (HCC) 04/18/2023    Alcohol withdrawal (HCC) 11/25/2022    Hyperlipidemia     Hypertension     Hyponatremia 11/23/2022    Hypothermia 11/23/2022    Hypoxia 11/23/2022    Metabolic acidosis with increased anion gap and accumulation of organic acids 11/23/2022    Metabolic encephalopathy 11/23/2022    Pneumonia 11/25/2022    Positive blood culture 11/28/2022    Septic shock (HCC) 11/24/2022     Past Surgical History  Past Surgical History:   Procedure Laterality Date    COLONOSCOPY      IR BIOPSY BONE  12/19/2023 03/28/25 1059   OT Last Visit   OT Visit Date 03/28/25   Note Type   Note type Evaluation   Pain Assessment   Pain Assessment Tool 0-10   Pain Score No Pain   Restrictions/Precautions   Weight Bearing Precautions Per Order No   Other Precautions Chair Alarm;Fall Risk;Multiple lines;Cognitive   Home Living   Type of Home Apartment   Home Layout One level;Stairs to enter with rails  (2 ADILENE c HR, FFOS to second floor apartment)   Bathroom Shower/Tub Walk-in shower   Bathroom Toilet Standard   Bathroom Equipment Grab bars in shower;Shower chair   Bathroom Accessibility Accessible   Home Equipment Walker;Cane   Additional Comments no AD baseline   Prior Function   Level of [place] Independent with ADLs;Independent with functional mobility;Independent with IADLS   Lives With Alone  "  Receives Help From Friend(s)   IADLs Family/Friend/Other provides transportation;Independent with meal prep;Independent with medication management   Falls in the last 6 months 1 to 4   Vocational Retired   Lifestyle   Intrinsic Gratification enjoys cooking   Subjective   Subjective \" Oh I have a smoker and make sausages\"   ADL   Where Assessed Chair   LB Dressing Assistance 5  Supervision/Setup   LB Dressing Deficit Supervision/safety  (figure four technique demonstrated)   Additional Comments pt washed and dressed upon arrival; pt demonstrating figure four technique to don socks/underwear; in stance without AD, pt with mild instability when reaching in various planes, unable to reach outside of STALIN; RW utilized for stability   Bed Mobility   Additional Comments seated in chair at start and end of session on RA; SPO2>90%   Transfers   Sit to Stand 5  Supervision   Additional items Verbal cues   Stand to Sit 5  Supervision   Additional items Verbal cues   Stand pivot 5  Supervision   Additional items Verbal cues  (RW utilized)   Additional Comments pt completing functional sts without AD and SPT with RW, mild instability without AD   Functional Mobility   Functional Mobility 5  Supervision   Additional Comments pt completing functional mobility with RW at (S) level  with no instability, pt with goal to complete without AD   Additional items Rolling walker   Balance   Static Sitting Good   Dynamic Sitting Good   Static Standing Fair   Dynamic Standing Fair -   Ambulatory Fair -   Activity Tolerance   Activity Tolerance Patient limited by fatigue   RUE Assessment   RUE Assessment WFL   LUE Assessment   LUE Assessment WFL   Hand Function   Gross Motor Coordination Functional   Fine Motor Coordination Functional   Sensation   Light Touch No apparent deficits   Sharp/Dull No apparent deficits   Psychosocial   Psychosocial (WDL) WDL   Cognition   Overall Cognitive Status Impaired   Arousal/Participation Alert   Attention " Within functional limits   Orientation Level Oriented X4   Memory Within functional limits   Following Commands Follows one step commands with increased time or repetition   Comments pt answering orientation questions correctly but presenting with confusion with higher level tasks and sequencing- to test congition in future session   Assessment   Limitation Decreased ADL status;Decreased Safe judgement during ADL;Decreased UE strength;Decreased cognition;Decreased endurance;Decreased high-level ADLs;Decreased self-care trans   Assessment Pt is a 70 y.o. male seen for OT evaluation s/p admit to [facility] on 3/27/2025 w/ Acute on chronic anemia. Pt with PMHx impacting their performance during ADL tasks, including: B leg edema, history of non-ST elevation MI, esophagitis, neuropathy associated with cancer. Prior to admission to the hospital Pt was performing ADLs without physical assistance. IADLs without physical assistance. Functional transfers/ambulation without physical assistance. Cognitive status PTA was A&Ox4. OT order placed to assess Pt's ADLs, cognitive status, and performance during functional tasks in order to maximize safety and independence while making most appropriate d/c recommendations. Pt's clinical presentation is currently stable given new onset deficits that affect Pt's occupational performance and ability to safely return to OF including decrease activity tolerance, decrease standing balance, decrease cognition, decrease safety awareness , increase impulsiveness, decrease UB MS, decrease activity engagement, decrease performance during functional transfers, limited family support, high fall risk, and limited insight to deficits combined with medical complications of abnormal H&H, abnormal CBC, edema/swelling, impulsivity during admission, and need for input for mobility technique/safety.Personal factors affecting Pt at time of initial evaluation include: impulsivity, step(s) to enter environment,  multi-level environment, limited home support, inability to perform IADLs, limited insight into impairments, and decreased initiation and engagement. The patient's raw score on the AM-PAC Daily Activity Inpatient Short Form is 23. A raw score of greater than or equal to 19 suggests the patient may benefit from discharge to home. Please refer to the recommendation of the Occupational Therapist for safe discharge planning.  Pt will benefit from continued skilled OT services to address deficits as defined above and to maximize level independence/participation during ADLs and functional tasks to facilitate return toward PLOF and improved quality of life. From an occupational therapy standpoint, No Post-Acute Rehabilitation Needs is recommended upon d/c.   Goals   Patient Goals to go home   Short Term Goal  pt will complete UE strengthening exercises   Long Term Goal #1 pt will complete a five step sequencing task with no vc to improve safety at home/community   Long Term Goal #2 pt will recall 5/5 objects after a ten minute delay to improve memory for at home safety   Long Term Goal pt will  complete navigation without AD identifying 3 safety concerns in the environment   Plan   Treatment Interventions ADL retraining;Endurance training;Activityengagement;Energy conservation;Cognitive reorientation;UE strengthening/ROM  (higher level congitive tasks)   Goal Expiration Date 04/11/25   OT Frequency 2-3x/wk   Discharge Recommendation   Rehab Resource Intensity Level, OT No post-acute rehabilitation needs   AM-PAC Daily Activity Inpatient   Lower Body Dressing 3   Bathing 4   Toileting 4   Upper Body Dressing 4   Grooming 4   Eating 4   Daily Activity Raw Score 23   Daily Activity Standardized Score (Calc for Raw Score >=11) 51.12   AM-[facility] Applied Cognition Inpatient   Following a Speech/Presentation 3   Understanding Ordinary Conversation 3   Taking Medications 3   Remembering Where Things Are Placed or Put Away 3    Remembering List of 4-5 Errands 3   Taking Care of Complicated Tasks 3   Applied Cognition Raw Score 18   Applied Cognition Standardized Score 38.07

## 2025-03-28 NOTE — ASSESSMENT & PLAN NOTE
Presented to ED with hemoglobin of 4 after getting routine lab work outpatient. Asymptomatic at this time.    Hgb of 4.6 --> 7.6. Baseline appears to be around 8-9.    S/p three units of blood in ED   UA with no signs of blood   FOBT pending   B12 elevated   Folate WNL   Ferritin of 3   VTE prophylaxis held   Protonix 40 mg BID ordered   Venofer infusions 200 mg for 5 days   Monitor with CBCs  Transfuse for hbg less than 7    GI consulted- possible EGD today   Heme/onc consulted

## 2025-03-28 NOTE — CONSULTS
"Consultation - Gastroenterology   Name: Alexander Olmstead 70 y.o. male I MRN: [id number]  Unit/Bed#: 409-01 I Date of Admission: 3/27/2025   Date of Service: 3/28/2025 I Hospital Day: 1   Inpatient consult to gastroenterology  Consult performed by: Nya Mcgee PA-C  Consult ordered by: Angelia Maddox DO        Physician Requesting Evaluation: María Bermudez MD   Reason for Evaluation / Principal Problem: Acute on chronic anemia     Assessment & Plan  Acute on chronic anemia  Patient presents to the ER due to being sent by his provider after getting blood work that noted low hemoglobin at 4.6.  Baseline hemoglobin ranges from 8/9 to within normal limits over the years.  His most recent hemoglobin prior to this admission in October was low at 9.3.  The patient underwent transfusion of 3 units of leukoreduced RBCs; repeat hemoglobin today at 7.6.  BUN within normal limits.  LFTs within normal limits.  CT chest abdomen pelvis noted moderate fecal stasis, small hiatal hernia with distal esophageal wall thickening consistent with esophagitis.  Patient underwent a colonoscopy recently this past summer that subcentimeter polyp and scattered diverticulosis and was otherwise normal.  Patient denies overt GI bleeding.  Patient is hemodynamically stable at this time.  -Continue Protonix 40 mg twice daily  -Keep n.p.o. for now  -EGD today to rule out severe esophagitis that can be contributing to occult bleeding, AVMs, ulcer disease, erosions, H. pylori, malignancy  Esophagitis  See above. CT noted \"Small hiatal hernia with distal esophageal wall thickening consistent with esophagitis.\"  Patient says he was having intermittent heartburn but has not had any since admission.  He is on PPI twice daily.  -Continue Protonix 40 mg twice daily  -Keep n.p.o. for now  -EGD today  History of non-ST elevation myocardial infarction (NSTEMI)  2019. Pt is on ASA/Statin; no anticoagulation at this time.     Of note: Patient is " not complaining of constipation at this time due to CT findings of fecal stasis, I will order MiraLAX as needed  I have discussed the above management plan in detail with the primary service.     History of Present Illness   HPI:  Alexander Olmstead is a 70 y.o. male who presents after being sent by his provider for low hemoglobin.  He says he was having some heartburn intermittently but has not had any since admission.  He denies abdominal pain, nausea vomiting, trouble swallowing, constipation, diarrhea, bloody or black bowel movements.  Patient denies dizziness or feeling like he is going to pass out.  Patient denies fevers, chills, unintentional weight loss.  Patient is on aspirin but is not on any other blood thinners.    Review of Systems   Constitutional:  Negative for appetite change, chills, diaphoresis, fatigue, fever and unexpected weight change.   HENT:  Negative for trouble swallowing.    Gastrointestinal:  Negative for abdominal distention, abdominal pain, anal bleeding, blood in stool, constipation, diarrhea, nausea, rectal pain and vomiting.   Neurological:  Negative for dizziness and light-headedness.         Objective :  Temp:  [96.2 °F (35.7 °C)-98.1 °F (36.7 °C)] 98.1 °F (36.7 °C)  HR:  [65-78] 78  BP: (116-154)/(63-84) 128/65  Resp:  [16-18] 16  SpO2:  [94 %-100 %] 100 %  O2 Device: None (Room air)    Physical Exam  Constitutional:       Appearance: Normal appearance.   Abdominal:      General: Abdomen is flat. Bowel sounds are normal. There is no distension.      Palpations: Abdomen is soft. There is no mass.      Tenderness: There is no abdominal tenderness. There is no right CVA tenderness, left CVA tenderness, guarding or rebound.      Hernia: No hernia is present.   Neurological:      Mental Status: He is alert.           Lab Results: I have reviewed the following results:CBC/BMP:   .     03/28/25  0010 03/28/25  0503   WBC 8.21  --    HGB 7.6*  --    HCT 25.0*  --      --    SODIUM   --  140   K  --  3.6   CL  --  107   CO2  --  23   BUN  --  13   CREATININE  --  0.91   GLUC  --  83   MG  --  2.1        Imaging Results Review: I personally reviewed the following image studies/reports in PACS and discussed pertinent findings with Radiology: CT abdomen/pelvis. My interpretation of the radiology images/reports is: Esophagitis and moderate colonic fecal stasis.  Other Study Results Review: EKG was reviewed.     Administrative Statements   I have spent a total time of 40 minutes in caring for this patient on the day of the visit/encounter including Diagnostic results, Prognosis, Risks and benefits of tx options, Instructions for management, Patient and family education, Importance of tx compliance, Risk factor reductions, Impressions, Counseling / Coordination of care, Documenting in the medical record, Reviewing/placing orders in the medical record (including tests, medications, and/or procedures), Obtaining or reviewing history  , and Communicating with other healthcare professionals .

## 2025-03-28 NOTE — ANESTHESIA POSTPROCEDURE EVALUATION
Post-Op Assessment Note    CV Status:  Stable  Pain Score: 0    Pain management: adequate       Mental Status:  Alert and awake   Hydration Status:  Euvolemic   PONV Controlled:  Controlled   Airway Patency:  Patent     Post Op Vitals Reviewed: Yes    No anethesia notable event occurred.    Staff: CRNA, Anesthesiologist           Last Filed PACU Vitals:  Vitals Value Taken Time   Temp     Pulse 69    /56    Resp 15    SpO2 98

## 2025-03-28 NOTE — ASSESSMENT & PLAN NOTE
BP stable   Continue home losartan   Monitor BP throughout admission    Go to the ER for any lightheadedness.   We are in contact with urology to try to get earliest available appt and will let you know when we can get you in.  Will call with all results when they come in.

## 2025-03-28 NOTE — ASSESSMENT & PLAN NOTE
"History of solitary plasmacytoma of the sacrum, IgG lambda.  Status post RT May 2024.   CT in ED showed \"Stable sclerotic lesion involving the sacrum with cortical breakthrough anteriorly and extension into the left ilium. Stable T11 pedicle mixed lytic and sclerotic lesion\"   SPEP, immunoglobulin free light chains pending   IgG/IgA/IgM WNL   Heme/onc follow up outpatient   "

## 2025-03-28 NOTE — ASSESSMENT & PLAN NOTE
Pt presenting with bilateral leg edema. Unclear etiology    BNP of 187, no prior to compare   TSH WNL  CMP WNL   Urine protein/cr WNL  Follow up outpatient with PCP

## 2025-03-28 NOTE — ASSESSMENT & PLAN NOTE
2019. Pt is on ASA/Statin; no anticoagulation at this time.     Of note: Patient is not complaining of constipation at this time due to CT findings of fecal stasis, I will order MiraLAX as needed

## 2025-03-28 NOTE — ASSESSMENT & PLAN NOTE
Patient presents to the ER due to being sent by his provider after getting blood work that noted low hemoglobin at 4.6.  Baseline hemoglobin ranges from 8/9 to within normal limits over the years.  His most recent hemoglobin prior to this admission in October was low at 9.3.  The patient underwent transfusion of 3 units of leukoreduced RBCs; repeat hemoglobin today at 7.6.  BUN within normal limits.  LFTs within normal limits.  CT chest abdomen pelvis noted moderate fecal stasis, small hiatal hernia with distal esophageal wall thickening consistent with esophagitis.  Patient underwent a colonoscopy recently this past summer that subcentimeter polyp and scattered diverticulosis and was otherwise normal.  Patient denies overt GI bleeding.  Patient is hemodynamically stable at this time.  -Continue Protonix 40 mg twice daily  -Keep n.p.o. for now  -EGD today to rule out severe esophagitis that can be contributing to occult bleeding, AVMs, ulcer disease, erosions, H. pylori, malignancy

## 2025-03-28 NOTE — PLAN OF CARE
Problem: Potential for Falls  Goal: Patient will remain free of falls  Description: INTERVENTIONS:- Educate patient/family on patient safety including physical limitations- Instruct patient to call for assistance with activity - Consult OT/PT to assist with strengthening/mobility - Keep Call bell within reach- Keep bed low and locked with side rails adjusted as appropriate- Keep care items and personal belongings within reach- Initiate and maintain comfort rounds- Make Fall Risk Sign visible to staff- Offer Toileting every 2 Hours, in advance of need- Initiate/Maintain bed alarm- Obtain necessary fall risk management equipment: nonskid socks- Apply yellow socks and bracelet for high fall risk patients- Consider moving patient to room near nurses station  INTERVENTIONS:- Educate patient/family on patient safety including physical limitations- Instruct patient to call for assistance with activity - Consult OT/PT to assist with strengthening/mobility - Keep Call bell within reach- Keep bed low and locked with side rails adjusted as appropriate- Keep care items and personal belongings within reach- Initiate and maintain comfort rounds- Make Fall Risk Sign visible to staff- Offer Toileting every 2 Hours, in advance of need- Initiate/Maintain bed alarm- Obtain necessary fall risk management equipment: nonskid socks- Apply yellow socks and bracelet for high fall risk patients- Consider moving patient to room near nurses station  Outcome: Progressing     Problem: PAIN - ADULT  Goal: Verbalizes/displays adequate comfort level or baseline comfort level  Description: Interventions:- Encourage patient to monitor pain and request assistance- Assess pain using appropriate pain scale- Administer analgesics based on type and severity of pain and evaluate response- Implement non-pharmacological measures as appropriate and evaluate response- Consider cultural and social influences on pain and pain management- Notify  physician/advanced practitioner if interventions unsuccessful or patient reports new pain  Outcome: Progressing     Problem: INFECTION - ADULT  Goal: Absence or prevention of progression during hospitalization  Description: INTERVENTIONS:- Assess and monitor for signs and symptoms of infection- Monitor lab/diagnostic results- Monitor all insertion sites, i.e. indwelling lines, tubes, and drains- Monitor endotracheal if appropriate and nasal secretions for changes in amount and color- [place] appropriate cooling/warming therapies per order- Administer medications as ordered- Instruct and encourage patient and family to use good hand hygiene technique- Identify and instruct in appropriate isolation precautions for identified infection/condition  Outcome: Progressing     Problem: SAFETY ADULT  Goal: Patient will remain free of falls  Description: INTERVENTIONS:- Educate patient/family on patient safety including physical limitations- Instruct patient to call for assistance with activity - Consult OT/PT to assist with strengthening/mobility - Keep Call bell within reach- Keep bed low and locked with side rails adjusted as appropriate- Keep care items and personal belongings within reach- Initiate and maintain comfort rounds- Make Fall Risk Sign visible to staff- Offer Toileting every 2 Hours, in advance of need- Initiate/Maintain bed alarm- Obtain necessary fall risk management equipment: nonskid socks- Apply yellow socks and bracelet for high fall risk patients- Consider moving patient to room near nurses station  INTERVENTIONS:- Educate patient/family on patient safety including physical limitations- Instruct patient to call for assistance with activity - Consult OT/PT to assist with strengthening/mobility - Keep Call bell within reach- Keep bed low and locked with side rails adjusted as appropriate- Keep care items and personal belongings within reach- Initiate and maintain comfort rounds- Make Fall Risk Sign  visible to staff- Offer Toileting every 2 Hours, in advance of need- Initiate/Maintain bed alarm- Obtain necessary fall risk management equipment: nonskid socks- Apply yellow socks and bracelet for high fall risk patients- Consider moving patient to room near nurses station  Outcome: Progressing  Goal: Maintain or return to baseline ADL function  Description: INTERVENTIONS:-  Assess patient's ability to carry out ADLs; assess patient's baseline for ADL function and identify physical deficits which impact ability to perform ADLs (bathing, care of mouth/teeth, toileting, grooming, dressing, etc.)- Assess/evaluate cause of self-care deficits - Assess range of motion- Assess patient's mobility; develop plan if impaired- Assess patient's need for assistive devices and provide as appropriate- Encourage maximum independence but intervene and supervise when necessary- Involve family in performance of ADLs- Assess for home care needs following discharge - Consider OT consult to assist with ADL evaluation and planning for discharge- Provide patient education as appropriate  Outcome: Progressing  Goal: Maintains/Returns to pre admission functional level  Description: INTERVENTIONS:- Perform AM-PAC 6 Click Basic Mobility/ Daily Activity assessment daily.- Set and communicate daily mobility goal to care team and patient/family/caregiver. - Collaborate with rehabilitation services on mobility goals if consulted- Perform Range of Motion 3 times a day.- Reposition patient every 2 hours.- Dangle patient 2 times a day- Stand patient 2 times a day- Ambulate patient 2 times a day- Out of bed to chair 2 times a day - Out of bed for meals 2 times a day- Out of bed for toileting- Record patient progress and toleration of activity level   Outcome: Progressing     Problem: DISCHARGE PLANNING  Goal: Discharge to home or other facility with appropriate resources  Description: INTERVENTIONS:- Identify barriers to discharge w/patient and  caregiver- Arrange for needed discharge resources and transportation as appropriate- Identify discharge learning needs (meds, wound care, etc.)- Arrange for interpretive services to assist at discharge as needed- Refer to Case Management Department for coordinating discharge planning if the patient needs post-hospital services based on physician/advanced practitioner order or complex needs related to functional status, cognitive ability, or social support system  Outcome: Progressing     Problem: Knowledge Deficit  Goal: Patient/family/caregiver demonstrates understanding of disease process, treatment plan, medications, and discharge instructions  Description: Complete learning assessment and assess knowledge base.Interventions:- Provide teaching at level of understanding- Provide teaching via preferred learning methods  Outcome: Progressing     Problem: HEMATOLOGIC - ADULT  Goal: Maintains hematologic stability  Description: INTERVENTIONS- Assess for signs and symptoms of bleeding or hemorrhage- Monitor labs- Administer supportive blood products/factors as ordered and appropriate  Outcome: Progressing

## 2025-03-28 NOTE — ASSESSMENT & PLAN NOTE
"Hx of GI bleed in 2018 after falling down steps in the setting of ETOH use   Pt denies s/s of bleeding at this time   CT in ED with \"Small hiatal hernia with distal esophageal wall thickening consistent with esophagitis\"   No GI bleeding noted during admission   Follow up outpatient with GI   "

## 2025-03-28 NOTE — CASE MANAGEMENT
Case Management Discharge Planning Note    Patient name Alexander Olmstead  Location /409-01 MRN [id number]  : 1955 Date 3/28/2025       Current Admission Date: 3/27/2025  Current Admission Diagnosis:Acute on chronic anemia   Patient Active Problem List    Diagnosis Date Noted Date Diagnosed    Bilateral leg edema 2025     History of non-ST elevation myocardial infarction (NSTEMI) 2025     Neuropathy associated with cancer (HCC) 2024     Solitary plasmacytoma not having achieved remission (HCC) 2023     Hypertension 2022     Esophagitis 2022     History of GI bleed 2022     Sacral lesion 2022     Acute on chronic anemia 2022     Pharyngeal swelling 2022     Multiple rib fractures 2018       LOS (days): 1  Geometric Mean LOS (GMLOS) (days): 2.8  Days to GMLOS:1.8     OBJECTIVE:  Risk of Unplanned Readmission Score: 9.38         Current admission status: Inpatient   Preferred Pharmacy:   [id number] - [place], [place] - [address]  [address] [address]  Phone: [phone] Fax: [phone]    Primary Care Provider: Blade Walton DO    Primary Insurance: MEDICARE  Secondary Insurance: [facility]    DISCHARGE DETAILS:    Discharge planning discussed with:: patient  Freedom of Choice: Yes  Comments - Freedom of Choice: agreeable to blanket referral for HHC  CM contacted family/caregiver?: No- see comments (declined)  Were Treatment Team discharge recommendations reviewed with patient/caregiver?: Yes  Did patient/caregiver verbalize understanding of patient care needs?: Yes  Were patient/caregiver advised of the risks associated with not following Treatment Team discharge recommendations?: Yes    Contacts  Contact Method: In Person  Reason/Outcome: Discharge Planning    Requested Home Health Care         Is the patient interested in HHC at discharge?: Yes  Home Health Discipline requested:: Nursing,  Physical Therapy  [facility] Health Agency Name:: [facility]  Home Health Follow-Up Provider:: PCP  Home Health Services Needed:: Evaluate Functional Status and Safety, Strengthening/Theraputic Exercises to Improve Function (sn for assessment, medication management)  Homebound Criteria Met:: Requires the Assistance of Another Person for Safe Ambulation or to Leave the Home, Uses an Assist Device (i.e. cane, walker, etc)  Supporting Clincal Findings:: Limited Endurance         Other Referral/Resources/Interventions Provided:  Interventions: [facility]    Would you like to participate in our [facility] service program?  : No - Declined    Treatment Team Recommendation: Home with Home Health Care  Discharge Destination Plan:: Home with Home Health Care

## 2025-03-28 NOTE — PROGRESS NOTES
"Progress Note - Hospitalist   Name: Alexander Olmstead 70 y.o. male I MRN: [id number]  Unit/Bed#: 409-01 I Date of Admission: 3/27/2025   Date of Service: 3/28/2025 I Hospital Day: 1    Assessment & Plan  Acute on chronic anemia  Presented to ED with hemoglobin of 4 after getting routine lab work outpatient. Asymptomatic at this time.    Hgb of 4.6 --> 7.6. Baseline appears to be around 8-9.    S/p three units of blood in ED   UA with no signs of blood   FOBT pending   B12 elevated   Folate WNL   Ferritin of 3   VTE prophylaxis held   Protonix 40 mg BID ordered   Venofer infusions 200 mg for 5 days   Monitor with CBCs  Transfuse for hbg less than 7    GI consulted- possible EGD today   Heme/onc consulted   Bilateral leg edema  Pt presenting with bilateral leg edema. Unclear etiology    BNP of 187, no prior to compare   TSH WNL  CMP WNL   Urine protein/cr WNL  Echo ordered   Will continue to monitor   Esophageal dilatation  Noted on CT scan in ED as \"Small hiatal hernia with distal esophageal wall thickening consistent with esophagitis\"   Protonix 40 mg BID started   GI consulted- possible EGD today   History of GI bleed  Hx of GI bleed in 2018 after falling down steps in the setting of ETOH use   Pt denies s/s of bleeding at this time   CT in ED with \"Small hiatal hernia with distal esophageal wall thickening consistent with esophagitis\"   UA with no blood   FIT test ordered   Protonix 40 mg BID ordered   GI consulted   Hypertension  BP stable   Continue home losartan   Monitor BP throughout admission   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  Status post RT May 2024.   CT in ED showed \"Stable sclerotic lesion involving the sacrum with cortical breakthrough anteriorly and extension into the left ilium. Stable T11 pedicle mixed lytic and sclerotic lesion\"   SPEP, immunoglobulin free light chains pending   IgG/IgA/IgM WNL   Heme/onc " consulted   Neuropathy associated with cancer (HCC)  Continue home gabapentin   History of non-ST elevation myocardial infarction (NSTEMI)  Occurred in 2019   Not on home ASA/statin   Monitor for symptoms during admission     VTE Pharmacologic Prophylaxis:    Held 2/2 anemia     Mobility:   Basic Mobility Inpatient Raw Score: 22  JH-HLM Goal: 7: Walk 25 feet or more  JH-HLM Achieved: 7: Walk 25 feet or more  JH-HLM Goal achieved. Continue to encourage appropriate mobility.    Patient Centered Rounds: I performed bedside rounds with nursing staff today.   Discussions with Specialists or Other Care Team Provider: GI, Heme/onc     Education and Discussions with Family / Patient: Updated  (friend) via phone.    Current Length of Stay: 1 day(s)  Current Patient Status: Inpatient   Certification Statement: The patient will continue to require additional inpatient hospital stay due to anemia  Discharge Plan: Anticipate discharge tomorrow to home.    Code Status: Level 1 - Full Code    Subjective   Patient seen sitting in chair in no acute distress. No complaints. Legs less bothersome at this point in time. Tolerating p.o. intake. Urinating and stooling without issue. Ambulatory. Sleeping well. Denies chest pain, shortness of breath, nausea, vomiting, constipation, diarrhea.     Objective :  Temp:  [96.2 °F (35.7 °C)-98.1 °F (36.7 °C)] 98.1 °F (36.7 °C)  HR:  [65-80] 78  BP: (116-160)/(63-84) 128/65  Resp:  [16-18] 16  SpO2:  [94 %-100 %] 100 %  O2 Device: None (Room air)    Body mass index is 25.18 kg/m².     Input and Output Summary (last 24 hours):     Intake/Output Summary (Last 24 hours) at 3/28/2025 0805  Last data filed at 3/28/2025 0525  Gross per 24 hour   Intake 1890 ml   Output 400 ml   Net 1490 ml       Physical Exam  Vitals and nursing note reviewed.   Constitutional:       General: He is not in acute distress.     Appearance: Normal appearance. He is normal weight. He is not ill-appearing or  toxic-appearing.   HENT:      Head: Normocephalic and atraumatic.      Nose: Nose normal.   Eyes:      Conjunctiva/sclera: Conjunctivae normal.   Cardiovascular:      Rate and Rhythm: Normal rate and regular rhythm.      Heart sounds: Normal heart sounds. No murmur heard.     No friction rub. No gallop.   Pulmonary:      Effort: Pulmonary effort is normal. No respiratory distress.      Breath sounds: Normal breath sounds. No wheezing, rhonchi or rales.   Musculoskeletal:      Right lower leg: Edema (Minimal, less erythema) present.      Left lower leg: Edema (Minimal, less erythema) present.   Skin:     General: Skin is warm and dry.   Neurological:      General: No focal deficit present.      Mental Status: He is alert and oriented to person, place, and time.   Psychiatric:         Mood and Affect: Mood normal.         Behavior: Behavior normal.         Thought Content: Thought content normal.         Lines/Drains:      Lab Results: I have reviewed the following results:   Results from last 7 days   Lab Units 03/28/25  0010 03/27/25  1632 03/27/25  0926   WBC Thousand/uL 8.21   < > 5.88   HEMOGLOBIN g/dL 7.6*   < > 4.7*   HEMATOCRIT % 25.0*   < > 17.1*   PLATELETS Thousands/uL 278   < > 358   SEGS PCT %  --   --  70   LYMPHO PCT %  --   --  10*   MONO PCT %  --   --  16*   EOS PCT %  --   --  3    < > = values in this interval not displayed.     Results from last 7 days   Lab Units 03/28/25  0503   SODIUM mmol/L 140   POTASSIUM mmol/L 3.6   CHLORIDE mmol/L 107   CO2 mmol/L 23   BUN mg/dL 13   CREATININE mg/dL 0.91   ANION GAP mmol/L 10   CALCIUM mg/dL 9.2   ALBUMIN g/dL 3.9   TOTAL BILIRUBIN mg/dL 0.79   ALK PHOS U/L 68   ALT U/L 8   AST U/L 13   GLUCOSE RANDOM mg/dL 83                       Recent Cultures (last 7 days):         Imaging Results Review: I reviewed radiology reports from this admission including: CT abdomen/pelvis.  Other Study Results Review: No additional pertinent studies reviewed.    Last 24  Hours Medication List:     Current Facility-Administered Medications:     [Held by provider] cyanocobalamin (VITAMIN B-12) tablet 50 mcg, Daily    DULoxetine (CYMBALTA) delayed release capsule 30 mg, Daily    gabapentin (NEURONTIN) capsule 300 mg, TID    iron sucrose (VENOFER) 200 mg in sodium chloride 0.9 % 100 mL IVPB, Daily    losartan (COZAAR) tablet 50 mg, Daily    mirtazapine (REMERON) tablet 15 mg, HS    multivitamin-minerals (CENTRUM) tablet 1 tablet, Daily    ondansetron (ZOFRAN) injection 4 mg, Q6H PRN    pantoprazole (PROTONIX) injection 40 mg, Q12H MALLIKA    Administrative Statements   Today, Patient Was Seen By: Angelia Maddox DO    **Please Note: This note may have been constructed using a voice recognition system.**

## 2025-03-28 NOTE — PHYSICAL THERAPY NOTE
PHYSICAL THERAPY EVALUATION  NAME:  Alexander Olmstead  DATE: 03/28/25    AGE:   70 y.o.  Mrn:   [id number]  ADMIT DX:  Anemia [D64.9]  Abnormal laboratory test [R89.9]    Past Medical History:   Diagnosis Date    Acute respiratory failure (HCC) 11/24/2022    Acute respiratory failure with hypoxia (HCC)     Alcohol dependence with withdrawal delirium (HCC) 04/18/2023    Alcohol withdrawal (HCC) 11/25/2022    Hyperlipidemia     Hypertension     Hyponatremia 11/23/2022    Hypothermia 11/23/2022    Hypoxia 11/23/2022    Metabolic acidosis with increased anion gap and accumulation of organic acids 11/23/2022    Metabolic encephalopathy 11/23/2022    Pneumonia 11/25/2022    Positive blood culture 11/28/2022    Septic shock (HCC) 11/24/2022     Length Of Stay: 1  Performed at least 2 patient identifiers during session: Name and ID bracelet  PHYSICAL THERAPY EVALUATION :    03/28/25 0806   PT Last Visit   PT Visit Date 03/28/25   Note Type   Note type Evaluation   Pain Assessment   Pain Assessment Tool 0-10   Pain Score No Pain   Restrictions/Precautions   Weight Bearing Precautions Per Order No   Other Precautions Fall Risk;Bed Alarm;Chair Alarm;Cognitive   Home Living   Type of Home Apartment   Home Layout One level;Stairs to enter with rails  (2 ADILENE c HR; FFOS c HR to 2nd floor apt)   Bathroom Shower/Tub Walk-in shower   Bathroom Toilet Standard   Bathroom Equipment Grab bars in shower;Shower chair   Bathroom Accessibility Accessible   Home Equipment Walker;Cane   Additional Comments pt denies use of DME at baseline   Prior Function   Level of [place] Independent with ADLs;Independent with functional mobility;Independent with IADLS   Lives With Alone   Receives Help From Friend(s)   IADLs Family/Friend/Other provides transportation   Falls in the last 6 months 1 to 4   Vocational Retired   General   Family/Caregiver Present No   Cognition   Overall Cognitive Status Impaired   Arousal/Participation Alert    Orientation Level Oriented to person;Disoriented to place;Disoriented to time;Disoriented to situation   Following Commands Follows one step commands without difficulty   Comments pt pleasant and cooperative   Subjective   Subjective pt enjoys using his smoker and making beef jerky   RLE Assessment   RLE Assessment WFL   LLE Assessment   LLE Assessment WFL   Bed Mobility   Additional Comments pt EOB at start of session; OOB at end of session   Transfers   Sit to Stand 5  Supervision   Additional items Increased time required;Verbal cues;Armrests   Stand to Sit 5  Supervision   Additional items Increased time required;Verbal cues;Armrests   Stand pivot 5  Supervision   Additional items Armrests;Increased time required;Verbal cues   Additional Comments performed with and without RW   Ambulation/Elevation   Gait pattern Excessively slow;Inconsistent dalia;Decreased foot clearance;Narrow STALIN;Scissoring;Improper Weight shift   Gait Assistance 4  Minimal assist   Additional items Assist x 1;Tactile cues;Verbal cues   Assistive Device Rolling walker; None   Distance 80'   Balance   Static Sitting Good   Dynamic Sitting Good   Static Standing Fair   Dynamic Standing Fair -   Ambulatory Poor +   Endurance Deficit   Endurance Deficit Yes   Endurance Deficit Description pt appears fatigued with increased ambulation   Activity Tolerance   Activity Tolerance Patient limited by fatigue   Assessment   Prognosis Good   Problem List Decreased strength;Decreased endurance;Impaired balance;Decreased mobility;Decreased cognition;Impaired judgement;Decreased safety awareness   Goals   Patient Goals to get out of bed   LTG Expiration Date 04/11/25   Plan   Treatment/Interventions Functional transfer training;LE strengthening/ROM;Elevations;Therapeutic exercise;Endurance training;Bed mobility;Gait training   PT Frequency 3-5x/wk   Discharge Recommendation   Rehab Resource Intensity Level, PT II (Moderate Resource Intensity)   AM-PAC  Basic Mobility Inpatient   Turning in Flat Bed Without Bedrails 3   Lying on Back to Sitting on Edge of Flat Bed Without Bedrails 3   Moving Bed to Chair 3   Standing Up From Chair Using Arms 3   Walk in Room 3   Climb 3-5 Stairs With Railing 3   Basic Mobility Inpatient Raw Score 18   Basic Mobility Standardized Score 41.05   [facility] Highest Level Of Mobility   -HLM Goal 6: Walk 10 steps or more   JH-HLM Achieved 7: Walk 25 feet or more   End of Consult   Patient Position at End of Consult Bedside chair;Bed/Chair alarm activated;All needs within reach     (Please find full objective findings from PT assessment regarding body systems outlined above).     Assessment: Pt is a 70 y.o. male seen for PT evaluation s/p admission to [facility] on 3/27/2025 with Acute on chronic anemia.  Order placed for PT services.  Upon evaluation: Pt is presenting with impaired functional mobility due to decreased endurance, impaired balance, gait deviations, impaired cognition, decreased safety awareness, and fall risk requiring  SUP assistance for transfers and min assistance for ambulation with RW . Pt's clinical presentation is currently evolving given the functional mobility deficits above, especially decreased endurance, impaired balance, gait deviations, decreased activity tolerance, decreased functional mobility tolerance, decreased safety awareness, impaired judgement, and decreased cognition, and combined with medical complications of abnormal H&H, abnormal CBC, and need for input for mobility technique/safety.  Pt's PMHx and comorbidities that may affect physical performance and progress include: anemia, cancer history and/or treatment, and neuropathy. Personal factors affecting pt at time of IE include: step(s) to enter environment, multi-level environment, limited home support, inability to navigate level surfaces without external assistance, inability to navigate community distances, limited insight  into impairments, and recent fall(s)/fall history. Pt will benefit from continued skilled PT services to address deficits as defined above and to maximize level of functional mobility to facilitate return toward PLOF and improved QOL. From PT/mobility standpoint, recommendation at time of d/c would be Level II (Moderate Resource Intensity in order to reduce fall risk and maximize pt's functional independence and consistency with mobility.      The patient's AM-PAC Basic Mobility Inpatient Short Form Raw Score is 18. A Raw score of greater than 16 suggests the patient may benefit from discharge to home. Please also refer to the recommendation of the Physical Therapist for safe discharge planning.       Goals: Pt will participate in B LE strengthening exercises to facilitate improved functional activity tolerance. Pt will perform all functional transfers and bed mobility mod(I) with good safety awareness. Pt will ambulate 250' mod(I) with LRAD while maintaining good functional dynamic balance. Pt will ascend/descend a FFOS with HR and SUP to reflect the ability to safely navigate the home.     Haydee Contreras, PT,DPT

## 2025-03-28 NOTE — ANESTHESIA PREPROCEDURE EVALUATION
Procedure:  EGD    Relevant Problems   CARDIO   (+) Hypertension      HEMATOLOGY   (+) Acute on chronic anemia      Other   (+) History of GI bleed   (+) History of non-ST elevation myocardial infarction (NSTEMI)      Hypertension    Hyperlipidemia    Metabolic acidosis with increased anion gap and accumulation of organic acids    Hypothermia    Hyponatremia    Hypoxia    Acute respiratory failure (HCC)    Metabolic encephalopathy    Pneumonia    Septic shock (HCC)    Alcohol withdrawal (HCC)    Positive blood culture    Acute respiratory failure with hypoxia (HCC)    Alcohol dependence with withdrawal delirium (HCC)            Left Ventricle: Left ventricular cavity size is normal. Wall thickness is normal. The left ventricular ejection fraction is 65%. Systolic function is normal. Wall motion is normal.    Mitral Valve: There is trace regurgitation.    Tricuspid Valve: There is trace regurgitation. The estimated right ventricular systolic pressure is 41.00 mmHg.           Physical Exam    Airway    Mallampati score: II  TM Distance: >3 FB  Neck ROM: full     Dental       Cardiovascular  Cardiovascular exam normal    Pulmonary  Pulmonary exam normal     Other Findings        Anesthesia Plan  ASA Score- 3     Anesthesia Type-     Plan Factors-    Induction-     Postoperative Plan-     Perioperative Resuscitation Plan - Level 1 - Full Code.       Informed Consent-       NPO Status:  Vitals Value Taken Time   Date of last liquid 03/27/25 03/28/25 1208   Time of last liquid 0924 03/28/25 1208   Date of last solid 03/27/25 03/28/25 1208   Time of last solid 1630 03/28/25 1208

## 2025-03-28 NOTE — ASSESSMENT & PLAN NOTE
"Esophagitis noted on CT scan in ED as \"Small hiatal hernia with distal esophageal wall thickening consistent with esophagitis\"   High dose PPI ordered for d/c   EGD showed a clean based esophageal ulcer, long segment de santiago's esophagus and hiatal hernia   Recommend repeat EGD in 3 months per GI   "

## 2025-03-28 NOTE — ASSESSMENT & PLAN NOTE
"History of solitary plasmacytoma of the sacrum, IgG lambda.  Status post RT May 2024.   CT in ED showed \"Stable sclerotic lesion involving the sacrum with cortical breakthrough anteriorly and extension into the left ilium. Stable T11 pedicle mixed lytic and sclerotic lesion\"   SPEP, immunoglobulin free light chains pending   IgG/IgA/IgM WNL   Heme/onc consulted   "

## 2025-03-28 NOTE — PLAN OF CARE
Problem: PHYSICAL THERAPY ADULT  Goal: Performs mobility at highest level of function for planned discharge setting.  See evaluation for individualized goals.  Description: Treatment/Interventions: Functional transfer training, LE strengthening/ROM, Elevations, Therapeutic exercise, Endurance training, Bed mobility, Gait training          See flowsheet documentation for full assessment, interventions and recommendations.  Note: Prognosis: Good  Problem List: Decreased strength, Decreased endurance, Impaired balance, Decreased mobility, Decreased cognition, Impaired judgement, Decreased safety awareness      Assessment: Pt is a 70 y.o. male seen for PT evaluation s/p admission to [facility] on 3/27/2025 with Acute on chronic anemia.  Order placed for PT services.  Upon evaluation: Pt is presenting with impaired functional mobility due to decreased endurance, impaired balance, gait deviations, impaired cognition, decreased safety awareness, and fall risk requiring SUP assistance for transfers and min assistance for ambulation with RW. Pt's clinical presentation is currently evolving given the functional mobility deficits above, especially decreased endurance, impaired balance, gait deviations, decreased activity tolerance, decreased functional mobility tolerance, decreased safety awareness, impaired judgement, and decreased cognition, and combined with medical complications of abnormal H&H, abnormal CBC, and need for input for mobility technique/safety.  Pt's PMHx and comorbidities that may affect physical performance and progress include: anemia, cancer history and/or treatment, and neuropathy. Personal factors affecting pt at time of IE include: step(s) to enter environment, multi-level environment, limited home support, inability to navigate level surfaces without external assistance, inability to navigate community distances, limited insight into impairments, and recent fall(s)/fall history. Pt  will benefit from continued skilled PT services to address deficits as defined above and to maximize level of functional mobility to facilitate return toward PLOF and improved QOL. From PT/mobility standpoint, recommendation at time of d/c would be Level II (Moderate Resource Intensity in order to reduce fall risk and maximize pt's functional independence and consistency with mobility.     Rehab Resource Intensity Level, PT: II (Moderate Resource Intensity)    See flowsheet documentation for full assessment.

## 2025-03-28 NOTE — ASSESSMENT & PLAN NOTE
Presented to ED with hemoglobin of 4 after getting routine lab work outpatient. Asymptomatic at this time.    Hgb of 4.6 --> 7.6. Baseline appears to be around 8-9.    S/p three units of blood in ED and one venofer infusion   UA with no signs of blood   FOBT WNL  B12 elevated   Folate WNL   Ferritin of 3   High dose PPI ordered for discharge   Venofer infusions ordered for discharge  GI recommending outpatient follow up

## 2025-03-28 NOTE — DISCHARGE SUMMARY
"Discharge Summary - Hospitalist   Name: Alexander Olmstead 70 y.o. male I MRN: [id number]  Unit/Bed#: 409-01 I Date of Admission: 3/27/2025   Date of Service: 3/28/2025 I Hospital Day: 1     Assessment & Plan  Acute on chronic anemia  Presented to ED with hemoglobin of 4 after getting routine lab work outpatient. Asymptomatic at this time.    Hgb of 4.6 --> 7.6. Baseline appears to be around 8-9.    S/p three units of blood in ED and one venofer infusion   UA with no signs of blood   FOBT WNL  B12 elevated   Folate WNL   Ferritin of 3   High dose PPI ordered for discharge   Venofer infusions ordered for discharge  GI recommending outpatient follow up      Bilateral leg edema  Pt presenting with bilateral leg edema. Unclear etiology    BNP of 187, no prior to compare   TSH WNL  CMP WNL   Urine protein/cr WNL  Follow up outpatient with PCP   De Santiago's esophagus with dysplasia  Esophagitis noted on CT scan in ED as \"Small hiatal hernia with distal esophageal wall thickening consistent with esophagitis\"   High dose PPI ordered for d/c   EGD showed a clean based esophageal ulcer, long segment de santiago's esophagus and hiatal hernia   Recommend repeat EGD in 3 months per GI   History of GI bleed  Hx of GI bleed in 2018 after falling down steps in the setting of ETOH use   Pt denies s/s of bleeding at this time   CT in ED with \"Small hiatal hernia with distal esophageal wall thickening consistent with esophagitis\"   No GI bleeding noted during admission   Follow up outpatient with GI   Hypertension  BP stable   Continue home BP meds   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  Status post RT May 2024.   CT in ED showed \"Stable sclerotic lesion involving the sacrum with cortical breakthrough anteriorly and extension into the left ilium. Stable T11 pedicle mixed lytic and sclerotic lesion\"   SPEP, immunoglobulin free light chains pending "   IgG/IgA/IgM WNL   Heme/onc follow up outpatient   Neuropathy associated with cancer (HCC)  Continue home gabapentin   History of non-ST elevation myocardial infarction (NSTEMI)  Occurred in 2019   Not on home ASA/statin   Follow up outpatient with PCP       Medical Problems       Resolved Problems  Date Reviewed: 3/28/2025   None       Discharging Physician / Practitioner: Angelia Maddox DO  PCP: Blade Walton DO  Admission Date:   Admission Orders (From admission, onward)       Ordered        03/27/25 1304  INPATIENT ADMISSION  Once                          Discharge Date: 03/28/25    Consultations During Hospital Stay:  GI, heme/onc      Procedures Performed:   CT chest abdomen pelvis w contrast   Final Result      Stable mild emphysema.      Small hiatal hernia with distal esophageal wall thickening consistent with esophagitis.      Moderate colonic fecal stasis.      Stable sclerotic lesion involving the sacrum with cortical breakthrough anteriorly and extension into the left ilium. Stable T11 pedicle mixed lytic and sclerotic lesion.               Workstation performed: [id number]          VAS VENOUS DUPLEX - LOWER LIMB BILATERAL   Final Result          Significant Findings / Test Results:   Results for orders placed or performed during the hospital encounter of 03/27/25   iFOBT (FIT)   Result Value Ref Range    OCCULT BLD, FECAL IMMUNOLOGICAL Negative Negative   CBC and differential   Result Value Ref Range    WBC 5.88 4.31 - 10.16 Thousand/uL    RBC 2.34 (L) 3.88 - 5.62 Million/uL    Hemoglobin 4.7 (LL) 12.0 - 17.0 g/dL    Hematocrit 17.1 (L) 36.5 - 49.3 %    MCV 73 (L) 82 - 98 fL    MCH 20.1 (L) 26.8 - 34.3 pg    MCHC 27.5 (L) 31.4 - 37.4 g/dL    RDW 16.4 (H) 11.6 - 15.1 %    MPV 10.1 8.9 - 12.7 fL    Platelets 358 149 - 390 Thousands/uL    nRBC 0 /100 WBCs    Segmented % 70 43 - 75 %    Immature Grans % 0 0 - 2 %    Lymphocytes % 10 (L) 14 - 44 %    Monocytes % 16 (H) 4 - 12 %    Eosinophils Relative 3  0 - 6 %    Basophils Relative 1 0 - 1 %    Absolute Neutrophils 4.14 1.85 - 7.62 Thousands/µL    Absolute Immature Grans 0.01 0.00 - 0.20 Thousand/uL    Absolute Lymphocytes 0.58 (L) 0.60 - 4.47 Thousands/µL    Absolute Monocytes 0.92 0.17 - 1.22 Thousand/µL    Eosinophils Absolute 0.18 0.00 - 0.61 Thousand/µL    Basophils Absolute 0.05 0.00 - 0.10 Thousands/µL   Protein / creatinine ratio, urine   Result Value Ref Range    Creatinine, Ur 49.4 Reference range not established. mg/dL    Protein Urine Random 5.5 Reference range not established. mg/dL    Prot/Creat Ratio, Ur 0.1 0.0 - 0.1   CBC and Platelet   Result Value Ref Range    WBC 5.53 4.31 - 10.16 Thousand/uL    RBC 3.26 (L) 3.88 - 5.62 Million/uL    Hemoglobin 7.2 (L) 12.0 - 17.0 g/dL    Hematocrit 25.1 (L) 36.5 - 49.3 %    MCV 77 (L) 82 - 98 fL    MCH 22.1 (L) 26.8 - 34.3 pg    MCHC 28.7 (L) 31.4 - 37.4 g/dL    RDW 17.4 (H) 11.6 - 15.1 %    Platelets 336 149 - 390 Thousands/uL    MPV 9.7 8.9 - 12.7 fL   CBC and Platelet   Result Value Ref Range    WBC 8.21 4.31 - 10.16 Thousand/uL    RBC 3.19 (L) 3.88 - 5.62 Million/uL    Hemoglobin 7.6 (L) 12.0 - 17.0 g/dL    Hematocrit 25.0 (L) 36.5 - 49.3 %    MCV 78 (L) 82 - 98 fL    MCH 23.8 (L) 26.8 - 34.3 pg    MCHC 30.4 (L) 31.4 - 37.4 g/dL    RDW 18.0 (H) 11.6 - 15.1 %    Platelets 278 149 - 390 Thousands/uL    MPV 9.9 8.9 - 12.7 fL   Comprehensive metabolic panel   Result Value Ref Range    Sodium 140 135 - 147 mmol/L    Potassium 3.6 3.5 - 5.3 mmol/L    Chloride 107 96 - 108 mmol/L    CO2 23 21 - 32 mmol/L    ANION GAP 10 4 - 13 mmol/L    BUN 13 5 - 25 mg/dL    Creatinine 0.91 0.60 - 1.30 mg/dL    Glucose 83 65 - 140 mg/dL    Calcium 9.2 8.4 - 10.2 mg/dL    AST 13 13 - 39 U/L    ALT 8 7 - 52 U/L    Alkaline Phosphatase 68 34 - 104 U/L    Total Protein 6.1 (L) 6.4 - 8.4 g/dL    Albumin 3.9 3.5 - 5.0 g/dL    Total Bilirubin 0.79 0.20 - 1.00 mg/dL    eGFR 85 ml/min/1.73sq m   Magnesium   Result Value Ref Range     Magnesium 2.1 1.9 - 2.7 mg/dL   ECG 12 lead   Result Value Ref Range    Ventricular Rate 76 BPM    Atrial Rate 76 BPM    KY Interval 152 ms    QRSD Interval 96 ms    QT Interval 388 ms    QTC Interval 436 ms    P Axis 54 degrees    QRS Axis 4 degrees    T Wave Axis 44 degrees   Type and screen   Result Value Ref Range    ABO Grouping O     Rh Factor Positive     Antibody Screen Negative     Specimen Expiration Date 20250330    Prepare Leukoreduced RBC: 3 Units   Result Value Ref Range    Unit Product Code F2834R64     Unit Number [id number]     Unit ABO O     Unit RH POS     Crossmatch Compatible     Unit Dispense Status Presumed Trans     Unit Product Volume 350 mL    Unit Product Code X7781C95     Unit Number [id number]     Unit ABO O     Unit RH POS     Crossmatch Compatible     Unit Dispense Status Presumed Trans     Unit Product Volume 350 mL    Unit Product Code P7798Z10     Unit Number [id number]     Unit ABO O     Unit RH NEG     Crossmatch Compatible     Unit Dispense Status Presumed Trans     Unit Product Volume 350 ml       Incidental Findings:   None     Test Results Pending at Discharge (will require follow up):   Immunoglobulins free LT chains      Outpatient Tests Requested:  None    Complications:  None    Reason for Admission: None    Hospital Course:   Alexander Olmstead is a 70 y.o. male patient who originally presented to the hospital on 3/27/2025 due to abnormal lab work. Please see hospitalist H&P for further details.       [facility]. Three units of packed red blood cells given. EGD was done and revealed ulcer in the esophagus as well as Nash's esophagus and a hiatal hernia.  Patient remained asymptomatic throughout the entirety of admission and no active bleeding was noted on any of his lab work.  His labs were significant for low iron levels and heme-onc recommended iron infusions started inpatient and  continued outpatient.  He received 1 iron infusion while inpatient.  GI recommending high-dose PPI for discharge and follow-up outpatient for repeat EGD in 3 months.  All findings reviewed with patient's.  On the day of discharge, he was saturating well on room air.  Vital signs stable.  Tolerating p.o. intake    The patient's medical conditions can be managed on an outpatient basis. He will be discharged to home with home services with iron infusions and PPI. Patient can follow-up outpatient with PCP, heme/onc, and GI for further management.     Please see above list of diagnoses and related plan for additional information.     Condition at Discharge: stable    Discharge Day Visit / Exam:   * Please refer to separate progress note for these details *    Discussion with Family: Patient declined call to .     Discharge instructions/Information to patient and family:   See after visit summary for information provided to patient and family.      Provisions for Follow-Up Care:  See after visit summary for information related to follow-up care and any pertinent home health orders.      Mobility at time of Discharge:   Basic Mobility Inpatient Raw Score: 18  JH-HLM Goal: 6: Walk 10 steps or more  JH-HLM Achieved: 7: Walk 25 feet or more  HLM Goal achieved. Continue to encourage appropriate mobility.     Disposition:   Home with VNA Services (Reminder: Complete face to face encounter)    Planned Readmission: No    Discharge Medications:  See after visit summary for reconciled discharge medications provided to patient and/or family.      Angelia Maddox DO   PGY-2 [facility]   [facility]     **Please Note: This note may have been constructed using a voice recognition system**

## 2025-03-28 NOTE — CASE MANAGEMENT
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) 2023     Hypertension 2022     Esophagitis 2022     History of GI bleed 2022     Sacral lesion 2022     Acute on chronic anemia 2022     Pharyngeal swelling 2022     Multiple rib fractures 2018       LOS (days): 1  Geometric Mean LOS (GMLOS) (days): 2.8  Days to GMLOS:1.7     OBJECTIVE:  Risk of Unplanned Readmission Score: 9.38         Current admission status: Inpatient   Preferred Pharmacy:   [id number] [place] - [address] [place]  [address] [address]  Phone: [phone] Fax: [phone]    Primary Care Provider: Blade Walton DO    Primary Insurance: MEDICARE  Secondary [place] [facility]    DISCHARGE DETAILS:  Pt is being dc'd home on this date with OP follow up; CAIO and CM trying to coordinate OP infusions for iron for pt with our infusion center (needs 3 doses every other day). Provider trying to put order in [place].

## 2025-03-28 NOTE — ASSESSMENT & PLAN NOTE
"Noted on CT scan in ED as \"Small hiatal hernia with distal esophageal wall thickening consistent with esophagitis\"   Protonix 40 mg BID started   GI consulted- possible EGD today   "

## 2025-03-28 NOTE — ASSESSMENT & PLAN NOTE
"See above. CT noted \"Small hiatal hernia with distal esophageal wall thickening consistent with esophagitis.\"  Patient says he was having intermittent heartburn but has not had any since admission.  He is on PPI twice daily.  -Continue Protonix 40 mg twice daily  -Keep n.p.o. for now  -EGD today  "

## 2025-03-30 ENCOUNTER — HOME CARE VISIT (OUTPATIENT)
Dept: HOME HEALTH SERVICES | Facility: HOME HEALTHCARE | Age: 70
End: 2025-03-30

## 2025-03-31 ENCOUNTER — TRANSITIONAL CARE MANAGEMENT (OUTPATIENT)
Dept: FAMILY MEDICINE CLINIC | Facility: CLINIC | Age: 70
End: 2025-03-31

## 2025-03-31 ENCOUNTER — HOSPITAL ENCOUNTER (OUTPATIENT)
Dept: INFUSION CENTER | Facility: HOSPITAL | Age: 70
Discharge: HOME | End: 2025-03-31

## 2025-03-31 ENCOUNTER — TELEPHONE (OUTPATIENT)
Age: 70
End: 2025-03-31

## 2025-03-31 RX ORDER — FUROSEMIDE 20 MG/1
20 TABLET ORAL DAILY
Qty: 100 TABLET | Refills: 0 | Status: SHIPPED | OUTPATIENT
Start: 2025-03-31

## 2025-03-31 NOTE — ANESTHESIA POSTPROCEDURE EVALUATION
Post-Op Assessment Note    Last Filed PACU Vitals:  Vitals Value Taken Time   Temp 97.9 °F (36.6 °C) 03/28/25 1337   Pulse 66 03/28/25 1340   /75 03/28/25 1337   Resp 14 03/28/25 1340   SpO2 99 % 03/28/25 1340   Vitals shown include unfiled device data.    Modified Edmond:     Vitals Value Taken Time   Activity 2 03/28/25 1337   Respiration 2 03/28/25 1337   Circulation 2 03/28/25 1337   Consciousness 2 03/28/25 1337   Oxygen Saturation 2 03/28/25 1337     Modified Edmond Score: 10             baseline

## 2025-04-01 LAB
KAPPA LC FREE SER-MCNC: 31.5 MG/L (ref 3.3–19.4)
KAPPA LC FREE/LAMBDA FREE SER: 1.41 {RATIO} (ref 0.26–1.65)
LAMBDA LC FREE SERPL-MCNC: 22.3 MG/L (ref 5.7–26.3)

## 2025-04-02 ENCOUNTER — HOSPITAL ENCOUNTER (OUTPATIENT)
Dept: INFUSION CENTER | Facility: HOSPITAL | Age: 70
Discharge: HOME | End: 2025-04-02
Payer: MEDICARE

## 2025-04-02 VITALS
HEART RATE: 69 BPM | SYSTOLIC BLOOD PRESSURE: 149 MMHG | TEMPERATURE: 96.9 F | DIASTOLIC BLOOD PRESSURE: 72 MMHG | OXYGEN SATURATION: 100 % | RESPIRATION RATE: 16 BRPM

## 2025-04-02 DIAGNOSIS — D64.9 ACUTE ON CHRONIC ANEMIA: Primary | ICD-10-CM

## 2025-04-02 DIAGNOSIS — G62.9 NEUROPATHY: ICD-10-CM

## 2025-04-02 PROCEDURE — 96365 THER/PROPH/DIAG IV INF INIT: CPT

## 2025-04-02 PROCEDURE — 96366 THER/PROPH/DIAG IV INF ADDON: CPT

## 2025-04-02 RX ORDER — SODIUM CHLORIDE 9 MG/ML
20 INJECTION, SOLUTION INTRAVENOUS ONCE
Status: COMPLETED | OUTPATIENT
Start: 2025-04-02 | End: 2025-04-02

## 2025-04-02 RX ORDER — SODIUM CHLORIDE 9 MG/ML
20 INJECTION, SOLUTION INTRAVENOUS ONCE
Status: CANCELLED | OUTPATIENT
Start: 2025-04-02

## 2025-04-02 RX ORDER — SODIUM CHLORIDE 9 MG/ML
20 INJECTION, SOLUTION INTRAVENOUS ONCE
OUTPATIENT
Start: 2025-04-10

## 2025-04-02 RX ADMIN — SODIUM CHLORIDE 20 ML/HR: 0.9 INJECTION, SOLUTION INTRAVENOUS at 13:23

## 2025-04-02 RX ADMIN — IRON SUCROSE 300 MG: 20 INJECTION, SOLUTION INTRAVENOUS at 13:21

## 2025-04-02 NOTE — PROGRESS NOTES
Alexander Olmstead  tolerated venofer treatment well with no complications.      Alexander Olmstead is aware of future appt on 4/10/25 at 230pm.     AVS printed and given to Alexander Olmstead:  No (Declined by Alexander Olmstead)

## 2025-04-03 ENCOUNTER — RESULTS FOLLOW-UP (OUTPATIENT)
Dept: GASTROENTEROLOGY | Facility: MEDICAL CENTER | Age: 70
End: 2025-04-03

## 2025-04-03 PROCEDURE — 88341 IMHCHEM/IMCYTCHM EA ADD ANTB: CPT | Performed by: PATHOLOGY

## 2025-04-03 PROCEDURE — 88313 SPECIAL STAINS GROUP 2: CPT | Performed by: PATHOLOGY

## 2025-04-03 PROCEDURE — 88305 TISSUE EXAM BY PATHOLOGIST: CPT | Performed by: PATHOLOGY

## 2025-04-03 PROCEDURE — 88342 IMHCHEM/IMCYTCHM 1ST ANTB: CPT | Performed by: PATHOLOGY

## 2025-04-03 RX ORDER — GABAPENTIN 300 MG/1
300 CAPSULE ORAL 3 TIMES DAILY
Qty: 270 CAPSULE | Refills: 1 | Status: SHIPPED | OUTPATIENT
Start: 2025-04-03

## 2025-04-03 NOTE — RESULT ENCOUNTER NOTE
Please call the patient with the results    Please let him know that the biopsies of his esophagus show Ansh's esophagus.  Nash's esophagus is a precancerous change to the lining of the esophagus due to chronic acid reflux.  There was no evidence of cancerous changes which is good news.  He should continue to take his acid suppression medications as prescribed.  He will be due in 3 months for repeat endoscopy to ensure his esophagus ulcer has healed.

## 2025-04-04 ENCOUNTER — OFFICE VISIT (OUTPATIENT)
Dept: HEMATOLOGY ONCOLOGY | Facility: CLINIC | Age: 70
End: 2025-04-04
Payer: MEDICARE

## 2025-04-04 VITALS
TEMPERATURE: 98.1 F | SYSTOLIC BLOOD PRESSURE: 148 MMHG | OXYGEN SATURATION: 99 % | HEART RATE: 74 BPM | WEIGHT: 175 LBS | BODY MASS INDEX: 25.05 KG/M2 | HEIGHT: 70 IN | DIASTOLIC BLOOD PRESSURE: 64 MMHG

## 2025-04-04 DIAGNOSIS — D50.0 IRON DEFICIENCY ANEMIA DUE TO CHRONIC BLOOD LOSS: ICD-10-CM

## 2025-04-04 DIAGNOSIS — K22.719 BARRETT'S ESOPHAGUS WITH DYSPLASIA: ICD-10-CM

## 2025-04-04 DIAGNOSIS — C90.30 SOLITARY PLASMACYTOMA NOT HAVING ACHIEVED REMISSION (HCC): Primary | ICD-10-CM

## 2025-04-04 PROCEDURE — 99215 OFFICE O/P EST HI 40 MIN: CPT | Performed by: INTERNAL MEDICINE

## 2025-04-04 PROCEDURE — G2211 COMPLEX E/M VISIT ADD ON: HCPCS | Performed by: INTERNAL MEDICINE

## 2025-04-04 NOTE — TELEPHONE ENCOUNTER
Called the patient and left a VM indicating that we are reaching out with non-urgent procedure results and recommendations from our provider. Asked the patient to call the office to discuss and provided office number. Thank you!     ----- Message from Diana Jaiyeola, MD sent at 4/3/2025  5:07 PM EDT -----  Please call the patient with the results    Please let him know that the biopsies of his esophagus show Nash's esophagus.  Nash's esophagus is a precancerous change to the lining of the esophagus due to chronic acid reflux.  There was no evidence of cancerous changes which is good news.  He should continue to take his acid suppression medications as prescribed.  He will be due in 3 months for repeat endoscopy to ensure his esophagus ulcer has healed.

## 2025-04-04 NOTE — PROGRESS NOTES
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  Status post RT May 2024.  Recent immunoglobulin studies near normal, stable over greater than 18 months.  Recent CT chest and pelvis showed stable sacral and T11 lesions.  No evidence of bony or soft tissue disease elsewhere.  Monitor.  Orders:  •  CBC and differential; Future  •  Comprehensive metabolic panel; Future  •  IgG, IgA, IgM; Future  •  Immunoglobulin free LT chains blood; Future  •  Protein electrophoresis, serum; Future  •  Iron Panel (Includes Ferritin, Iron Sat%, Iron, and TIBC); Future    Iron deficiency anemia due to chronic blood loss  Recently hospitalized for severe anemia, hemoglobin less than 5.  Iron saturation 4%.  Received Venofer 200, 200, 300.  No further Venofer is required at this time.  Okay to transition to oral iron supplementation.  Previous colonoscopy in mid  showed diverticulosis.  No bleeding source.  EGD showed ulcer, Nash's.  Pathology confirmed the same.  No malignancy.  On further questioning, he states that he had severe epistaxis some 4 months ago or so, required packing.  I suspect that between these 2 processes, this provides an adequate explanation for his iron deficiency.  Epistaxis has ceased.  I encouraged follow-up under GI.  Orders:  •  CBC and differential; Future  •  Comprehensive metabolic panel; Future  •  IgG, IgA, IgM; Future  •  Immunoglobulin free LT chains blood; Future  •  Protein electrophoresis, serum; Future  •  Iron Panel (Includes Ferritin, Iron Sat%, Iron, and TIBC); Future    Nash's esophagus with dysplasia  Reviewed pathology.  Continue esomeprazole.  Follow-up with GI.           No follow-ups on file.    History of Present  "Illness   No chief complaint on file.    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  Completed RT on 2024.  Today's visit is an EOT phone follow-up    Upcomin24  MRI pelvis sacrum coccyx  24  Dr. 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 Bone, \"Sacral mass,\" Biopsy:  - Epithelial neoplasm with sclerotic bone consistent with lambda-restricted plasma cell myeloma (see note and consultation letter)      2023 Initial Diagnosis    2022 patient had CT chest ab pelvis investigating sepsis.  Bilateral pneumonia.  Sclerotic bony lesions central sacrum 7.9 cm, previously 5 cm in 2018.  2023 CT chest ab pelvis showed increase in sclerotic bony lesion, mixed lytic/sclerotic lesion T12 vertebra left pedicle, 2.5 cm unchanged since 2018.  WBC 6.1, hemoglobin 12.8, MCV 91, platelet count 404, normal differential.  CMP showed sodium 133, calcium 10.5, otherwise normal.  PSA 0.6.  CRP and sed rate normal.  SPEP showed peak 1 0.14 g/dL, IgG lambda.  2023 biopsy of sacral mass showed epithelial neoplasm.     2024 - 2024 Radiation      Plan ID Energy Fractions Dose per Fraction (cGy) Dose Correction (cGy) Total Dose Delivered (cGy) Elapsed Days   Sacrum # 10X/6X 25 / 25 200 0 5,000 36      Treatment dates:  C1: 2024 - 2024 -  Cancer Staged    Staging form: Plasma Cell Myeloma and Disorders, AJCC 8th Edition  - Clinical: High-risk cytogenetics: Unknown - Signed by Tay Mckenna DO on 2024  Stage prefix: Initial " "diagnosis  Cytogenetics: Unknown          Pertinent Medical History   November 24, 2022 patient had CT chest ab pelvis investigating sepsis.  Bilateral pneumonia.  Sclerotic bony lesions central sacrum 7.9 cm, previously 5 cm in January 2018.  October 31, 2023 CT chest ab pelvis showed increase in sclerotic bony lesion, mixed lytic/sclerotic lesion T12 vertebra left pedicle, 2.5 cm unchanged since 2018.  WBC 6.1, hemoglobin 12.8, MCV 91, platelet count 404, normal differential.  CMP showed sodium 133, calcium 10.5, otherwise normal.  PSA 0.6.  CRP and sed rate normal.  SPEP showed peak 1 0.14 g/dL, IgG lambda.  December 19, 2023 biopsy of sacral mass showed epithelial neoplasm.        Review of Systems   Constitutional:  Negative for chills and fever.   HENT:  Negative for nosebleeds.    Eyes:  Negative for discharge.   Respiratory:  Negative for cough and shortness of breath.    Cardiovascular:  Negative for chest pain.   Gastrointestinal:  Negative for abdominal pain, constipation and diarrhea.   Endocrine: Negative for polydipsia.   Genitourinary:  Negative for hematuria.   Musculoskeletal:  Negative for arthralgias.   Skin:  Negative for color change.   Allergic/Immunologic: Negative for immunocompromised state.   Neurological:  Negative for dizziness and headaches.   Hematological:  Negative for adenopathy.   Psychiatric/Behavioral:  Negative for agitation.            Objective   /64 (BP Location: Left arm, Patient Position: Sitting, Cuff Size: Standard)   Pulse 74   Temp 98.1 °F (36.7 °C) (Temporal)   Ht 5' 10\" (1.778 m)   Wt 79.4 kg (175 lb)   SpO2 99%   BMI 25.11 kg/m²     Pain Screening:     ECOG     Physical Exam  Constitutional:       Appearance: He is well-developed.   HENT:      Head: Normocephalic and atraumatic.      Mouth/Throat:      Mouth: Mucous membranes are moist.   Eyes:      Pupils: Pupils are equal, round, and reactive to light.   Cardiovascular:      Rate and Rhythm: Normal rate " and regular rhythm.      Heart sounds: No murmur heard.  Pulmonary:      Breath sounds: Normal breath sounds. No wheezing or rales.   Abdominal:      Palpations: Abdomen is soft.      Tenderness: There is no abdominal tenderness.   Musculoskeletal:         General: No tenderness. Normal range of motion.      Cervical back: Neck supple.   Lymphadenopathy:      Cervical: No cervical adenopathy.   Skin:     Findings: No erythema or rash.   Neurological:      Mental Status: He is alert and oriented to person, place, and time.      Cranial Nerves: No cranial nerve deficit.      Deep Tendon Reflexes: Reflexes are normal and symmetric.   Psychiatric:         Behavior: Behavior normal.         Labs: I have reviewed the following labs:  Lab Results   Component Value Date/Time    WBC 8.21 03/28/2025 12:10 AM    RBC 3.19 (L) 03/28/2025 12:10 AM    Hemoglobin 7.6 (L) 03/28/2025 12:10 AM    Hematocrit 25.0 (L) 03/28/2025 12:10 AM    MCV 78 (L) 03/28/2025 12:10 AM    MCH 23.8 (L) 03/28/2025 12:10 AM    RDW 18.0 (H) 03/28/2025 12:10 AM    Platelets 278 03/28/2025 12:10 AM    Segmented % 70 03/27/2025 09:26 AM    Lymphocytes % 10 (L) 03/27/2025 09:26 AM    Monocytes % 16 (H) 03/27/2025 09:26 AM    Eosinophils Relative 3 03/27/2025 09:26 AM    Basophils Relative 1 03/27/2025 09:26 AM    Immature Grans % 0 03/27/2025 09:26 AM    Absolute Neutrophils 4.14 03/27/2025 09:26 AM     Lab Results   Component Value Date/Time    Potassium 3.6 03/28/2025 05:03 AM    Chloride 107 03/28/2025 05:03 AM    CO2 23 03/28/2025 05:03 AM    BUN 13 03/28/2025 05:03 AM    Creatinine 0.91 03/28/2025 05:03 AM    Glucose, Fasting 105 (H) 03/27/2025 08:06 AM    Calcium 9.2 03/28/2025 05:03 AM    AST 13 03/28/2025 05:03 AM    ALT 8 03/28/2025 05:03 AM    Alkaline Phosphatase 68 03/28/2025 05:03 AM    Total Protein 6.1 (L) 03/28/2025 05:03 AM    Albumin 3.9 03/28/2025 05:03 AM    Total Bilirubin 0.79 03/28/2025 05:03 AM    eGFR 85 03/28/2025 05:03 AM

## 2025-04-04 NOTE — ASSESSMENT & PLAN NOTE
SBP sacrum, IgG lambda.  Status post RT May 2024.  Recent immunoglobulin studies near normal, stable over greater than 18 months.  Recent CT chest and pelvis showed stable sacral and T11 lesions.  No evidence of bony or soft tissue disease elsewhere.  Monitor.  Orders:  •  CBC and differential; Future  •  Comprehensive metabolic panel; Future  •  IgG, IgA, IgM; Future  •  Immunoglobulin free LT chains blood; Future  •  Protein electrophoresis, serum; Future  •  Iron Panel (Includes Ferritin, Iron Sat%, Iron, and TIBC); Future

## 2025-04-04 NOTE — RESULT ENCOUNTER NOTE
Patient spoke with Marika Crockett MA, and was given the result report. Patient had no questions and is aware of the next appointment.     3 month EGD recall was placed. Thank you!

## 2025-04-08 ENCOUNTER — PATIENT OUTREACH (OUTPATIENT)
Dept: HEMATOLOGY ONCOLOGY | Facility: CLINIC | Age: 70
End: 2025-04-08

## 2025-04-08 NOTE — PROGRESS NOTES
I reached out and spoke with Shiraz to follow up and to review for any new changes in barriers to care and offer supportive services as needed.     Barriers noted previously and outcome of interventions;  Neuropathy pain in is feet- using gabapentin   Was inpatient due to Anemia from 3/27-3/28 due to nose bleeds. Received blood transfusion and on oral iron.     Reviewed for any new barriers as noted below.    Are you eating and drinking normally? Yes     Have you been experiencing any uncontrolled pain related to your cancer diagnosis? No    If not already established, have needs changed for a palliative care referral? no    Do you have a good support system? Supported by his friend Ruel    Are you interested in any support groups? Previously provided     How are you doing with transportation to your appointments? Good, uses [employer]    Do you have any questions or concerns regarding your treatment plan? No    Any new financial concerns for your household or medical bills? No    Do you know when your upcoming appointments are? yes  Future Appointments   Date Time Provider Department Center   5/6/2025  3:20 PM Diana M Jaiyeola, MD [facility] Practice-Med   7/11/2025  8:40 AM Tay Mckenna, DO HEM ONC [facility] Practice-Onc   7/23/2025 10:15 AM Blade Walton DO [facility]  Practice-Nor          Based on individual needs I will follow up with them on an as needed basis. Shiraz confirmed he has my direct contact information and knows to contact me if his needs as discussed above change. They were appreciative for the call.

## 2025-04-10 ENCOUNTER — HOSPITAL ENCOUNTER (OUTPATIENT)
Dept: INFUSION CENTER | Facility: HOSPITAL | Age: 70
End: 2025-04-10
Attending: INTERNAL MEDICINE

## 2025-04-11 DIAGNOSIS — F10.21 ALCOHOLISM IN REMISSION (HCC): ICD-10-CM

## 2025-04-11 RX ORDER — MIRTAZAPINE 15 MG/1
15 TABLET, FILM COATED ORAL
Qty: 90 TABLET | Refills: 1 | Status: SHIPPED | OUTPATIENT
Start: 2025-04-11

## 2025-04-15 DIAGNOSIS — K20.90 ESOPHAGITIS: ICD-10-CM

## 2025-04-16 RX ORDER — ESOMEPRAZOLE MAGNESIUM 40 MG/1
40 CAPSULE, DELAYED RELEASE ORAL DAILY
Qty: 30 CAPSULE | Refills: 0 | Status: SHIPPED | OUTPATIENT
Start: 2025-04-16

## 2025-05-06 ENCOUNTER — TELEPHONE (OUTPATIENT)
Dept: GASTROENTEROLOGY | Facility: MEDICAL CENTER | Age: 70
End: 2025-05-06

## 2025-05-06 ENCOUNTER — OFFICE VISIT (OUTPATIENT)
Dept: GASTROENTEROLOGY | Facility: MEDICAL CENTER | Age: 70
End: 2025-05-06
Payer: MEDICARE

## 2025-05-06 VITALS
WEIGHT: 163.4 LBS | HEART RATE: 55 BPM | BODY MASS INDEX: 23.39 KG/M2 | OXYGEN SATURATION: 97 % | DIASTOLIC BLOOD PRESSURE: 69 MMHG | SYSTOLIC BLOOD PRESSURE: 153 MMHG | HEIGHT: 70 IN | TEMPERATURE: 97.1 F

## 2025-05-06 DIAGNOSIS — K22.10 ULCER OF ESOPHAGUS WITHOUT BLEEDING: ICD-10-CM

## 2025-05-06 DIAGNOSIS — K44.9 HIATAL HERNIA: ICD-10-CM

## 2025-05-06 DIAGNOSIS — K22.70 BARRETT'S ESOPHAGUS WITHOUT DYSPLASIA: Primary | ICD-10-CM

## 2025-05-06 DIAGNOSIS — R04.0 EPISTAXIS: ICD-10-CM

## 2025-05-06 DIAGNOSIS — D50.9 IRON DEFICIENCY ANEMIA, UNSPECIFIED IRON DEFICIENCY ANEMIA TYPE: ICD-10-CM

## 2025-05-06 PROCEDURE — 99214 OFFICE O/P EST MOD 30 MIN: CPT | Performed by: INTERNAL MEDICINE

## 2025-05-06 RX ORDER — PANTOPRAZOLE SODIUM 20 MG/1
20 TABLET, DELAYED RELEASE ORAL EVERY MORNING
COMMUNITY
End: 2025-05-06

## 2025-05-06 RX ORDER — ALPHA LIPOIC ACID 300 MG
6 CAPSULE ORAL DAILY
COMMUNITY

## 2025-05-06 RX ORDER — SODIUM CHLORIDE, SODIUM LACTATE, POTASSIUM CHLORIDE, CALCIUM CHLORIDE 600; 310; 30; 20 MG/100ML; MG/100ML; MG/100ML; MG/100ML
125 INJECTION, SOLUTION INTRAVENOUS CONTINUOUS
OUTPATIENT
Start: 2025-05-06

## 2025-05-06 RX ORDER — PSYLLIUM HUSK 0.4 G
1 CAPSULE ORAL AS NEEDED
COMMUNITY

## 2025-05-06 NOTE — PROGRESS NOTES
Name: Alexander Olmstead      : 1955      MRN: [id number]  Encounter Provider: Diana M Jaiyeola, MD  Encounter Date: 2025   Encounter department: [facility]  :  Assessment & Plan  Iron deficiency anemia, unspecified iron deficiency anemia type  He was admitted for profound iron deficiency anemia.  His colonoscopy from  showed a subcentimeter adenomatous polyp but was otherwise unremarkable.  He underwent EGD as an inpatient showing long segment Nash's esophagus and a clean-based esophageal ulcer.  He had a large episode of epistaxis prior to his presentation which was likely the etiology of his iron deficiency anemia, with possible contribution from his esophageal ulcer.  He is continuing to follow-up with hematology and will continue oral iron supplementation.       Nash's esophagus without dysplasia  He was found to have long segment Nash's esophagus on endoscopy.  We discussed the etiology and management of Nash's esophagus, that this is a precancerous change to the lining of the esophagus due to chronic acid reflux.  He will continue Nexium 40 mg daily and we will repeat his endoscopy in 3 months to ensure healing of his esophagus ulcer.  He would then require repeat EGD in 3 years for surveillance of his long segment Nash's esophagus.  Orders:    EGD; Future    Ulcer of esophagus without bleeding    Orders:    EGD; Future    Hiatal hernia    Orders:    EGD; Future    Epistaxis  Given his significant epistaxis  episode requiring PRBC transfusion I have placed a referral for him to establish care with ENT to discuss preventative management and treatment.  Orders:    Ambulatory Referral to Otolaryngology; Future      EGD approximately 2025  Follow-up in the office 1 year    History of Present Illness   Alexander Olmstead is a 70 y.o. male who presents for hospital follow-up.  He has a history of solitary plasmacytoma of the sacrum,  CAD.      He was admitted to [facility], [place] in March after undergoing outpatient blood work showing a hemoglobin of 4 and was recommended to present to the emergency room.  He reported no overt GI bleeding but reported an episode of large-volume epistaxis prior to admission.  His baseline hemoglobin had recently ranged 8-12 and on presentation was 4.7, MCV 72 iron saturation 2% and ferritin 3.  He was transfused 3 units PRBCs and had appropriate response.  His CT abdomen pelvis that admission showed small hiatal hernia with esophageal thickening and colonic fecal stasis.  July 2024 colonoscopy showed a subcentimeter polyp, pathology showing tubular adenoma and he was recommended repeat in 7 years.    He underwent EGD that admission showing C14 and 14 Nash's esophagus, 5 cm hiatal hernia, GE junction ulcer.  He was recommended to start high-dose PPI and repeat EGD in 12 weeks document healing of his esophageal ulcer.    He has been taking Nexium 40 mg daily and also taking pantoprazole 20 mg daily.  He reports no heartburn symptoms or dysphagia.  He usually has regular, formed bowel movements without melena or hematochezia    HPI  History obtained from: patient  Review of Systems   Constitutional:  Negative for chills and fever.   HENT:  Negative for ear pain and sore throat.    Eyes:  Negative for pain and visual disturbance.   Respiratory:  Negative for cough and shortness of breath.    Cardiovascular:  Negative for chest pain and palpitations.   Gastrointestinal:  Negative for abdominal pain and vomiting.   Genitourinary:  Negative for dysuria and hematuria.   Musculoskeletal:  Negative for arthralgias and back pain.   Skin:  Negative for color change and rash.   Neurological:  Negative for seizures and syncope.   All other systems reviewed and are negative.   A complete review of systems is negative other than that noted above in the HPI.    Past Medical History   Past Medical History:    Diagnosis Date    Acute respiratory failure (HCC) 11/24/2022    Acute respiratory failure with hypoxia (HCC)     Alcohol dependence with withdrawal delirium (HCC) 04/18/2023    Alcohol withdrawal (HCC) 11/25/2022    Hyperlipidemia     Hypertension     Hyponatremia 11/23/2022    Hypothermia 11/23/2022    Hypoxia 11/23/2022    Metabolic acidosis with increased anion gap and accumulation of organic acids 11/23/2022    Metabolic encephalopathy 11/23/2022    Pneumonia 11/25/2022    Positive blood culture 11/28/2022    Septic shock (HCC) 11/24/2022     Past Surgical History:   Procedure Laterality Date    COLONOSCOPY      IR BIOPSY BONE  12/19/2023     Family History   Problem Relation Age of Onset    Stroke Mother     Hypertension Father     Diabetes Father     Cancer Father       reports that he has never smoked. He has never used smokeless tobacco. He reports that he does not currently use alcohol after a past usage of about 6.0 standard drinks of alcohol per week. He reports that he does not use drugs.  Current Outpatient Medications   Medication Instructions    Alpha-Lipoic Acid 300 MG CAPS 6 capsules, Daily    [Paused] Cyanocobalamin (VITAMIN B-12 CR PO) 2 tablets, Daily    DULoxetine (CYMBALTA) 30 mg, Oral, Daily    esomeprazole (NEXIUM) 40 mg, Oral, Daily    furosemide (LASIX) 20 mg, Oral, Daily    gabapentin (NEURONTIN) 300 mg, Oral, 3 times daily    ibuprofen (MOTRIN) 400 mg, Every 6 hours PRN    losartan (COZAAR) 50 mg, Oral, Daily    mirtazapine (REMERON) 15 mg, Oral, Daily at bedtime,       Multiple Vitamin (MULTIVITAMIN) tablet 1 tablet, Daily    Nutritional Supplements (ENSURE PO) Take by mouth    Omega-3 Fatty Acids (FISH OIL PO) Take by mouth    Psyllium (Metamucil 3 in 1 Daily Fiber) 400 MG CAPS 1 capsule, As needed    pyridoxine (VITAMIN B6) 50 mg, Oral, Daily    SALINE NASAL MIST NA 2 sprays, 2 times daily   No Known Allergies   Current Outpatient Medications   Medication Sig Dispense Refill     [Paused] Cyanocobalamin (VITAMIN B-12 CR PO) Take 2 tablets by mouth in the morning      DULoxetine (CYMBALTA) 30 mg delayed release capsule Take 1 capsule (30 mg total) by mouth daily 90 capsule 1    esomeprazole (NexIUM) 40 MG capsule Take 1 capsule (40 mg total) by mouth in the morning 30 capsule 0    furosemide (LASIX) 20 mg tablet Take 1 tablet (20 mg total) by mouth daily 100 tablet 0    gabapentin (Neurontin) 300 mg capsule Take 1 capsule (300 mg total) by mouth 3 (three) times a day 270 capsule 1    ibuprofen (MOTRIN) 200 mg tablet Take 400 mg by mouth every 6 (six) hours as needed for mild pain      losartan (COZAAR) 50 mg tablet TAKE 1 TABLET (50 MG TOTAL) BY MOUTH IN THE MORNING 90 tablet 1    mirtazapine (REMERON) 15 mg tablet TAKE 1 TABLET BY MOUTH EVERYDAY AT BEDTIME 90 tablet 1    Multiple Vitamin (MULTIVITAMIN) tablet Take 1 tablet by mouth daily      Nutritional Supplements (ENSURE PO) Take by mouth      Omega-3 Fatty Acids (FISH OIL PO) Take by mouth      pyridoxine (VITAMIN B6) 50 mg tablet Take 1 tablet (50 mg total) by mouth daily 90 tablet 0    SALINE NASAL MIST NA 2 sprays into each nostril 2 (two) times a day       No current facility-administered medications for this visit.     Objective   There were no vitals taken for this visit.    Physical Exam  Vitals and nursing note reviewed.   Constitutional:       General: He is not in acute distress.     Appearance: He is well-developed.   HENT:      Head: Normocephalic and atraumatic.   Eyes:      Conjunctiva/sclera: Conjunctivae normal.   Cardiovascular:      Rate and Rhythm: Normal rate and regular rhythm.      Heart sounds: No murmur heard.  Pulmonary:      Effort: Pulmonary effort is normal. No respiratory distress.      Breath sounds: Normal breath sounds.   Abdominal:      Palpations: Abdomen is soft.      Tenderness: There is no abdominal tenderness.   Musculoskeletal:         General: No swelling.      Cervical back: Neck supple.   Skin:      General: Skin is warm and dry.      Capillary Refill: Capillary refill takes less than 2 seconds.   Neurological:      Mental Status: He is alert.   Psychiatric:         Mood and Affect: Mood normal.            Lab Results: I personally reviewed relevant lab results.       Results for orders placed during the hospital encounter of 03/27/25    EGD    Impression  5 cm hiatal hernia  C14M14 Nash's esophagus; electronic chromoendoscopy (NBI) was used; performed four-quadrant cold forceps biopsy every 2 cm for dysplasia screening  Single ulcer in the GE junction with clean base (Baltazar III); performed cold forceps biopsy  The stomach appeared normal. Performed random biopsy to rule out H. pylori.  The duodenal bulb, 1st part of the duodenum, 2nd part of the duodenum and 3rd part of the duodenum appeared normal. Performed random biopsy to rule out celiac disease.      RECOMMENDATION:  Await pathology results  Schedule repeat EGD, due: 6/28/2025  Schedule repeat EGD, due: 3/28/2028  Nash's esophagus surveillance    Start high-dose PPI for 12 weeks then repeat EGD at that time to document healing of esophageal ulcer.  The ulcer may be the etiology of the patient's recent iron deficiency anemia.  He also reports episodes of large-volume epistaxis prior to admission    Continue daily PPI thereafter indefinitely given history of long segment Nash's esophagus and repeat EGD in 3 years for Nash's esophagus surveillance  Resume regular diet            Diana M Jaiyeola, MD

## 2025-05-06 NOTE — ASSESSMENT & PLAN NOTE
He was found to have long segment Nash's esophagus on endoscopy.  We discussed the etiology and management of Nash's esophagus, that this is a precancerous change to the lining of the esophagus due to chronic acid reflux.  He will continue Nexium 40 mg daily and we will repeat his endoscopy in 3 months to ensure healing of his esophagus ulcer.  He would then require repeat EGD in 3 years for surveillance of his long segment Nash's esophagus.  Orders:    EGD; Future

## 2025-05-15 DIAGNOSIS — K20.90 ESOPHAGITIS: ICD-10-CM

## 2025-05-16 RX ORDER — ESOMEPRAZOLE MAGNESIUM 40 MG/1
40 CAPSULE, DELAYED RELEASE ORAL DAILY
Qty: 30 CAPSULE | Refills: 5 | Status: SHIPPED | OUTPATIENT
Start: 2025-05-16

## 2025-06-03 DIAGNOSIS — G62.9 NEUROPATHY: ICD-10-CM

## 2025-06-04 RX ORDER — LANOLIN ALCOHOL/MO/W.PET/CERES
50 CREAM (GRAM) TOPICAL DAILY
Qty: 90 TABLET | Refills: 0 | Status: SHIPPED | OUTPATIENT
Start: 2025-06-04 | End: 2025-06-09 | Stop reason: SDUPTHER

## 2025-06-06 DIAGNOSIS — G62.9 NEUROPATHY: ICD-10-CM

## 2025-06-06 NOTE — TELEPHONE ENCOUNTER
Patient called to request a refill for their Vitamin B6 advised a refill was requested on /6/2025 and is pending approval. Patient verbalized understanding and is in agreement.     Does the patient have enough for 3 days?   [x] Yes   [] No - Send as HP to POD  Patient states he did speak to someone directly in the pharmacy and was told that no recent script was received.

## 2025-06-08 DIAGNOSIS — K20.90 ESOPHAGITIS: ICD-10-CM

## 2025-06-09 ENCOUNTER — TELEPHONE (OUTPATIENT)
Age: 70
End: 2025-06-09

## 2025-06-09 DIAGNOSIS — G62.9 NEUROPATHY: ICD-10-CM

## 2025-06-09 RX ORDER — LANOLIN ALCOHOL/MO/W.PET/CERES
50 CREAM (GRAM) TOPICAL DAILY
Qty: 90 TABLET | Refills: 1 | Status: SHIPPED | OUTPATIENT
Start: 2025-06-09

## 2025-06-09 RX ORDER — LANOLIN ALCOHOL/MO/W.PET/CERES
50 CREAM (GRAM) TOPICAL DAILY
Qty: 90 TABLET | Refills: 0 | OUTPATIENT
Start: 2025-06-09

## 2025-06-09 RX ORDER — ESOMEPRAZOLE MAGNESIUM 40 MG/1
40 CAPSULE, DELAYED RELEASE ORAL DAILY
Qty: 90 CAPSULE | Refills: 1 | Status: SHIPPED | OUTPATIENT
Start: 2025-06-09

## 2025-06-09 NOTE — TELEPHONE ENCOUNTER
Patient called and is asking why was his pyridoxine (Vitamin B6) refused last week.  He is in need of this medication.  He would like a call back letting him now if this will be refilled.

## 2025-06-20 DIAGNOSIS — R60.9 DEPENDENT EDEMA: ICD-10-CM

## 2025-06-22 DIAGNOSIS — R60.9 DEPENDENT EDEMA: ICD-10-CM

## 2025-06-22 RX ORDER — FUROSEMIDE 20 MG/1
20 TABLET ORAL DAILY
Qty: 90 TABLET | Refills: 1 | OUTPATIENT
Start: 2025-06-22

## 2025-06-22 RX ORDER — FUROSEMIDE 20 MG/1
20 TABLET ORAL DAILY
Qty: 100 TABLET | Refills: 1 | Status: SHIPPED | OUTPATIENT
Start: 2025-06-22

## 2025-07-02 ENCOUNTER — APPOINTMENT (OUTPATIENT)
Dept: LAB | Facility: HOSPITAL | Age: 70
End: 2025-07-02
Payer: MEDICARE

## 2025-07-02 DIAGNOSIS — D50.0 IRON DEFICIENCY ANEMIA DUE TO CHRONIC BLOOD LOSS: ICD-10-CM

## 2025-07-02 DIAGNOSIS — C90.30 SOLITARY PLASMACYTOMA NOT HAVING ACHIEVED REMISSION (HCC): ICD-10-CM

## 2025-07-02 LAB
ALBUMIN SERPL BCG-MCNC: 4.6 G/DL (ref 3.5–5)
ALP SERPL-CCNC: 82 U/L (ref 34–104)
ALT SERPL W P-5'-P-CCNC: 13 U/L (ref 7–52)
ANION GAP SERPL CALCULATED.3IONS-SCNC: 8 MMOL/L (ref 4–13)
AST SERPL W P-5'-P-CCNC: 17 U/L (ref 13–39)
BASOPHILS # BLD AUTO: 0.04 THOUSANDS/ÂΜL (ref 0–0.1)
BASOPHILS NFR BLD AUTO: 2 % (ref 0–1)
BILIRUB SERPL-MCNC: 0.53 MG/DL (ref 0.2–1)
BUN SERPL-MCNC: 18 MG/DL (ref 5–25)
CALCIUM SERPL-MCNC: 10.3 MG/DL (ref 8.4–10.2)
CHLORIDE SERPL-SCNC: 101 MMOL/L (ref 96–108)
CO2 SERPL-SCNC: 30 MMOL/L (ref 21–32)
CREAT SERPL-MCNC: 1.18 MG/DL (ref 0.6–1.3)
EOSINOPHIL # BLD AUTO: 0.08 THOUSAND/ÂΜL (ref 0–0.61)
EOSINOPHIL NFR BLD AUTO: 3 % (ref 0–6)
ERYTHROCYTE [DISTWIDTH] IN BLOOD BY AUTOMATED COUNT: 17.6 % (ref 11.6–15.1)
FERRITIN SERPL-MCNC: 36 NG/ML (ref 30–336)
GFR SERPL CREATININE-BSD FRML MDRD: 62 ML/MIN/1.73SQ M
GLUCOSE P FAST SERPL-MCNC: 91 MG/DL (ref 65–99)
HCT VFR BLD AUTO: 36.9 % (ref 36.5–49.3)
HGB BLD-MCNC: 12.2 G/DL (ref 12–17)
IGA SERPL-MCNC: 372 MG/DL (ref 66–433)
IGG SERPL-MCNC: 1032 MG/DL (ref 635–1741)
IGM SERPL-MCNC: 63 MG/DL (ref 45–281)
IMM GRANULOCYTES # BLD AUTO: 0.01 THOUSAND/UL (ref 0–0.2)
IMM GRANULOCYTES NFR BLD AUTO: 0 % (ref 0–2)
IRON SATN MFR SERPL: 25 % (ref 15–50)
IRON SERPL-MCNC: 85 UG/DL (ref 50–212)
LYMPHOCYTES # BLD AUTO: 0.51 THOUSANDS/ÂΜL (ref 0.6–4.47)
LYMPHOCYTES NFR BLD AUTO: 20 % (ref 14–44)
MCH RBC QN AUTO: 29.5 PG (ref 26.8–34.3)
MCHC RBC AUTO-ENTMCNC: 33.1 G/DL (ref 31.4–37.4)
MCV RBC AUTO: 89 FL (ref 82–98)
MONOCYTES # BLD AUTO: 0.29 THOUSAND/ÂΜL (ref 0.17–1.22)
MONOCYTES NFR BLD AUTO: 11 % (ref 4–12)
NEUTROPHILS # BLD AUTO: 1.65 THOUSANDS/ÂΜL (ref 1.85–7.62)
NEUTS SEG NFR BLD AUTO: 64 % (ref 43–75)
NRBC BLD AUTO-RTO: 0 /100 WBCS
PLATELET # BLD AUTO: 219 THOUSANDS/UL (ref 149–390)
PMV BLD AUTO: 10.4 FL (ref 8.9–12.7)
POTASSIUM SERPL-SCNC: 3.9 MMOL/L (ref 3.5–5.3)
PROT SERPL-MCNC: 7.4 G/DL (ref 6.4–8.4)
RBC # BLD AUTO: 4.14 MILLION/UL (ref 3.88–5.62)
SODIUM SERPL-SCNC: 139 MMOL/L (ref 135–147)
TIBC SERPL-MCNC: 344.4 UG/DL (ref 250–450)
TRANSFERRIN SERPL-MCNC: 246 MG/DL (ref 203–362)
UIBC SERPL-MCNC: 259 UG/DL (ref 155–355)
WBC # BLD AUTO: 2.58 THOUSAND/UL (ref 4.31–10.16)

## 2025-07-02 PROCEDURE — 85025 COMPLETE CBC W/AUTO DIFF WBC: CPT

## 2025-07-02 PROCEDURE — 36415 COLL VENOUS BLD VENIPUNCTURE: CPT

## 2025-07-02 PROCEDURE — 82728 ASSAY OF FERRITIN: CPT

## 2025-07-02 PROCEDURE — 83521 IG LIGHT CHAINS FREE EACH: CPT

## 2025-07-02 PROCEDURE — 80053 COMPREHEN METABOLIC PANEL: CPT

## 2025-07-02 PROCEDURE — 82784 ASSAY IGA/IGD/IGG/IGM EACH: CPT

## 2025-07-02 PROCEDURE — 83550 IRON BINDING TEST: CPT

## 2025-07-02 PROCEDURE — 84165 PROTEIN E-PHORESIS SERUM: CPT

## 2025-07-02 PROCEDURE — 83540 ASSAY OF IRON: CPT

## 2025-07-03 LAB
ALBUMIN SERPL ELPH-MCNC: 4.34 G/DL (ref 3.2–5.1)
ALBUMIN SERPL ELPH-MCNC: 60.3 % (ref 48–70)
ALPHA1 GLOB SERPL ELPH-MCNC: 0.29 G/DL (ref 0.15–0.47)
ALPHA1 GLOB SERPL ELPH-MCNC: 4 % (ref 1.8–7)
ALPHA2 GLOB SERPL ELPH-MCNC: 0.72 G/DL (ref 0.42–1.04)
ALPHA2 GLOB SERPL ELPH-MCNC: 10 % (ref 5.9–14.9)
BETA GLOB ABNORMAL SERPL ELPH-MCNC: 0.45 G/DL (ref 0.31–0.57)
BETA1 GLOB SERPL ELPH-MCNC: 6.2 % (ref 4.7–7.7)
BETA2 GLOB SERPL ELPH-MCNC: 5.7 % (ref 3.1–7.9)
BETA2+GAMMA GLOB SERPL ELPH-MCNC: 0.41 G/DL (ref 0.2–0.58)
GAMMA GLOB ABNORMAL SERPL ELPH-MCNC: 0.99 G/DL (ref 0.4–1.66)
GAMMA GLOB SERPL ELPH-MCNC: 13.8 % (ref 6.9–22.3)
IGG/ALB SER: 1.52 {RATIO} (ref 1.1–1.8)
KAPPA LC FREE SER-MCNC: 29 MG/L (ref 3.3–19.4)
KAPPA LC FREE/LAMBDA FREE SER: 1.78 {RATIO} (ref 0.26–1.65)
LAMBDA LC FREE SERPL-MCNC: 16.3 MG/L (ref 5.7–26.3)
PROT SERPL-MCNC: 7.2 G/DL (ref 6.4–8.4)

## 2025-07-03 PROCEDURE — 84165 PROTEIN E-PHORESIS SERUM: CPT | Performed by: PATHOLOGY

## 2025-07-11 ENCOUNTER — OFFICE VISIT (OUTPATIENT)
Dept: HEMATOLOGY ONCOLOGY | Facility: CLINIC | Age: 70
End: 2025-07-11
Payer: MEDICARE

## 2025-07-11 VITALS
OXYGEN SATURATION: 91 % | WEIGHT: 155.2 LBS | TEMPERATURE: 97.9 F | HEART RATE: 73 BPM | SYSTOLIC BLOOD PRESSURE: 149 MMHG | DIASTOLIC BLOOD PRESSURE: 77 MMHG | BODY MASS INDEX: 22.22 KG/M2 | HEIGHT: 70 IN

## 2025-07-11 DIAGNOSIS — R04.0 EPISTAXIS: ICD-10-CM

## 2025-07-11 DIAGNOSIS — D50.0 IRON DEFICIENCY ANEMIA DUE TO CHRONIC BLOOD LOSS: ICD-10-CM

## 2025-07-11 DIAGNOSIS — K22.70 BARRETT'S ESOPHAGUS WITHOUT DYSPLASIA: ICD-10-CM

## 2025-07-11 DIAGNOSIS — C90.30 SOLITARY PLASMACYTOMA NOT HAVING ACHIEVED REMISSION (HCC): Primary | ICD-10-CM

## 2025-07-11 PROCEDURE — 99214 OFFICE O/P EST MOD 30 MIN: CPT | Performed by: INTERNAL MEDICINE

## 2025-07-11 PROCEDURE — G2211 COMPLEX E/M VISIT ADD ON: HCPCS | Performed by: INTERNAL MEDICINE

## 2025-07-11 NOTE — PROGRESS NOTES
Name: Alexander Olmstead      : 1955      MRN: [id number]  Encounter Provider: Tay Mckenna DO  Encounter Date: 2025   Encounter department: [facility] [place]  :  Assessment & Plan  Solitary plasmacytoma not having achieved remission (HCC)    Orders:  •  CBC and differential; Future  •  Comprehensive metabolic panel; Future  •  IgG, IgA, IgM; Future  •  Immunoglobulin free LT chains blood; Future  •  Protein electrophoresis, serum; Future    Iron deficiency anemia due to chronic blood loss    Orders:  •  CBC and differential; Future  •  Comprehensive metabolic panel; Future  •  IgG, IgA, IgM; Future  •  Immunoglobulin free LT chains blood; Future  •  Protein electrophoresis, serum; Future    Nash's esophagus without dysplasia  See below       Epistaxis  See below           Return in about 6 months (around 2026) for Labs - See orders.    Assessment & Plan  1. Plasma cell neoplasm.  Hemoglobin improved from 4.5 to 12.2, positive response to treatment. White blood cell count slightly low at 2.5, consistent with previous results. Platelets normal at 219. Chemistry panel satisfactory, calcium at upper limit of normal (10.3). Sodium, potassium, kidney function, and liver enzymes normal. Antibody levels (IgG, IgA, IgM) normal, SPEP normal. Free light chains minimally abnormal but stable over 15 months. CT sacrum in 2025 stable, no new spots. Iron deficiency cause unclear, possibly digestive blood loss. Colonoscopy in 2024 showed small polyp, mild diverticulosis, no obvious bleeding source. Upper scope in 2025 revealed Nash's esophagus and ulcer at gastroesophageal junction, no bleeding. Repeat blood work in 6 months, reimaging in a year.    2. Neuropathy.  Neuropathy in feet bothers when sitting, not walking or lying down. Using IcyHot cream and alpha lipoic acid, some improvement. Continue current regimen, consider CBD ointment if IcyHot  insufficient.    3. Nash's esophagus.  Upper scope in 2025 revealed Nash's esophagus and ulcer at gastroesophageal junction, no bleeding. EGD scheduled for follow-up.    Follow-up  Follow up in 6 months.       History of Present Illness   Chief Complaint   Patient presents with   • Follow-up       History of Present Illness  The patient is a 70-year-old male with a diagnosis of plasma cell neoplasm.    Epistaxis  - The patient reports experiencing severe epistaxis.  - This has been effectively managed with the use of humidifiers and saline nasal irrigation three times daily.  - These measures have prevented further episodes.    Peripheral Neuropathy  - The patient describes peripheral neuropathy in the feet.  - It is more noticeable when sitting and does not interfere with sleep.  - He utilizes IcyHot cream and alpha-lipoic acid, both of which have contributed to an improvement in his symptoms.  - Additionally, he has made dietary modifications.    There have been no new medical conditions or changes in medication, except for the initiation of an iron supplement following blood work conducted six months ago.    The patient previously received 700 mg of intravenous iron (Venofer) in 2025 for iron deficiency.    There has been no significant change in energy levels post-supplementation; the patient's energy levels were satisfactory prior to supplementation.    An esophagogastroduodenoscopy (EGD) is scheduled for later this month.       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  Completed RT on 2024.  Today's visit is an EOT phone follow-up    [id number]   "MRI pelvis sacrum coccyx  8/30/24  Dr. Mckenna       Solitary plasmacytoma not having achieved remission (HCC)   12/19/2023 Biopsy    Final Diagnosis   A. Bone, \"Sacral mass,\" Biopsy:  - Epithelial neoplasm with sclerotic bone consistent with lambda-restricted plasma cell myeloma (see note and consultation letter)      12/19/2023 Initial Diagnosis    November 24, 2022 patient had CT chest ab pelvis investigating sepsis.  Bilateral pneumonia.  Sclerotic bony lesions central sacrum 7.9 cm, previously 5 cm in January 2018.  October 31, 2023 CT chest ab pelvis showed increase in sclerotic bony lesion, mixed lytic/sclerotic lesion T12 vertebra left pedicle, 2.5 cm unchanged since 2018.  WBC 6.1, hemoglobin 12.8, MCV 91, platelet count 404, normal differential.  CMP showed sodium 133, calcium 10.5, otherwise normal.  PSA 0.6.  CRP and sed rate normal.  SPEP showed peak 1 0.14 g/dL, IgG lambda.  December 19, 2023 biopsy of sacral mass showed epithelial neoplasm.     4/29/2024 - 6/4/2024 Radiation      Plan ID Energy Fractions Dose per Fraction (cGy) Dose Correction (cGy) Total Dose Delivered (cGy) Elapsed Days   Sacrum # 10X/6X 25 / 25 200 0 5,000 36      Treatment dates:  C1: 4/29/2024 - 6/4/2024 4/30/2024 -  Cancer Staged    Staging form: Plasma Cell Myeloma and Disorders, AJCC 8th Edition  - Clinical: High-risk cytogenetics: Unknown - Signed by Tay Mckenna DO on 4/30/2024  Stage prefix: Initial diagnosis  Cytogenetics: Unknown          Pertinent Medical History   November 24, 2022 patient had CT chest ab pelvis investigating sepsis.  Bilateral pneumonia.  Sclerotic bony lesions central sacrum 7.9 cm, previously 5 cm in January 2018.  October 31, 2023 CT chest ab pelvis showed increase in sclerotic bony lesion, mixed lytic/sclerotic lesion T12 vertebra left pedicle, 2.5 cm unchanged since 2018.  WBC 6.1, hemoglobin 12.8, MCV 91, platelet count 404, normal differential.  CMP showed sodium 133, calcium 10.5, " "otherwise normal.  PSA 0.6.  CRP and sed rate normal.  SPEP showed peak 1 0.14 g/dL, IgG lambda.  December 19, 2023 biopsy of sacral mass showed epithelial neoplasm.   April/June 2024 RT to sacrum.     Review of Systems   Constitutional:  Negative for chills and fever.   HENT:  Negative for nosebleeds.    Eyes:  Negative for discharge.   Respiratory:  Negative for cough and shortness of breath.    Cardiovascular:  Negative for chest pain.   Gastrointestinal:  Negative for abdominal pain, constipation and diarrhea.   Endocrine: Negative for polydipsia.   Genitourinary:  Negative for hematuria.   Musculoskeletal:  Negative for arthralgias.   Skin:  Negative for color change.   Allergic/Immunologic: Negative for immunocompromised state.   Neurological:  Negative for dizziness and headaches.   Hematological:  Negative for adenopathy.   Psychiatric/Behavioral:  Negative for agitation.            Objective   /77 (BP Location: Left arm, Patient Position: Sitting, Cuff Size: Standard)   Pulse 73   Temp 97.9 °F (36.6 °C) (Temporal)   Ht 5' 10\" (1.778 m)   Wt 70.4 kg (155 lb 3.2 oz)   SpO2 91%   BMI 22.27 kg/m²     Pain Screening:     ECOG ECOG Performance Status: 0 - Fully active, able to carry on all pre-disease performance without restriction   Physical Exam  Constitutional:       Appearance: He is well-developed.   HENT:      Head: Normocephalic and atraumatic.      Mouth/Throat:      Mouth: Mucous membranes are moist.     Eyes:      Pupils: Pupils are equal, round, and reactive to light.       Cardiovascular:      Rate and Rhythm: Normal rate and regular rhythm.      Heart sounds: No murmur heard.  Pulmonary:      Breath sounds: Normal breath sounds. No wheezing or rales.   Abdominal:      Palpations: Abdomen is soft.      Tenderness: There is no abdominal tenderness.     Musculoskeletal:         General: No tenderness. Normal range of motion.      Cervical back: Neck supple.   Lymphadenopathy:      Cervical: " No cervical adenopathy.     Skin:     Findings: No erythema or rash.     Neurological:      Mental Status: He is alert and oriented to person, place, and time.      Cranial Nerves: No cranial nerve deficit.      Deep Tendon Reflexes: Reflexes are normal and symmetric.     Psychiatric:         Behavior: Behavior normal.         Physical Exam             Results  Labs   - Iron Saturation: 03/2025, 2%   - Ferritin: 03/2025, 3   - Hemoglobin: 12.2   - White Count: 2.5   - Platelets: 219   - Calcium: 10.3   - IgG: Normal   - IgA: Normal   - IgM: Normal   - SPEP: Normal   - Free Light Chains: Minimally abnormal but stable over the past 15 months    Imaging   - CT sacrum: 03/2025, stable spot, no new spots

## 2025-07-11 NOTE — ASSESSMENT & PLAN NOTE
Orders:  •  CBC and differential; Future  •  Comprehensive metabolic panel; Future  •  IgG, IgA, IgM; Future  •  Immunoglobulin free LT chains blood; Future  •  Protein electrophoresis, serum; Future

## 2025-07-17 ENCOUNTER — OFFICE VISIT (OUTPATIENT)
Dept: FAMILY MEDICINE CLINIC | Facility: CLINIC | Age: 70
End: 2025-07-17
Payer: MEDICARE

## 2025-07-17 VITALS
RESPIRATION RATE: 20 BRPM | WEIGHT: 154 LBS | TEMPERATURE: 97.9 F | DIASTOLIC BLOOD PRESSURE: 74 MMHG | BODY MASS INDEX: 22.05 KG/M2 | HEART RATE: 84 BPM | SYSTOLIC BLOOD PRESSURE: 130 MMHG | HEIGHT: 70 IN

## 2025-07-17 DIAGNOSIS — I25.2 HISTORY OF NON-ST ELEVATION MYOCARDIAL INFARCTION (NSTEMI): Primary | ICD-10-CM

## 2025-07-17 DIAGNOSIS — E78.5 DYSLIPIDEMIA: ICD-10-CM

## 2025-07-17 DIAGNOSIS — I10 PRIMARY HYPERTENSION: ICD-10-CM

## 2025-07-17 DIAGNOSIS — F10.21 ALCOHOLISM IN REMISSION (HCC): ICD-10-CM

## 2025-07-17 DIAGNOSIS — K22.70 BARRETT'S ESOPHAGUS WITHOUT DYSPLASIA: ICD-10-CM

## 2025-07-17 DIAGNOSIS — C90.30 SOLITARY PLASMACYTOMA NOT HAVING ACHIEVED REMISSION (HCC): ICD-10-CM

## 2025-07-17 DIAGNOSIS — G62.9 NEUROPATHY: ICD-10-CM

## 2025-07-17 DIAGNOSIS — D63.8 ANEMIA IN OTHER CHRONIC DISEASES CLASSIFIED ELSEWHERE: ICD-10-CM

## 2025-07-17 DIAGNOSIS — E55.9 VITAMIN D DEFICIENCY: ICD-10-CM

## 2025-07-17 PROBLEM — J39.2 PHARYNGEAL SWELLING: Status: RESOLVED | Noted: 2022-11-23 | Resolved: 2025-07-17

## 2025-07-17 PROBLEM — R04.0 EPISTAXIS: Status: RESOLVED | Noted: 2025-07-11 | Resolved: 2025-07-17

## 2025-07-17 PROBLEM — Z87.19 HISTORY OF GI BLEED: Status: RESOLVED | Noted: 2022-11-27 | Resolved: 2025-07-17

## 2025-07-17 PROBLEM — C79.51 METASTASIS TO BONE (HCC): Status: ACTIVE | Noted: 2025-07-17

## 2025-07-17 PROCEDURE — 99214 OFFICE O/P EST MOD 30 MIN: CPT | Performed by: FAMILY MEDICINE

## 2025-07-17 PROCEDURE — G2211 COMPLEX E/M VISIT ADD ON: HCPCS | Performed by: FAMILY MEDICINE

## 2025-07-17 NOTE — ASSESSMENT & PLAN NOTE
Hematology oncology notes of April 4, 2025 reviewed and appreciated recent immunoglobulin studies are near normal stable over greater than 18 months.  Recent CT of the chest and pelvis showed stable sacral and T11 lesions no evidence of bony or soft tissue disease elsewhere

## 2025-07-17 NOTE — PROGRESS NOTES
Name: Alexander Olmstead      : 1955      MRN: [id number]  Encounter Provider: Blade Walton DO  Encounter Date: 2025   Encounter department: [facility] PRIMARY CARE  :  Assessment & Plan  History of non-ST elevation myocardial infarction (NSTEMI)  Currently without angina  Orders:    Lipid Panel with Direct LDL reflex    Nash's esophagus without dysplasia  Followed by gastroenterology anemia has improved       Anemia in other chronic diseases classified elsewhere        Orders:    CBC and differential    Solitary plasmacytoma not having achieved remission ([facility])  Hematology oncology notes of 2025 reviewed and appreciated recent immunoglobulin studies are near normal stable over greater than 18 months.  Recent CT of the chest and pelvis showed stable sacral and T11 lesions no evidence of bony or soft tissue disease elsewhere       Neuropathy  Treated with Neurontin 300 mg 3 times daily, Cymbalta 30 mg daily and B6 50 mg daily       Alcoholism in remission ([facility])         Primary hypertension  With blood pressure controlled on the current regimen  Orders:    CBC and differential    Comprehensive metabolic panel    Comprehensive metabolic panel    Vitamin D deficiency  Vitamin D level pending  Orders:    Vitamin D 25 hydroxy    Vitamin D 25 hydroxy    Dyslipidemia    Orders:    Lipid Panel with Direct LDL reflex           History of Present Illness   Dents for 4-month checkup history of myocardial infarction without angina anemia, esophagitis, leukopenia,    Angina history of Nash's esophagitis, history of anemia,  Review of Systems   Constitutional:  Negative for chills and fever.   HENT:  Negative for ear pain and sore throat.    Eyes:  Negative for pain and visual disturbance.   Respiratory:  Negative for cough and shortness of breath.    Cardiovascular:  Positive for leg swelling. Negative for chest pain and palpitations.   Gastrointestinal:  Negative for abdominal  "pain and vomiting.   Genitourinary:  Negative for dysuria and hematuria.   Musculoskeletal:  Negative for arthralgias and back pain.   Skin:  Negative for color change and rash.   Neurological:  Negative for seizures and syncope.   All other systems reviewed and are negative.      Objective   /74   Pulse 84   Temp 97.9 °F (36.6 °C)   Resp 20   Ht 5' 10\" (1.778 m)   Wt 69.9 kg (154 lb)   BMI 22.10 kg/m²      Physical Exam  Constitutional:       Appearance: Normal appearance.   HENT:      Head: Normocephalic and atraumatic.      Right Ear: Tympanic membrane, ear canal and external ear normal.      Left Ear: Tympanic membrane, ear canal and external ear normal.      Nose: Nose normal.      Mouth/Throat:      Mouth: Mucous membranes are moist.      Pharynx: Oropharynx is clear.     Eyes:      Extraocular Movements: Extraocular movements intact.      Conjunctiva/sclera: Conjunctivae normal.      Pupils: Pupils are equal, round, and reactive to light.       Cardiovascular:      Rate and Rhythm: Normal rate and regular rhythm.      Pulses: Normal pulses.      Heart sounds: Normal heart sounds.   Pulmonary:      Effort: Pulmonary effort is normal.      Breath sounds: Normal breath sounds.   Abdominal:      General: Abdomen is flat. Bowel sounds are normal.      Palpations: Abdomen is soft.     Musculoskeletal:         General: Normal range of motion.      Cervical back: Normal range of motion.      Right lower leg: Edema present.      Left lower leg: Edema present.     Skin:     General: Skin is warm and dry.      Capillary Refill: Capillary refill takes less than 2 seconds.     Neurological:      General: No focal deficit present.      Mental Status: He is alert and oriented to person, place, and time.     Psychiatric:         Mood and Affect: Mood normal.         Behavior: Behavior normal.         "

## 2025-07-17 NOTE — ASSESSMENT & PLAN NOTE
With blood pressure controlled on the current regimen  Orders:    CBC and differential    Comprehensive metabolic panel    Comprehensive metabolic panel

## 2025-07-29 ENCOUNTER — ANESTHESIA (OUTPATIENT)
Dept: PERIOP | Facility: HOSPITAL | Age: 70
End: 2025-07-29
Payer: MEDICARE

## 2025-07-29 ENCOUNTER — ANESTHESIA EVENT (OUTPATIENT)
Dept: PERIOP | Facility: HOSPITAL | Age: 70
End: 2025-07-29
Payer: MEDICARE

## 2025-07-29 ENCOUNTER — HOSPITAL ENCOUNTER (OUTPATIENT)
Dept: PERIOP | Facility: HOSPITAL | Age: 70
Setting detail: OUTPATIENT SURGERY
Discharge: HOME | End: 2025-07-29
Attending: INTERNAL MEDICINE
Payer: MEDICARE

## 2025-07-29 VITALS
SYSTOLIC BLOOD PRESSURE: 144 MMHG | WEIGHT: 155 LBS | OXYGEN SATURATION: 98 % | DIASTOLIC BLOOD PRESSURE: 71 MMHG | RESPIRATION RATE: 18 BRPM | HEART RATE: 55 BPM | HEIGHT: 70 IN | TEMPERATURE: 97.3 F | BODY MASS INDEX: 22.19 KG/M2

## 2025-07-29 DIAGNOSIS — K22.10 ULCER OF ESOPHAGUS WITHOUT BLEEDING: ICD-10-CM

## 2025-07-29 DIAGNOSIS — K44.9 HIATAL HERNIA: ICD-10-CM

## 2025-07-29 DIAGNOSIS — K22.70 BARRETT'S ESOPHAGUS WITHOUT DYSPLASIA: ICD-10-CM

## 2025-07-29 PROCEDURE — 88305 TISSUE EXAM BY PATHOLOGIST: CPT | Performed by: PATHOLOGY

## 2025-07-29 RX ORDER — PROPOFOL 10 MG/ML
INJECTION, EMULSION INTRAVENOUS AS NEEDED
Status: DISCONTINUED | OUTPATIENT
Start: 2025-07-29 | End: 2025-07-29

## 2025-07-29 RX ORDER — LIDOCAINE HYDROCHLORIDE 20 MG/ML
INJECTION, SOLUTION EPIDURAL; INFILTRATION; INTRACAUDAL; PERINEURAL AS NEEDED
Status: DISCONTINUED | OUTPATIENT
Start: 2025-07-29 | End: 2025-07-29

## 2025-07-29 RX ORDER — SODIUM CHLORIDE, SODIUM LACTATE, POTASSIUM CHLORIDE, CALCIUM CHLORIDE 600; 310; 30; 20 MG/100ML; MG/100ML; MG/100ML; MG/100ML
125 INJECTION, SOLUTION INTRAVENOUS CONTINUOUS
Status: DISCONTINUED | OUTPATIENT
Start: 2025-07-29 | End: 2025-08-02 | Stop reason: HOSPADM

## 2025-07-29 RX ORDER — SODIUM CHLORIDE, SODIUM LACTATE, POTASSIUM CHLORIDE, CALCIUM CHLORIDE 600; 310; 30; 20 MG/100ML; MG/100ML; MG/100ML; MG/100ML
INJECTION, SOLUTION INTRAVENOUS CONTINUOUS PRN
Status: DISCONTINUED | OUTPATIENT
Start: 2025-07-29 | End: 2025-07-29

## 2025-07-29 RX ADMIN — PROPOFOL 50 MG: 10 INJECTION, EMULSION INTRAVENOUS at 09:06

## 2025-07-29 RX ADMIN — LIDOCAINE HYDROCHLORIDE 100 MG: 20 INJECTION, SOLUTION EPIDURAL; INFILTRATION; INTRACAUDAL at 09:05

## 2025-07-29 RX ADMIN — PROPOFOL 150 MG: 10 INJECTION, EMULSION INTRAVENOUS at 09:05

## 2025-07-29 RX ADMIN — SODIUM CHLORIDE, SODIUM LACTATE, POTASSIUM CHLORIDE, AND CALCIUM CHLORIDE: .6; .31; .03; .02 INJECTION, SOLUTION INTRAVENOUS at 08:58

## 2025-07-29 RX ADMIN — SODIUM CHLORIDE, SODIUM LACTATE, POTASSIUM CHLORIDE, AND CALCIUM CHLORIDE 125 ML/HR: .6; .31; .03; .02 INJECTION, SOLUTION INTRAVENOUS at 08:01

## 2025-08-04 PROCEDURE — 88305 TISSUE EXAM BY PATHOLOGIST: CPT | Performed by: PATHOLOGY
